# Patient Record
Sex: FEMALE | Race: WHITE | NOT HISPANIC OR LATINO | Employment: FULL TIME | URBAN - METROPOLITAN AREA
[De-identification: names, ages, dates, MRNs, and addresses within clinical notes are randomized per-mention and may not be internally consistent; named-entity substitution may affect disease eponyms.]

---

## 2021-12-16 ENCOUNTER — PATIENT OUTREACH (OUTPATIENT)
Dept: ONCOLOGY | Facility: CLINIC | Age: 41
End: 2021-12-16
Payer: COMMERCIAL

## 2021-12-16 ENCOUNTER — TRANSCRIBE ORDERS (OUTPATIENT)
Dept: OTHER | Age: 41
End: 2021-12-16

## 2021-12-16 ENCOUNTER — DOCUMENTATION ONLY (OUTPATIENT)
Dept: ONCOLOGY | Facility: CLINIC | Age: 41
End: 2021-12-16

## 2021-12-16 DIAGNOSIS — C85.90 NHL (NON-HODGKIN'S LYMPHOMA) (H): Primary | ICD-10-CM

## 2021-12-16 NOTE — PROGRESS NOTES
Action December 16, 2021 10:06 AM ABT   Action Taken Called and spoke to patient in regards to CE records. Patient gave consent to pull records. CE updated and Image request sent to

## 2021-12-17 NOTE — PROGRESS NOTES
New Patient Hematology / Oncology Nurse Navigator Note     Referral Date: 12/16/21    Referral notes from call center:        Self referral for transfer of care for new dx of Hodgkins lymphoma. Medical records are at [address] [address]. Ph. [phone]. Referral from telephone call with the patient           Clinical History (per Nurse review of records provided):      12/13/21 Lymph node, left axilla, needle core biopsy at [facility] -- BOOKMARKED    12/4/21 [facility] office visit note -- BOOKMARKED    12/9/21 MM MAMMOGRAM DIAG BILAT W 3D BARB, MM US AXILLARY ONLY LT  LOCATION: [facility] --- BOOKMARKED    Clinical Assessment / Barriers to Care (Per Nurse):  Pt lives in [facility]      Records Location: Care Everywhere     Referral updates and Plan:   December 17, 2021 OUTGOING CALL to pt, no answer, left detailed VM: Introduced my role as nurse navigator with [facility] Hematology/Oncology dept and that we have recd the referral and she can call the number below to schedule consult, provided my contact information if questions, scheduling team to call pt today as well if she does not return my call to schedule..  Batsheva Walton, RN, BSN, OCN  Hematology/Oncology Nurse Navigator   [facility]  [phone]

## 2021-12-22 ENCOUNTER — ONCOLOGY VISIT (OUTPATIENT)
Dept: ONCOLOGY | Facility: CLINIC | Age: 41
End: 2021-12-22
Attending: INTERNAL MEDICINE
Payer: COMMERCIAL

## 2021-12-22 VITALS
HEART RATE: 109 BPM | HEIGHT: 67 IN | SYSTOLIC BLOOD PRESSURE: 114 MMHG | WEIGHT: 177 LBS | RESPIRATION RATE: 16 BRPM | OXYGEN SATURATION: 97 % | DIASTOLIC BLOOD PRESSURE: 76 MMHG | BODY MASS INDEX: 27.78 KG/M2

## 2021-12-22 DIAGNOSIS — C81.70 CLASSICAL HODGKIN LYMPHOMA (H): Primary | ICD-10-CM

## 2021-12-22 LAB
ALBUMIN SERPL-MCNC: 3.6 G/DL (ref 3.5–5)
ALP SERPL-CCNC: 86 U/L (ref 45–120)
ALT SERPL W P-5'-P-CCNC: <9 U/L (ref 0–45)
ANION GAP SERPL CALCULATED.3IONS-SCNC: 8 MMOL/L (ref 5–18)
AST SERPL W P-5'-P-CCNC: 10 U/L (ref 0–40)
BASOPHILS # BLD AUTO: 0 10E3/UL (ref 0–0.2)
BASOPHILS NFR BLD AUTO: 0 %
BILIRUB SERPL-MCNC: 0.2 MG/DL (ref 0–1)
BUN SERPL-MCNC: 16 MG/DL (ref 8–22)
CALCIUM SERPL-MCNC: 9.6 MG/DL (ref 8.5–10.5)
CHLORIDE BLD-SCNC: 106 MMOL/L (ref 98–107)
CO2 SERPL-SCNC: 26 MMOL/L (ref 22–31)
CREAT SERPL-MCNC: 0.85 MG/DL (ref 0.6–1.1)
EOSINOPHIL # BLD AUTO: 0.2 10E3/UL (ref 0–0.7)
EOSINOPHIL NFR BLD AUTO: 2 %
ERYTHROCYTE [DISTWIDTH] IN BLOOD BY AUTOMATED COUNT: 12.4 % (ref 10–15)
ERYTHROCYTE [SEDIMENTATION RATE] IN BLOOD BY WESTERGREN METHOD: 23 MM/HR (ref 0–20)
GFR SERPL CREATININE-BSD FRML MDRD: 87 ML/MIN/1.73M2
GLUCOSE BLD-MCNC: 100 MG/DL (ref 70–125)
HCT VFR BLD AUTO: 39.4 % (ref 35–47)
HGB BLD-MCNC: 12.7 G/DL (ref 11.7–15.7)
IMM GRANULOCYTES # BLD: 0 10E3/UL
IMM GRANULOCYTES NFR BLD: 0 %
LDH SERPL L TO P-CCNC: 190 U/L (ref 125–220)
LYMPHOCYTES # BLD AUTO: 1.6 10E3/UL (ref 0.8–5.3)
LYMPHOCYTES NFR BLD AUTO: 15 %
MCH RBC QN AUTO: 28.3 PG (ref 26.5–33)
MCHC RBC AUTO-ENTMCNC: 32.2 G/DL (ref 31.5–36.5)
MCV RBC AUTO: 88 FL (ref 78–100)
MONOCYTES # BLD AUTO: 0.8 10E3/UL (ref 0–1.3)
MONOCYTES NFR BLD AUTO: 7 %
NEUTROPHILS # BLD AUTO: 7.7 10E3/UL (ref 1.6–8.3)
NEUTROPHILS NFR BLD AUTO: 76 %
NRBC # BLD AUTO: 0 10E3/UL
NRBC BLD AUTO-RTO: 0 /100
PLATELET # BLD AUTO: 424 10E3/UL (ref 150–450)
POTASSIUM BLD-SCNC: 4.3 MMOL/L (ref 3.5–5)
PROT SERPL-MCNC: 7.6 G/DL (ref 6–8)
RBC # BLD AUTO: 4.49 10E6/UL (ref 3.8–5.2)
SODIUM SERPL-SCNC: 140 MMOL/L (ref 136–145)
WBC # BLD AUTO: 10.2 10E3/UL (ref 4–11)

## 2021-12-22 PROCEDURE — 99205 OFFICE O/P NEW HI 60 MIN: CPT | Performed by: INTERNAL MEDICINE

## 2021-12-22 PROCEDURE — 80053 COMPREHEN METABOLIC PANEL: CPT | Performed by: INTERNAL MEDICINE

## 2021-12-22 PROCEDURE — 85025 COMPLETE CBC W/AUTO DIFF WBC: CPT | Performed by: INTERNAL MEDICINE

## 2021-12-22 PROCEDURE — 85652 RBC SED RATE AUTOMATED: CPT | Performed by: INTERNAL MEDICINE

## 2021-12-22 PROCEDURE — 83615 LACTATE (LD) (LDH) ENZYME: CPT | Performed by: INTERNAL MEDICINE

## 2021-12-22 PROCEDURE — 36415 COLL VENOUS BLD VENIPUNCTURE: CPT | Performed by: INTERNAL MEDICINE

## 2021-12-22 PROCEDURE — G0463 HOSPITAL OUTPT CLINIC VISIT: HCPCS

## 2021-12-22 RX ORDER — ESCITALOPRAM OXALATE 10 MG/1
1 TABLET ORAL DAILY
COMMUNITY
Start: 2021-08-03 | End: 2022-03-16

## 2021-12-22 RX ORDER — LORATADINE 10 MG/1
10 TABLET ORAL
COMMUNITY

## 2021-12-22 RX ORDER — SPIRONOLACTONE 50 MG/1
1 TABLET, FILM COATED ORAL 2 TIMES DAILY
COMMUNITY
Start: 2020-10-14 | End: 2022-03-16

## 2021-12-22 RX ORDER — DROSPIRENONE AND ETHINYL ESTRADIOL 0.02-3(28)
1 KIT ORAL DAILY
COMMUNITY
Start: 2021-08-03 | End: 2022-03-03

## 2021-12-22 RX ORDER — IRON,CARB/VIT C/VIT B12/FOLIC 100-250-1
1 TABLET ORAL DAILY
COMMUNITY

## 2021-12-22 ASSESSMENT — PAIN SCALES - GENERAL: PAINLEVEL: MILD PAIN (3)

## 2021-12-22 ASSESSMENT — MIFFLIN-ST. JEOR: SCORE: 1495.5

## 2021-12-22 NOTE — PROGRESS NOTES
"Oncology Rooming Note    December 22, 2021 1:58 PM   Amna Oneil is a 41 year old female who presents for:    Chief Complaint   Patient presents with     Oncology Clinic Visit     mew consult, non-hodgkins lymphoma      Initial Vitals: /76 (BP Location: Right arm, Patient Position: Sitting, Cuff Size: Adult Regular)   Pulse 109   Resp 16   Ht 1.702 m (5' 7\")   Wt 80.3 kg (177 lb)   SpO2 97%   BMI 27.72 kg/m   Estimated body mass index is 27.72 kg/m  as calculated from the following:    Height as of this encounter: 1.702 m (5' 7\").    Weight as of this encounter: 80.3 kg (177 lb). Body surface area is 1.95 meters squared.  Mild Pain (3) Comment: Data Unavailable   No LMP recorded.  Allergies reviewed: Yes  Medications reviewed: Yes    Medications: Medication refills not needed today.  Pharmacy name entered into [employer]: [facility] - [place], [place] - [address]    Clinical concerns:  New consult     Brenda Colin            "

## 2021-12-22 NOTE — PROGRESS NOTES
[facility] Hematology and Oncology Consult Note    Patient: Amna Oneil  MRN: [id number]  Date of Service: Dec 22, 2021       Reason for Visit    Chief Complaint   Patient presents with     Oncology Clinic Visit     mew consult, hodgkins lymphoma          Assessment/Plan    #.  Classical Hodgkin lymphoma of left axillary lymph node by core needle biopsy.   Reviewed the clinical course.  I reviewed the general information about lymphoma, Hodgkin lymphoma, resources for her to review, potential treatment options depending on the stage of the disease.   Recommended the pathology consultation at [place] as well.  I explained to her that the pathology diagnosis based on core needle biopsy could be adequate, however in some situation, we will need excisional biopsy for definitive diagnosis.  She voiced understanding.  We will expedite the pathology consult.   Will obtain PET/CT scan for staging and additional labs as below.   Follow-up with me after completion of PET scan and pathology evaluation.       ECOG Performance 0 - Independent    Encounter Diagnoses:    Problem List Items Addressed This Visit     None      Visit Diagnoses     Classical Hodgkin lymphoma (H)    -  Primary    Relevant Orders    CBC with Platelets & Differential (Completed)    Comprehensive metabolic panel (Completed)    Lactate Dehydrogenase (Completed)    ESR: Erythrocyte sedimentation rate (Completed)    Surgical Pathology Exam            ______________________________________________________________________________    Staging History  Cancer Staging  No matching staging information was found for the patient.      History    Ms. Amna Oneil is a very pleasant 41 year old presented today by her spouse, Christian.    She self palpated swollen lymph nodes in the left axilla in end of November and seek attention in early December 2021.  Feels it was felt to be related to Covid booster vaccination which she was on 11/16/2021.  However  she noted the axillary lymphadenopathy on the left side and the vaccine was given on the right shoulder.  She has wheezed with some shortness of breath about a few weeks duration prior to presentation.  She described shortness of breath as she is easily winded with activity. No chest pain. No drenching sweats, no fever.  She underwent diagnostic exam, ultrasound and left axillary lymph node biopsy.  Core needle biopsy of the left axillary lymph showed classical Hodgkin lymphoma.    She had left neck adenopathy evaluated 3 years ago was benign.  She had colonoscopy in November 2021 due to blood in stool for 6 months and found hemorrhoids.  She is post appendectomy about 10 years ago.  She does not have chronic major medical problems.    She does not smoke.  She occasionally drinks wine about 1 a month.  She uses edibles marijuana infrequently.  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  They have a 6-year-old child and he has 2 stepsons.  Her family is complete now and no plan for childbearing..    Family history is significant for multiple myeloma in maternal aunt, amyloidosis and maternal grandmother, breast cancer in maternal aunt, bladder cancer in paternal grandfather.      Review of systems.  Apart from describing in history, the remainder of comprehensive ROS was negative.    Past History    No past medical history on file.  No past surgical history on file.  No family history on file.  Social History     Socioeconomic History     Marital status:      Spouse name: None     Number of children: None     Years of education: None     Highest education level: None   Occupational History     None   Tobacco Use     Smoking status: Never Smoker     Smokeless tobacco: Never Used   Substance and Sexual Activity     Alcohol use: Not Currently     Drug use: Yes     Types: Marijuana     Comment: edibles     Sexual activity: None   Other Topics Concern     None   Social History Narrative     None     Social  "Determinants of Health     Financial Resource Strain: Not on file   Food Insecurity: Not on file   Transportation Needs: Not on file   Physical Activity: Not on file   Stress: Not on file   Social Connections: Not on file   Intimate Partner Violence: Not on file   Housing Stability: Not on file         Allergies    Allergies   Allergen Reactions     Avocado GI Disturbance         Physical Exam    /76 (BP Location: Right arm, Patient Position: Sitting, Cuff Size: Adult Regular)   Pulse 109   Resp 16   Ht 1.702 m (5' 7\")   Wt 80.3 kg (177 lb)   SpO2 97%   BMI 27.72 kg/m        General: alert, awake, not in acute distress  HEENT: Head: Normal, normocephalic, atraumatic.  Eye: Normal external eye, conjunctiva, lids cornea, PATRICIA.  Nose: Normal external nose, mucus membranes and septum.  Pharynx: Normal buccal mucosa. Normal pharynx.  Neck / Thyroid: Supple, no masses, nodes, nodules or enlargement.  Lymphatics: small rubbery palpable adenopathy in bilateral axilla.  About a pea-sized firm lymph node in the anterior cervical chain.    Chest: Normal chest wall and respirations. Clear to auscultation.  Heart: S1 S2 RRR, no murmur.   Abdomen: abdomen is soft without significant tenderness, masses, organomegaly or guarding  Extremities: normal strength, tone, and muscle mass  Skin: normal. no rash or abnormalities  CNS: non focal.      Lab Results    Recent Results (from the past 168 hour(s))   Comprehensive metabolic panel   Result Value Ref Range    Sodium 140 136 - 145 mmol/L    Potassium 4.3 3.5 - 5.0 mmol/L    Chloride 106 98 - 107 mmol/L    Carbon Dioxide (CO2) 26 22 - 31 mmol/L    Anion Gap 8 5 - 18 mmol/L    Urea Nitrogen 16 8 - 22 mg/dL    Creatinine 0.85 0.60 - 1.10 mg/dL    Calcium 9.6 8.5 - 10.5 mg/dL    Glucose 100 70 - 125 mg/dL    Alkaline Phosphatase 86 45 - 120 U/L    AST 10 0 - 40 U/L    ALT <9 0 - 45 U/L    Protein Total 7.6 6.0 - 8.0 g/dL    Albumin 3.6 3.5 - 5.0 g/dL    Bilirubin Total 0.2 0.0 " - 1.0 mg/dL    GFR Estimate 87 >60 mL/min/1.73m2   Lactate Dehydrogenase   Result Value Ref Range    Lactate Dehydrogenase 190 125 - 220 U/L   ESR: Erythrocyte sedimentation rate   Result Value Ref Range    Erythrocyte Sedimentation Rate 23 (H) 0 - 20 mm/hr   CBC with platelets and differential   Result Value Ref Range    WBC Count 10.2 4.0 - 11.0 10e3/uL    RBC Count 4.49 3.80 - 5.20 10e6/uL    Hemoglobin 12.7 11.7 - 15.7 g/dL    Hematocrit 39.4 35.0 - 47.0 %    MCV 88 78 - 100 fL    MCH 28.3 26.5 - 33.0 pg    MCHC 32.2 31.5 - 36.5 g/dL    RDW 12.4 10.0 - 15.0 %    Platelet Count 424 150 - 450 10e3/uL    % Neutrophils 76 %    % Lymphocytes 15 %    % Monocytes 7 %    % Eosinophils 2 %    % Basophils 0 %    % Immature Granulocytes 0 %    NRBCs per 100 WBC 0 <1 /100    Absolute Neutrophils 7.7 1.6 - 8.3 10e3/uL    Absolute Lymphocytes 1.6 0.8 - 5.3 10e3/uL    Absolute Monocytes 0.8 0.0 - 1.3 10e3/uL    Absolute Eosinophils 0.2 0.0 - 0.7 10e3/uL    Absolute Basophils 0.0 0.0 - 0.2 10e3/uL    Absolute Immature Granulocytes 0.0 <=0.4 10e3/uL    Absolute NRBCs 0.0 10e3/uL   Glucose by meter   Result Value Ref Range    GLUCOSE BY METER POCT 102 (H) 70 - 99 mg/dL       Imaging Results      Signed by: Juan Lizarraga MD

## 2021-12-22 NOTE — LETTER
"    12/22/2021         RE: Amna Oneil  [address]  [place] [address]        Dear Colleague,    Thank you for referring your patient, Amna Oneil, to the [facility] [place]. Please see a copy of my visit note below.    Oncology Rooming Note    December 22, 2021 1:58 PM   Amna Oneil is a 41 year old female who presents for:    Chief Complaint   Patient presents with     Oncology Clinic Visit     mew consult, non-hodgkins lymphoma      Initial Vitals: /76 (BP Location: Right arm, Patient Position: Sitting, Cuff Size: Adult Regular)   Pulse 109   Resp 16   Ht 1.702 m (5' 7\")   Wt 80.3 kg (177 lb)   SpO2 97%   BMI 27.72 kg/m   Estimated body mass index is 27.72 kg/m  as calculated from the following:    Height as of this encounter: 1.702 m (5' 7\").    Weight as of this encounter: 80.3 kg (177 lb). Body surface area is 1.95 meters squared.  Mild Pain (3) Comment: Data Unavailable   No LMP recorded.  Allergies reviewed: Yes  Medications reviewed: Yes    Medications: Medication refills not needed today.  Pharmacy name entered into [employer]: [facility] -- [place] - [place], [place] - [address]    Clinical concerns:  New consult     Brenda Colin              [facility] Hematology and Oncology Consult Note    Patient: Amna Oneil  MRN: [id number]  Date of Service: Dec 22, 2021       Reason for Visit    Chief Complaint   Patient presents with     Oncology Clinic Visit     mew consult, hodgkins lymphoma          Assessment/Plan    #.  Classical Hodgkin lymphoma of left axillary lymph node by core needle biopsy.   Reviewed the clinical course.  I reviewed the general information about lymphoma, Hodgkin lymphoma, resources for her to review, potential treatment options depending on the stage of the disease.   Recommended the pathology consultation at [place] as well.  I explained to her that the pathology diagnosis based on core needle " biopsy could be adequate, however in some situation, we will need excisional biopsy for definitive diagnosis.  She voiced understanding.  We will expedite the pathology consult.   Will obtain PET/CT scan for staging and additional labs as below.   Follow-up with me after completion of PET scan and pathology evaluation.       ECOG Performance 0 - Independent    Encounter Diagnoses:    Problem List Items Addressed This Visit     None      Visit Diagnoses     Classical Hodgkin lymphoma (H)    -  Primary    Relevant Orders    CBC with Platelets & Differential (Completed)    Comprehensive metabolic panel (Completed)    Lactate Dehydrogenase (Completed)    ESR: Erythrocyte sedimentation rate (Completed)    Surgical Pathology Exam            ______________________________________________________________________________    Staging History  Cancer Staging  No matching staging information was found for the patient.      History    Ms. Amna Oneil is a very pleasant 41 year old presented today by her spouse, Christian.    She self palpated swollen lymph nodes in the left axilla in end of November and seek attention in early December 2021.  Feels it was felt to be related to Covid booster vaccination which she was on 11/16/2021.  However she noted the axillary lymphadenopathy on the left side and the vaccine was given on the right shoulder.  She has wheezed with some shortness of breath about a few weeks duration prior to presentation.  She described shortness of breath as she is easily winded with activity. No chest pain. No drenching sweats, no fever.  She underwent diagnostic exam, ultrasound and left axillary lymph node biopsy.  Core needle biopsy of the left axillary lymph showed classical Hodgkin lymphoma.    She had left neck adenopathy evaluated 3 years ago was benign.  She had colonoscopy in November 2021 due to blood in stool for 6 months and found hemorrhoids.  She is post appendectomy about 10 years ago.  She does  "not have chronic major medical problems.    She does not smoke.  She occasionally drinks wine about 1 a month.  She uses edibles marijuana infrequently.  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  They have a 6-year-old child and he has 2 stepsons.  Her family is complete now and no plan for childbearing..    Family history is significant for multiple myeloma in maternal aunt, amyloidosis and maternal grandmother, breast cancer in maternal aunt, bladder cancer in paternal grandfather.      Review of systems.  Apart from describing in history, the remainder of comprehensive ROS was negative.    Past History    No past medical history on file.  No past surgical history on file.  No family history on file.  Social History     Socioeconomic History     Marital status:      Spouse name: None     Number of children: None     Years of education: None     Highest education level: None   Occupational History     None   Tobacco Use     Smoking status: Never Smoker     Smokeless tobacco: Never Used   Substance and Sexual Activity     Alcohol use: Not Currently     Drug use: Yes     Types: Marijuana     Comment: edibles     Sexual activity: None   Other Topics Concern     None   Social History Narrative     None     Social Determinants of Health     Financial Resource Strain: Not on file   Food Insecurity: Not on file   Transportation Needs: Not on file   Physical Activity: Not on file   Stress: Not on file   Social Connections: Not on file   Intimate Partner Violence: Not on file   Housing Stability: Not on file         Allergies    Allergies   Allergen Reactions     Avocado GI Disturbance         Physical Exam    /76 (BP Location: Right arm, Patient Position: Sitting, Cuff Size: Adult Regular)   Pulse 109   Resp 16   Ht 1.702 m (5' 7\")   Wt 80.3 kg (177 lb)   SpO2 97%   BMI 27.72 kg/m        General: alert, awake, not in acute distress  HEENT: Head: Normal, normocephalic, atraumatic.  Eye: Normal " external eye, conjunctiva, lids cornea, PATRICIA.  Nose: Normal external nose, mucus membranes and septum.  Pharynx: Normal buccal mucosa. Normal pharynx.  Neck / Thyroid: Supple, no masses, nodes, nodules or enlargement.  Lymphatics: small rubbery palpable adenopathy in bilateral axilla.  About a pea-sized firm lymph node in the anterior cervical chain.    Chest: Normal chest wall and respirations. Clear to auscultation.  Heart: S1 S2 RRR, no murmur.   Abdomen: abdomen is soft without significant tenderness, masses, organomegaly or guarding  Extremities: normal strength, tone, and muscle mass  Skin: normal. no rash or abnormalities  CNS: non focal.      Lab Results    Recent Results (from the past 168 hour(s))   Comprehensive metabolic panel   Result Value Ref Range    Sodium 140 136 - 145 mmol/L    Potassium 4.3 3.5 - 5.0 mmol/L    Chloride 106 98 - 107 mmol/L    Carbon Dioxide (CO2) 26 22 - 31 mmol/L    Anion Gap 8 5 - 18 mmol/L    Urea Nitrogen 16 8 - 22 mg/dL    Creatinine 0.85 0.60 - 1.10 mg/dL    Calcium 9.6 8.5 - 10.5 mg/dL    Glucose 100 70 - 125 mg/dL    Alkaline Phosphatase 86 45 - 120 U/L    AST 10 0 - 40 U/L    ALT <9 0 - 45 U/L    Protein Total 7.6 6.0 - 8.0 g/dL    Albumin 3.6 3.5 - 5.0 g/dL    Bilirubin Total 0.2 0.0 - 1.0 mg/dL    GFR Estimate 87 >60 mL/min/1.73m2   Lactate Dehydrogenase   Result Value Ref Range    Lactate Dehydrogenase 190 125 - 220 U/L   ESR: Erythrocyte sedimentation rate   Result Value Ref Range    Erythrocyte Sedimentation Rate 23 (H) 0 - 20 mm/hr   CBC with platelets and differential   Result Value Ref Range    WBC Count 10.2 4.0 - 11.0 10e3/uL    RBC Count 4.49 3.80 - 5.20 10e6/uL    Hemoglobin 12.7 11.7 - 15.7 g/dL    Hematocrit 39.4 35.0 - 47.0 %    MCV 88 78 - 100 fL    MCH 28.3 26.5 - 33.0 pg    MCHC 32.2 31.5 - 36.5 g/dL    RDW 12.4 10.0 - 15.0 %    Platelet Count 424 150 - 450 10e3/uL    % Neutrophils 76 %    % Lymphocytes 15 %    % Monocytes 7 %    % Eosinophils 2 %    %  Basophils 0 %    % Immature Granulocytes 0 %    NRBCs per 100 WBC 0 <1 /100    Absolute Neutrophils 7.7 1.6 - 8.3 10e3/uL    Absolute Lymphocytes 1.6 0.8 - 5.3 10e3/uL    Absolute Monocytes 0.8 0.0 - 1.3 10e3/uL    Absolute Eosinophils 0.2 0.0 - 0.7 10e3/uL    Absolute Basophils 0.0 0.0 - 0.2 10e3/uL    Absolute Immature Granulocytes 0.0 <=0.4 10e3/uL    Absolute NRBCs 0.0 10e3/uL   Glucose by meter   Result Value Ref Range    GLUCOSE BY METER POCT 102 (H) 70 - 99 mg/dL       Imaging Results      Signed by: Juan Lizarraga MD         Again, thank you for allowing me to participate in the care of your patient.        Sincerely,        Juan Lizarraga MD

## 2021-12-23 ENCOUNTER — HOSPITAL ENCOUNTER (OUTPATIENT)
Dept: PET IMAGING | Facility: HOSPITAL | Age: 41
Discharge: HOME | End: 2021-12-23
Attending: INTERNAL MEDICINE | Admitting: INTERNAL MEDICINE
Payer: COMMERCIAL

## 2021-12-23 DIAGNOSIS — C81.70 CLASSICAL HODGKIN LYMPHOMA (H): ICD-10-CM

## 2021-12-23 LAB — GLUCOSE BLDC GLUCOMTR-MCNC: 102 MG/DL (ref 70–99)

## 2021-12-23 PROCEDURE — 250N000011 HC RX IP 250 OP 636: Performed by: INTERNAL MEDICINE

## 2021-12-23 PROCEDURE — 71260 CT THORAX DX C+: CPT | Mod: 26 | Performed by: STUDENT IN AN ORGANIZED HEALTH CARE EDUCATION/TRAINING PROGRAM

## 2021-12-23 PROCEDURE — 71260 CT THORAX DX C+: CPT | Mod: 59,PI

## 2021-12-23 PROCEDURE — 82962 GLUCOSE BLOOD TEST: CPT

## 2021-12-23 PROCEDURE — A9552 F18 FDG: HCPCS | Performed by: INTERNAL MEDICINE

## 2021-12-23 PROCEDURE — 74177 CT ABD & PELVIS W/CONTRAST: CPT | Mod: 26 | Performed by: STUDENT IN AN ORGANIZED HEALTH CARE EDUCATION/TRAINING PROGRAM

## 2021-12-23 PROCEDURE — 78816 PET IMAGE W/CT FULL BODY: CPT | Mod: 26 | Performed by: STUDENT IN AN ORGANIZED HEALTH CARE EDUCATION/TRAINING PROGRAM

## 2021-12-23 PROCEDURE — 343N000001 HC RX 343: Performed by: INTERNAL MEDICINE

## 2021-12-23 RX ORDER — IOPAMIDOL 755 MG/ML
86 INJECTION, SOLUTION INTRAVASCULAR ONCE
Status: COMPLETED | OUTPATIENT
Start: 2021-12-23 | End: 2021-12-23

## 2021-12-23 RX ADMIN — FLUDEOXYGLUCOSE F-18 10.65 MCI.: 500 INJECTION, SOLUTION INTRAVENOUS at 10:09

## 2021-12-23 RX ADMIN — IOPAMIDOL 86 ML: 755 INJECTION, SOLUTION INTRAVENOUS at 11:11

## 2021-12-28 ENCOUNTER — TELEPHONE (OUTPATIENT)
Dept: ONCOLOGY | Facility: CLINIC | Age: 41
End: 2021-12-28

## 2021-12-28 ENCOUNTER — E-VISIT (OUTPATIENT)
Dept: URGENT CARE | Facility: URGENT CARE | Age: 41
End: 2021-12-28
Payer: COMMERCIAL

## 2021-12-28 DIAGNOSIS — Z20.822 CLOSE EXPOSURE TO 2019 NOVEL CORONAVIRUS: Primary | ICD-10-CM

## 2021-12-28 PROCEDURE — 99421 OL DIG E/M SVC 5-10 MIN: CPT | Performed by: PHYSICIAN ASSISTANT

## 2021-12-28 NOTE — PATIENT INSTRUCTIONS
Dear Amna,    Based on your exposure to COVID-19 (coronavirus), we would like to test you for this virus. I have placed an order for this test. The best time for testing is 5-7 days after the exposure.    How to schedule:  Go to your [facility] home page and scroll down to the section that says  You have an appointment that needs to be scheduled  and click the large green button that says  Schedule Now  and follow the steps to find the next available opening.     If you are unable to complete these [facility] scheduling steps, please call [phone] to schedule your testing.     Monoclonal antibody treatment after exposure:  Because you have been exposed to COVID-19, you may be able to receive a treatment with monoclonal antibodies. This treatment can lower your risk of severe illness and going to the hospital. It is given through an IV or under your skin (subcutaneous) and must be given at an infusion center.   To be eligible, you must be 12 years or older, at least 88 pounds and:    Are not fully vaccinated against COVID-19, OR    Are immunocompromised     If you would like to sign up to be considered to receive the monoclonal antibody medicine, please complete a participation form through the [facility] here:  MNRAP ([email or web address]/[email or web address]/[email or web address]). You may also call the [facility] COVID-19 Public Hotline at 1-903.265.6270 (open Mon-Fri: 9am-7pm and Sat: 10am-6pm).     Not all people who are eligible will receive the medicine since supply is limited. You will be contacted in the next 1 to 2 business days only if you are selected. If you do not receive a call, you have not been selected to receive the medicine. If you have any questions about this medication, please contact your primary care provider. For more information, see [email or web address]/[email or web address]/[email or web address]/[email or web address]    Return to work/school/ guidance:   Please let your workplace manager and  staffing office know when your quarantine ends. We cannot give you an exact date as it depends on the information below. You can calculate this on your own or work with your manager/staffing office to calculate this.    Quarantine Guidelines:  You are considered exposed if you have been within 6 feet of an infected person(s) for 15 minutes or more over a 24-hour period. Quarantine will start after the LAST time you had contact with the infected person while they were contagious (for example, if you saw someone on Monday and Wednesday, your quarantine would start after Wednesday).     If you have NO symptoms (asymptomatic):    Stay home for 14 days (quarantine) after your LAST contact with a person who has COVID-19 (this remains the CDC recommendation for greatest protection against spread of COVID-19), OR    10-day quarantine with no test, OR    Minimum 7-day quarantine with negative RT-PCR test collected on day 5 or later    Quarantine Guideline exceptions:    People who have been fully vaccinated do not need to quarantine unless they have symptoms. You are considered fully vaccinated 2 weeks after your 2nd dose in a 2-dose series or 2 weeks after a single-dose series. This includes vaccinated health care workers.  o Fully vaccinated people should still get tested 5-7 days after exposure, even if they don t have symptoms.   Note: If you have ongoing exposure to the COVID-positive person, this quarantine period may be more than 14 days. For example, if you continue to be exposed to your child who tested positive and cannot isolate from them, then the quarantine of 7-14 days can't start until your child is no longer contagious. This is typically 10 days from onset of the child's symptoms. So, the total duration may be 17-24 days in this case.   Please contact your doctor if you have questions or call the [facility] Public Hotline: 1-215.938.7789 (Mon-Fri: 9am-7pm and Sat: 10am-6pm).     How to Quarantine:     Monitor your  symptoms until 14 days after your last exposure. If you develop signs or symptoms, isolate and get tested (even if you have been tested already).    Stay home and away from others. Don't go to school or anywhere else. Generally, quarantine means staying home from work but there are some exceptions to this. Please contact your workplace. Cover your mouth and nose with a face covering if you must be around other people.     Wash your hands and face often. Use soap and water.    What are the symptoms of COVID-19?  The most common symptoms are cough, fever and trouble breathing. Less common symptoms include headache, body aches, fatigue (feeling very tired), chills, sore throat, stuffy or runny nose, diarrhea (loose poop), loss of taste or smell, belly pain, and nausea or vomiting (feeling sick to your stomach or throwing up).  If you develop symptoms, follow these guidelines:    If you're normally healthy: Please start another eVisit.    If you have a serious health problem (like cancer, heart failure, an organ transplant or kidney disease): Call your specialty clinic. Let them know that you might have COVID-19.    Where can I get more information?    [facility] - About COVID-19: [email or web address]     CDC - What to Do If You're Sick:     www.cdc.gov/coronavirus/2019-ncov/about/steps-when-sick.html    CDC - Ending Home Isolation:  https://www.cdc.gov/coronavirus/2019-ncov/your-health/quarantine-isolation.html    CDC - Caring for Someone:  www.cdc.gov/coronavirus/2019-ncov/if-you-are-sick/care-for-someone.html    [facility] clinical trials (COVID-19 research studies): clinicalaffairs.[facility]/umn-clinical-trials    Below are the COVID-19 hotlines at the [facility] of Health ([facility]). Interpreters are available.  o For health questions: Call 209-470-7385 or 1-243.264.4260 (7 am to 7 pm)  o For questions about schools and childcare: Call 222-068-2722 or 1-729.519.2225 (7 a.m. to 7  "p.m.)    December 28, 2021  RE:  Amna Oneil                                                                                                                   [address] [place] [address]      To whom it may concern:    I evaluated Amna Oneil on December 28, 2021. Amna Oneil should be excused from work/school.    They should let their workplace manager and staffing office know when their quarantine ends.    We can not give an exact date as it depends on the information below. They can calculate this on their own or work with their manager/staffing office to calculate this. (For example if they were exposed on 10/04, they would have to quarantine for 14 full days. That would be through 10/18. They could return on 10/19.)    Quarantine Guidelines:    Patients (\"contacts\") who have been in close prolonged contact of an infected person(s) (within six feet for at least 15 minutes within a 24 hour period) and remain asymptomatic should enter quarantine based on the following options:      14-day quarantine period (this remains the CDC recommendation for the greatest protection against spread of COVID-19) OR    Minimum 7-day quarantine with negative RT-PCR test collected on day 5 or later OR    10-day quarantine with no test   Quarantine Guideline exceptions are as follows:    People who have been fully vaccinated do not need to quarantine if the exposure was at least 2 weeks after the last vaccination. This includes vaccinated health care workers.    Not fully vaccinated and unvaccinated Individuals who work in health care, congregate care, or congregate living should be off work for 14 days from their last date of exposure. Community activities for this group can be resumed based on options above. Fully vaccinated individuals in this group do not need to quarantine from work after exposure.    Not fully vaccinated and unvaccinated people whose high-risk exposure was a household member should " always quarantine for 14 days from their last date of exposure. Fully vaccinated people in this category do not need to quarantine.    Not fully vaccinated or unvaccinated residents of congregate care and congregate living settings should always quarantine for 14 days from their last date of exposure. Fully vaccinated residents do not need to quarantine.    Note: If there is ongoing exposure to the covid positive person, this quarantine period may be longer than 14 days. (For example, if they are continually exposed to their child, who tested positive and cannot isolate from them, then the quarantine of 7-14 days can't start until their child is no longer contagious. This is typically 10 days from onset to the child's symptoms. So the total duration may be 17-24 days in this case.)    Amna Oneil should continue symptom monitoring until day 14 post-exposure. If they develop signs or symptoms of COVID-19, they should isolate and get tested (even if they have been tested already).    Sincerely,  Miguel Angel Galindo PA-C

## 2021-12-28 NOTE — TELEPHONE ENCOUNTER
Called [facility] and spoke with Sylvia ([phone], option 1) to follow up and see if the path slides were sent to [facility] yet for a second opinion read. She said that they were sent out yesterday to [facility] in [place] for review. They were requested by the hematology department (MD not listed) and patient has a consult on 1/7 at [place]. Dr. Lizarraga will be updated/ Indira Caro RN

## 2021-12-29 ENCOUNTER — TELEPHONE (OUTPATIENT)
Dept: ONCOLOGY | Facility: CLINIC | Age: 41
End: 2021-12-29
Payer: COMMERCIAL

## 2021-12-29 ENCOUNTER — LAB (OUTPATIENT)
Dept: LAB | Facility: CLINIC | Age: 41
End: 2021-12-29
Attending: PHYSICIAN ASSISTANT
Payer: COMMERCIAL

## 2021-12-29 DIAGNOSIS — Z20.822 CLOSE EXPOSURE TO 2019 NOVEL CORONAVIRUS: ICD-10-CM

## 2021-12-29 LAB — SARS-COV-2 RNA RESP QL NAA+PROBE: NEGATIVE

## 2021-12-29 PROCEDURE — U0003 INFECTIOUS AGENT DETECTION BY NUCLEIC ACID (DNA OR RNA); SEVERE ACUTE RESPIRATORY SYNDROME CORONAVIRUS 2 (SARS-COV-2) (CORONAVIRUS DISEASE [COVID-19]), AMPLIFIED PROBE TECHNIQUE, MAKING USE OF HIGH THROUGHPUT TECHNOLOGIES AS DESCRIBED BY CMS-2020-01-R: HCPCS

## 2021-12-29 PROCEDURE — U0005 INFEC AGEN DETEC AMPLI PROBE: HCPCS

## 2021-12-29 NOTE — TELEPHONE ENCOUNTER
Patient scheduled to see Dr. Lizarraga tomorrow, 12/30 to go over PET results and Pathology 2nd opinion.  Pathology 2nd opinion not completed as slides are at [facility]. Patient has consult on 1/7.     Dr. Lizarraga said patient may come as scheduled tomorrow to discuss PET results or reschedule until after follow-up with [facility]. Called and left message for patient to return my call/XENA Gonzales RN     Patient called back. I informed her that [facility] has her slides so unable to do 2nd opinion at [facility] until slides returned to [facility].  She said that she is planning to cancel her Jan 7 appt at [facility] and will do so after our phone call. She said she would like to keep her appointment with Dr. Lizarraga tomorrow to go over PET scan results. She changed to virtual visit as she recently was exposed to Covid.    Called [facility] ([phone], option 1) and talked with Monalisa to see how to get slides back from [facility]. Monalisa said slides were sent to [facility] on 12/21 and they should have received next day.  She said [facility] sends slides back automatically when they are done. She said once they receive the slides back, they can work on our request. She asked that I fax the requisition to them again to [phone].     Patient called back and said she canceled her appointment at [facility] and they are requesting [facility] call [phone] to request slides back.  Called this number and was told slides are internal and release would need to be requested in order to release slides.      I called Monalisa back at [facility] and notified her that release needs to be signed in order to release slides.  Monalisa said that they never have to call to get slides returned back, [facility] just automatically sends them back. She said that she will follow-up with [facility].  Faxed requisition at [phone].  Right fax confirmed faxed.    Dr. Lizarraga updated/XENA Gonzales, RN

## 2021-12-30 ENCOUNTER — VIRTUAL VISIT (OUTPATIENT)
Dept: ONCOLOGY | Facility: CLINIC | Age: 41
End: 2021-12-30
Attending: INTERNAL MEDICINE
Payer: COMMERCIAL

## 2021-12-30 VITALS — WEIGHT: 175 LBS | BODY MASS INDEX: 27.41 KG/M2

## 2021-12-30 DIAGNOSIS — C81.70 CLASSICAL HODGKIN LYMPHOMA (H): Primary | ICD-10-CM

## 2021-12-30 DIAGNOSIS — Z51.11 ENCOUNTER FOR ANTINEOPLASTIC CHEMOTHERAPY: ICD-10-CM

## 2021-12-30 DIAGNOSIS — F43.22 ADJUSTMENT DISORDER WITH ANXIOUS MOOD: ICD-10-CM

## 2021-12-30 PROCEDURE — 99215 OFFICE O/P EST HI 40 MIN: CPT | Mod: GT | Performed by: INTERNAL MEDICINE

## 2021-12-30 RX ORDER — OXYCODONE AND ACETAMINOPHEN 5; 325 MG/1; MG/1
1 TABLET ORAL ONCE
Status: CANCELLED | OUTPATIENT
Start: 2021-12-30 | End: 2021-12-30

## 2021-12-30 RX ORDER — LORAZEPAM 0.5 MG/1
0.5 TABLET ORAL EVERY 6 HOURS PRN
Qty: 30 TABLET | Refills: 0 | Status: SHIPPED | OUTPATIENT
Start: 2021-12-30 | End: 2022-11-16

## 2021-12-30 RX ORDER — LIDOCAINE HYDROCHLORIDE 10 MG/ML
10 INJECTION, SOLUTION EPIDURAL; INFILTRATION; INTRACAUDAL; PERINEURAL ONCE
Status: CANCELLED | OUTPATIENT
Start: 2021-12-30 | End: 2021-12-30

## 2021-12-30 RX ORDER — LORAZEPAM 1 MG/1
1 TABLET ORAL ONCE
Status: CANCELLED | OUTPATIENT
Start: 2021-12-30 | End: 2021-12-30

## 2021-12-30 ASSESSMENT — PAIN SCALES - GENERAL: PAINLEVEL: NO PAIN (0)

## 2021-12-30 NOTE — PROGRESS NOTES
Amna is a 41 year old who is being evaluated via a billable video visit.      How would you like to obtain your AVS? [employer]  If the video visit is dropped, the invitation should be resent by: Text to cell phone: [phone]  Will anyone else be joining your video visit? No    Video Start Time:                   Video-Visit Details    Type of service:  Video Visit    Video End Time:    Originating Location (pt. Location): Home    Distant Location (provider location):  [facility], located within [facility]     Platform used for Video Visit: Oximity

## 2021-12-30 NOTE — PROGRESS NOTES
[facility] Hematology and Oncology Progress Note    Patient: Amna Oneil  MRN: [id number]  Date of Service: Dec 30, 2021     This is a video visit.    Reason for Visit    Chief Complaint   Patient presents with     Oncology Clinic Visit       Assessment and Plan    Cancer Staging  No matching staging information was found for the patient.    ECOG Performance    0 - Independent     Pain  Pain Score: No Pain (0)    #.  Classical Hodgkin lymphoma   Reviewed the PET scan images independently and with the patient.  I showed the areas of concerns.  Discussed about likely consistent with Hodgkin lymphoma.  Awaiting pathology evaluation at [facility].  Currently slides are at [facility] and returning to [facility] before shipping out to [facility].  Will determine the need for additional biopsy.  I discussed the purpose of pathology confirmation is to exclude any evidence of non-Hodgkin lymphoma. Based on current finding, she has stage II (favorable/unfavorable risk) classical Hodgkin lymphoma.    She has variable marrow uptake possibly marrow reactivation.  She does not have cytopenia.  However, because of the FDG uptake in marrow, I recommended to complete bone marrow biopsy to confirm staging.   In preparation for chemotherapy, I recommended port placement, echocardiogram, pulmonary function test.   I discussed about likely treatment with ABVD, schedule, side effects and overall treatment plan.     #.  Situational anxiety   Ativan 0.5 mg every 6 hours as needed for short-term use.  Referral to psychotherapy was made today.    #.  Upper respiratory tract infection with Covid exposure   Advised her to push fluids, to do breathing exercises and get plenty of rest.    Encounter Diagnoses:    Problem List Items Addressed This Visit     None      Visit Diagnoses     Classical Hodgkin lymphoma (H)    -  Primary    Relevant Orders    Oncology Psychotherapist Referral    General  PFT Lab (Please always keep checked)    Pulmonary Function Test    IR Chest Port Placement > 5 Yrs of Age    Echocardiogram Complete    Bone marrow biopsy/aspiration    Adjustment disorder with anxious mood        Relevant Medications    LORazepam (ATIVAN) 0.5 MG tablet    Encounter for antineoplastic chemotherapy        Relevant Orders    General PFT Lab (Please always keep checked)    Pulmonary Function Test    IR Chest Port Placement > 5 Yrs of Age    Echocardiogram Complete             CC: Physician No Ref-Primary   ______________________________________________________________________________  Diagnosis  12/13/2021-classical Hodgkin lymphoma by left axillary lymph node core needle biopsy.  12/23/2021-PET/CT scan showed hypermetabolic lymph nodes involving basilar neck/superior mediastinum, mediastinum, right pericardial space, bilateral axillae.    Treatment to date  TBD    History of Present Illness    Ms. Amna Oneil presented today accompanied by her .  She had upper respiratory tract symptoms and had tested Covid x4 and negative.  Her  was Covid positive.  She is doing overall very good.  She admitted she has anxiety related to this diagnosis and staging work-up, treatment planning.  She had a history of anxiety over 15 years ago and she was on Ativan at that time.  No recent use of antidepressant or antianxiety.    Review of systems  Apart from describing in HPI, the remainder of comprehensive ROS was negative.    Past History    No past medical history on file.    No past surgical history on file.      Physical Exam    Wt 79.4 kg (175 lb)   BMI 27.41 kg/m      Limited video exam showed well-appearing female with no acute distress.    Lab Results    Recent Results (from the past 168 hour(s))   Asymptomatic COVID-19 Virus (Coronavirus) by PCR Nose    Specimen: Nose; Swab   Result Value Ref Range    SARS CoV2 PCR Negative Negative, Testing sent to reference lab. Results will be returned  via unsolicited result       Imaging    PET Oncology Whole Body    Result Date: 12/23/2021  Combined Report of:    PET and CT on  12/23/2021 11:39 AM : 1. PET of the neck, chest, abdomen, and pelvis. 2. PET CT Fusion for Attenuation Correction and Anatomical Localization:  3. Diagnostic CT scan of the chest, abdomen, and pelvis with intravenous contrast for interpretation. 4. 3D MIP and PET-CT fused images were processed on an independent workstation and archived to PACS and reviewed by a radiologist. Technique: 1. PET: The patient received 10.65 mCi of F-18-FDG; the serum glucose was 102 prior to administration, body weight was 80.3 kg. Images were evaluated in the axial, sagittal, and coronal planes as well as the rotational whole body MIP. Images were acquired from the Vertex to the Feet. UPTAKE WAS MEASURED AT 62 MINUTES. BACKGROUND:  Liver SUV max= 3.93,   Aorta Blood SUV Max: 2.97. 2. CT: Volumetric acquisition for clinical interpretation of the chest, abdomen, and pelvis acquired at 3 mm sections . The chest, abdomen, and pelvis were evaluated at 5 mm sections in bone, soft tissue, and lung windows.  The patient received 86 cc of Isovue 370 intravenously for the examination.  -- 3. 3D MIP and PET-CT fused images were processed on an independent workstation and archived to PACS and reviewed by a radiologist. INDICATION: Classical Hodgkin lymphoma (H) ADDITIONAL INFORMATION OBTAINED FROM EMR: Enlarged lymph nodes discovered on mammography. COMPARISON: None available. FINDINGS: HEAD/NECK: Hypermetabolic lymph nodes located within the basilar neck/superior mediastinum. For example: - Right level 7 lymph node which measures 1.2 x 0.8 cm (series 4, image 145 has an SUV max of 11.5. - Left level 7 lymph node which measures 1.3 x 1.0 cm (series 4, image 147 has an SUV max of 11.3. The paranasal sinuses are clear. The mastoid air cells are clear. The mucosal pharyngeal space, the , prevertebral and carotid  spaces are within normal limits. The thyroid gland is unremarkable. CHEST: Hypermetabolic lymph node conglomerate lesions located within throughout the mediastinum and the bilateral axillae. For example: - Hypermetabolic lymph node conglomerate mass in the superior anterior mediastinum (series 4, image 178) measures 5.3 x 2.3 cm with an SUV max of 8.9. -Hypermetabolic lymph node located at the aortopulmonic window (series 4, image 186) measures 2.1 x 1.6 cm and has an SUV max of 9.9. -Hypermetabolic lymph node located just to. 2 the diaphragm on the right and the right pericardial space (series 4, image 238) measures 1.4 x 1.0 cm and has an SUV max of 7.0. - Hypermetabolic right axillary lesion (series 4, image 175) measures 1.7 x 1.3 cm with an SUV max of 15.2. - Hypermetabolic left axillary lesion (series 4, image 174) measures 2.0 x 1.8 cm and has an SUV max of 12.3. - Scattered other hypermetabolic lymph nodes are located throughout the mediastinum. Focal area of peripheral groundglass in the right lower lobe which measures up to 6 mm (series 8, image 84) likely focal area of atelectasis. No concerning pulmonary nodules. There is no significant pericardial or pleural effusions. ABDOMEN AND PELVIS: There is no suspicious FDG uptake in the abdomen or pelvis. Hypermetabolic lesion near the dome of the liver corresponds with a right cardiophrenic lymph node. No hypermetabolic lymph nodes below the diaphragm. There are no definite intrahepatic or intrasplenic lesions. No splenomegaly. There are no suspicious adrenal mass lesions or opaque gallbladder calculi. The pancreas is unremarkable. There is symmetric nephrographic renal phase without hydronephrosis. There is no evidence for diverticulitis, bowel obstruction or free fluid.  Physiologic uptake in the cecum. LOWER EXTREMITIES: No hypermetabolic lesions. BONES: There is no abnormal FDG uptake in the skeleton. Mild relatively symmetric uptake throughout the axial  and proximal appendicular skeleton is favored to represent physiologic bone marrow uptake or bone marrow reactivation.     IMPRESSION: In this patient with a recent diagnosis of classical Hodgkin's lymphoma: 1. Hypermetabolic lymph node lesions throughout the base of the neck, mediastinum and bilateral axillae consistent with lymphoma. 2. No hypermetabolic lesions below the diaphragm. 3. No evidence for osseous or splenic involvement. I have personally reviewed the examination and initial interpretation and I agree with the findings. MARIELA ASH MD   SYSTEM ID:  [id number]    Video start time 2: 28 PM  Video end time 2: 58 PM  Platform used: [employer]    I spent 45 minutes on the date of service, of which greater than 50% of the time was spent on counseling and coordination of care, and discussion with radiation oncology, radiology.    Signed by: Juan Lizarraga MD

## 2022-01-04 ENCOUNTER — TELEPHONE (OUTPATIENT)
Dept: ONCOLOGY | Facility: CLINIC | Age: 42
End: 2022-01-04
Payer: COMMERCIAL

## 2022-01-04 DIAGNOSIS — Z11.59 ENCOUNTER FOR SCREENING FOR OTHER VIRAL DISEASES: ICD-10-CM

## 2022-01-04 NOTE — TELEPHONE ENCOUNTER
1/4/22:  Called [facility] ([phone], option 1) for update on slides.  Talked with Sylvia.  Sylvia said slides are still at [facility].  She said Monalisa is out today and will have her update me when she returns/XENA Gonzales RN     1/5/22:  Called [facility].  Talked with Sylvia.  Monalisa is out of the office again today. Per Sylvia, [facility] sent back slides yesterday. [facility] should receive today or tomorrow and once they receive they will send out to the [facility].  She said hopefully by the end of the week.    Dr. Lizarraga updated/XENA Gonzales RN    1/6/22:  Called [facility] again and talked to Danica. They have the slides are are sending out to the  today. Dr. Lizarraga updated/XENA Gonzales RN     1/10/22:  Called [facility] and talked to Sylvia to confirm slides sent to [facility].  She said Danica johnson on the 7th and they haven't received slides yet.  She said that [facility] is backed up in getting slides sent.  She said as of Fri they haven't been shipped back yet. She said as soon as they get, they will send out/XENA Gonzales RN     1/12/22:  Called [facility] and talked to Sylvia.  She said hasn't received specimen back yet.  She said that she called [facility] and they told her they were in distribution ready to be sent out. Sylvia and her team is aware we need slides urgently and once they receive they will send to [facility]. JAMES Gonzales    1/14/22:  Called [facility] and talked with Sylvia.  She said they called [facility] yesterday and said they still hadn't been sent yet. They were still in distribution center waiting to be sent out.  She said that the [facility] employee obtained the slides and mailed them herself yesterday.  She said they expect the slides today but haven't received their packages yet.  I told her I would follow up with them next week on status/XENA Gonzales RN

## 2022-01-11 ENCOUNTER — TRANSFERRED RECORDS (OUTPATIENT)
Dept: HEALTH INFORMATION MANAGEMENT | Facility: CLINIC | Age: 42
End: 2022-01-11

## 2022-01-11 ENCOUNTER — LAB (OUTPATIENT)
Dept: LAB | Facility: CLINIC | Age: 42
End: 2022-01-11
Attending: INTERNAL MEDICINE
Payer: COMMERCIAL

## 2022-01-11 DIAGNOSIS — Z11.59 ENCOUNTER FOR SCREENING FOR OTHER VIRAL DISEASES: ICD-10-CM

## 2022-01-11 PROCEDURE — U0005 INFEC AGEN DETEC AMPLI PROBE: HCPCS

## 2022-01-11 PROCEDURE — U0003 INFECTIOUS AGENT DETECTION BY NUCLEIC ACID (DNA OR RNA); SEVERE ACUTE RESPIRATORY SYNDROME CORONAVIRUS 2 (SARS-COV-2) (CORONAVIRUS DISEASE [COVID-19]), AMPLIFIED PROBE TECHNIQUE, MAKING USE OF HIGH THROUGHPUT TECHNOLOGIES AS DESCRIBED BY CMS-2020-01-R: HCPCS

## 2022-01-12 ENCOUNTER — APPOINTMENT (OUTPATIENT)
Dept: URGENT CARE | Facility: CLINIC | Age: 42
End: 2022-01-12
Payer: COMMERCIAL

## 2022-01-12 ENCOUNTER — TELEPHONE (OUTPATIENT)
Dept: ONCOLOGY | Facility: CLINIC | Age: 42
End: 2022-01-12
Payer: COMMERCIAL

## 2022-01-12 LAB — SARS-COV-2 RNA RESP QL NAA+PROBE: POSITIVE

## 2022-01-12 NOTE — TELEPHONE ENCOUNTER
1/12/22:  Message from DIDI Eugene,  called but unable to transfer call.  Also received another message on triage voicemail from patient's , Christian wanting to make sure received message from call earlier.  Call Christian at [phone] or Amna at [phone].    Patient had positive Covid test on 1/11/22 in preparation for port placement on 1/14/22.      Called patient reports her symptoms started on 12/27/21.  She said symptoms were nasal congestion. She said she has a little nasal drainage remaining but not sure if this is just due to cold winter.  She said she took rapid test on 1/2 and was positive. She took vault test on 1/6 and was positive. PCR done here on 12/29 and was negative.  She is wondering how appointments this week. Port placement has been canceled. I told patient I wanted to talk to my manager first before we cancel any appointments due to timing of previous + Covid tests. I told patient I would call her back with an update. She verbalized understanding.     Called lab and notified them of patient's situation.  Per Dr. Saad Edwards requesting bone marrow biopsy be rescheduled.    Discussed the above with ANA Cruz.  She will see if BM bx can be done at [facility].      Called [facility] to inform them of previous positive Covid tests. She said patient needs to be rescheduled 10 days past positive PCR on 1/11/21.    DIDI Eugene, Scheduling called ECHO and per Varsha, if patient is symptom free, she may come to her appointment tomorrow.     Called patient to let her know that she can attend her ECHO appointment tomorrow if she is symptom free. I told her we would call her tomorrow to get her port and bone marrow biopsy scheduled.  Patient verbalized understanding.    1/13/22:   Patient scheduled for port placement on 1/21, bone marrow biopsy with ANA Mercedes CNP on 1/18 at [facility], and appointment with Dr. Lizarraga on 1/26.  Patient updated on schedule changes by DIDI Eugene, as well as through sahil/XENA  Christian, RN

## 2022-01-13 ENCOUNTER — HOSPITAL ENCOUNTER (OUTPATIENT)
Dept: CARDIOLOGY | Facility: CLINIC | Age: 42
Discharge: HOME | End: 2022-01-13
Attending: INTERNAL MEDICINE | Admitting: INTERNAL MEDICINE
Payer: COMMERCIAL

## 2022-01-13 ENCOUNTER — MEDICAL CORRESPONDENCE (OUTPATIENT)
Dept: HEALTH INFORMATION MANAGEMENT | Facility: CLINIC | Age: 42
End: 2022-01-13

## 2022-01-13 DIAGNOSIS — C81.70 CLASSICAL HODGKIN LYMPHOMA (H): ICD-10-CM

## 2022-01-13 DIAGNOSIS — Z51.11 ENCOUNTER FOR ANTINEOPLASTIC CHEMOTHERAPY: ICD-10-CM

## 2022-01-13 PROCEDURE — 93306 TTE W/DOPPLER COMPLETE: CPT | Mod: 26 | Performed by: INTERNAL MEDICINE

## 2022-01-13 PROCEDURE — 93306 TTE W/DOPPLER COMPLETE: CPT

## 2022-01-17 NOTE — TELEPHONE ENCOUNTER
1/17/22- Call received from Obinna in Hematopathology at the [facility]. She has received the slides and be updating the system this afternoon or tomorrow. The results will be updated in Epic when ready.     She also wanted to confirm that the patient's next appt with Dr. Lizarraga was 1/26/22. RN confirmed this date.     Dr. Lizarraga will receive a copy of the results once ready. Alejandra Noland RN

## 2022-01-18 ENCOUNTER — DOCUMENTATION ONLY (OUTPATIENT)
Dept: ONCOLOGY | Facility: CLINIC | Age: 42
End: 2022-01-18
Payer: COMMERCIAL

## 2022-01-18 ENCOUNTER — PROCEDURE ONLY VISIT (OUTPATIENT)
Dept: ONCOLOGY | Facility: HOSPITAL | Age: 42
End: 2022-01-18
Attending: NURSE PRACTITIONER
Payer: COMMERCIAL

## 2022-01-18 ENCOUNTER — LAB (OUTPATIENT)
Dept: LAB | Facility: CLINIC | Age: 42
End: 2022-01-18
Payer: COMMERCIAL

## 2022-01-18 VITALS
RESPIRATION RATE: 16 BRPM | OXYGEN SATURATION: 99 % | DIASTOLIC BLOOD PRESSURE: 63 MMHG | HEIGHT: 67 IN | TEMPERATURE: 98.8 F | HEART RATE: 76 BPM | SYSTOLIC BLOOD PRESSURE: 108 MMHG | BODY MASS INDEX: 27.73 KG/M2 | WEIGHT: 176.7 LBS

## 2022-01-18 DIAGNOSIS — C81.70 CLASSICAL HODGKIN LYMPHOMA (H): ICD-10-CM

## 2022-01-18 DIAGNOSIS — C81.70 CLASSICAL HODGKIN LYMPHOMA (H): Primary | ICD-10-CM

## 2022-01-18 LAB
BASOPHILS # BLD AUTO: 0 10E3/UL (ref 0–0.2)
BASOPHILS NFR BLD AUTO: 0 %
EOSINOPHIL # BLD AUTO: 0.2 10E3/UL (ref 0–0.7)
EOSINOPHIL NFR BLD AUTO: 2 %
ERYTHROCYTE [DISTWIDTH] IN BLOOD BY AUTOMATED COUNT: 12.9 % (ref 10–15)
HCT VFR BLD AUTO: 40.1 % (ref 35–47)
HGB BLD-MCNC: 12.7 G/DL (ref 11.7–15.7)
IMM GRANULOCYTES # BLD: 0 10E3/UL
IMM GRANULOCYTES NFR BLD: 0 %
LYMPHOCYTES # BLD AUTO: 1.7 10E3/UL (ref 0.8–5.3)
LYMPHOCYTES NFR BLD AUTO: 17 %
MCH RBC QN AUTO: 27.8 PG (ref 26.5–33)
MCHC RBC AUTO-ENTMCNC: 31.7 G/DL (ref 31.5–36.5)
MCV RBC AUTO: 88 FL (ref 78–100)
MONOCYTES # BLD AUTO: 0.8 10E3/UL (ref 0–1.3)
MONOCYTES NFR BLD AUTO: 8 %
NEUTROPHILS # BLD AUTO: 7.3 10E3/UL (ref 1.6–8.3)
NEUTROPHILS NFR BLD AUTO: 73 %
NRBC # BLD AUTO: 0 10E3/UL
NRBC BLD AUTO-RTO: 0 /100
PLATELET # BLD AUTO: 398 10E3/UL (ref 150–450)
RBC # BLD AUTO: 4.57 10E6/UL (ref 3.8–5.2)
WBC # BLD AUTO: 10 10E3/UL (ref 4–11)

## 2022-01-18 PROCEDURE — 88189 FLOWCYTOMETRY/READ 16 & >: CPT | Mod: GC | Performed by: STUDENT IN AN ORGANIZED HEALTH CARE EDUCATION/TRAINING PROGRAM

## 2022-01-18 PROCEDURE — 88311 DECALCIFY TISSUE: CPT | Mod: TC | Performed by: INTERNAL MEDICINE

## 2022-01-18 PROCEDURE — 88321 CONSLTJ&REPRT SLD PREP ELSWR: CPT | Performed by: PATHOLOGY

## 2022-01-18 PROCEDURE — 85097 BONE MARROW INTERPRETATION: CPT | Performed by: PATHOLOGY

## 2022-01-18 PROCEDURE — 250N000013 HC RX MED GY IP 250 OP 250 PS 637: Performed by: INTERNAL MEDICINE

## 2022-01-18 PROCEDURE — 88313 SPECIAL STAINS GROUP 2: CPT | Mod: 26 | Performed by: PATHOLOGY

## 2022-01-18 PROCEDURE — 88291 CYTO/MOLECULAR REPORT: CPT | Performed by: MEDICAL GENETICS

## 2022-01-18 PROCEDURE — 88305 TISSUE EXAM BY PATHOLOGIST: CPT | Mod: 26 | Performed by: PATHOLOGY

## 2022-01-18 PROCEDURE — 38222 DX BONE MARROW BX & ASPIR: CPT | Performed by: NURSE PRACTITIONER

## 2022-01-18 PROCEDURE — 88185 FLOWCYTOMETRY/TC ADD-ON: CPT | Performed by: NURSE PRACTITIONER

## 2022-01-18 PROCEDURE — 85025 COMPLETE CBC W/AUTO DIFF WBC: CPT | Performed by: NURSE PRACTITIONER

## 2022-01-18 PROCEDURE — 88313 SPECIAL STAINS GROUP 2: CPT | Mod: TC,59 | Performed by: INTERNAL MEDICINE

## 2022-01-18 PROCEDURE — 88311 DECALCIFY TISSUE: CPT | Mod: 26 | Performed by: PATHOLOGY

## 2022-01-18 PROCEDURE — 88161 CYTOPATH SMEAR OTHER SOURCE: CPT | Mod: 26 | Performed by: PATHOLOGY

## 2022-01-18 PROCEDURE — 85060 BLOOD SMEAR INTERPRETATION: CPT | Performed by: PATHOLOGY

## 2022-01-18 PROCEDURE — 88237 TISSUE CULTURE BONE MARROW: CPT | Performed by: NURSE PRACTITIONER

## 2022-01-18 PROCEDURE — 36415 COLL VENOUS BLD VENIPUNCTURE: CPT | Performed by: NURSE PRACTITIONER

## 2022-01-18 RX ORDER — OXYCODONE AND ACETAMINOPHEN 5; 325 MG/1; MG/1
1 TABLET ORAL ONCE
Status: COMPLETED | OUTPATIENT
Start: 2022-01-18 | End: 2022-01-18

## 2022-01-18 RX ORDER — LORAZEPAM 1 MG/1
1 TABLET ORAL ONCE
Status: COMPLETED | OUTPATIENT
Start: 2022-01-18 | End: 2022-01-18

## 2022-01-18 RX ADMIN — LORAZEPAM 1 MG: 1 TABLET ORAL at 10:13

## 2022-01-18 RX ADMIN — OXYCODONE HYDROCHLORIDE AND ACETAMINOPHEN 1 TABLET: 5; 325 TABLET ORAL at 10:13

## 2022-01-18 ASSESSMENT — PAIN SCALES - GENERAL: PAINLEVEL: NO PAIN (0)

## 2022-01-18 ASSESSMENT — MIFFLIN-ST. JEOR: SCORE: 1494.26

## 2022-01-18 NOTE — PROGRESS NOTES
FMLA forms received via fax from:    [employer]  Fax : [phone]    Completed forms faxed to [phone] on 1/18/2022.  Copies made (2) one to scanning. One to file.     Sarah Corona LPN. [facility]  P. [phone]  F. [phone]

## 2022-01-19 ENCOUNTER — HOSPITAL ENCOUNTER (OUTPATIENT)
Dept: RESPIRATORY THERAPY | Facility: CLINIC | Age: 42
Discharge: HOME | End: 2022-01-19
Payer: COMMERCIAL

## 2022-01-19 DIAGNOSIS — C81.70 CLASSICAL HODGKIN LYMPHOMA (H): ICD-10-CM

## 2022-01-19 DIAGNOSIS — Z51.11 ENCOUNTER FOR ANTINEOPLASTIC CHEMOTHERAPY: ICD-10-CM

## 2022-01-19 PROCEDURE — 94375 RESPIRATORY FLOW VOLUME LOOP: CPT

## 2022-01-19 PROCEDURE — 94729 DIFFUSING CAPACITY: CPT

## 2022-01-19 PROCEDURE — 94726 PLETHYSMOGRAPHY LUNG VOLUMES: CPT

## 2022-01-20 LAB
DLCOCOR-%PRED-PRE: 93 %
DLCOCOR-PRE: 22.32 ML/MIN/MMHG
DLCOUNC-%PRED-PRE: 90 %
DLCOUNC-PRE: 21.82 ML/MIN/MMHG
DLCOUNC-PRED: 23.99 ML/MIN/MMHG
ERV-%PRED-PRE: 100 %
ERV-PRE: 1.04 L
ERV-PRED: 1.03 L
EXPTIME-PRE: 6.71 SEC
FEF2575-%PRED-PRE: 86 %
FEF2575-PRE: 2.85 L/SEC
FEF2575-PRED: 3.3 L/SEC
FEFMAX-%PRED-PRE: 94 %
FEFMAX-PRE: 7.02 L/SEC
FEFMAX-PRED: 7.41 L/SEC
FEV1-%PRED-PRE: 98 %
FEV1-PRE: 3.23 L
FEV1FEV6-PRE: 80 %
FEV1FEV6-PRED: 84 %
FEV1FVC-PRE: 80 %
FEV1FVC-PRED: 81 %
FEV1SVC-PRE: 83 %
FEV1SVC-PRED: 81 %
FIFMAX-PRE: 4.68 L/SEC
FRCPLETH-%PRED-PRE: 107 %
FRCPLETH-PRE: 3.07 L
FRCPLETH-PRED: 2.85 L
FVC-%PRED-PRE: 99 %
FVC-PRE: 4.04 L
FVC-PRED: 4.04 L
IC-%PRED-PRE: 91 %
IC-PRE: 2.74 L
IC-PRED: 3.01 L
PATH REPORT.COMMENTS IMP SPEC: NORMAL
PATH REPORT.FINAL DX SPEC: NORMAL
PATH REPORT.FINAL DX SPEC: NORMAL
PATH REPORT.MICROSCOPIC SPEC OTHER STN: NORMAL
PATH REPORT.RELEVANT HX SPEC: NORMAL
PATH REPORT.RELEVANT HX SPEC: NORMAL
RVPLETH-%PRED-PRE: 109 %
RVPLETH-PRE: 1.93 L
RVPLETH-PRED: 1.75 L
TLCPLETH-%PRED-PRE: 106 %
TLCPLETH-PRE: 5.81 L
TLCPLETH-PRED: 5.44 L
VA-%PRED-PRE: 95 %
VA-PRE: 5.32 L
VC-%PRED-PRE: 96 %
VC-PRE: 3.88 L
VC-PRED: 4.03 L

## 2022-01-21 ENCOUNTER — HOSPITAL ENCOUNTER (OUTPATIENT)
Dept: INTERVENTIONAL RADIOLOGY/VASCULAR | Facility: CLINIC | Age: 42
Discharge: HOME | End: 2022-01-21
Attending: INTERNAL MEDICINE | Admitting: RADIOLOGY
Payer: COMMERCIAL

## 2022-01-21 VITALS
SYSTOLIC BLOOD PRESSURE: 102 MMHG | RESPIRATION RATE: 17 BRPM | DIASTOLIC BLOOD PRESSURE: 65 MMHG | OXYGEN SATURATION: 96 % | HEART RATE: 65 BPM

## 2022-01-21 DIAGNOSIS — C81.70 CLASSICAL HODGKIN LYMPHOMA (H): ICD-10-CM

## 2022-01-21 DIAGNOSIS — Z51.11 ENCOUNTER FOR ANTINEOPLASTIC CHEMOTHERAPY: ICD-10-CM

## 2022-01-21 LAB
HCG UR QL: NEGATIVE
PATH REPORT.COMMENTS IMP SPEC: NORMAL
PATH REPORT.FINAL DX SPEC: NORMAL
PATH REPORT.GROSS SPEC: NORMAL
PATH REPORT.MICROSCOPIC SPEC OTHER STN: NORMAL
PATH REPORT.SITE OF ORIGIN SPEC: NORMAL

## 2022-01-21 PROCEDURE — 76937 US GUIDE VASCULAR ACCESS: CPT

## 2022-01-21 PROCEDURE — 250N000011 HC RX IP 250 OP 636: Performed by: RADIOLOGY

## 2022-01-21 PROCEDURE — 99152 MOD SED SAME PHYS/QHP 5/>YRS: CPT

## 2022-01-21 PROCEDURE — C1769 GUIDE WIRE: HCPCS

## 2022-01-21 PROCEDURE — 250N000009 HC RX 250: Performed by: NURSE PRACTITIONER

## 2022-01-21 PROCEDURE — 250N000009 HC RX 250: Performed by: RADIOLOGY

## 2022-01-21 PROCEDURE — 272N000500 HC NEEDLE CR2

## 2022-01-21 PROCEDURE — 81025 URINE PREGNANCY TEST: CPT | Performed by: NURSE PRACTITIONER

## 2022-01-21 PROCEDURE — 258N000003 HC RX IP 258 OP 636: Performed by: NURSE PRACTITIONER

## 2022-01-21 PROCEDURE — 272N000602 HC WOUND GLUE CR1

## 2022-01-21 PROCEDURE — 250N000011 HC RX IP 250 OP 636: Performed by: NURSE PRACTITIONER

## 2022-01-21 PROCEDURE — C1788 PORT, INDWELLING, IMP: HCPCS

## 2022-01-21 PROCEDURE — 36561 INSERT TUNNELED CV CATH: CPT

## 2022-01-21 RX ORDER — HEPARIN SODIUM,PORCINE 10 UNIT/ML
5-10 VIAL (ML) INTRAVENOUS EVERY 24 HOURS
Status: DISCONTINUED | OUTPATIENT
Start: 2022-01-21 | End: 2022-01-22 | Stop reason: HOSPADM

## 2022-01-21 RX ORDER — SODIUM CHLORIDE 9 MG/ML
INJECTION, SOLUTION INTRAVENOUS CONTINUOUS
Status: DISCONTINUED | OUTPATIENT
Start: 2022-01-21 | End: 2022-01-22 | Stop reason: HOSPADM

## 2022-01-21 RX ORDER — HEPARIN SODIUM 200 [USP'U]/100ML
1 INJECTION, SOLUTION INTRAVENOUS CONTINUOUS PRN
Status: DISCONTINUED | OUTPATIENT
Start: 2022-01-21 | End: 2022-01-22 | Stop reason: HOSPADM

## 2022-01-21 RX ORDER — LIDOCAINE HYDROCHLORIDE AND EPINEPHRINE 10; 10 MG/ML; UG/ML
1-10 INJECTION, SOLUTION INFILTRATION; PERINEURAL ONCE
Status: COMPLETED | OUTPATIENT
Start: 2022-01-21 | End: 2022-01-21

## 2022-01-21 RX ORDER — ACETAMINOPHEN 325 MG/1
650 TABLET ORAL
Status: DISCONTINUED | OUTPATIENT
Start: 2022-01-21 | End: 2022-01-22 | Stop reason: HOSPADM

## 2022-01-21 RX ORDER — NALOXONE HYDROCHLORIDE 0.4 MG/ML
0.2 INJECTION, SOLUTION INTRAMUSCULAR; INTRAVENOUS; SUBCUTANEOUS
Status: DISCONTINUED | OUTPATIENT
Start: 2022-01-21 | End: 2022-01-22 | Stop reason: HOSPADM

## 2022-01-21 RX ORDER — NALOXONE HYDROCHLORIDE 0.4 MG/ML
0.4 INJECTION, SOLUTION INTRAMUSCULAR; INTRAVENOUS; SUBCUTANEOUS
Status: DISCONTINUED | OUTPATIENT
Start: 2022-01-21 | End: 2022-01-22 | Stop reason: HOSPADM

## 2022-01-21 RX ORDER — CEFAZOLIN SODIUM 2 G/100ML
2 INJECTION, SOLUTION INTRAVENOUS
Status: COMPLETED | OUTPATIENT
Start: 2022-01-21 | End: 2022-01-21

## 2022-01-21 RX ORDER — HEPARIN SODIUM (PORCINE) LOCK FLUSH IV SOLN 100 UNIT/ML 100 UNIT/ML
5-10 SOLUTION INTRAVENOUS ONCE
Status: COMPLETED | OUTPATIENT
Start: 2022-01-21 | End: 2022-01-21

## 2022-01-21 RX ORDER — LIDOCAINE 40 MG/G
CREAM TOPICAL
Status: DISCONTINUED | OUTPATIENT
Start: 2022-01-21 | End: 2022-01-22 | Stop reason: HOSPADM

## 2022-01-21 RX ORDER — FLUMAZENIL 0.1 MG/ML
0.2 INJECTION, SOLUTION INTRAVENOUS
Status: DISCONTINUED | OUTPATIENT
Start: 2022-01-21 | End: 2022-01-22 | Stop reason: HOSPADM

## 2022-01-21 RX ORDER — HEPARIN SODIUM,PORCINE 10 UNIT/ML
5-10 VIAL (ML) INTRAVENOUS
Status: DISCONTINUED | OUTPATIENT
Start: 2022-01-21 | End: 2022-01-22 | Stop reason: HOSPADM

## 2022-01-21 RX ORDER — HEPARIN SODIUM (PORCINE) LOCK FLUSH IV SOLN 100 UNIT/ML 100 UNIT/ML
5-10 SOLUTION INTRAVENOUS
Status: DISCONTINUED | OUTPATIENT
Start: 2022-01-21 | End: 2022-01-22 | Stop reason: HOSPADM

## 2022-01-21 RX ORDER — FENTANYL CITRATE 50 UG/ML
25-50 INJECTION, SOLUTION INTRAMUSCULAR; INTRAVENOUS EVERY 5 MIN PRN
Status: DISCONTINUED | OUTPATIENT
Start: 2022-01-21 | End: 2022-01-22 | Stop reason: HOSPADM

## 2022-01-21 RX ADMIN — CEFAZOLIN SODIUM 2 G: 2 INJECTION, SOLUTION INTRAVENOUS at 07:39

## 2022-01-21 RX ADMIN — MIDAZOLAM HYDROCHLORIDE 1 MG: 1 INJECTION, SOLUTION INTRAMUSCULAR; INTRAVENOUS at 08:32

## 2022-01-21 RX ADMIN — LIDOCAINE HYDROCHLORIDE AND EPINEPHRINE 10 ML: 10; 10 INJECTION, SOLUTION INFILTRATION; PERINEURAL at 08:25

## 2022-01-21 RX ADMIN — SODIUM CHLORIDE: 9 INJECTION, SOLUTION INTRAVENOUS at 07:39

## 2022-01-21 RX ADMIN — FENTANYL CITRATE 50 MCG: 50 INJECTION INTRAMUSCULAR; INTRAVENOUS at 08:21

## 2022-01-21 RX ADMIN — HEPARIN SODIUM 1 BAG: 200 INJECTION, SOLUTION INTRAVENOUS at 08:27

## 2022-01-21 RX ADMIN — MIDAZOLAM HYDROCHLORIDE 1 MG: 1 INJECTION, SOLUTION INTRAMUSCULAR; INTRAVENOUS at 08:21

## 2022-01-21 RX ADMIN — LIDOCAINE HYDROCHLORIDE 10 ML: 10 INJECTION, SOLUTION EPIDURAL; INFILTRATION; INTRACAUDAL; PERINEURAL at 08:23

## 2022-01-21 RX ADMIN — FENTANYL CITRATE 50 MCG: 50 INJECTION INTRAMUSCULAR; INTRAVENOUS at 08:32

## 2022-01-21 RX ADMIN — HEPARIN SODIUM (PORCINE) LOCK FLUSH IV SOLN 100 UNIT/ML 5 ML: 100 SOLUTION at 08:35

## 2022-01-21 NOTE — PROGRESS NOTES
Pt discharged home with , Christian, at 0940  Pt tolerated oral intake.   Pt has no complaints of pain.   Discharge instructions discussed and verbalized understanding. Given port patient guide. Questions were answered.   PIV discontinued and removed from LDAs.     Lucinda Delgado RN  01/21/22

## 2022-01-21 NOTE — TELEPHONE ENCOUNTER
Called Crispin at Hematopathology at [facility] at [phone] to check status of Pathology Consult.  Left message to return my call/XENA Gonzales RN     Called Hematopathology lab [phone] opt#3. Talked with Sylvia. She will leave message for Doctors and will notify them need results before her appt on 1/26/22 with Dr. Lizarraga/XENA Gonzales RN    Pathologist, [place] MD called me back and said he just signed consult and results will be in Epic. Dr. Lizarraga updated/XENA Gonzales RN

## 2022-01-21 NOTE — PRE-PROCEDURE
GENERAL PRE-PROCEDURE:   Procedure:  Port placement  Date/Time:  1/21/2022 8:07 AM    Verbal consent obtained?: Yes    Written consent obtained?: Yes    Risks and benefits: Risks, benefits and alternatives were discussed    Consent given by:  Patient  Patient states understanding of procedure being performed: Yes    Patient's understanding of procedure matches consent: Yes    Procedure consent matches procedure scheduled: Yes    Expected level of sedation:  Moderate  Appropriately NPO:  Yes  ASA Class:  2  Mallampati  :  Grade 1- soft palate, uvula, tonsillar pillars, and posterior pharyngeal wall visible  Lungs:  Lungs clear with good breath sounds bilaterally  Heart:  Normal heart sounds and rate  History & Physical reviewed:  History and physical reviewed and no updates needed  Statement of review:  I have reviewed the lab findings, diagnostic data, medications, and the plan for sedation

## 2022-01-21 NOTE — PROCEDURES
[facility]    Procedure: IR Procedure Note    Date/Time: 1/21/2022 8:45 AM  Performed by: Deangelo Mckeon MD  Authorized by: Deangelo Mckeon MD       UNIVERSAL PROTOCOL   Site Marked: NA  Prior Images Obtained and Reviewed:  Yes  Required items: Required blood products, implants, devices and special equipment available    Patient identity confirmed:  Verbally with patient, arm band, provided demographic data and hospital-assigned identification number  Patient was reevaluated immediately before administering moderate or deep sedation or anesthesia  Confirmation Checklist:  Patient's identity using two indicators, relevant allergies, procedure was appropriate and matched the consent or emergent situation and correct equipment/implants were available  Time out: Immediately prior to the procedure a time out was called    Universal Protocol: the Joint Commission Universal Protocol was followed    Preparation: Patient was prepped and draped in usual sterile fashion       ANESTHESIA    Anesthesia: Local infiltration  Local Anesthetic:  Lidocaine 1% without epinephrine      SEDATION  Patient Sedated: Yes    Vital signs: Vital signs monitored during sedation    See dictated procedure note for full details.  Findings: SL Bard Port via right internal jugular vein, tip at cavoatrial junction.    Specimens: none    Complications: None    Condition: Stable    Plan: OK to use port      PROCEDURE  Describe Procedure: SL Bard Port via right internal jugular vein, tip at cavoatrial junction.  Patient Tolerance:  Patient tolerated the procedure well with no immediate complications  Length of time physician/provider present for 1:1 monitoring during sedation: 15

## 2022-01-21 NOTE — DISCHARGE INSTRUCTIONS
Port Placement Procedure Discharge Instructions:  You had a port placed. A port is a small medical device that is placed under the skin and is connected to a vein with a catheter (thin, flexible tube). Ports can be used to administer IV medications, fluids or blood products (including chemotherapy) or for blood lab draws. Please follow the below instructions:  Care Instructions:  - If you received sedation for your procedure, do not drive or operate heavy machinery for the rest of the day.  - You may shower beginning post procedure day #1.  Do not scrub site until well healed; pat dry.  - Avoid submerging the port site under water (tub baths, Jacuzzis, hot tubs and pools) for 10 days or until glue falls off.  - You may take over the counter pain medication for discomfort. Follow the package directions.  - Avoid heavy lifting (greater than 10 pounds) and strenuous activities for 3 days.   - If you experience significant bleeding at site, apply pressure with hands above the clavicle bone, sit upright and seek immediate medical assistance.    Call [facility] ([phone]) if you experience the following:   - Uncontrolled bleeding from port site  - Fever (greater than 101 F (38.3C))  - Purulent (yellow/green/foul smelling) drainage from port insertion site.  - Increasing pain at port site  - Increasing redness at port site      Sedation Discharge Instructions    1. You are required to have someone accompany you home.    2. Rest today. Do not drive or operate machinery today. Over activity may produce nausea and dizziness.    3. You may follow your normal diet. Drink plenty of fluids, juice, water, etc. Do not drink any alcoholic beverages for 24 hours.    4. If you have problems or questions, please call you doctor.        * Recovery After Conscious Sedation (Adult)  We gave you medicine by vein to make you sleepy or relaxed during your procedure. This may have included both a pain medicine and sleeping  medicine. Most of the effects have worn off. But you may still feel sleepy for the next 6 to 8 hours.  Home care  Follow these guidelines when you get home:    You may feel sleepy and clumsy and have poor balance for the next few hours.    A responsible adult should stay with you for the next 8 hours. This person should make sure your condition doesn t get worse.    Don't drink any alcohol for the next 24 hours.    Don't drive, operate dangerous machinery, make important business or personal decisions or sign legal documents during the next 24 hours.    You may vomit (throw up) if you eat too soon after the procedure. If this happens, drink small amounts of water, juice or clear broth. Wait to try solid food until you no longer have nausea (upset stomach).  Note: Your care team may tell you not to take any medicine by mouth for pain or sleep in the next 4 hours. These medicines may react with the medicines you had in the hospital. This could cause a much stronger response than usual.  Follow-up care  Follow up with your care team if you are not alert and back to your usual level of activity within 12 hours.  When to seek medical advice  Call your care team right away if any of these occur:    You still feel sleepy or clumsy after 12 hours, or your sleepiness gets worse    Weakness or dizziness gets worse    Repeated vomiting    If you can't be woken up and someone is staying with you, they should call 911.  For informational purposes only. Not to replace the advice of your health care provider.  Copyright   2018 [employer]. All rights reserved.

## 2022-01-21 NOTE — PROGRESS NOTES
Patient Name: Amna Oneil  Medical Record Number: [id number]  Today's Date: 1/21/2022    Procedure: Image Guided Chest Port Placement  Proceduralist: Dr. Deangelo Mckeon  Pathology present: n/a    Procedure Start: 0820  Procedure end: 0840  Sedation medications administered: Fentanyl 100 mcg, Versed 2 mg    Report given to: JAMES Jane IR  : n/a    Other Notes: Pt arrived to IR room #1 from IR Pre / Post 3. Consent reviewed. Pt denies any questions or concerns regarding procedure. Pt positioned supine and monitored per protocol. 8 fr Bard Power Port placed by Dr. Mckeon. Pt tolerated procedure without any noted complications. Pt transferred back to IR Pre / Post 3.

## 2022-01-21 NOTE — H&P
Interventional Radiology - History and Physical/Chart Review Note  1/21/2022    Procedure Requested: port placement  Requesting Provider: Juan Lizarraga MD    HPI: Amna Oneil is a 41 year old female with newly diagnosed classic hodgkin's lymphoma and plans for chemotherapy. Here today to have a port inserted. Recent COVID-19 (1/11/22).     NPO Status: midnight  Anticoagulation/Antiplatelets/Bleeding tendencies: none  Antibiotics: ancef 2g IV for IR procedure    Review of Systems: Per IR MD    PMH:  Hodgkin's lymphoma   COVID-19  Situational anxiety    PSH:  No past surgical history on file.    ALLERGIES:  Allergies   Allergen Reactions     Avocado GI Disturbance       MEDICATIONS:  Current Outpatient Medications   Medication     cholecalciferol 50 MCG (2000 UT) tablet     drospirenone-ethinyl estradiol (YOGESH) 3-0.02 MG tablet     escitalopram (LEXAPRO) 10 MG tablet     Iron-vit C-vit B12-folic acid (IRON 100 PLUS) 100-250-0.025-1 MG TABS     loratadine (CLARITIN) 10 MG tablet     LORazepam (ATIVAN) 0.5 MG tablet     Probiotic Product (PROBIOTIC-10 PO)     spironolactone (ALDACTONE) 50 MG tablet     Current Facility-Administered Medications   Medication     ceFAZolin (ANCEF) intermittent infusion 2 g in 100 mL dextrose PRE-MIX     fentaNYL (PF) (SUBLIMAZE) injection 25-50 mcg     flumazenil (ROMAZICON) injection 0.2 mg     lidocaine (LMX4) cream     lidocaine 1 % 0.1-1 mL     lidocaine 1 % 1-30 mL     midazolam (VERSED) injection 0.5-2 mg     naloxone (NARCAN) injection 0.2 mg    Or     naloxone (NARCAN) injection 0.4 mg    Or     naloxone (NARCAN) injection 0.2 mg    Or     naloxone (NARCAN) injection 0.4 mg     sodium chloride (PF) 0.9% PF flush 3 mL     sodium chloride (PF) 0.9% PF flush 3 mL     sodium chloride 0.9% 1000 mL TABLE SOLN     sodium chloride 0.9% infusion         LABS:     Hemoglobin   Date Value Ref Range Status   01/18/2022 12.7 11.7 - 15.7 g/dL Final   ]  Platelet Count   Date Value Ref  Range Status   01/18/2022 398 150 - 450 10e3/uL Final     Creatinine   Date Value Ref Range Status   12/22/2021 0.85 0.60 - 1.10 mg/dL Final     Potassium   Date Value Ref Range Status   12/22/2021 4.3 3.5 - 5.0 mmol/L Final         EXAM:  There were no vitals taken for this visit.  Per MICHAEL TONEY    See pre-sedation note.    ASSESSMENT:  Lymphoma need for Chemotherapy; here for a port    PLAN:    Port placement with sedation.     Procedure, risks/benefits, details, alternatives, and sedation reviewed with patient per MICHAEL TONEY. All questions answered. OK to proceed with above radiology procedure.       Chart review and note written by:  LORI Riojas CNP  Interventional Radiology  [phone]    Physical exam and consent to be obtained by: MICHAEL TONEY

## 2022-01-21 NOTE — IP AVS SNAPSHOT
[facility] Interventional Radiology  [address] [address]  Phone: [phone]  Fax: [phone]                                  After Visit Summary   1/21/2022    Amna Oneil   MRN: [id number]           After Visit Summary Signature Page    I have received my discharge instructions, and my questions have been answered. I have discussed any challenges I see with this plan with the nurse or doctor.    ..........................................................................................................................................  Patient/Patient Representative Signature      ..........................................................................................................................................  Patient Representative Print Name and Relationship to Patient    ..................................................               ................................................  Date                                   Time    ..........................................................................................................................................  Reviewed by Signature/Title    ...................................................              ..............................................  Date                                               Time          22EPIC Rev 08/18

## 2022-01-24 ENCOUNTER — VIRTUAL VISIT (OUTPATIENT)
Dept: ONCOLOGY | Facility: CLINIC | Age: 42
End: 2022-01-24
Attending: INTERNAL MEDICINE
Payer: COMMERCIAL

## 2022-01-24 DIAGNOSIS — C81.70 CLASSICAL HODGKIN LYMPHOMA (H): ICD-10-CM

## 2022-01-24 DIAGNOSIS — F43.23 ADJUSTMENT DISORDER WITH MIXED ANXIETY AND DEPRESSED MOOD: Primary | ICD-10-CM

## 2022-01-24 PROCEDURE — 90834 PSYTX W PT 45 MINUTES: CPT | Mod: TEL,95 | Performed by: SOCIAL WORKER

## 2022-01-24 NOTE — PROGRESS NOTES
Psychology Psychotherapy  Note-virtual visit    Name:  Amna Oneil  :  1980  MRN:  [id number]      Date of Service: 2022  Duration: 45 minutes (1:00-1:45 PM)    The patient has been notified of following:      This telephone visit will be conducted via a call between you and your provider. We have found that certain health care needs can be provided without a face to face meeting.  This service lets us provide the care you need with a short phone conversation.      Telephone visits are billed at different rates depending on your insurance coverage. During this emergency period, for some insurers they may be billed the same as an in-person visit.  Please reach out to your insurance provider with any questions.     If during the course of the call the if provider feels a telephone visit is not appropriate, you will not be charged for this service.     Patient has given verbal consent to a Telephone visit? Yes      Target Symptoms:    The patient was seen in light of concerns regarding symptoms of anxiety and depression as evidenced by patient and staff report.    Participation:  The patient was able to participate and benefit from treatment as evidenced by her verbal expression of ideas and initiation of topics discussed.    Mental Status:    Mood/Affect:  normal affect  Suicidal Ideation:  absent  Homicidal Ideation:  absent  Thought process:  normal  Thought content:  Clear  Fund of Knowledge:  Sufficient  Attention/Concentration:  Normal  Language ability:  intact  Speech: normal  Memory:  recent and remote memory intact  Insight and Judgement:  good  Orientation:  self, place and time  Appearance: N/A-telephone visit  Eye Contact: N/A-telephone visit  Estimated IQ:  Above Average      Intervention:    Amna was referred to me by Dr. Lizarraga.  She was diagnosed with classic Hodgkin's lymphoma on 12/15/2021.  She started to having symptoms in 2021 where she became tired and short of breath.   She also noticed her lymph nodes were swollen after her COVID-19 booster shot.  She has undergone additional testing and planning for her next step of treatment.  She follows up with Dr. Lizarraga on 2022 in order to finalize the plan of care.  She anticipates that she will start chemotherapy in the very near future and most likely get a PET scan after several cycles of treatment.  Amna processed her thoughts and feelings about her cancer diagnosis and her treatment.  We also talked about strategies to help her cope.    Amna has been treated for anxiety and depression for 15+ years.  She states that her first episode of anxiety and depression was when she was in graduate school in her mid 20s.  She was started on Lexapro at this time.  She went off this medication a few years later and then started on Paxil.  She felt that the Lexapro worked better for her and has been taking it since that time.  She feels that her anxiety and depression is episodic in nature.  She takes Lexapro with, 10 mg.  Prior to her cancer diagnosis, her anxiety was at baseline.  She is experiencing some situational anxiety and depression related to her cancer diagnosis.  She denies suicidal ideation.  She meets diagnostic criteria for adjustment disorder with mixed anxiety and depressed mood.    Amna indicated that 3 years ago she thought about being sized lump in her lymph node in her neck.  She had it checked out, but this created her to have issues with health anxiety.  She met with our therapist at that time to learn strategies to help her cope with her symptoms of anxiety related to her worries about her health.  She felt that the main worry at that time was her fear of not being alone for Ellie if she  from cancer.    Amna has been  to her , Christian for 8 years.  He has 2 adult sons from his previous marriage.  They include Rhett, age 23 who lives in [place] and Raj, age 19 who lives in [place]  and visits with him on a weekly basis.  Together they have a 7-year-old daughter, Ellie.  She is in first grade.  She knows about Amna's cancer and what cancer is.  She is worried about her mom and the future of her illness.  We talked about strategies and communicating with her daughter about her cancer.  I talked with her about the [employer] foundation and will get Ryan pack ready for her at an upcoming visit.  She already has information about [employer]'s club.  I also shared with her about pathways and the programs that they offer.  I also explained that I would be available to meet with any of her family members, including her , daughters, or stepsons as needed.  Amna indicates that they also have 2 large dogs and 3 cats.    Amna indicates that she has a good support system of family and friends.  Her friends have started a meal trained.  She also is looking for support from her friends to help walk her dogs.     Amna went to graduate school in [place] and went on to get her PhD in sociology from the [employer].  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  She works with a team of 7 people where they look at data sources in transrequiring quality of life.  She states that her work has been extremely supportive and she is on an intermittent leave of absence.     Amna talked about the hardest thing in dealing with her cancer is worrying about her daughter, Ellie and how she can be most supportive to her at this time.  We discussed some of the strategies that she has incorporated to better manage her anxiety and depression throughout the year.  She states that she has been knitting for 15 to 20 years.  She finds that this is extremely relaxing.  Prior to being sick, she enjoyed taking her dogs on long walks up to 3 to 5 miles a day.  This is been difficult as now she  is short of breath.  She did yoga last summer, which she found to be helpful.  She also feels that writing in her hearing bridge page helps her process about her cancer.    Both Amna and her , Christian grew up in the [place].  Amna's parents and her brother all live locally and are very supportive.  Her 's family also all live locally and are very involved and supportive.    Amna was raised Spiritism, but currently describes herself as being agnostic.  We discussed her thoughts and feelings about her belief system.    Amna is interested in ongoing support throughout her cancer journey.  She will contact our information coordinators when she is in clinic and ready to schedule further appointments.      Psychoterapeutic Techniques:  Cognitive-behavioral therapy, motivational interviewing and supportive psychotherapy strategies were utilized.    Necessity:    The session was necessary for the care of the patient to address symptoms of anxiety and depression related to the patient's medical condition.    Progress /Plan:    Amna openly processed about her cancer diagnosis and upcoming treatment.  She also shared about her history of anxiety and depression, which tends to be episodic.  We discussed strategies to help her better manage her anxiety and depression throughout her cancer journey.  I also gave her various resources including information about the [employer] foundation, [employer]'s club and pathways.  I will will continue to be available to Amna as needed for ongoing support and assistance.  She is considering the possibility of bringing her daughter in for a visit in the future.    Diagnosis:  1.  Adjustment disorder with mixed anxiety and depressed mood  2.  History of anxiety  3.  Classical Hodgkin's lymphoma      This note was created with the help of Dragon dictation system.  Grammatical and typing errors are not intentional.    Provider: Nicolette Ribeiro MA, LP, [facility]    Date:   1/24/2022  Time:  4:08 PM

## 2022-01-26 ENCOUNTER — ONCOLOGY VISIT (OUTPATIENT)
Dept: ONCOLOGY | Facility: CLINIC | Age: 42
End: 2022-01-26
Attending: INTERNAL MEDICINE
Payer: COMMERCIAL

## 2022-01-26 VITALS
HEART RATE: 86 BPM | WEIGHT: 176.4 LBS | BODY MASS INDEX: 27.62 KG/M2 | RESPIRATION RATE: 16 BRPM | TEMPERATURE: 98.8 F | OXYGEN SATURATION: 97 % | SYSTOLIC BLOOD PRESSURE: 126 MMHG | DIASTOLIC BLOOD PRESSURE: 86 MMHG

## 2022-01-26 DIAGNOSIS — C81.18 NODULAR SCLEROSIS HODGKIN LYMPHOMA OF LYMPH NODES OF MULTIPLE REGIONS (H): Primary | ICD-10-CM

## 2022-01-26 DIAGNOSIS — Z51.11 ENCOUNTER FOR ANTINEOPLASTIC CHEMOTHERAPY: ICD-10-CM

## 2022-01-26 PROCEDURE — G0463 HOSPITAL OUTPT CLINIC VISIT: HCPCS

## 2022-01-26 PROCEDURE — 99215 OFFICE O/P EST HI 40 MIN: CPT | Performed by: INTERNAL MEDICINE

## 2022-01-26 RX ORDER — DEXAMETHASONE 4 MG/1
8 TABLET ORAL DAILY
Qty: 12 TABLET | Refills: 5 | Status: SHIPPED | OUTPATIENT
Start: 2022-01-26 | End: 2022-02-01

## 2022-01-26 RX ORDER — PROCHLORPERAZINE MALEATE 10 MG
10 TABLET ORAL EVERY 6 HOURS PRN
Qty: 30 TABLET | Refills: 5 | Status: SHIPPED | OUTPATIENT
Start: 2022-01-26 | End: 2022-09-04

## 2022-01-26 RX ORDER — LIDOCAINE/PRILOCAINE 2.5 %-2.5%
CREAM (GRAM) TOPICAL ONCE
Qty: 30 G | Refills: 1 | Status: SHIPPED | OUTPATIENT
Start: 2022-01-26 | End: 2023-08-16

## 2022-01-26 ASSESSMENT — PAIN SCALES - GENERAL: PAINLEVEL: NO PAIN (0)

## 2022-01-26 NOTE — PATIENT INSTRUCTIONS
Patient Education     Doxorubicin HCl Injectable Solution 2 mg/mL  Uses  For treating cancer.  Instructions  This is an IV medicine. It is given through a sterile tube directly into the vein by a healthcare provider.  This medicine is given gradually through the IV line.  Always inspect the medicine before using.  Check the medicine before each use. If the liquid medicine has any particles in it, appears discolored, or if the vial appears damaged, do not use it.  Keep this medicine in the refrigerator. Do not freeze.  Protect medicine from light.  Drink plenty of water while on this medicine.  This medicine may cause you to become more sensitive to the sun. Use sunscreen or wear protective clothing when you are exposed to the sun.  Avoid getting medicine on the skin or in the eyes. If this happens, wash the skin with soap and water. For eyes, open eyelid and flush with plenty of water and tell your doctor right away.  It is important that you keep taking each dose of this medicine on time even if you are feeling well.  If you miss a dose, contact your doctor for instructions.  Your doctor may prescribe other medications to reduce side effects. Follow instructions carefully.  Talk to your doctor before taking other medicines, including aspirins and ibuprofen containing products. Speak to your doctor about which medicines are safe to use while you are on this medicine.  Use effective birth control to avoid pregnancy.  It is very important that you follow your doctor's instructions for all blood tests.  It is very important that you keep all appointments for medical exams and tests while on this medicine.  Cautions  Do not change brands, dosage forms or strengths without consulting your doctor or pharmacist. Doing so can result in serious side effects.  Tell your doctor and pharmacist if you ever had an allergic reaction to a medicine. Symptoms of an allergic reaction can include trouble breathing, skin rash, itching,  swelling, or severe dizziness.  May cause mouth sores. Brush teeth gently. Avoid products containing alcohol. Rinse mouth with a mixture of water and baking soda or salt.  Some patients with weak hearts may have worsening of symptoms. If you notice difficulty breathing, weight gain, or swelling of your legs or ankles, let your doctor know right away.  This medicine is associated with a rare but very serious medical condition. Please speak with your doctor about symptoms you should look out for while on this medicine. Notify your doctor immediately if you develop those symptoms.  Some patients on this medicine have developed severe, life-threatening infections. Please speak with your doctor about the risks and benefits of using this medicine.  Some patients taking this medicine have experienced serious side effects. Please speak with your doctor to understand the risks and benefits associated with this medicine.  This medicine is associated with an increased risk of serious heart problems, heart attack, and stroke. Please speak with your doctor about the risks and benefits of using this medicine. Contact your doctor immediately if you experience chest pain or difficulty breathing.  This medicine is associated with a rare, but serious problem of the liver. Speak to your doctor about the early signs of liver problems and the benefits and risks of using this medicine.  There is an increased risk of bleeding while on this medicine, please tell your doctor or nurse if you notice any excessive bleeding or bruising.  Your ability to stay alert or to react quickly may be impaired by this medicine. Do not drive or operate machinery until you know how this medicine will affect you.  Speak with your doctor before taking any medicine with aspirin.  Please check with your doctor before drinking alcohol while on this medicine.  Watch for excessive bruising or bleeding. Contact your doctor if you notice any.  This medicine may  reduce your body's ability to fight infections. Try to avoid contact with people with colds, flu or other infections.  Contact your doctor if you develop any signs of a new infection such as fever, cough, sore throat, or chills.  Wash your hands often and avoid close contact with people with infections such as colds and flu.  Speak with your health care provider before receiving any vaccinations.  Tell the doctor or pharmacist if you are pregnant, planning to be pregnant, or breastfeeding.  Do not breastfeed while on this medicine. You may safely start breastfeeding 10 days after stopping treatment.  Do not use this medicine if you are pregnant. If you become pregnant while on this medicine, contact your doctor immediately.  This medicine can hurt a new baby in the womb. If you become pregnant while on this medicine, tell your doctor immediately. Your doctor may switch you to a different medicine.  Women of child-bearing age should have a negative pregnancy test before starting this medicine.  Women must use reliable forms of birth control while taking this medicine and for 6 months after stopping to prevent pregnancy.  Men with a pregnant partner must wear a condom during sexual activity while taking this medicine and for 10 days after stopping to prevent harm to the developing baby.  Men with a female partner who is of childbearing age must use a condom and a second form of birth control during sexual activity while taking this medicine and for 3 months after stopping to prevent pregnancy.  Do not take [place] wort while on this medicine.  Ask your pharmacist if this medicine can interact with any of your other medicines. Be sure to tell them about all the medicines you take.  Always carry an ID card or wear a medical alert bracelet indicating your medical condition.  Please tell all your doctors and dentists that you are on this medicine before they provide care.  Do not start or stop any other medicines  without first speaking to your doctor or pharmacist.  Call your doctor right away if you notice any unusual bleeding or bruising.  If you have had a heart attack or a stroke within the past 6 months, talk to your doctor before using this medicine.  This medicine is associated with increased risk of death in certain patients. Please speak with your doctor about the benefits and risks of using this medicine.  Side Effects  The following is a list of some common side effects from this medicine. Please speak with your doctor about what you should do if you experience these or other side effects.    decreased appetite    changes in the number of cells in the blood    increased risk of cancer    diarrhea    changes in the color of the urine or stool    hair loss    mouth sores or irritation    nausea    redness of eyes    stomach upset or abdominal pain    increased risk of sunburn    red coloring of urine, tears or sweat    vomiting    weight loss  Call your doctor or get medical help right away if you notice any of these more serious side effects:    decreased awareness or responsiveness    back pain    wheezing or difficulty breathing    chest pain    cough that does not go away    severe, watery or bloody diarrhea    swelling of the face, mouth, tongue or throat    fainting    feeling of heat or flushing    fever or chills    flu-like symptoms    pain, redness, swelling or blistering on hands, feet or elbows    fast or irregular heart beats    unusual or long-lasting hoarseness    reaction at the area of the injection (pain, redness, swelling)    pain in the joints    menstruation changes (missed or fewer periods)    pain near injection site    eye pain or swelling    skin irritation such as redness, itching, rash, or burning    seizures    shortness of breath    red, peeling or blistering skin    blood in stool    dark, tarry stool    thirst    unusual or unexplained tiredness or weakness    difficulty or discomfort  urinating    urinating less often    severe or persistent vomiting  A few people may have an allergic reactions to this medicine. Symptoms can include difficulty breathing, skin rash, itching, swelling, or severe dizziness. If you notice any of these symptoms, seek medical help quickly.  Extra  Please speak with your doctor, nurse, or pharmacist if you have any questions about this medicine.  [email or web address]  IMPORTANT NOTE: This document tells you briefly how to take your medicine, but it does not tell you all there is to know about it.Your doctor or pharmacist may give you other documents about your medicine. Please talk to them if you have any questions.Always follow their advice. There is a more complete description of this medicine available in English.Scan this code on your smartphone or tablet or use the web address below. You can also ask your pharmacist for a printout. If you have any questions, please ask your pharmacist.     2021 [employer].      Patient Education     Bleomycin Injection 30 Units  Uses  For treating cancer.  Instructions  This is an IV medicine. It is given through a sterile tube directly into the vein by a healthcare provider.  This medicine should be given by a trained health care provider.  It is important that you keep taking each dose of this medicine on time even if you are feeling well.  If you miss a dose, contact your doctor for instructions.  Please tell your doctor and pharmacist about all the medicines you take. Include both prescription and over-the-counter medicines. Also tell them about any vitamins, herbal medicines, or anything else you take for your health.  If your symptoms do not improve or they worsen while on this medicine, contact your doctor.  Your doctor may prescribe other medications to reduce side effects. Follow instructions carefully.  Do not suddenly stop taking this medicine. Check with your doctor before  stopping.  Use effective birth control to avoid pregnancy.  It is very important that you follow your doctor's instructions for all blood tests.  It is very important that you keep all appointments for medical exams and tests while on this medicine.  Do not use more than 1 dose each week.  Cautions  Tell your doctor and pharmacist if you ever had an allergic reaction to a medicine. Symptoms of an allergic reaction can include trouble breathing, skin rash, itching, swelling, or severe dizziness.  May cause mouth sores. Brush teeth gently. Avoid products containing alcohol. Rinse mouth with a mixture of water and baking soda or salt.  This medicine has been associated with a rare but serious skin rash. If you notice any red, peeling or blistered skin, contact your doctor immediately.  This medicine is associated with a rare but very serious medical condition. Please speak with your doctor about symptoms you should look out for while on this medicine. Notify your doctor immediately if you develop those symptoms.  Some patients taking this medicine have experienced serious side effects. Please speak with your doctor to understand the risks and benefits associated with this medicine.  Call the doctor if there are any signs of confusion or unusual changes in behavior.  This medicine may reduce your body's ability to fight infections. Try to avoid contact with people with colds, flu or other infections.  Contact your doctor if you develop any signs of a new infection such as fever, cough, sore throat, or chills.  Wash your hands often and avoid close contact with people with infections such as colds and flu.  Speak with your health care provider before receiving any vaccinations.  Tell the doctor or pharmacist if you are pregnant, planning to be pregnant, or breastfeeding.  Do not breastfeed while on this medicine.  Do not use this medicine if you are pregnant. If you become pregnant while on this medicine, contact your  doctor immediately.  This medicine can cause birth defects. Speak with your doctor about birth control methods that should be used while on this medicine.  This medicine can hurt a new baby in the womb. If you become pregnant while on this medicine, tell your doctor immediately. Your doctor may switch you to a different medicine.  Ask your pharmacist if this medicine can interact with any of your other medicines. Be sure to tell them about all the medicines you take.  Please tell all your doctors and dentists that you are on this medicine before they provide care.  Do not start or stop any other medicines without first speaking to your doctor or pharmacist.  Side Effects  The following is a list of some common side effects from this medicine. Please speak with your doctor about what you should do if you experience these or other side effects.    decreased appetite    lack of energy and tiredness    hair loss    reaction at the area of the injection (pain, redness, swelling)    nausea    darkening of skin or nails    vomiting    weight loss  Call your doctor or get medical help right away if you notice any of these more serious side effects:    bleeding that is severe or takes longer to stop    wheezing or difficulty breathing    unusual bruising or discoloration on skin    chest pain    coughing    coughing up blood or vomit that looks like coffee grounds    fever or chills    cold hands or feet    signs of kidney damage (such as bloody or bubbly urine, or changes in urine color or amount)    mood changes    mouth sores or irritation    feeling of numbness or tingling in your hands and feet    pale or blue skin, lips or fingernails    rapid heartbeat    skin irritation such as redness, itching, rash, or burning    shortness of breath    red, peeling or blistering skin    unusual hardening or thickening of skin    severe stomach or bowel pain    symptoms of stroke (such as one-sided weakness, slurred speech,  confusion)    yeast infection of mouth    dark urine    blurring or changes of vision  A few people may have an allergic reactions to this medicine. Symptoms can include difficulty breathing, skin rash, itching, swelling, or severe dizziness. If you notice any of these symptoms, seek medical help quickly.  Extra  Please speak with your doctor, nurse, or pharmacist if you have any questions about this medicine.  [email or web address]  IMPORTANT NOTE: This document tells you briefly how to take your medicine, but it does not tell you all there is to know about it.Your doctor or pharmacist may give you other documents about your medicine. Please talk to them if you have any questions.Always follow their advice. There is a more complete description of this medicine available in English.Scan this code on your smartphone or tablet or use the web address below. You can also ask your pharmacist for a printout. If you have any questions, please ask your pharmacist.     2021 [employer].    Patient Education     Vinblastine Sulfate Solution for injection  What is this medicine?  VINBLASTINE ([place] CARLA teen) is a chemotherapy drug. It slows the growth of cancer cells. This medicine is used to treat many types of cancer like breast cancer, testicular cancer, Hodgkin's disease, non-Hodgkin's lymphoma, and sarcoma.  This medicine may be used for other purposes; ask your health care provider or pharmacist if you have questions.  What should I tell my health care provider before I take this medicine?  They need to know if you have any of these conditions:    blood disorders    dental disease    gout    infection (especially a virus infection such as chickenpox, cold sores, or herpes)    liver disease    lung disease    nervous system disease    recent or ongoing radiation therapy    an unusual or allergic reaction to vinblastine, other chemotherapy agents, other medicines, foods, dyes, or  preservatives    pregnant or trying to get pregnant    breast-feeding  How should I use this medicine?  This drug is given as an infusion into a vein. It is administered in a hospital or clinic by a specially trained health care professional. If you have pain, swelling, burning or any unusual feeling around the site of your injection, tell your health care professional right away.  Talk to your pediatrician regarding the use of this medicine in children. While this drug may be prescribed for selected conditions, precautions do apply.  Overdosage: If you think you have taken too much of this medicine contact a poison control center or emergency room at once.  NOTE: This medicine is only for you. Do not share this medicine with others.  What if I miss a dose?  It is important not to miss your dose. Call your doctor or health care professional if you are unable to keep an appointment.  What may interact with this medicine?  Do not take this medicine with any of the following medications:    erythromycin    itraconazole    mibefradil    voriconazole  This medicine may also interact with the following medications:    cyclosporine    fluconazole    ketoconazole    medicines for seizures like phenytoin    medicines to increase blood counts like filgrastim, pegfilgrastim, sargramostim    vaccines    verapamil  Talk to your doctor or health care professional before taking any of these medicines:    acetaminophen    aspirin    ibuprofen    ketoprofen    naproxen  This list may not describe all possible interactions. Give your health care provider a list of all the medicines, herbs, non-prescription drugs, or dietary supplements you use. Also tell them if you smoke, drink alcohol, or use illegal drugs. Some items may interact with your medicine.  What should I watch for while using this medicine?  Your condition will be monitored carefully while you are receiving this medicine. You will need important blood work done while you  are taking this medicine.  This drug may make you feel generally unwell. This is not uncommon, as chemotherapy can affect healthy cells as well as cancer cells. Report any side effects. Continue your course of treatment even though you feel ill unless your doctor tells you to stop.  In some cases, you may be given additional medicines to help with side effects. Follow all directions for their use.  Call your doctor or health care professional for advice if you get a fever, chills or sore throat, or other symptoms of a cold or flu. Do not treat yourself. This drug decreases your body's ability to fight infections. Try to avoid being around people who are sick.  This medicine may increase your risk to bruise or bleed. Call your doctor or health care professional if you notice any unusual bleeding.  Be careful brushing and flossing your teeth or using a toothpick because you may get an infection or bleed more easily. If you have any dental work done, tell your dentist you are receiving this medicine.  Avoid taking products that contain aspirin, acetaminophen, ibuprofen, naproxen, or ketoprofen unless instructed by your doctor. These medicines may hide a fever.  Do not become pregnant while taking this medicine. Women should inform their doctor if they wish to become pregnant or think they might be pregnant. There is a potential for serious side effects to an unborn child. Talk to your health care professional or pharmacist for more information. Do not breast-feed an infant while taking this medicine.  Men may have a lower sperm count while taking this medicine. Talk to your doctor if you plan to father a child.  What side effects may I notice from receiving this medicine?  Side effects that you should report to your doctor or health care professional as soon as possible:    allergic reactions like skin rash, itching or hives, swelling of the face, lips, or tongue    low blood counts - This drug may decrease the number  of white blood cells, red blood cells and platelets. You may be at increased risk for infections and bleeding.    signs of infection - fever or chills, cough, sore throat, pain or difficulty passing urine    signs of decreased platelets or bleeding - bruising, pinpoint red spots on the skin, black, tarry stools, nosebleeds    signs of decreased red blood cells - unusually weak or tired, fainting spells, lightheadedness    breathing problems    changes in hearing    change in the amount of urine    chest pain    high blood pressure    mouth sores    nausea and vomiting    pain, swelling, redness or irritation at the injection site    pain, tingling, numbness in the hands or feet    problems with balance, dizziness    seizures  Side effects that usually do not require medical attention (report to your doctor or health care professional if they continue or are bothersome):    constipation    hair loss    jaw pain    loss of appetite    sensitivity to light    stomach pain    tumor pain  This list may not describe all possible side effects. Call your doctor for medical advice about side effects. You may report side effects to FDA at 5-495-FDA-9118.  Where should I keep my medicine?  This drug is given in a hospital or clinic and will not be stored at home.  NOTE:This sheet is a summary. It may not cover all possible information. If you have questions about this medicine, talk to your doctor, pharmacist, or health care provider. Copyright  2016 [employer]      Patient Education     Dacarbazine Injection 100 mg  Uses  For treating cancer.  Instructions  This is an IV medicine. It is given through a sterile tube directly into the vein by a healthcare provider.  This medicine is given gradually through the IV line.  This medicine should be given by a trained health care provider.  This medicine may cause you to become more sensitive to the sun. Use sunscreen or wear protective clothing when you are exposed to the  sun.  It is important that you keep taking each dose of this medicine on time even if you are feeling well.  If you miss a dose, contact your doctor for instructions.  Please tell your doctor and pharmacist about all the medicines you take. Include both prescription and over-the-counter medicines. Also tell them about any vitamins, herbal medicines, or anything else you take for your health.  Your doctor may prescribe other medications to reduce side effects. Follow instructions carefully.  It is very important that you continue using this medicine for the full number of days that it is prescribed. Please do not stop the medicine even if you start to feel better after the first few days.  It is very important that you keep all appointments for medical exams and tests while on this medicine.  Cautions  Tell your doctor and pharmacist if you ever had an allergic reaction to a medicine. Symptoms of an allergic reaction can include trouble breathing, skin rash, itching, swelling, or severe dizziness.  May cause mouth sores. Brush teeth gently. Avoid products containing alcohol. Rinse mouth with a mixture of water and baking soda or salt.  Some patients taking this medicine have experienced serious side effects. Please speak with your doctor to understand the risks and benefits associated with this medicine.  This medicine is associated with a rare, but serious problem of the liver. Speak to your doctor about the early signs of liver problems and the benefits and risks of using this medicine.  Your ability to stay alert or to react quickly may be impaired by this medicine. Do not drive or operate machinery until you know how this medicine will affect you.  Watch for excessive bruising or bleeding. Contact your doctor if you notice any.  This medicine may reduce your body's ability to fight infections. Try to avoid contact with people with colds, flu or other infections.  Contact your doctor if you develop any signs of a  new infection such as fever, cough, sore throat, or chills.  Wash your hands often and avoid close contact with people with infections such as colds and flu.  Speak with your health care provider before receiving any vaccinations.  Tell the doctor or pharmacist if you are pregnant, planning to be pregnant, or breastfeeding.  Do not breastfeed while on this medicine.  Ask your pharmacist if this medicine can interact with any of your other medicines. Be sure to tell them about all the medicines you take.  Please tell all your doctors and dentists that you are on this medicine before they provide care.  Do not start or stop any other medicines without first speaking to your doctor or pharmacist.  Side Effects  The following is a list of some common side effects from this medicine. Please speak with your doctor about what you should do if you experience these or other side effects.    decreased appetite    blurry vision    diarrhea    feeling of heat or flushing    hair loss    increased risk for an infection    nausea    vomiting  Call your doctor or get medical help right away if you notice any of these more serious side effects:    bleeding that is severe or takes longer to stop    unusual bruising or discoloration on skin    confusion    fever or chills    flu-like symptoms    reaction at the area of the injection (pain, redness, swelling)    symptoms of liver damage (such as yellowing of skin or eyes, dark urine, unusual tiredness or weakness; severe stomach or back pain)    mouth sores or irritation    seizures    shortness of breath    sore throat    unusual or unexplained tiredness or weakness  A few people may have an allergic reactions to this medicine. Symptoms can include difficulty breathing, skin rash, itching, swelling, or severe dizziness. If you notice any of these symptoms, seek medical help quickly.  Extra  Please speak with your doctor, nurse, or pharmacist if you have any questions about this  medicine.  [email or web address]  IMPORTANT NOTE: This document tells you briefly how to take your medicine, but it does not tell you all there is to know about it.Your doctor or pharmacist may give you other documents about your medicine. Please talk to them if you have any questions.Always follow their advice. There is a more complete description of this medicine available in English.Scan this code on your smartphone or tablet or use the web address below. You can also ask your pharmacist for a printout. If you have any questions, please ask your pharmacist.     2021 [employer].

## 2022-01-26 NOTE — LETTER
1/26/2022         RE: Amna Oneil  [address]  [place] [address]        Dear Colleague,    Thank you for referring your patient, Amna Oneil, to the [facility] [place]. Please see a copy of my visit note below.    [facility] Hematology and Oncology Progress Note    Patient: Amna Oneil  MRN: [id number]  Date of Service: Jan 26, 2022     Reason for Visit    Chief Complaint   Patient presents with     Oncology Clinic Visit       Assessment and Plan    Cancer Staging  No matching staging information was found for the patient.    ECOG Performance    0 - Independent     Pain  Pain Score: No Pain (0)    #.  Classical Hodgkin lymphoma, stage II (favorable risk) IPSS-0 risk factor, FFP 88%, overall survival 98%.   Reviewed the bone marrow biopsy result in detail.  There is no evidence of Hodgkin lymphoma involvement in the marrow.  I also reviewed the second opinion pathology evaluation at [facility] and it confirmed classical Hodgkin lymphoma.  Based on the current finding, we do not need additional biopsy.   Echocardiogram showed adequate cardiac function.  Pulmonary function test was normal   She has a port placed.     Discussed that she has stage II favorable Hodgkin lymphoma.  Offered treatment with ABVD therapy.  I gave her a printout of NCCN guidelines regarding the treatment plan.  Reviewed the side effects and schedule of chemotherapy drugs in detail.  Also discussed about take-home medications and EMLA cream and his use.  Prescription sent.   She confirmed that she completed family and no desire for fertility preservation.   She will return to clinic next week to start cycle #1 ABVD.  We do not recommend G-CSF with ABVD in general.   We will plan to obtain restaging PET scan after 2 cycles of ABVD.  If she responds well, overall plan is to complete 3-4 cycles of ABVD followed by involved site radiation.     #.  Situational anxiety   Ativan 0.5 mg  every 6 hours as needed for short-term use.  Referral to psychotherapy was made.    #.  Nausea   She would like to use medical cannabis.  We will certify for nausea/anxiety related to cancer diagnosis and chemotherapy use.    #.  Covid infection in January 2022   She recovered well.    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    Relevant Medications    prochlorperazine (COMPAZINE) 10 MG tablet    dexamethasone (DECADRON) 4 MG tablet    lidocaine-prilocaine (EMLA) 2.5-2.5 % external cream    Other Relevant Orders    Infusion Appointment Request       Other    Encounter for antineoplastic chemotherapy    Relevant Medications    prochlorperazine (COMPAZINE) 10 MG tablet    dexamethasone (DECADRON) 4 MG tablet    Other Relevant Orders    Infusion Appointment Request             CC: Physician No Ref-Primary   ______________________________________________________________________________  Diagnosis  12/13/2021-classical Hodgkin lymphoma by left axillary lymph node core needle biopsy.  12/23/2021-PET/CT scan showed hypermetabolic lymph nodes involving basilar neck/superior mediastinum, mediastinum, right pericardial space, bilateral axillae.   -second opinion pathology evaluation at [facility] and it confirmed classical Hodgkin lymphoma.  Echocardiogram showed adequate cardiac function.  Pulmonary function test was normal    Treatment to date  2/3/2022-anticipate to start ABVD.     History of Present Illness    Ms. Amna Oneil presented today accompanied by her .  She is doing well.  She did not notice any changes in size of the lymph nodes.  No fever.  She and her  are recovering well from Covid.   She is here to discuss about the results and next step in management.    Review of systems  Apart from describing in HPI, the remainder of comprehensive ROS was  negative.    Past History    History reviewed. No pertinent past medical history.    Past Surgical History:   Procedure Laterality Date     IR CHEST PORT PLACEMENT > 5 YRS OF AGE  1/21/2022         Physical Exam    /86   Pulse 86   Temp 98.8  F (37.1  C) (Oral)   Resp 16   Wt 80 kg (176 lb 6.4 oz)   SpO2 97%   BMI 27.62 kg/m      General: alert, awake, not in acute distress  HEENT: Head: Normal, normocephalic, atraumatic.  Eye: Normal external eye, conjunctiva, lids cornea, PATRICIA.  Nose: Normal external nose, mucus membranes and septum.  Pharynx: Normal buccal mucosa. Normal pharynx.  Neck / Thyroid: Supple, no masses, nodes, nodules or enlargement.  Extremities: normal strength, tone, and muscle mass  Skin: normal. no rash or abnormalities  CNS: non focal.    Lab Results    Recent Results (from the past 168 hour(s))   General PFT Lab (Please always keep checked)   Result Value Ref Range    FVC-Pred 4.04 L    FVC-Pre 4.04 L    FVC-%Pred-Pre 99 %    FEV1-Pre 3.23 L    FEV1-%Pred-Pre 98 %    FEV1FVC-Pred 81 %    FEV1FVC-Pre 80 %    FEFMax-Pred 7.41 L/sec    FEFMax-Pre 7.02 L/sec    FEFMax-%Pred-Pre 94 %    FEF2575-Pred 3.30 L/sec    FEF2575-Pre 2.85 L/sec    RJF6475-%Pred-Pre 86 %    ExpTime-Pre 6.71 sec    FIFMax-Pre 4.68 L/sec    VC-Pred 4.03 L    VC-Pre 3.88 L    VC-%Pred-Pre 96 %    IC-Pred 3.01 L    IC-Pre 2.74 L    IC-%Pred-Pre 91 %    ERV-Pred 1.03 L    ERV-Pre 1.04 L    ERV-%Pred-Pre 100 %    FEV1FEV6-Pred 84 %    FEV1FEV6-Pre 80 %    FRCPleth-Pred 2.85 L    FRCPleth-Pre 3.07 L    FRCPleth-%Pred-Pre 107 %    RVPleth-Pred 1.75 L    RVPleth-Pre 1.93 L    RVPleth-%Pred-Pre 109 %    TLCPleth-Pred 5.44 L    TLCPleth-Pre 5.81 L    TLCPleth-%Pred-Pre 106 %    DLCOunc-Pred 23.99 ml/min/mmHg    DLCOunc-Pre 21.82 ml/min/mmHg    DLCOunc-%Pred-Pre 90 %    DLCOcor-Pre 22.32 ml/min/mmHg    DLCOcor-%Pred-Pre 93 %    VA-Pre 5.32 L    VA-%Pred-Pre 95 %    FEV1SVC-Pred 81 %    FEV1SVC-Pre 83 %   HCG qualitative urine    Result Value Ref Range    hCG Urine Qualitative Negative Negative       Imaging    Echocardiogram Complete    Result Date: 2022  [id number]^BENITEZ^CELESTINO^[place] [address]  Name: FREDY MARTÍNEZ MRN: [id number] : 1980 Study Date: 2022 01:07 PM Age: 41 yrs Gender: Female Patient Location: [facility] Reason For Study: Classical Hodgkin lymphoma (H), Encounter for antineoplastic john Ordering Physician: CELESTINO MARQUIS Referring Physician: CELESTINO MARQUIS Performed By: DELANEY  BSA: 1.9 m2 Height: 67 in Weight: 175 lb BP: 131/83 mmHg ______________________________________________________________________________ ______________________________________________________________________________ Interpretation Summary  1. Normal left ventricular size and systolic performance with a visually estimated ejection fraction of 60-65%. 2. No significant valvular heart disease is identified on this study. 3. Normal right ventricular size and systolic performance. ______________________________________________________________________________ Left ventricle: Normal left ventricular size and systolic performance with a visually estimated ejection fraction of 60-65%. There is normal regional wall motion. Left ventricular wall thickness is normal.  Assessment of LV Diastolic Function: The cumulative findings suggest normal diastolic filling [The septal e' velocity is > 7 cm/s & lateral e' velocity is > 10 cm/s. The average E/e' is < 14. TR velocity is < 2.8 m/s. Left atrial volume index is less than 34 mL/mÂ ]..  Right ventricle: Normal right ventricular size and systolic performance.  Left atrium: The left atrium is of normal size.  Right atrium: The right atrium is of normal size.  IVC: The IVC is of normal caliber.  Aortic valve: The aortic valve is comprised of three cusps. No significant aortic stenosis or aortic insufficiency is detected on this study.   Mitral valve: The mitral valve appears morphologically normal. There is trace mitral insufficiency..  Tricuspid valve: The tricuspid valve is grossly morphologically normal. There is trace tricuspid insufficiency.  Pulmonic valve: The pulmonic valve is grossly morphologically normal. There is trace pulmonic insufficiency.  Thoracic aorta: The aortic root and proximal ascending aorta are of normal dimension.  Pericardium: There is no significant pericardial effusion. ______________________________________________________________________________ ______________________________________________________________________________ MMode/2D Measurements & Calculations IVSd: 0.72 cm LVIDd: 4.6 cm LVIDs: 2.8 cm LVPWd: 0.92 cm FS: 38.6 % LV mass(C)d: 120.5 grams LV mass(C)dI: 63.1 grams/m2 Ao root diam: 3.0 cm LA dimension: 2.9 cm asc Aorta Diam: 2.8 cm LA/Ao: 0.97 LVOT diam: 2.0 cm LVOT area: 3.0 cm2 LA Volume Indexed (AL/bp): 19.5 ml/m2  RWT: 0.40  Time Measurements MM HR: 77.0 BPM  Doppler Measurements & Calculations MV E max javier: 81.6 cm/sec MV A max javier: 59.3 cm/sec MV E/A: 1.4 MV dec time: 0.22 sec Ao V2 max: 119.5 cm/sec Ao max P.0 mmHg Ao V2 mean: 81.8 cm/sec Ao mean PG: 3.1 mmHg Ao V2 VTI: 24.5 cm LUKE(I,D): 2.6 cm2 LUKE(V,D): 2.3 cm2 LV V1 max PG: 3.4 mmHg LV V1 max: 92.2 cm/sec LV V1 VTI: 20.9 cm SV(LVOT): 63.5 ml SI(LVOT): 33.2 ml/m2 PA acc time: 0.11 sec PI end-d javier: 105.9 cm/sec TR max javier: 194.1 cm/sec TR max PG: 15.1 mmHg AV Javier Ratio (DI): 0.77 LUKE Index (cm2/m2): 1.4  E/E' av.3 Lateral E/e': 4.5 Medial E/e': 6.1  ______________________________________________________________________________ Report approved by: Rhett Mohr 2022 02:13 PM       IR Chest Port Placement > 5 Yrs of Age    Result Date: 2022  [place] [facility] LOCATION: [facility] DATE: 2022 PROCEDURE: 1.  CENTRAL VENOUS PORT PLACEMENT 2.  FLUOROSCOPIC GUIDANCE FOR CATHETER PLACEMENT 3.   ULTRASOUND GUIDANCE FOR VASCULAR ACCESS 4.  CONSCIOUS SEDATION INTERVENTIONAL RADIOLOGIST: Deangelo Mckeon MD. INDICATION: Lymphoma, port for chemotherapy. SEDATION: Versed 2 mg. Fentanyl 100 mcg. The procedure was performed with administration intravenous conscious sedation with appropriate preoperative, intraoperative, and postoperative evaluation. During the time-out, immediately prior to administration of medications, the patient was reassessed for adequacy to receive conscious sedation. 15 minutes of supervised face to face conscious sedation time was provided by a radiology nurse under my direct supervision. ANTIBIOTICS: Ancef 2 gram IV. Air Kerma: 2 mGy FLUOROSCOPIC TIME: 0.2 minutes CONTRAST: None. COMPLICATIONS: No immediate complications. STERILE BARRIER TECHNIQUE: Maximum sterile barrier technique was used. Cutaneous antisepsis was performed at the operative site with application of 2% chlorhexidine and large sterile drape. Prior to the procedure, the surgeon and assistant performed hand  hygiene and wore hat, mask, sterile gown, and sterile gloves during the entire procedure. PROCEDURE: Ultrasound demonstrated a patent and compressible right internal  jugular vein, and an ultrasound image was obtained and placed in the patient's permanent medical record. The right neck and chest were prepped and draped in usual sterile fashion. Using real-time ultrasound guidance, the right internal jugular vein was accessed. A subcutaneous pocket was created and irrigated with sterile normal saline. The catheter was tunneled in an antegrade fashion. Over the guidewire, a peel-away sheath was advanced with fluoroscopic monitoring. Through the peel-away sheath, the catheter was advanced until the tip was at the cavoatrial junction. The catheter was cut to length and attached firmly to the port. The incisions were closed with layered absorbable  suture and surgical glue. FINDINGS: Ultrasound shows an anechoic and  "compressible jugular vein. At the completion of the study, the port tip lies at the cavoatrial junction. TYPE OF PORT:  8 [employer] Bard PowerPort     IMPRESSION: Successful power-injectable venous port placement.     Video start time 2: 28 PM  Video end time 2: 58 PM  Platform used: Laxmi    PEDRO spent 45 minutes on the date of service, of which greater than 50% of the time was spent on counseling and coordination of care, and discussion with radiation oncology, radiology.    Signed by: Juan Lizarraga MD    Oncology Rooming Note    January 26, 2022 8:57 AM   Amna Oneil is a 41 year old female who presents for:    Chief Complaint   Patient presents with     Oncology Clinic Visit     Initial Vitals: /86   Pulse 86   Temp 98.8  F (37.1  C) (Oral)   Resp 16   Wt 80 kg (176 lb 6.4 oz)   SpO2 97%   BMI 27.62 kg/m   Estimated body mass index is 27.62 kg/m  as calculated from the following:    Height as of 1/18/22: 1.702 m (5' 7.01\").    Weight as of this encounter: 80 kg (176 lb 6.4 oz). Body surface area is 1.94 meters squared.  No Pain (0) Comment: Data Unavailable   No LMP recorded.  Allergies reviewed: Yes  Medications reviewed: Yes    Medications: Medication refills not needed today.  Pharmacy name entered into [employer]: [facility] -- [place], [place] - [address]    Clinical concerns:  Discuss results.       Leonardo Corona LPN                  Again, thank you for allowing me to participate in the care of your patient.        Sincerely,        Juan Lizarraga MD    "

## 2022-01-26 NOTE — PROGRESS NOTES
[facility] Hematology and Oncology Progress Note    Patient: Amna Oneil  MRN: [id number]  Date of Service: Jan 26, 2022     Reason for Visit    Chief Complaint   Patient presents with     Oncology Clinic Visit       Assessment and Plan    Cancer Staging  No matching staging information was found for the patient.    ECOG Performance    0 - Independent     Pain  Pain Score: No Pain (0)    #.  Classical Hodgkin lymphoma, stage II (favorable risk) IPSS-0 risk factor, FFP 88%, overall survival 98%.   Reviewed the bone marrow biopsy result in detail.  There is no evidence of Hodgkin lymphoma involvement in the marrow.  I also reviewed the second opinion pathology evaluation at [facility] and it confirmed classical Hodgkin lymphoma.  Based on the current finding, we do not need additional biopsy.   Echocardiogram showed adequate cardiac function.  Pulmonary function test was normal   She has a port placed.     Discussed that she has stage II favorable Hodgkin lymphoma.  Offered treatment with ABVD therapy.  I gave her a printout of NCCN guidelines regarding the treatment plan.  Reviewed the side effects and schedule of chemotherapy drugs in detail.  Also discussed about take-home medications and EMLA cream and his use.  Prescription sent.   She confirmed that she completed family and no desire for fertility preservation.   She will return to clinic next week to start cycle #1 ABVD.  We do not recommend G-CSF with ABVD in general.   We will plan to obtain restaging PET scan after 2 cycles of ABVD.  If she responds well, overall plan is to complete 3-4 cycles of ABVD followed by involved site radiation.     #.  Situational anxiety   Ativan 0.5 mg every 6 hours as needed for short-term use.  Referral to psychotherapy was made.    #.  Nausea   She would like to use medical cannabis.  We will certify for nausea/anxiety related to cancer diagnosis and chemotherapy use.    #.  Covid infection in January  2022   She recovered well.    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    Relevant Medications    prochlorperazine (COMPAZINE) 10 MG tablet    dexamethasone (DECADRON) 4 MG tablet    lidocaine-prilocaine (EMLA) 2.5-2.5 % external cream    Other Relevant Orders    Infusion Appointment Request       Other    Encounter for antineoplastic chemotherapy    Relevant Medications    prochlorperazine (COMPAZINE) 10 MG tablet    dexamethasone (DECADRON) 4 MG tablet    Other Relevant Orders    Infusion Appointment Request             CC: Physician No Ref-Primary   ______________________________________________________________________________  Diagnosis  12/13/2021-classical Hodgkin lymphoma by left axillary lymph node core needle biopsy.  12/23/2021-PET/CT scan showed hypermetabolic lymph nodes involving basilar neck/superior mediastinum, mediastinum, right pericardial space, bilateral axillae.   -second opinion pathology evaluation at [facility] and it confirmed classical Hodgkin lymphoma.  Echocardiogram showed adequate cardiac function.  Pulmonary function test was normal    Treatment to date  2/3/2022-anticipate to start ABVD.     History of Present Illness    Ms. Amna Oneil presented today accompanied by her .  She is doing well.  She did not notice any changes in size of the lymph nodes.  No fever.  She and her  are recovering well from Covid.   She is here to discuss about the results and next step in management.    Review of systems  Apart from describing in HPI, the remainder of comprehensive ROS was negative.    Past History    History reviewed. No pertinent past medical history.    Past Surgical History:   Procedure Laterality Date     IR CHEST PORT PLACEMENT > 5 YRS OF AGE  1/21/2022         Physical Exam    /86   Pulse 86   Temp 98.8  F  (37.1  C) (Oral)   Resp 16   Wt 80 kg (176 lb 6.4 oz)   SpO2 97%   BMI 27.62 kg/m      General: alert, awake, not in acute distress  HEENT: Head: Normal, normocephalic, atraumatic.  Eye: Normal external eye, conjunctiva, lids cornea, PATRICIA.  Nose: Normal external nose, mucus membranes and septum.  Pharynx: Normal buccal mucosa. Normal pharynx.  Neck / Thyroid: Supple, no masses, nodes, nodules or enlargement.  Extremities: normal strength, tone, and muscle mass  Skin: normal. no rash or abnormalities  CNS: non focal.    Lab Results    Recent Results (from the past 168 hour(s))   General PFT Lab (Please always keep checked)   Result Value Ref Range    FVC-Pred 4.04 L    FVC-Pre 4.04 L    FVC-%Pred-Pre 99 %    FEV1-Pre 3.23 L    FEV1-%Pred-Pre 98 %    FEV1FVC-Pred 81 %    FEV1FVC-Pre 80 %    FEFMax-Pred 7.41 L/sec    FEFMax-Pre 7.02 L/sec    FEFMax-%Pred-Pre 94 %    FEF2575-Pred 3.30 L/sec    FEF2575-Pre 2.85 L/sec    ZDB0508-%Pred-Pre 86 %    ExpTime-Pre 6.71 sec    FIFMax-Pre 4.68 L/sec    VC-Pred 4.03 L    VC-Pre 3.88 L    VC-%Pred-Pre 96 %    IC-Pred 3.01 L    IC-Pre 2.74 L    IC-%Pred-Pre 91 %    ERV-Pred 1.03 L    ERV-Pre 1.04 L    ERV-%Pred-Pre 100 %    FEV1FEV6-Pred 84 %    FEV1FEV6-Pre 80 %    FRCPleth-Pred 2.85 L    FRCPleth-Pre 3.07 L    FRCPleth-%Pred-Pre 107 %    RVPleth-Pred 1.75 L    RVPleth-Pre 1.93 L    RVPleth-%Pred-Pre 109 %    TLCPleth-Pred 5.44 L    TLCPleth-Pre 5.81 L    TLCPleth-%Pred-Pre 106 %    DLCOunc-Pred 23.99 ml/min/mmHg    DLCOunc-Pre 21.82 ml/min/mmHg    DLCOunc-%Pred-Pre 90 %    DLCOcor-Pre 22.32 ml/min/mmHg    DLCOcor-%Pred-Pre 93 %    VA-Pre 5.32 L    VA-%Pred-Pre 95 %    FEV1SVC-Pred 81 %    FEV1SVC-Pre 83 %   HCG qualitative urine   Result Value Ref Range    hCG Urine Qualitative Negative Negative       Imaging    Echocardiogram Complete    Result Date: 1/13/2022  [id number]^THAW^CELESTINO^[address] [address]  Name:  FREDY MARTÍNEZ MRN: [id number] : 1980 Study Date: 2022 01:07 PM Age: 41 yrs Gender: Female Patient Location: [facility] Reason For Study: Classical Hodgkin lymphoma (H), Encounter for antineoplastic john Ordering Physician: CELESTINO MARQUIS Referring Physician: CELESTINO MARQUIS Performed By: DELANEY  BSA: 1.9 m2 Height: 67 in Weight: 175 lb BP: 131/83 mmHg ______________________________________________________________________________ ______________________________________________________________________________ Interpretation Summary  1. Normal left ventricular size and systolic performance with a visually estimated ejection fraction of 60-65%. 2. No significant valvular heart disease is identified on this study. 3. Normal right ventricular size and systolic performance. ______________________________________________________________________________ Left ventricle: Normal left ventricular size and systolic performance with a visually estimated ejection fraction of 60-65%. There is normal regional wall motion. Left ventricular wall thickness is normal.  Assessment of LV Diastolic Function: The cumulative findings suggest normal diastolic filling [The septal e' velocity is > 7 cm/s & lateral e' velocity is > 10 cm/s. The average E/e' is < 14. TR velocity is < 2.8 m/s. Left atrial volume index is less than 34 mL/mÂ ]..  Right ventricle: Normal right ventricular size and systolic performance.  Left atrium: The left atrium is of normal size.  Right atrium: The right atrium is of normal size.  IVC: The IVC is of normal caliber.  Aortic valve: The aortic valve is comprised of three cusps. No significant aortic stenosis or aortic insufficiency is detected on this study.  Mitral valve: The mitral valve appears morphologically normal. There is trace mitral insufficiency..  Tricuspid valve: The tricuspid valve is grossly morphologically normal. There is trace tricuspid insufficiency.  Pulmonic valve: The pulmonic valve  is grossly morphologically normal. There is trace pulmonic insufficiency.  Thoracic aorta: The aortic root and proximal ascending aorta are of normal dimension.  Pericardium: There is no significant pericardial effusion. ______________________________________________________________________________ ______________________________________________________________________________ MMode/2D Measurements & Calculations IVSd: 0.72 cm LVIDd: 4.6 cm LVIDs: 2.8 cm LVPWd: 0.92 cm FS: 38.6 % LV mass(C)d: 120.5 grams LV mass(C)dI: 63.1 grams/m2 Ao root diam: 3.0 cm LA dimension: 2.9 cm asc Aorta Diam: 2.8 cm LA/Ao: 0.97 LVOT diam: 2.0 cm LVOT area: 3.0 cm2 LA Volume Indexed (AL/bp): 19.5 ml/m2  RWT: 0.40  Time Measurements MM HR: 77.0 BPM  Doppler Measurements & Calculations MV E max javier: 81.6 cm/sec MV A max javier: 59.3 cm/sec MV E/A: 1.4 MV dec time: 0.22 sec Ao V2 max: 119.5 cm/sec Ao max P.0 mmHg Ao V2 mean: 81.8 cm/sec Ao mean PG: 3.1 mmHg Ao V2 VTI: 24.5 cm LUKE(I,D): 2.6 cm2 LUKE(V,D): 2.3 cm2 LV V1 max PG: 3.4 mmHg LV V1 max: 92.2 cm/sec LV V1 VTI: 20.9 cm SV(LVOT): 63.5 ml SI(LVOT): 33.2 ml/m2 PA acc time: 0.11 sec PI end-d javier: 105.9 cm/sec TR max javier: 194.1 cm/sec TR max PG: 15.1 mmHg AV Javier Ratio (DI): 0.77 LUKE Index (cm2/m2): 1.4  E/E' av.3 Lateral E/e': 4.5 Medial E/e': 6.1  ______________________________________________________________________________ Report approved by: Rhett Mohr 2022 02:13 PM       IR Chest Port Placement > 5 Yrs of Age    Result Date: 2022  [facility] LOCATION: [facility] DATE: 2022 PROCEDURE: 1.  CENTRAL VENOUS PORT PLACEMENT 2.  FLUOROSCOPIC GUIDANCE FOR CATHETER PLACEMENT 3.  ULTRASOUND GUIDANCE FOR VASCULAR ACCESS 4.  CONSCIOUS SEDATION INTERVENTIONAL RADIOLOGIST: Deangelo Mckeon MD. INDICATION: Lymphoma, port for chemotherapy. SEDATION: Versed 2 mg. Fentanyl 100 mcg. The procedure was performed with administration  intravenous conscious sedation with appropriate preoperative, intraoperative, and postoperative evaluation. During the time-out, immediately prior to administration of medications, the patient was reassessed for adequacy to receive conscious sedation. 15 minutes of supervised face to face conscious sedation time was provided by a radiology nurse under my direct supervision. ANTIBIOTICS: Ancef 2 gram IV. Air Kerma: 2 mGy FLUOROSCOPIC TIME: 0.2 minutes CONTRAST: None. COMPLICATIONS: No immediate complications. STERILE BARRIER TECHNIQUE: Maximum sterile barrier technique was used. Cutaneous antisepsis was performed at the operative site with application of 2% chlorhexidine and large sterile drape. Prior to the procedure, the surgeon and assistant performed hand  hygiene and wore hat, mask, sterile gown, and sterile gloves during the entire procedure. PROCEDURE: Ultrasound demonstrated a patent and compressible right internal  jugular vein, and an ultrasound image was obtained and placed in the patient's permanent medical record. The right neck and chest were prepped and draped in usual sterile fashion. Using real-time ultrasound guidance, the right internal jugular vein was accessed. A subcutaneous pocket was created and irrigated with sterile normal saline. The catheter was tunneled in an antegrade fashion. Over the guidewire, a peel-away sheath was advanced with fluoroscopic monitoring. Through the peel-away sheath, the catheter was advanced until the tip was at the cavoatrial junction. The catheter was cut to length and attached firmly to the port. The incisions were closed with layered absorbable  suture and surgical glue. FINDINGS: Ultrasound shows an anechoic and compressible jugular vein. At the completion of the study, the port tip lies at the cavoatrial junction. TYPE OF PORT:  8 [employer] Bard PowerPort     IMPRESSION: Successful power-injectable venous port placement.     Video start time 2: 28 PM  Video end  time 2: 58 PM  Platform used: Laxmi VASQUEZ spent 45 minutes on the date of service, of which greater than 50% of the time was spent on counseling and coordination of care, and discussion with radiation oncology, radiology.    Signed by: Juan Lizarraga MD

## 2022-01-26 NOTE — PROGRESS NOTES
"Oncology Rooming Note    January 26, 2022 8:57 AM   Amna Oneil is a 41 year old female who presents for:    Chief Complaint   Patient presents with     Oncology Clinic Visit     Initial Vitals: /86   Pulse 86   Temp 98.8  F (37.1  C) (Oral)   Resp 16   Wt 80 kg (176 lb 6.4 oz)   SpO2 97%   BMI 27.62 kg/m   Estimated body mass index is 27.62 kg/m  as calculated from the following:    Height as of 1/18/22: 1.702 m (5' 7.01\").    Weight as of this encounter: 80 kg (176 lb 6.4 oz). Body surface area is 1.94 meters squared.  No Pain (0) Comment: Data Unavailable   No LMP recorded.  Allergies reviewed: Yes  Medications reviewed: Yes    Medications: Medication refills not needed today.  Pharmacy name entered into [facility]: [facility] -- [place], [place] - [address]    Clinical concerns:  Discuss results.       Leonardo Corona LPN              "

## 2022-02-01 ENCOUNTER — TELEPHONE (OUTPATIENT)
Dept: ONCOLOGY | Facility: CLINIC | Age: 42
End: 2022-02-01
Payer: COMMERCIAL

## 2022-02-01 NOTE — TELEPHONE ENCOUNTER
Patient called stating she was returning a call from last week but didn't leave name.  I told her I didn't see any outstanding messages waiting for a call back from her.    While patient was on phone, we discussed her upcoming 1st chemo appointment on 2/3 and what to expect. Explained to her she would get her chemo education chair side.  Patent verbalized understanding/XENA Gonzales RN

## 2022-02-02 NOTE — PROGRESS NOTES
[facility] Hematology and Oncology Progress Note    Patient: Amna Oneil  MRN: [id number]  Date of Service: Feb 3, 2022         Reason for Visit:    D1C1 ABVD chemotherapy    Assessment and Plan:    1. Stage II classical Hodgkin lymphoma.    41 year old     Amna presents alone anticipating starting planned chemotherapy.  She looks and is feeling well.  She has not noted any changes in size of the axillary lymph nodes.  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  She denies B-symptoms, cough, fever, chills, unusual headaches, visual or mentation disturbance, bowel or bladder issues, neuropathies, rash.      CBC - WNL    CMP - basically WNL.    I reviewed the potential side effects and schedule of planned ABVD chemotherapy.  I reviewed the uses of her take-home dexamethasone and compazine antiemetic medications and EMLA cream.  She has picked up the Rxs.  I encouraged her to be proactive in treating any nausea, vomiting or bowel changes.  She confirmed that she completed family and does not desire fertility preservation.  She feels ready to begin C1.    Proceed with D1C1 of a planned 3-4 cycles ABVD chemotherapy.    No G-CSF.    Instructed to contact us/be seen with a fever > 100.4F as antibiotics or even hospitalization may be indicated.    Patient requested and was certified for Medical cannabis for potential nausea/anxiety related to cancer diagnosis and chemotherapy.    Situational anxiety - no concerning issues.    Managed with ativan 0.5 mg every 6 hours as needed for short-term use.    Follows with Nicolette Ribeiro, our cancer care psychotherapist.    I addition to receiving the seasonal flu vaccine and the SARScovid.19 vaccines and booster, Amna had the Covid infection in January, 2022, and recovered well.    02/17/2022 - D15C1 ABVD chemotherapy follow up with labs per Treatment Plan.    Restaging PET scan after 2 cycles of  ABVD.  If she responds well, overall plan is to complete 3-4 cycles of ABVD followed by involved site EBRT.                Hematologic History:    1. Classical Hodgkin lymphoma   Stage II (favorable risk)    IPSS-0 risk factor (FFP 88%, overall survival 98%).    12/13/2021 - diagnosed by left axillary lymph node core needle biopsy.    12/23/2021 NM PET/CT - hypermetabolic lymph nodes involving basilar neck/superior mediastinum, mediastinum, right pericardial space, bilateral axillae.    second opinion pathology evaluation at [facility] confirmed classical Hodgkin lymphoma.      01/13/2022 Echocardiogram - normal left ventricular size and systolic performance.  LVEF @ 60-65%.  No significant valvular heart disease identified. Normal right ventricular size and systolic performance.    01/18/2022 bone marrow biopsy - no evidence of Hodgkin lymphoma involvement.      01/19/2022 pulmonary function testing - normal    01/21/2022 - IR port-a-cath placed.      02/03/2022 - D1C1 ABVD chemotherapy.     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    Talib Gaxiola CNP     CC: Physician No Ref-Primary   ___________________________________________________________________________    Review of Systems:    No fever or night sweats.  No loss of weight.  No lumps or bumps anywhere.  No unusual headaches or eyesight issues.  No dizziness.  No bleeding from the nose.  No sores in the mouth. No problems with swallowing.  No chest pain. No shortness of breath. No cough.  No abdominal pain. No nausea or vomiting.  No diarrhea or  constipation.  No blood in stool or black colored stools.  No problems passing urine.  No numbness or tingling in hands or feet.  No skin rashes.    A 14 point review of systems is otherwise negative.    Past History:    No past medical history on file.    Past Surgical History:   Procedure Laterality Date     IR CHEST PORT PLACEMENT > 5 YRS OF AGE  1/21/2022     Physical Exam:    /81 (Patient Position: Sitting)   Pulse 96   Temp 98.5  F (36.9  C) (Oral)   Resp 16   Wt 79.8 kg (176 lb)   SpO2 99%   BMI 27.56 kg/m      GENERAL:   Very pleasant.  Alert and oriented. Seated comfortably. In no distress.    HEENT:   Atraumatic and normocephalic.  PATRICIA.  EOMI.  No pallor.  No icterus.  No mucosal lesions.    LYMPH NODES:  Soft (B) axillary adenopathy.  No palpable cervical or inguinal lymphadenopathy.    CHEST:   Lungs clear to auscultation bilaterally.    CVS:    S1 and S2 heard. Regular rate and rhythm.  No murmur, gallop or rub heard.  No peripheral edema.    ABDOMEN:   Soft. Not tender. Not distended.  No palpable hepatomegaly or splenomegaly, masses or ascites.    EXTREMITIES:  Warm.    SKIN:    No rash bruising or purpura noted.  Full head of hair.    Lab Results:    Reviewed with patient.    Recent Results (from the past 168 hour(s))   Comprehensive metabolic panel   Result Value Ref Range    Sodium 138 136 - 145 mmol/L    Potassium 4.2 3.5 - 5.0 mmol/L    Chloride 107 98 - 107 mmol/L    Carbon Dioxide (CO2) 20 (L) 22 - 31 mmol/L    Anion Gap 11 5 - 18 mmol/L    Urea Nitrogen 18 8 - 22 mg/dL    Creatinine 0.86 0.60 - 1.10 mg/dL    Calcium 9.2 8.5 - 10.5 mg/dL    Glucose 96 70 - 125 mg/dL    Alkaline Phosphatase 87 45 - 120 U/L    AST 10 0 - 40 U/L    ALT <9 0 - 45 U/L    Protein Total 7.4 6.0 - 8.0 g/dL    Albumin 3.5 3.5 - 5.0 g/dL    Bilirubin Total 0.5 0.0 - 1.0 mg/dL    GFR Estimate 86 >60 mL/min/1.73m2   CBC with platelets and differential   Result Value Ref Range    WBC Count 10.5 4.0 - 11.0  10e3/uL    RBC Count 4.47 3.80 - 5.20 10e6/uL    Hemoglobin 12.5 11.7 - 15.7 g/dL    Hematocrit 38.7 35.0 - 47.0 %    MCV 87 78 - 100 fL    MCH 28.0 26.5 - 33.0 pg    MCHC 32.3 31.5 - 36.5 g/dL    RDW 13.2 10.0 - 15.0 %    Platelet Count 363 150 - 450 10e3/uL    % Neutrophils 72 %    % Lymphocytes 16 %    % Monocytes 7 %    % Eosinophils 4 %    % Basophils 1 %    % Immature Granulocytes 0 %    NRBCs per 100 WBC 0 <1 /100    Absolute Neutrophils 7.6 1.6 - 8.3 10e3/uL    Absolute Lymphocytes 1.7 0.8 - 5.3 10e3/uL    Absolute Monocytes 0.7 0.0 - 1.3 10e3/uL    Absolute Eosinophils 0.4 0.0 - 0.7 10e3/uL    Absolute Basophils 0.1 0.0 - 0.2 10e3/uL    Absolute Immature Granulocytes 0.0 <=0.4 10e3/uL    Absolute NRBCs 0.0 10e3/uL       Imaging:    No new imaging.

## 2022-02-03 ENCOUNTER — INFUSION THERAPY VISIT (OUTPATIENT)
Dept: INFUSION THERAPY | Facility: CLINIC | Age: 42
End: 2022-02-03
Attending: NURSE PRACTITIONER
Payer: COMMERCIAL

## 2022-02-03 ENCOUNTER — ONCOLOGY VISIT (OUTPATIENT)
Dept: ONCOLOGY | Facility: CLINIC | Age: 42
End: 2022-02-03
Attending: NURSE PRACTITIONER
Payer: COMMERCIAL

## 2022-02-03 ENCOUNTER — LAB (OUTPATIENT)
Dept: INFUSION THERAPY | Facility: CLINIC | Age: 42
End: 2022-02-03
Attending: INTERNAL MEDICINE
Payer: COMMERCIAL

## 2022-02-03 VITALS
WEIGHT: 176 LBS | HEART RATE: 96 BPM | RESPIRATION RATE: 16 BRPM | TEMPERATURE: 98.5 F | DIASTOLIC BLOOD PRESSURE: 81 MMHG | OXYGEN SATURATION: 99 % | SYSTOLIC BLOOD PRESSURE: 114 MMHG | BODY MASS INDEX: 27.56 KG/M2

## 2022-02-03 DIAGNOSIS — C81.18 NODULAR SCLEROSIS HODGKIN LYMPHOMA OF LYMPH NODES OF MULTIPLE REGIONS (H): ICD-10-CM

## 2022-02-03 DIAGNOSIS — Z51.11 ENCOUNTER FOR ANTINEOPLASTIC CHEMOTHERAPY: ICD-10-CM

## 2022-02-03 DIAGNOSIS — C81.18 NODULAR SCLEROSIS HODGKIN LYMPHOMA OF LYMPH NODES OF MULTIPLE REGIONS (H): Primary | ICD-10-CM

## 2022-02-03 DIAGNOSIS — Z51.11 ENCOUNTER FOR ANTINEOPLASTIC CHEMOTHERAPY: Primary | ICD-10-CM

## 2022-02-03 LAB
ALBUMIN SERPL-MCNC: 3.5 G/DL (ref 3.5–5)
ALP SERPL-CCNC: 87 U/L (ref 45–120)
ALT SERPL W P-5'-P-CCNC: <9 U/L (ref 0–45)
ANION GAP SERPL CALCULATED.3IONS-SCNC: 11 MMOL/L (ref 5–18)
AST SERPL W P-5'-P-CCNC: 10 U/L (ref 0–40)
BASOPHILS # BLD AUTO: 0.1 10E3/UL (ref 0–0.2)
BASOPHILS NFR BLD AUTO: 1 %
BILIRUB SERPL-MCNC: 0.5 MG/DL (ref 0–1)
BUN SERPL-MCNC: 18 MG/DL (ref 8–22)
CALCIUM SERPL-MCNC: 9.2 MG/DL (ref 8.5–10.5)
CHLORIDE BLD-SCNC: 107 MMOL/L (ref 98–107)
CO2 SERPL-SCNC: 20 MMOL/L (ref 22–31)
CREAT SERPL-MCNC: 0.86 MG/DL (ref 0.6–1.1)
EOSINOPHIL # BLD AUTO: 0.4 10E3/UL (ref 0–0.7)
EOSINOPHIL NFR BLD AUTO: 4 %
ERYTHROCYTE [DISTWIDTH] IN BLOOD BY AUTOMATED COUNT: 13.2 % (ref 10–15)
GFR SERPL CREATININE-BSD FRML MDRD: 86 ML/MIN/1.73M2
GLUCOSE BLD-MCNC: 96 MG/DL (ref 70–125)
HCT VFR BLD AUTO: 38.7 % (ref 35–47)
HGB BLD-MCNC: 12.5 G/DL (ref 11.7–15.7)
IMM GRANULOCYTES # BLD: 0 10E3/UL
IMM GRANULOCYTES NFR BLD: 0 %
LYMPHOCYTES # BLD AUTO: 1.7 10E3/UL (ref 0.8–5.3)
LYMPHOCYTES NFR BLD AUTO: 16 %
MCH RBC QN AUTO: 28 PG (ref 26.5–33)
MCHC RBC AUTO-ENTMCNC: 32.3 G/DL (ref 31.5–36.5)
MCV RBC AUTO: 87 FL (ref 78–100)
MONOCYTES # BLD AUTO: 0.7 10E3/UL (ref 0–1.3)
MONOCYTES NFR BLD AUTO: 7 %
NEUTROPHILS # BLD AUTO: 7.6 10E3/UL (ref 1.6–8.3)
NEUTROPHILS NFR BLD AUTO: 72 %
NRBC # BLD AUTO: 0 10E3/UL
NRBC BLD AUTO-RTO: 0 /100
PLATELET # BLD AUTO: 363 10E3/UL (ref 150–450)
POTASSIUM BLD-SCNC: 4.2 MMOL/L (ref 3.5–5)
PROT SERPL-MCNC: 7.4 G/DL (ref 6–8)
RBC # BLD AUTO: 4.47 10E6/UL (ref 3.8–5.2)
SODIUM SERPL-SCNC: 138 MMOL/L (ref 136–145)
WBC # BLD AUTO: 10.5 10E3/UL (ref 4–11)

## 2022-02-03 PROCEDURE — G0463 HOSPITAL OUTPT CLINIC VISIT: HCPCS | Mod: 25

## 2022-02-03 PROCEDURE — 96367 TX/PROPH/DG ADDL SEQ IV INF: CPT

## 2022-02-03 PROCEDURE — 82040 ASSAY OF SERUM ALBUMIN: CPT | Performed by: INTERNAL MEDICINE

## 2022-02-03 PROCEDURE — 96411 CHEMO IV PUSH ADDL DRUG: CPT

## 2022-02-03 PROCEDURE — 85025 COMPLETE CBC W/AUTO DIFF WBC: CPT | Performed by: INTERNAL MEDICINE

## 2022-02-03 PROCEDURE — 96413 CHEMO IV INFUSION 1 HR: CPT

## 2022-02-03 PROCEDURE — 258N000003 HC RX IP 258 OP 636: Performed by: NURSE PRACTITIONER

## 2022-02-03 PROCEDURE — 80053 COMPREHEN METABOLIC PANEL: CPT | Performed by: INTERNAL MEDICINE

## 2022-02-03 PROCEDURE — 99214 OFFICE O/P EST MOD 30 MIN: CPT | Performed by: NURSE PRACTITIONER

## 2022-02-03 PROCEDURE — 96375 TX/PRO/DX INJ NEW DRUG ADDON: CPT

## 2022-02-03 PROCEDURE — 250N000011 HC RX IP 250 OP 636: Performed by: NURSE PRACTITIONER

## 2022-02-03 RX ORDER — LORAZEPAM 2 MG/ML
0.5 INJECTION INTRAMUSCULAR EVERY 4 HOURS PRN
Status: CANCELLED | OUTPATIENT
Start: 2022-02-03

## 2022-02-03 RX ORDER — MEPERIDINE HYDROCHLORIDE 50 MG/ML
25 INJECTION INTRAMUSCULAR; INTRAVENOUS; SUBCUTANEOUS EVERY 30 MIN PRN
Status: CANCELLED | OUTPATIENT
Start: 2022-02-03

## 2022-02-03 RX ORDER — EPINEPHRINE 1 MG/ML
0.3 INJECTION, SOLUTION, CONCENTRATE INTRAVENOUS EVERY 5 MIN PRN
Status: CANCELLED | OUTPATIENT
Start: 2022-02-03

## 2022-02-03 RX ORDER — PALONOSETRON 0.05 MG/ML
0.25 INJECTION, SOLUTION INTRAVENOUS ONCE
Status: CANCELLED
Start: 2022-02-03

## 2022-02-03 RX ORDER — PALONOSETRON 0.05 MG/ML
0.25 INJECTION, SOLUTION INTRAVENOUS ONCE
Status: COMPLETED | OUTPATIENT
Start: 2022-02-03 | End: 2022-02-03

## 2022-02-03 RX ORDER — DOXORUBICIN HYDROCHLORIDE 2 MG/ML
25 INJECTION, SOLUTION INTRAVENOUS ONCE
Status: COMPLETED | OUTPATIENT
Start: 2022-02-03 | End: 2022-02-03

## 2022-02-03 RX ORDER — HEPARIN SODIUM (PORCINE) LOCK FLUSH IV SOLN 100 UNIT/ML 100 UNIT/ML
5 SOLUTION INTRAVENOUS
Status: DISCONTINUED | OUTPATIENT
Start: 2022-02-03 | End: 2022-02-03 | Stop reason: HOSPADM

## 2022-02-03 RX ORDER — METHYLPREDNISOLONE SODIUM SUCCINATE 125 MG/2ML
125 INJECTION, POWDER, LYOPHILIZED, FOR SOLUTION INTRAMUSCULAR; INTRAVENOUS
Status: DISCONTINUED | OUTPATIENT
Start: 2022-02-03 | End: 2022-02-03 | Stop reason: HOSPADM

## 2022-02-03 RX ORDER — HEPARIN SODIUM,PORCINE 10 UNIT/ML
5 VIAL (ML) INTRAVENOUS
Status: CANCELLED | OUTPATIENT
Start: 2022-02-03

## 2022-02-03 RX ORDER — NALOXONE HYDROCHLORIDE 0.4 MG/ML
0.2 INJECTION, SOLUTION INTRAMUSCULAR; INTRAVENOUS; SUBCUTANEOUS
Status: DISCONTINUED | OUTPATIENT
Start: 2022-02-03 | End: 2022-02-03 | Stop reason: HOSPADM

## 2022-02-03 RX ORDER — METHYLPREDNISOLONE SODIUM SUCCINATE 125 MG/2ML
125 INJECTION, POWDER, LYOPHILIZED, FOR SOLUTION INTRAMUSCULAR; INTRAVENOUS
Status: CANCELLED
Start: 2022-02-03

## 2022-02-03 RX ORDER — DIPHENHYDRAMINE HYDROCHLORIDE 50 MG/ML
50 INJECTION INTRAMUSCULAR; INTRAVENOUS
Status: CANCELLED
Start: 2022-02-03

## 2022-02-03 RX ORDER — ALBUTEROL SULFATE 0.83 MG/ML
2.5 SOLUTION RESPIRATORY (INHALATION)
Status: DISCONTINUED | OUTPATIENT
Start: 2022-02-03 | End: 2022-02-03 | Stop reason: HOSPADM

## 2022-02-03 RX ORDER — HEPARIN SODIUM (PORCINE) LOCK FLUSH IV SOLN 100 UNIT/ML 100 UNIT/ML
5 SOLUTION INTRAVENOUS
Status: CANCELLED | OUTPATIENT
Start: 2022-02-03

## 2022-02-03 RX ORDER — NALOXONE HYDROCHLORIDE 0.4 MG/ML
0.2 INJECTION, SOLUTION INTRAMUSCULAR; INTRAVENOUS; SUBCUTANEOUS
Status: CANCELLED | OUTPATIENT
Start: 2022-02-03

## 2022-02-03 RX ORDER — ALBUTEROL SULFATE 0.83 MG/ML
2.5 SOLUTION RESPIRATORY (INHALATION)
Status: CANCELLED | OUTPATIENT
Start: 2022-02-03

## 2022-02-03 RX ORDER — ALBUTEROL SULFATE 90 UG/1
1-2 AEROSOL, METERED RESPIRATORY (INHALATION)
Status: DISCONTINUED | OUTPATIENT
Start: 2022-02-03 | End: 2022-02-03 | Stop reason: HOSPADM

## 2022-02-03 RX ORDER — DIPHENHYDRAMINE HYDROCHLORIDE 50 MG/ML
50 INJECTION INTRAMUSCULAR; INTRAVENOUS
Status: DISCONTINUED | OUTPATIENT
Start: 2022-02-03 | End: 2022-02-03 | Stop reason: HOSPADM

## 2022-02-03 RX ORDER — DOXORUBICIN HYDROCHLORIDE 2 MG/ML
25 INJECTION, SOLUTION INTRAVENOUS ONCE
Status: CANCELLED | OUTPATIENT
Start: 2022-02-03

## 2022-02-03 RX ORDER — MEPERIDINE HYDROCHLORIDE 50 MG/ML
25 INJECTION INTRAMUSCULAR; INTRAVENOUS; SUBCUTANEOUS EVERY 30 MIN PRN
Status: DISCONTINUED | OUTPATIENT
Start: 2022-02-03 | End: 2022-02-03 | Stop reason: HOSPADM

## 2022-02-03 RX ORDER — ALBUTEROL SULFATE 90 UG/1
1-2 AEROSOL, METERED RESPIRATORY (INHALATION)
Status: CANCELLED
Start: 2022-02-03

## 2022-02-03 RX ORDER — EPINEPHRINE 1 MG/ML
0.3 INJECTION, SOLUTION, CONCENTRATE INTRAVENOUS EVERY 5 MIN PRN
Status: DISCONTINUED | OUTPATIENT
Start: 2022-02-03 | End: 2022-02-03 | Stop reason: HOSPADM

## 2022-02-03 RX ADMIN — HEPARIN SODIUM (PORCINE) LOCK FLUSH IV SOLN 100 UNIT/ML 5 ML: 100 SOLUTION at 12:23

## 2022-02-03 RX ADMIN — VINBLASTINE SULFATE 11.6 MG: 1 INJECTION INTRAVENOUS at 11:38

## 2022-02-03 RX ADMIN — SODIUM CHLORIDE 250 ML: 9 INJECTION, SOLUTION INTRAVENOUS at 09:39

## 2022-02-03 RX ADMIN — FOSAPREPITANT: 150 INJECTION, POWDER, LYOPHILIZED, FOR SOLUTION INTRAVENOUS at 09:45

## 2022-02-03 RX ADMIN — PALONOSETRON 0.25 MG: 0.05 INJECTION, SOLUTION INTRAVENOUS at 09:40

## 2022-02-03 RX ADMIN — SODIUM CHLORIDE 19 UNITS: 9 INJECTION, SOLUTION INTRAVENOUS at 11:16

## 2022-02-03 RX ADMIN — DACARBAZINE 730 MG: 200 INJECTION, POWDER, FOR SOLUTION INTRAVENOUS at 11:47

## 2022-02-03 RX ADMIN — DOXORUBICIN HYDROCHLORIDE 50 MG: 2 INJECTION, SOLUTION INTRAVENOUS at 10:38

## 2022-02-03 ASSESSMENT — PAIN SCALES - GENERAL: PAINLEVEL: NO PAIN (0)

## 2022-02-03 NOTE — PROGRESS NOTES
The patient attended chemotherapy class today with spouse, Christian.  The patient was educated on:  -Chemotherapy: what it is and how it works  -Described a typical day at the treatment center and what the patient can expect  -Discussed port access and functions of the port   -Chemotherapy side effects and appropriate management of these side effects. SE discussed included and not limited to: Fatigue, nausea and vomiting, constipation, diarrhea, mucositis, alopecia, peripheral neuropathy, pain, anemia, thrombocytopenia, and neutropenia.    -Reasons to call the physician, including, fever>100.5, shaking chills, unusual bleeding or bruising, shortness of breath, vomiting>12hours, nausea >24 hours, unable to eat/drink >24 hours, painful urination, blood in urine or stool, soreness at the IV site, and painful mouth sores.  The triage phone number was given to the patient and the patient was encouraged to call with any questions.  All questions were addressed to the best of my abilities and the patient was encouraged to call with any questions.        Infusion Nursing Note:  Amna Oneil presents today for first ABVD infusion.    Patient seen by provider today: Yes: Talib CONWAY NP   present during visit today: Not Applicable.    Note: N/A.      Intravenous Access:  Implanted Port.    Treatment Conditions:  Results reviewed, labs MET treatment parameters, ok to proceed with treatment.      Post Infusion Assessment:  Patient tolerated infusion without incident.  Blood return noted pre and post infusion.  Blood return noted during Adriamycin administration every 2-5 cc.  Site patent and intact, free from redness, edema or discomfort.  No evidence of extravasations.  Access discontinued per protocol.       Discharge Plan:   Discharge instructions reviewed with: Patient.  Patient and/or family verbalized understanding of discharge instructions and all questions answered.  Patient discharged in stable condition  accompanied by: .  Departure Mode: Ambulatory.      Alyse BERNAL RN

## 2022-02-03 NOTE — PATIENT INSTRUCTIONS
Recent Results (from the past 24 hour(s))   Comprehensive metabolic panel    Collection Time: 02/03/22  8:19 AM   Result Value Ref Range    Sodium 138 136 - 145 mmol/L    Potassium 4.2 3.5 - 5.0 mmol/L    Chloride 107 98 - 107 mmol/L    Carbon Dioxide (CO2) 20 (L) 22 - 31 mmol/L    Anion Gap 11 5 - 18 mmol/L    Urea Nitrogen 18 8 - 22 mg/dL    Creatinine 0.86 0.60 - 1.10 mg/dL    Calcium 9.2 8.5 - 10.5 mg/dL    Glucose 96 70 - 125 mg/dL    Alkaline Phosphatase 87 45 - 120 U/L    AST 10 0 - 40 U/L    ALT <9 0 - 45 U/L    Protein Total 7.4 6.0 - 8.0 g/dL    Albumin 3.5 3.5 - 5.0 g/dL    Bilirubin Total 0.5 0.0 - 1.0 mg/dL    GFR Estimate 86 >60 mL/min/1.73m2   CBC with platelets and differential    Collection Time: 02/03/22  8:19 AM   Result Value Ref Range    WBC Count 10.5 4.0 - 11.0 10e3/uL    RBC Count 4.47 3.80 - 5.20 10e6/uL    Hemoglobin 12.5 11.7 - 15.7 g/dL    Hematocrit 38.7 35.0 - 47.0 %    MCV 87 78 - 100 fL    MCH 28.0 26.5 - 33.0 pg    MCHC 32.3 31.5 - 36.5 g/dL    RDW 13.2 10.0 - 15.0 %    Platelet Count 363 150 - 450 10e3/uL    % Neutrophils 72 %    % Lymphocytes 16 %    % Monocytes 7 %    % Eosinophils 4 %    % Basophils 1 %    % Immature Granulocytes 0 %    NRBCs per 100 WBC 0 <1 /100    Absolute Neutrophils 7.6 1.6 - 8.3 10e3/uL    Absolute Lymphocytes 1.7 0.8 - 5.3 10e3/uL    Absolute Monocytes 0.7 0.0 - 1.3 10e3/uL    Absolute Eosinophils 0.4 0.0 - 0.7 10e3/uL    Absolute Basophils 0.1 0.0 - 0.2 10e3/uL    Absolute Immature Granulocytes 0.0 <=0.4 10e3/uL    Absolute NRBCs 0.0 10e3/uL

## 2022-02-03 NOTE — PROGRESS NOTES
"Oncology Rooming Note    February 3, 2022 8:33 AM   Amna Oneil is a 41 year old female who presents for:    Chief Complaint   Patient presents with     Oncology Clinic Visit     Initial Vitals: /81 (Patient Position: Sitting)   Pulse 96   Temp 98.5  F (36.9  C) (Oral)   Resp 16   Wt 79.8 kg (176 lb)   SpO2 99%   BMI 27.56 kg/m   Estimated body mass index is 27.56 kg/m  as calculated from the following:    Height as of 1/18/22: 1.702 m (5' 7.01\").    Weight as of this encounter: 79.8 kg (176 lb). Body surface area is 1.94 meters squared.  No Pain (0) Comment: Data Unavailable   No LMP recorded.  Allergies reviewed: Yes  Medications reviewed: Yes    Medications: Medication refills not needed today.  Pharmacy name entered into [employer]: [facility] -- [place], [place] - [address]    Clinical concerns: No concerns today.       Leonardo Corona LPN              "

## 2022-02-03 NOTE — LETTER
2/3/2022         RE: Amna Oneil  [address]  [place] [address]        Dear Colleague,    Thank you for referring your patient, Amna Oneil, to the [facility] [place]. Please see a copy of my visit note below.    [facility] Hematology and Oncology Progress Note    Patient: Amna Oneil  MRN: [id number]  Date of Service: Feb 3, 2022         Reason for Visit:    D1C1 ABVD chemotherapy    Assessment and Plan:    1. Stage II classical Hodgkin lymphoma.    41 year old     Amna presents alone anticipating starting planned chemotherapy.  She looks and is feeling well.  She has not noted any changes in size of the axillary lymph nodes.  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  She denies B-symptoms, cough, fever, chills, unusual headaches, visual or mentation disturbance, bowel or bladder issues, neuropathies, rash.      CBC - WNL    CMP - basically WNL.    I reviewed the potential side effects and schedule of planned ABVD chemotherapy.  I reviewed the uses of her take-home dexamethasone and compazine antiemetic medications and EMLA cream.  She has picked up the Rxs.  I encouraged her to be proactive in treating any nausea, vomiting or bowel changes.  She confirmed that she completed family and does not desire fertility preservation.  She feels ready to begin C1.    Proceed with D1C1 of a planned 3-4 cycles ABVD chemotherapy.    No G-CSF.    Instructed to contact us/be seen with a fever > 100.4F as antibiotics or even hospitalization may be indicated.    Patient requested and was certified for Medical cannabis for potential nausea/anxiety related to cancer diagnosis and chemotherapy.    Situational anxiety - no concerning issues.    Managed with ativan 0.5 mg every 6 hours as needed for short-term use.    Follows with Nicolette Ribeiro, our cancer care psychotherapist.    I addition to receiving  the seasonal flu vaccine and the SARScovid.19 vaccines and booster, Amna had the Covid infection in January, 2022, and recovered well.    02/17/2022 - D15C1 ABVD chemotherapy follow up with labs per Treatment Plan.    Restaging PET scan after 2 cycles of ABVD.  If she responds well, overall plan is to complete 3-4 cycles of ABVD followed by involved site EBRT.                Hematologic History:    1. Classical Hodgkin lymphoma   Stage II (favorable risk)    IPSS-0 risk factor (FFP 88%, overall survival 98%).    12/13/2021 - diagnosed by left axillary lymph node core needle biopsy.    12/23/2021 NM PET/CT - hypermetabolic lymph nodes involving basilar neck/superior mediastinum, mediastinum, right pericardial space, bilateral axillae.    second opinion pathology evaluation at [facility] confirmed classical Hodgkin lymphoma.      01/13/2022 Echocardiogram - normal left ventricular size and systolic performance.  LVEF @ 60-65%.  No significant valvular heart disease identified. Normal right ventricular size and systolic performance.    01/18/2022 bone marrow biopsy - no evidence of Hodgkin lymphoma involvement.      01/19/2022 pulmonary function testing - normal    01/21/2022 - IR port-a-cath placed.      02/03/2022 - D1C1 ABVD chemotherapy.     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    Talib Gaxiola CNP     CC: Physician No Ref-Primary   ___________________________________________________________________________    Review of Systems:    No fever or night sweats.  No loss of weight.  No lumps or bumps  anywhere.  No unusual headaches or eyesight issues.  No dizziness.  No bleeding from the nose.  No sores in the mouth. No problems with swallowing.  No chest pain. No shortness of breath. No cough.  No abdominal pain. No nausea or vomiting.  No diarrhea or constipation.  No blood in stool or black colored stools.  No problems passing urine.  No numbness or tingling in hands or feet.  No skin rashes.    A 14 point review of systems is otherwise negative.    Past History:    No past medical history on file.    Past Surgical History:   Procedure Laterality Date     IR CHEST PORT PLACEMENT > 5 YRS OF AGE  1/21/2022     Physical Exam:    /81 (Patient Position: Sitting)   Pulse 96   Temp 98.5  F (36.9  C) (Oral)   Resp 16   Wt 79.8 kg (176 lb)   SpO2 99%   BMI 27.56 kg/m      GENERAL:   Very pleasant.  Alert and oriented. Seated comfortably. In no distress.    HEENT:   Atraumatic and normocephalic.  PATRICIA.  EOMI.  No pallor.  No icterus.  No mucosal lesions.    LYMPH NODES:  Soft (B) axillary adenopathy.  No palpable cervical or inguinal lymphadenopathy.    CHEST:   Lungs clear to auscultation bilaterally.    CVS:    S1 and S2 heard. Regular rate and rhythm.  No murmur, gallop or rub heard.  No peripheral edema.    ABDOMEN:   Soft. Not tender. Not distended.  No palpable hepatomegaly or splenomegaly, masses or ascites.    EXTREMITIES:  Warm.    SKIN:    No rash bruising or purpura noted.  Full head of hair.    Lab Results:    Reviewed with patient.    Recent Results (from the past 168 hour(s))   Comprehensive metabolic panel   Result Value Ref Range    Sodium 138 136 - 145 mmol/L    Potassium 4.2 3.5 - 5.0 mmol/L    Chloride 107 98 - 107 mmol/L    Carbon Dioxide (CO2) 20 (L) 22 - 31 mmol/L    Anion Gap 11 5 - 18 mmol/L    Urea Nitrogen 18 8 - 22 mg/dL    Creatinine 0.86 0.60 - 1.10 mg/dL    Calcium 9.2 8.5 - 10.5 mg/dL    Glucose 96 70 - 125 mg/dL    Alkaline Phosphatase 87 45 - 120 U/L    AST 10 0 - 40 U/L     "ALT <9 0 - 45 U/L    Protein Total 7.4 6.0 - 8.0 g/dL    Albumin 3.5 3.5 - 5.0 g/dL    Bilirubin Total 0.5 0.0 - 1.0 mg/dL    GFR Estimate 86 >60 mL/min/1.73m2   CBC with platelets and differential   Result Value Ref Range    WBC Count 10.5 4.0 - 11.0 10e3/uL    RBC Count 4.47 3.80 - 5.20 10e6/uL    Hemoglobin 12.5 11.7 - 15.7 g/dL    Hematocrit 38.7 35.0 - 47.0 %    MCV 87 78 - 100 fL    MCH 28.0 26.5 - 33.0 pg    MCHC 32.3 31.5 - 36.5 g/dL    RDW 13.2 10.0 - 15.0 %    Platelet Count 363 150 - 450 10e3/uL    % Neutrophils 72 %    % Lymphocytes 16 %    % Monocytes 7 %    % Eosinophils 4 %    % Basophils 1 %    % Immature Granulocytes 0 %    NRBCs per 100 WBC 0 <1 /100    Absolute Neutrophils 7.6 1.6 - 8.3 10e3/uL    Absolute Lymphocytes 1.7 0.8 - 5.3 10e3/uL    Absolute Monocytes 0.7 0.0 - 1.3 10e3/uL    Absolute Eosinophils 0.4 0.0 - 0.7 10e3/uL    Absolute Basophils 0.1 0.0 - 0.2 10e3/uL    Absolute Immature Granulocytes 0.0 <=0.4 10e3/uL    Absolute NRBCs 0.0 10e3/uL       Imaging:    No new imaging.    Oncology Rooming Note    February 3, 2022 8:33 AM   Amna DENIZ Oneil is a 41 year old female who presents for:    Chief Complaint   Patient presents with     Oncology Clinic Visit     Initial Vitals: /81 (Patient Position: Sitting)   Pulse 96   Temp 98.5  F (36.9  C) (Oral)   Resp 16   Wt 79.8 kg (176 lb)   SpO2 99%   BMI 27.56 kg/m   Estimated body mass index is 27.56 kg/m  as calculated from the following:    Height as of 1/18/22: 1.702 m (5' 7.01\").    Weight as of this encounter: 79.8 kg (176 lb). Body surface area is 1.94 meters squared.  No Pain (0) Comment: Data Unavailable   No LMP recorded.  Allergies reviewed: Yes  Medications reviewed: Yes    Medications: Medication refills not needed today.  Pharmacy name entered into [employer]: [facility] -- [place] - [place], [place] - [address]    Clinical concerns: No concerns today.       Lenoardo Corona LPN                  Again, thank " you for allowing me to participate in the care of your patient.        Sincerely,        Talib Gaxiola, CNP

## 2022-02-06 ENCOUNTER — HEALTH MAINTENANCE LETTER (OUTPATIENT)
Age: 42
End: 2022-02-06

## 2022-02-07 ENCOUNTER — TELEPHONE (OUTPATIENT)
Dept: ONCOLOGY | Facility: CLINIC | Age: 42
End: 2022-02-07
Payer: COMMERCIAL

## 2022-02-07 NOTE — TELEPHONE ENCOUNTER
Per Talib, patient can start OTC Pepcid 20 mg bid (or can send RX). Can decrease to Pepcid 20 mg every night once sxs resolved. Can ramp up again to twice daily throughout chemotherapy cycles.   Left message for patient to return call.   Adenike Colunga RN

## 2022-02-07 NOTE — TELEPHONE ENCOUNTER
Please review and advise.   Patient called in, reports significant reflux. Believes it is from taking dexamethasone with AVBD tx.   Reflux started evening of 2/04, has progressed. Feels in esophagus, sometimes to throat. Difficult to eat as that worsens reflux.  Avoiding normal trigger foods.   Wondering if she should try OTC or do you want to send in RX? [facility]  Adenike Colunga RN

## 2022-02-07 NOTE — TELEPHONE ENCOUNTER
The patient was contacted and is aware of plan and verbalizes agreement.  She will get OTC Pepcid.   Adenike Colunga RN

## 2022-02-09 ENCOUNTER — TELEPHONE (OUTPATIENT)
Dept: ONCOLOGY | Facility: CLINIC | Age: 42
End: 2022-02-09
Payer: COMMERCIAL

## 2022-02-09 LAB
CULTURE HARVEST COMPLETE DATE: NORMAL
INTERPRETATION: NORMAL
ISCN: NORMAL
METHODS: NORMAL

## 2022-02-09 NOTE — TELEPHONE ENCOUNTER
Called patient to check in to see if Pepcid helping. Instructed patient to call at her  convenience.

## 2022-02-11 ENCOUNTER — TELEPHONE (OUTPATIENT)
Dept: ONCOLOGY | Facility: HOSPITAL | Age: 42
End: 2022-02-11
Payer: COMMERCIAL

## 2022-02-11 NOTE — TELEPHONE ENCOUNTER
"[facility] to report that Pepcid is working great for her heartburn. Pt c/o new onset of left sided rib pain. Pt rates it a \"3\" and says it radiates to her back. She says it got as high as \"5-6\" last night. She notices it more with deep breaths. Pt denies cough, SOB, fever, or chills.  Instructed pt to monitor pain and call if worsens. Also instructed pt to report to ED for any sudden sharp chest pain, shortness of breath, fever, or chills. Dr. Lizarraga aware and agreeable to this plan. Pt verbalized understanding.   "

## 2022-02-17 ENCOUNTER — ONCOLOGY VISIT (OUTPATIENT)
Dept: ONCOLOGY | Facility: CLINIC | Age: 42
End: 2022-02-17
Payer: COMMERCIAL

## 2022-02-17 ENCOUNTER — LAB (OUTPATIENT)
Dept: INFUSION THERAPY | Facility: CLINIC | Age: 42
End: 2022-02-17
Payer: COMMERCIAL

## 2022-02-17 ENCOUNTER — INFUSION THERAPY VISIT (OUTPATIENT)
Dept: INFUSION THERAPY | Facility: CLINIC | Age: 42
End: 2022-02-17
Attending: INTERNAL MEDICINE
Payer: COMMERCIAL

## 2022-02-17 VITALS
DIASTOLIC BLOOD PRESSURE: 81 MMHG | OXYGEN SATURATION: 98 % | RESPIRATION RATE: 22 BRPM | BODY MASS INDEX: 27.4 KG/M2 | HEART RATE: 95 BPM | TEMPERATURE: 98.3 F | WEIGHT: 175 LBS | SYSTOLIC BLOOD PRESSURE: 105 MMHG

## 2022-02-17 DIAGNOSIS — K59.01 SLOW TRANSIT CONSTIPATION: ICD-10-CM

## 2022-02-17 DIAGNOSIS — T45.1X5A CHEMOTHERAPY-INDUCED NEUTROPENIA (H): ICD-10-CM

## 2022-02-17 DIAGNOSIS — C81.18 NODULAR SCLEROSIS HODGKIN LYMPHOMA OF LYMPH NODES OF MULTIPLE REGIONS (H): ICD-10-CM

## 2022-02-17 DIAGNOSIS — Z51.11 ENCOUNTER FOR ANTINEOPLASTIC CHEMOTHERAPY: ICD-10-CM

## 2022-02-17 DIAGNOSIS — D70.1 CHEMOTHERAPY-INDUCED NEUTROPENIA (H): ICD-10-CM

## 2022-02-17 DIAGNOSIS — C81.18 NODULAR SCLEROSIS HODGKIN LYMPHOMA OF LYMPH NODES OF MULTIPLE REGIONS (H): Primary | ICD-10-CM

## 2022-02-17 DIAGNOSIS — K21.9 GASTROESOPHAGEAL REFLUX DISEASE WITHOUT ESOPHAGITIS: ICD-10-CM

## 2022-02-17 DIAGNOSIS — Z51.11 ENCOUNTER FOR ANTINEOPLASTIC CHEMOTHERAPY: Primary | ICD-10-CM

## 2022-02-17 LAB
BASOPHILS # BLD AUTO: 0 10E3/UL (ref 0–0.2)
BASOPHILS NFR BLD AUTO: 1 %
EOSINOPHIL # BLD AUTO: 0.1 10E3/UL (ref 0–0.7)
EOSINOPHIL NFR BLD AUTO: 3 %
ERYTHROCYTE [DISTWIDTH] IN BLOOD BY AUTOMATED COUNT: 13.6 % (ref 10–15)
HCT VFR BLD AUTO: 37.8 % (ref 35–47)
HGB BLD-MCNC: 12.1 G/DL (ref 11.7–15.7)
IMM GRANULOCYTES # BLD: 0 10E3/UL
IMM GRANULOCYTES NFR BLD: 0 %
LYMPHOCYTES # BLD AUTO: 1.8 10E3/UL (ref 0.8–5.3)
LYMPHOCYTES NFR BLD AUTO: 46 %
MCH RBC QN AUTO: 27.9 PG (ref 26.5–33)
MCHC RBC AUTO-ENTMCNC: 32 G/DL (ref 31.5–36.5)
MCV RBC AUTO: 87 FL (ref 78–100)
MONOCYTES # BLD AUTO: 0.6 10E3/UL (ref 0–1.3)
MONOCYTES NFR BLD AUTO: 16 %
NEUTROPHILS # BLD AUTO: 1.3 10E3/UL (ref 1.6–8.3)
NEUTROPHILS NFR BLD AUTO: 34 %
NRBC # BLD AUTO: 0 10E3/UL
NRBC BLD AUTO-RTO: 0 /100
PLATELET # BLD AUTO: 374 10E3/UL (ref 150–450)
RBC # BLD AUTO: 4.33 10E6/UL (ref 3.8–5.2)
WBC # BLD AUTO: 3.9 10E3/UL (ref 4–11)

## 2022-02-17 PROCEDURE — 96367 TX/PROPH/DG ADDL SEQ IV INF: CPT

## 2022-02-17 PROCEDURE — 258N000003 HC RX IP 258 OP 636: Performed by: NURSE PRACTITIONER

## 2022-02-17 PROCEDURE — 85025 COMPLETE CBC W/AUTO DIFF WBC: CPT | Performed by: NURSE PRACTITIONER

## 2022-02-17 PROCEDURE — 96413 CHEMO IV INFUSION 1 HR: CPT

## 2022-02-17 PROCEDURE — G0463 HOSPITAL OUTPT CLINIC VISIT: HCPCS | Mod: 25

## 2022-02-17 PROCEDURE — 99214 OFFICE O/P EST MOD 30 MIN: CPT | Performed by: NURSE PRACTITIONER

## 2022-02-17 PROCEDURE — 96375 TX/PRO/DX INJ NEW DRUG ADDON: CPT

## 2022-02-17 PROCEDURE — 250N000011 HC RX IP 250 OP 636: Performed by: NURSE PRACTITIONER

## 2022-02-17 PROCEDURE — 96417 CHEMO IV INFUS EACH ADDL SEQ: CPT

## 2022-02-17 PROCEDURE — 96411 CHEMO IV PUSH ADDL DRUG: CPT

## 2022-02-17 RX ORDER — MEPERIDINE HYDROCHLORIDE 50 MG/ML
25 INJECTION INTRAMUSCULAR; INTRAVENOUS; SUBCUTANEOUS EVERY 30 MIN PRN
Status: DISCONTINUED | OUTPATIENT
Start: 2022-02-17 | End: 2022-02-17 | Stop reason: HOSPADM

## 2022-02-17 RX ORDER — NALOXONE HYDROCHLORIDE 0.4 MG/ML
0.2 INJECTION, SOLUTION INTRAMUSCULAR; INTRAVENOUS; SUBCUTANEOUS
Status: CANCELLED | OUTPATIENT
Start: 2022-02-17

## 2022-02-17 RX ORDER — EPINEPHRINE 1 MG/ML
0.3 INJECTION, SOLUTION, CONCENTRATE INTRAVENOUS EVERY 5 MIN PRN
Status: DISCONTINUED | OUTPATIENT
Start: 2022-02-17 | End: 2022-02-17 | Stop reason: HOSPADM

## 2022-02-17 RX ORDER — FAMOTIDINE 20 MG/1
20 TABLET, FILM COATED ORAL
COMMUNITY
End: 2022-11-16

## 2022-02-17 RX ORDER — HEPARIN SODIUM (PORCINE) LOCK FLUSH IV SOLN 100 UNIT/ML 100 UNIT/ML
5 SOLUTION INTRAVENOUS
Status: DISCONTINUED | OUTPATIENT
Start: 2022-02-17 | End: 2022-02-17 | Stop reason: HOSPADM

## 2022-02-17 RX ORDER — DOXORUBICIN HYDROCHLORIDE 2 MG/ML
25 INJECTION, SOLUTION INTRAVENOUS ONCE
Status: CANCELLED | OUTPATIENT
Start: 2022-02-17

## 2022-02-17 RX ORDER — PALONOSETRON 0.05 MG/ML
0.25 INJECTION, SOLUTION INTRAVENOUS ONCE
Status: COMPLETED | OUTPATIENT
Start: 2022-02-17 | End: 2022-02-17

## 2022-02-17 RX ORDER — MEPERIDINE HYDROCHLORIDE 50 MG/ML
25 INJECTION INTRAMUSCULAR; INTRAVENOUS; SUBCUTANEOUS EVERY 30 MIN PRN
Status: CANCELLED | OUTPATIENT
Start: 2022-02-17

## 2022-02-17 RX ORDER — HEPARIN SODIUM (PORCINE) LOCK FLUSH IV SOLN 100 UNIT/ML 100 UNIT/ML
5 SOLUTION INTRAVENOUS
Status: CANCELLED | OUTPATIENT
Start: 2022-02-17

## 2022-02-17 RX ORDER — PALONOSETRON 0.05 MG/ML
0.25 INJECTION, SOLUTION INTRAVENOUS ONCE
Status: CANCELLED
Start: 2022-02-17

## 2022-02-17 RX ORDER — DIPHENHYDRAMINE HYDROCHLORIDE 50 MG/ML
50 INJECTION INTRAMUSCULAR; INTRAVENOUS
Status: DISCONTINUED | OUTPATIENT
Start: 2022-02-17 | End: 2022-02-17 | Stop reason: HOSPADM

## 2022-02-17 RX ORDER — DEXAMETHASONE 4 MG/1
4 TABLET ORAL
COMMUNITY
Start: 2022-02-09 | End: 2022-11-16

## 2022-02-17 RX ORDER — NALOXONE HYDROCHLORIDE 0.4 MG/ML
0.2 INJECTION, SOLUTION INTRAMUSCULAR; INTRAVENOUS; SUBCUTANEOUS
Status: DISCONTINUED | OUTPATIENT
Start: 2022-02-17 | End: 2022-02-17 | Stop reason: HOSPADM

## 2022-02-17 RX ORDER — DOXORUBICIN HYDROCHLORIDE 2 MG/ML
25 INJECTION, SOLUTION INTRAVENOUS ONCE
Status: COMPLETED | OUTPATIENT
Start: 2022-02-17 | End: 2022-02-17

## 2022-02-17 RX ORDER — ALBUTEROL SULFATE 90 UG/1
1-2 AEROSOL, METERED RESPIRATORY (INHALATION)
Status: DISCONTINUED | OUTPATIENT
Start: 2022-02-17 | End: 2022-02-17 | Stop reason: HOSPADM

## 2022-02-17 RX ORDER — LORAZEPAM 2 MG/ML
0.5 INJECTION INTRAMUSCULAR EVERY 4 HOURS PRN
Status: CANCELLED | OUTPATIENT
Start: 2022-02-17

## 2022-02-17 RX ORDER — EPINEPHRINE 1 MG/ML
0.3 INJECTION, SOLUTION, CONCENTRATE INTRAVENOUS EVERY 5 MIN PRN
Status: CANCELLED | OUTPATIENT
Start: 2022-02-17

## 2022-02-17 RX ORDER — METHYLPREDNISOLONE SODIUM SUCCINATE 125 MG/2ML
125 INJECTION, POWDER, LYOPHILIZED, FOR SOLUTION INTRAMUSCULAR; INTRAVENOUS
Status: DISCONTINUED | OUTPATIENT
Start: 2022-02-17 | End: 2022-02-17 | Stop reason: HOSPADM

## 2022-02-17 RX ORDER — METHYLPREDNISOLONE SODIUM SUCCINATE 125 MG/2ML
125 INJECTION, POWDER, LYOPHILIZED, FOR SOLUTION INTRAMUSCULAR; INTRAVENOUS
Status: CANCELLED
Start: 2022-02-17

## 2022-02-17 RX ORDER — ALBUTEROL SULFATE 90 UG/1
1-2 AEROSOL, METERED RESPIRATORY (INHALATION)
Status: CANCELLED
Start: 2022-02-17

## 2022-02-17 RX ORDER — ALBUTEROL SULFATE 0.83 MG/ML
2.5 SOLUTION RESPIRATORY (INHALATION)
Status: DISCONTINUED | OUTPATIENT
Start: 2022-02-17 | End: 2022-02-17 | Stop reason: HOSPADM

## 2022-02-17 RX ORDER — HEPARIN SODIUM,PORCINE 10 UNIT/ML
5 VIAL (ML) INTRAVENOUS
Status: CANCELLED | OUTPATIENT
Start: 2022-02-17

## 2022-02-17 RX ORDER — DIPHENHYDRAMINE HYDROCHLORIDE 50 MG/ML
50 INJECTION INTRAMUSCULAR; INTRAVENOUS
Status: CANCELLED
Start: 2022-02-17

## 2022-02-17 RX ORDER — ALBUTEROL SULFATE 0.83 MG/ML
2.5 SOLUTION RESPIRATORY (INHALATION)
Status: CANCELLED | OUTPATIENT
Start: 2022-02-17

## 2022-02-17 RX ADMIN — DOXORUBICIN HYDROCHLORIDE 50 MG: 2 INJECTION, SOLUTION INTRAVENOUS at 13:06

## 2022-02-17 RX ADMIN — DACARBAZINE 730 MG: 200 INJECTION, POWDER, FOR SOLUTION INTRAVENOUS at 13:46

## 2022-02-17 RX ADMIN — VINBLASTINE SULFATE 11.6 MG: 1 INJECTION INTRAVENOUS at 13:40

## 2022-02-17 RX ADMIN — PALONOSETRON 0.25 MG: 0.05 INJECTION, SOLUTION INTRAVENOUS at 11:55

## 2022-02-17 RX ADMIN — FOSAPREPITANT: 150 INJECTION, POWDER, LYOPHILIZED, FOR SOLUTION INTRAVENOUS at 12:11

## 2022-02-17 RX ADMIN — SODIUM CHLORIDE 250 ML: 9 INJECTION, SOLUTION INTRAVENOUS at 11:50

## 2022-02-17 RX ADMIN — HEPARIN SODIUM (PORCINE) LOCK FLUSH IV SOLN 100 UNIT/ML 5 ML: 100 SOLUTION at 14:23

## 2022-02-17 RX ADMIN — BLEOMYCIN SULFATE 19 UNITS: 30 POWDER, FOR SOLUTION INTRAMUSCULAR; INTRAPLEURAL; INTRAVENOUS; SUBCUTANEOUS at 13:17

## 2022-02-17 ASSESSMENT — PAIN SCALES - GENERAL: PAINLEVEL: NO PAIN (0)

## 2022-02-17 NOTE — LETTER
"    2/17/2022         RE: Amna Oneil  [address] [place] [address]        Dear Colleague,    Thank you for referring your patient, Amna Oneil, to the [facility] [place]. Please see a copy of my visit note below.    Oncology Rooming Note    February 17, 2022 10:38 AM   Amna Oneil is a 41 year old female who presents for:    Chief Complaint   Patient presents with     Oncology Clinic Visit     Initial Vitals: /81   Pulse 95   Temp 98.3  F (36.8  C)   Resp 16   Wt 79.4 kg (175 lb)   SpO2 98%   BMI 27.40 kg/m   Estimated body mass index is 27.4 kg/m  as calculated from the following:    Height as of 1/18/22: 1.702 m (5' 7.01\").    Weight as of this encounter: 79.4 kg (175 lb). Body surface area is 1.94 meters squared.  No Pain (0) Comment: Data Unavailable   No LMP recorded.  Allergies reviewed: Yes  Medications reviewed: Yes    Medications: Medication refills not needed today.  Pharmacy name entered into [employer]: [facility] -- [place] - [place], [place] - [address]    Clinical concerns: Short of breath      Grace Pedraza RN              [facility] Hematology and Oncology Progress Note    Patient: Amna Oneil  MRN: [id number]  Date of Service: Feb 17, 2022         Reason for Visit:    D15C1 ABVD chemotherapy    Assessment and Plan:    1. Stage II classical Hodgkin lymphoma.    41 year old     Amna presents accompanied by her spouse anticipating ongoing ABVD chemotherapy.  She looks and is generally feeling well.  She noted significant heartburn s/p C1 chemotherapy - now managed on OTC Pepcid 1-2 times daily.  No nausea.  She tried the medical cannabis but didn't like the dizziness she got from it.  She also noted some constipation - managed with increasing oral hydration and eating black beans.  She has not noted any changes in size of the axillary lymph nodes.  she feels a bit more short of breath with certain activities.  No " cough.  Her appetite and weight remain very stable with start of treatment.  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  She denies B-symptoms, cough, fever, chills, unusual headaches, visual or mentation disturbance, bowel or bladder issues, neuropathies, rash.     CBC - mild neutropenia.  Otherwise WNL    I again reviewed the potential side effects and schedule of planned ABVD chemotherapy.  I reviewed the uses of her take-home dexamethasone and compazine antiemetic medications and EMLA cream.  I encouraged her to be proactive in treating any nausea, vomiting or bowel changes.      Proceed with D15C1 of a planned 3-4 cycles ABVD chemotherapy.    No G-CSF.    Instructed to contact us/be seen with a fever > 100.4F as antibiotics or even hospitalization may be indicated.    Shortness of breath with certain activities:    Pulse ox @ 98% at rest.  Not anemic.  Lungs clear.  Decreasing palpable LNs.  Possibly some debilitation.    Also discussed possible relationship to Bleomycin - if worsens she is to alert us.    Heart burn:    Well managed with OTC Pepcid - continue.    Nausea:    Denied.    Reviewed use of dexamethasone 8 mg daily with breakfast for 3-days after each chemotherapy dose.    Has Compazine and medical cannabis if needed.      Situational anxiety - no concerning issues.    No recent ativan use.    Met with Nicolette Ribeiro, our cancer care psychotherapist - following with her own therapist.    I addition to receiving the seasonal flu vaccine and the SARScovid.19 vaccines and booster, Amna had the Covid infection in January, 2022, and recovered well.    03/03/2022 - D1C2 ABVD chemotherapy follow up with labs per Treatment Plan.    Restaging PET scan after 2 cycles of ABVD.  If she responds well, overall plan is to complete 3-4 cycles of ABVD followed by involved site EBRT.                Hematologic History:    1. Classical Hodgkin lymphoma   Stage II (favorable risk)    IPSS-0 risk factor (FFP  88%, overall survival 98%).    12/13/2021 - diagnosed by left axillary lymph node core needle biopsy.    12/23/2021 NM PET/CT - hypermetabolic lymph nodes involving basilar neck/superior mediastinum, mediastinum, right pericardial space, bilateral axillae.    second opinion pathology evaluation at [facility] confirmed classical Hodgkin lymphoma.      01/13/2022 Echocardiogram - normal left ventricular size and systolic performance.  LVEF @ 60-65%.  No significant valvular heart disease identified. Normal right ventricular size and systolic performance.    01/18/2022 bone marrow biopsy - no evidence of Hodgkin lymphoma involvement.      01/19/2022 pulmonary function testing - normal    01/21/2022 - IR port-a-cath placed.      02/03/2022 - D1C1 ABVD chemotherapy.     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    Talib Gaxiola CNP     CC: Physician No Ref-Primary   ___________________________________________________________________________    Review of Systems:    No fever or night sweats.  No loss of weight.  No lumps or bumps anywhere.  No unusual headaches or eyesight issues.  No dizziness.  No bleeding from the nose.  No sores in the mouth. No problems with swallowing.  No chest pain. No shortness of breath. No cough.  No abdominal pain. No nausea or vomiting.  No diarrhea or constipation.  No  blood in stool or black colored stools.  No problems passing urine.  No numbness or tingling in hands or feet.  No skin rashes.    A 14 point review of systems is otherwise negative.    Past History:    No past medical history on file.    Past Surgical History:   Procedure Laterality Date     IR CHEST PORT PLACEMENT > 5 YRS OF AGE  1/21/2022     Physical Exam:    /81   Pulse 95   Temp 98.3  F (36.8  C)   Resp 22   Wt 79.4 kg (175 lb)   SpO2 98%   BMI 27.40 kg/m      GENERAL:   Very pleasant.  Alert and oriented. Seated comfortably. In no distress.    HEENT:   Atraumatic and normocephalic.  PATRICIA.  EOMI.  No pallor.  No icterus.  No mucosal lesions.    LYMPH NODES:  Soft (~2 cm) (L) axillary LN - previously (B) axillary adenopathy.  No palpable cervical or inguinal lymphadenopathy.    CHEST:   Lungs clear to auscultation bilaterally.    Port-a-cath in place - no s/s infection.  Working well.    CVS:    S1 and S2 heard. Regular rate and rhythm.  No murmur, gallop or rub heard.  No peripheral edema.    ABDOMEN:   Soft.  Not tender or distended.  No palpable hepatomegaly or splenomegaly, masses or ascites.    EXTREMITIES:  Warm.    SKIN:    No rash bruising or purpura noted.  Full head of hair.    Lab Results:    Reviewed with patient.    Recent Results (from the past 24 hour(s))   CBC with platelets and differential    Collection Time: 02/17/22 10:19 AM   Result Value Ref Range    WBC Count 3.9 (L) 4.0 - 11.0 10e3/uL    RBC Count 4.33 3.80 - 5.20 10e6/uL    Hemoglobin 12.1 11.7 - 15.7 g/dL    Hematocrit 37.8 35.0 - 47.0 %    MCV 87 78 - 100 fL    MCH 27.9 26.5 - 33.0 pg    MCHC 32.0 31.5 - 36.5 g/dL    RDW 13.6 10.0 - 15.0 %    Platelet Count 374 150 - 450 10e3/uL    % Neutrophils 34 %    % Lymphocytes 46 %    % Monocytes 16 %    % Eosinophils 3 %    % Basophils 1 %    % Immature Granulocytes 0 %    NRBCs per 100 WBC 0 <1 /100    Absolute Neutrophils 1.3 (L) 1.6 - 8.3 10e3/uL    Absolute Lymphocytes 1.8 0.8  - 5.3 10e3/uL    Absolute Monocytes 0.6 0.0 - 1.3 10e3/uL    Absolute Eosinophils 0.1 0.0 - 0.7 10e3/uL    Absolute Basophils 0.0 0.0 - 0.2 10e3/uL    Absolute Immature Granulocytes 0.0 <=0.4 10e3/uL    Absolute NRBCs 0.0 10e3/uL     Imaging:    No new imaging.      Again, thank you for allowing me to participate in the care of your patient.        Sincerely,        Talib Gaxiola, CNP

## 2022-02-17 NOTE — PROGRESS NOTES
"Oncology Rooming Note    February 17, 2022 10:38 AM   Amnaisidra Oneil is a 41 year old female who presents for:    Chief Complaint   Patient presents with     Oncology Clinic Visit     Initial Vitals: /81   Pulse 95   Temp 98.3  F (36.8  C)   Resp 16   Wt 79.4 kg (175 lb)   SpO2 98%   BMI 27.40 kg/m   Estimated body mass index is 27.4 kg/m  as calculated from the following:    Height as of 1/18/22: 1.702 m (5' 7.01\").    Weight as of this encounter: 79.4 kg (175 lb). Body surface area is 1.94 meters squared.  No Pain (0) Comment: Data Unavailable   No LMP recorded.  Allergies reviewed: Yes  Medications reviewed: Yes    Medications: Medication refills not needed today.  Pharmacy name entered into [facility]: [facility] -- [place] - [place], [place] - [address]    Clinical concerns: Short of breath      Grace Pedraza RN            "

## 2022-02-17 NOTE — PATIENT INSTRUCTIONS
Recent Results (from the past 24 hour(s))   CBC with platelets and differential    Collection Time: 02/17/22 10:19 AM   Result Value Ref Range    WBC Count 3.9 (L) 4.0 - 11.0 10e3/uL    RBC Count 4.33 3.80 - 5.20 10e6/uL    Hemoglobin 12.1 11.7 - 15.7 g/dL    Hematocrit 37.8 35.0 - 47.0 %    MCV 87 78 - 100 fL    MCH 27.9 26.5 - 33.0 pg    MCHC 32.0 31.5 - 36.5 g/dL    RDW 13.6 10.0 - 15.0 %    Platelet Count 374 150 - 450 10e3/uL    % Neutrophils 34 %    % Lymphocytes 46 %    % Monocytes 16 %    % Eosinophils 3 %    % Basophils 1 %    % Immature Granulocytes 0 %    NRBCs per 100 WBC 0 <1 /100    Absolute Neutrophils 1.3 (L) 1.6 - 8.3 10e3/uL    Absolute Lymphocytes 1.8 0.8 - 5.3 10e3/uL    Absolute Monocytes 0.6 0.0 - 1.3 10e3/uL    Absolute Eosinophils 0.1 0.0 - 0.7 10e3/uL    Absolute Basophils 0.0 0.0 - 0.2 10e3/uL    Absolute Immature Granulocytes 0.0 <=0.4 10e3/uL    Absolute NRBCs 0.0 10e3/uL

## 2022-02-17 NOTE — PROGRESS NOTES
Infusion Nursing Note:  Amna Oneil presents today for Chemotherapy.    Patient seen by provider today: Yes   present during visit today: Not Applicable.    Note:       Intravenous Access:  Implanted Port.    Treatment Conditions:  Results reviewed, labs MET treatment parameters, ok to proceed with treatment.  Biological Infusion Checklist:  ~~~ NOTE: If the patient answers yes to any of the questions below, hold the infusion and contact ordering provider or on-call provider.    1. Have you recently had an elevated temperature, fever, chills, productive cough, coughing for 3 weeks or longer or hemoptysis, abnormal vital signs, night sweats,  chest pain or have you noticed a decrease in your appetite, unexplained weight loss or fatigue? No  2. Do you have any open wounds or new incisions? No  3. Do you have any recent or upcoming hospitalizations, surgeries or dental procedures? No  4. Do you currently have or recently have had any signs of illness or infection or are you on any antibiotics? No  5. Have you had any new, sudden or worsening abdominal pain? No  6. Have you or anyone in your household received a live vaccination in the past 4 weeks? Please note:  No live vaccines while on biologic/chemotherapy until 6 months after the last treatment.  Patient can receive the flu vaccine (shot only) and the pneumovax.  It is optimal for the patient to get these vaccines mid cycle, but they can be given at any time as long as it is not on the day of the infusion. No  7. Have you recently been diagnosed with any new nervous system diseases (ie. Multiple sclerosis, Guillain [place], seizures, neurological changes) or cancer diagnosis? No  8. Are you on any form of radiation or chemotherapy? yes  9. Are you pregnant or breast feeding or do you have plans of pregnancy in the future? No  10. Have you been having any signs of worsening depression or suicidal ideations?  (benlysta only) No  11. Have there been any  other new onset medical symptoms? No        Post Infusion Assessment:  Patient tolerated infusion without incident.  Blood return noted pre and post infusion.  Blood return noted during administration every 5cc ml with Adriamycin.  Site patent and intact, free from redness, edema or discomfort.  No evidence of extravasations.  Access discontinued per protocol.       Discharge Plan:   Patient and/or family verbalized understanding of discharge instructions and all questions answered.      Kellen Rose RN

## 2022-02-17 NOTE — PROGRESS NOTES
[facility] Hematology and Oncology Progress Note    Patient: Amna Oneil  MRN: [id number]  Date of Service: Feb 17, 2022         Reason for Visit:    D15C1 ABVD chemotherapy    Assessment and Plan:    1. Stage II classical Hodgkin lymphoma.    41 year old     Amna presents accompanied by her spouse anticipating ongoing ABVD chemotherapy.  She looks and is generally feeling well.  She noted significant heartburn s/p C1 chemotherapy - now managed on OTC Pepcid 1-2 times daily.  No nausea.  She tried the medical cannabis but didn't like the dizziness she got from it.  She also noted some constipation - managed with increasing oral hydration and eating black beans.  She has not noted any changes in size of the axillary lymph nodes.  she feels a bit more short of breath with certain activities.  No cough.  Her appetite and weight remain very stable with start of treatment.  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  She denies B-symptoms, cough, fever, chills, unusual headaches, visual or mentation disturbance, bowel or bladder issues, neuropathies, rash.     CBC - mild neutropenia.  Otherwise WNL    I again reviewed the potential side effects and schedule of planned ABVD chemotherapy.  I reviewed the uses of her take-home dexamethasone and compazine antiemetic medications and EMLA cream.  I encouraged her to be proactive in treating any nausea, vomiting or bowel changes.      Proceed with D15C1 of a planned 3-4 cycles ABVD chemotherapy.    No G-CSF.    Instructed to contact us/be seen with a fever > 100.4F as antibiotics or even hospitalization may be indicated.    Shortness of breath with certain activities:    Pulse ox @ 98% at rest.  Not anemic.  Lungs clear.  Decreasing palpable LNs.  Possibly some debilitation.    Also discussed possible relationship to Bleomycin - if worsens she is to alert us.    Heart burn:    Well managed with OTC Pepcid - continue.    Nausea:    Denied.    Reviewed  use of dexamethasone 8 mg daily with breakfast for 3-days after each chemotherapy dose.    Has Compazine and medical cannabis if needed.      Situational anxiety - no concerning issues.    No recent ativan use.    Met with Nicolette Ribeiro, our cancer care psychotherapist - following with her own therapist.    I addition to receiving the seasonal flu vaccine and the SARScovid.19 vaccines and booster, Amna had the Covid infection in January, 2022, and recovered well.    03/03/2022 - D1C2 ABVD chemotherapy follow up with labs per Treatment Plan.    Restaging PET scan after 2 cycles of ABVD.  If she responds well, overall plan is to complete 3-4 cycles of ABVD followed by involved site EBRT.                Hematologic History:    1. Classical Hodgkin lymphoma   Stage II (favorable risk)    IPSS-0 risk factor (FFP 88%, overall survival 98%).    12/13/2021 - diagnosed by left axillary lymph node core needle biopsy.    12/23/2021 NM PET/CT - hypermetabolic lymph nodes involving basilar neck/superior mediastinum, mediastinum, right pericardial space, bilateral axillae.    second opinion pathology evaluation at [facility] confirmed classical Hodgkin lymphoma.      01/13/2022 Echocardiogram - normal left ventricular size and systolic performance.  LVEF @ 60-65%.  No significant valvular heart disease identified. Normal right ventricular size and systolic performance.    01/18/2022 bone marrow biopsy - no evidence of Hodgkin lymphoma involvement.      01/19/2022 pulmonary function testing - normal    01/21/2022 - IR port-a-cath placed.      02/03/2022 - D1C1 ABVD chemotherapy.     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    Talib Gaxiola CNP     CC: Physician No Ref-Primary   ___________________________________________________________________________    Review of Systems:    No fever or night sweats.  No loss of weight.  No lumps or bumps anywhere.  No unusual headaches or eyesight issues.  No dizziness.  No bleeding from the nose.  No sores in the mouth. No problems with swallowing.  No chest pain. No shortness of breath. No cough.  No abdominal pain. No nausea or vomiting.  No diarrhea or constipation.  No blood in stool or black colored stools.  No problems passing urine.  No numbness or tingling in hands or feet.  No skin rashes.    A 14 point review of systems is otherwise negative.    Past History:    No past medical history on file.    Past Surgical History:   Procedure Laterality Date     IR CHEST PORT PLACEMENT > 5 YRS OF AGE  1/21/2022     Physical Exam:    /81   Pulse 95   Temp 98.3  F (36.8  C)   Resp 22   Wt 79.4 kg (175 lb)   SpO2 98%   BMI 27.40 kg/m      GENERAL:   Very pleasant.  Alert and oriented. Seated comfortably. In no distress.    HEENT:   Atraumatic and normocephalic.  PATRICIA.  EOMI.  No pallor.  No icterus.  No mucosal lesions.    LYMPH NODES:  Soft (~2 cm) (L) axillary LN - previously (B) axillary adenopathy.  No palpable cervical or inguinal lymphadenopathy.    CHEST:   Lungs clear to auscultation bilaterally.    Port-a-cath in place - no s/s infection.  Working well.    CVS:    S1 and S2 heard. Regular rate and rhythm.  No murmur, gallop or rub heard.  No peripheral edema.    ABDOMEN:   Soft.  Not tender or distended.  No palpable hepatomegaly or splenomegaly, masses or  ascites.    EXTREMITIES:  Warm.    SKIN:    No rash bruising or purpura noted.  Full head of hair.    Lab Results:    Reviewed with patient.    Recent Results (from the past 24 hour(s))   CBC with platelets and differential    Collection Time: 02/17/22 10:19 AM   Result Value Ref Range    WBC Count 3.9 (L) 4.0 - 11.0 10e3/uL    RBC Count 4.33 3.80 - 5.20 10e6/uL    Hemoglobin 12.1 11.7 - 15.7 g/dL    Hematocrit 37.8 35.0 - 47.0 %    MCV 87 78 - 100 fL    MCH 27.9 26.5 - 33.0 pg    MCHC 32.0 31.5 - 36.5 g/dL    RDW 13.6 10.0 - 15.0 %    Platelet Count 374 150 - 450 10e3/uL    % Neutrophils 34 %    % Lymphocytes 46 %    % Monocytes 16 %    % Eosinophils 3 %    % Basophils 1 %    % Immature Granulocytes 0 %    NRBCs per 100 WBC 0 <1 /100    Absolute Neutrophils 1.3 (L) 1.6 - 8.3 10e3/uL    Absolute Lymphocytes 1.8 0.8 - 5.3 10e3/uL    Absolute Monocytes 0.6 0.0 - 1.3 10e3/uL    Absolute Eosinophils 0.1 0.0 - 0.7 10e3/uL    Absolute Basophils 0.0 0.0 - 0.2 10e3/uL    Absolute Immature Granulocytes 0.0 <=0.4 10e3/uL    Absolute NRBCs 0.0 10e3/uL     Imaging:    No new imaging.

## 2022-02-23 ENCOUNTER — TELEPHONE (OUTPATIENT)
Dept: ONCOLOGY | Facility: CLINIC | Age: 42
End: 2022-02-23
Payer: COMMERCIAL

## 2022-02-23 ENCOUNTER — ONCOLOGY VISIT (OUTPATIENT)
Dept: ONCOLOGY | Facility: CLINIC | Age: 42
End: 2022-02-23
Attending: INTERNAL MEDICINE
Payer: COMMERCIAL

## 2022-02-23 ENCOUNTER — HOSPITAL ENCOUNTER (OUTPATIENT)
Dept: CT IMAGING | Facility: CLINIC | Age: 42
End: 2022-02-23
Attending: INTERNAL MEDICINE
Payer: COMMERCIAL

## 2022-02-23 ENCOUNTER — TELEPHONE (OUTPATIENT)
Dept: ONCOLOGY | Facility: CLINIC | Age: 42
End: 2022-02-23

## 2022-02-23 VITALS
WEIGHT: 174.9 LBS | OXYGEN SATURATION: 96 % | BODY MASS INDEX: 27.39 KG/M2 | TEMPERATURE: 98 F | RESPIRATION RATE: 20 BRPM

## 2022-02-23 DIAGNOSIS — R06.09 DYSPNEA ON EXERTION: ICD-10-CM

## 2022-02-23 DIAGNOSIS — I26.99 ACUTE PULMONARY EMBOLISM WITHOUT ACUTE COR PULMONALE, UNSPECIFIED PULMONARY EMBOLISM TYPE (H): Primary | ICD-10-CM

## 2022-02-23 DIAGNOSIS — C81.18 NODULAR SCLEROSIS HODGKIN LYMPHOMA OF LYMPH NODES OF MULTIPLE REGIONS (H): ICD-10-CM

## 2022-02-23 DIAGNOSIS — R07.9 ACUTE CHEST PAIN: ICD-10-CM

## 2022-02-23 LAB
CREAT BLD-MCNC: 0.6 MG/DL (ref 0.6–1.1)
GFR SERPL CREATININE-BSD FRML MDRD: >60 ML/MIN/1.73M2
RADIOLOGIST FLAGS: ABNORMAL

## 2022-02-23 PROCEDURE — 99215 OFFICE O/P EST HI 40 MIN: CPT | Performed by: INTERNAL MEDICINE

## 2022-02-23 PROCEDURE — G0463 HOSPITAL OUTPT CLINIC VISIT: HCPCS

## 2022-02-23 PROCEDURE — 71275 CT ANGIOGRAPHY CHEST: CPT

## 2022-02-23 PROCEDURE — 250N000011 HC RX IP 250 OP 636: Performed by: INTERNAL MEDICINE

## 2022-02-23 PROCEDURE — 250N000011 HC RX IP 250 OP 636

## 2022-02-23 PROCEDURE — 82565 ASSAY OF CREATININE: CPT | Performed by: INTERNAL MEDICINE

## 2022-02-23 RX ORDER — HEPARIN SODIUM (PORCINE) LOCK FLUSH IV SOLN 100 UNIT/ML 100 UNIT/ML
5 SOLUTION INTRAVENOUS
Status: DISCONTINUED | OUTPATIENT
Start: 2022-02-23 | End: 2022-02-23 | Stop reason: HOSPADM

## 2022-02-23 RX ORDER — HEPARIN SODIUM,PORCINE 10 UNIT/ML
5 VIAL (ML) INTRAVENOUS
Status: DISCONTINUED | OUTPATIENT
Start: 2022-02-23 | End: 2022-02-23 | Stop reason: HOSPADM

## 2022-02-23 RX ORDER — HEPARIN SODIUM (PORCINE) LOCK FLUSH IV SOLN 100 UNIT/ML 100 UNIT/ML
5 SOLUTION INTRAVENOUS
Status: CANCELLED | OUTPATIENT
Start: 2022-02-23

## 2022-02-23 RX ORDER — HEPARIN SODIUM,PORCINE 10 UNIT/ML
5 VIAL (ML) INTRAVENOUS
Status: CANCELLED | OUTPATIENT
Start: 2022-02-23

## 2022-02-23 RX ORDER — APIXABAN 5 MG (74)
KIT ORAL
Qty: 30 EACH | Refills: 0 | Status: SHIPPED | OUTPATIENT
Start: 2022-02-23 | End: 2022-02-23

## 2022-02-23 RX ORDER — RIVAROXABAN 15 MG-20MG
KIT ORAL
Qty: 51 EACH | Refills: 0 | Status: SHIPPED | OUTPATIENT
Start: 2022-02-23 | End: 2022-03-16

## 2022-02-23 RX ORDER — IOPAMIDOL 755 MG/ML
100 INJECTION, SOLUTION INTRAVASCULAR ONCE
Status: COMPLETED | OUTPATIENT
Start: 2022-02-23 | End: 2022-02-23

## 2022-02-23 RX ORDER — HEPARIN SODIUM (PORCINE) LOCK FLUSH IV SOLN 100 UNIT/ML 100 UNIT/ML
SOLUTION INTRAVENOUS
Status: COMPLETED
Start: 2022-02-23 | End: 2022-02-23

## 2022-02-23 RX ADMIN — IOPAMIDOL 100 ML: 755 INJECTION, SOLUTION INTRAVENOUS at 14:20

## 2022-02-23 RX ADMIN — HEPARIN 5 ML: 100 SYRINGE at 15:03

## 2022-02-23 ASSESSMENT — PAIN SCALES - GENERAL: PAINLEVEL: MODERATE PAIN (4)

## 2022-02-23 NOTE — LETTER
"    2/23/2022         RE: Amna Oneil  [address] [place] [address]        Dear Colleague,    Thank you for referring your patient, Amna Oneil, to the [facility] [place]. Please see a copy of my visit note below.    Oncology Rooming Note    February 23, 2022 1:15 PM   Amna Oneil is a 41 year old female who presents for:    Chief Complaint   Patient presents with     Oncology Clinic Visit     Initial Vitals: Temp 98  F (36.7  C)   Resp 20   Wt 79.3 kg (174 lb 14.4 oz)   SpO2 96%   BMI 27.39 kg/m   Estimated body mass index is 27.39 kg/m  as calculated from the following:    Height as of 1/18/22: 1.702 m (5' 7.01\").    Weight as of this encounter: 79.3 kg (174 lb 14.4 oz). Body surface area is 1.94 meters squared.  Moderate Pain (4) Comment: Data Unavailable   No LMP recorded.  Allergies reviewed: Yes  Medications reviewed: Yes    Medications: Medication refills not needed today.  Pharmacy name entered into [facility]: [facility] - [place], [place] - [address]    Clinical concerns:  Follow up      Sarah yuan LPN         [facility] Hematology and Oncology Progress Note    Patient: Amna Oneil  MRN: [id number]  Date of Service: Feb 23, 2022     Reason for Visit    Chief Complaint   Patient presents with     Oncology Clinic Visit       Assessment and Plan    Cancer Staging  No matching staging information was found for the patient.    ECOG Performance    0 - Independent     Pain  Pain Score: Moderate Pain (4)  Pain Loc: Chest    #.  Progressive dyspnea with mid chest pain, pleuritic for a few days.  #. Classical Hodgkin lymphoma, stage II (favorable risk) IPSS-0 risk factor, FFP 88%, overall survival 98%.  Currently on active chemotherapy      She is dyspneic with any level of exertion.  She also has pleuritic chest pain. Oxygenation is fairly good at 96% room air.  She is hemodynamically stable.  Her lung sounds fairly unremarkable " therefore no signs of reactive airway disease.  Discussed about possible etiologies including pulmonary emboli being the first priority due to pleuritic pain in nature, having active cancer and undergoing chemotherapy.  Other possible differential include post viral pneumonitis and less likely drug-induced pneumonitis.   Requested to obtain CT angiogram of the chest stat.    2:40 PM: I discussed with Dr. Cazares from radiology.  CT angiogram showed moderate bilateral pulmonary emboli without any evidence of saddle embolus or central emboli.  No evidence of right heart strain.  No signs of pneumonitis.   I discussed the result with the patient in the clinic.  I advised her to start oral anticoagulant with DOAC.  No indication for hospitalization.  Based on her insurance preferred drug, Xarelto was initiated.  She is advised to call us with any concern.  If she has worsening pulmonary symptoms or shortness of breath, she is advised to go into emergency room.     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)    Relevant Medications    sodium chloride (PF) 0.9% PF flush 3-20 mL    heparin lock flush 10 UNIT/ML injection 5 mL    heparin 100 UNIT/ML injection 5 mL    Other Relevant Orders    IV access      Other Visit Diagnoses     Acute pulmonary embolism without acute cor pulmonale, unspecified pulmonary embolism type (H)    -  Primary    Relevant Medications    Rivaroxaban ANTICOAGULANT (XARELTO STARTER PACK ANTICOAGULANT) 15 & 20 MG TBPK    Dyspnea on exertion        Relevant Orders    CT Chest Pulmonary Embolism w Contrast (Completed)    Acute chest pain        Relevant Orders    CT Chest Pulmonary Embolism w Contrast (Completed)             CC: Physician No Ref-Primary   ______________________________________________________________________________  Diagnosis  12/13/2021-classical Hodgkin lymphoma  by left axillary lymph node core needle biopsy.  12/23/2021-PET/CT scan showed hypermetabolic lymph nodes involving basilar neck/superior mediastinum, mediastinum, right pericardial space, bilateral axillae.   -second opinion pathology evaluation at [facility] and it confirmed classical Hodgkin lymphoma.  Echocardiogram showed adequate cardiac function.  Pulmonary function test was normal    Treatment to date  2/3/2022-  ABVD.     History of Present Illness    Ms. Amna Oneil presented today accompanied by her  and requested to be seen urgently due to progressive shortness of breath and pain in the middle of the chest for about 3 days.  Reported she has some shortness of breath prior to diagnosis of lymphoma, however initially was attributed to anxiety.  She reported noticeably exertional dyspnea and pain in the middle of the chest with deep breathing.  No cough.  She had Covid about a month ago and she recovered very well.  No wheezing.  No leg swelling.  Urinating well.    Review of systems  Apart from describing in HPI, the remainder of comprehensive ROS was negative.    Past History    History reviewed. No pertinent past medical history.    Past Surgical History:   Procedure Laterality Date     IR CHEST PORT PLACEMENT > 5 YRS OF AGE  1/21/2022         Physical Exam    Temp 98  F (36.7  C)   Resp 20   Wt 79.3 kg (174 lb 14.4 oz)   SpO2 96%   BMI 27.39 kg/m      General: alert, awake, not in acute distress however she look dyspneic with short distance walking.  HEENT: Head: Normal, normocephalic, atraumatic.  Eye: Normal external eye, conjunctiva, lids cornea, PATRICIA.  Nose: Normal external nose, mucus membranes and septum.  Pharynx: Normal buccal mucosa. Normal pharynx.  Neck / Thyroid: Supple, no masses, nodes, nodules or enlargement.  Lymphatics: No abnormally enlarged lymph nodes.  Chest: Normal chest wall and respirations. Clear to auscultation.  Heart: S1 S2 RRR, no murmur.    Abdomen: abdomen is soft without significant tenderness, masses, organomegaly or guarding  Extremities: normal strength, tone, and muscle mass  Skin: normal. no rash or abnormalities  CNS: non focal.    Lab Results    Recent Results (from the past 168 hour(s))   CBC with platelets and differential   Result Value Ref Range    WBC Count 3.9 (L) 4.0 - 11.0 10e3/uL    RBC Count 4.33 3.80 - 5.20 10e6/uL    Hemoglobin 12.1 11.7 - 15.7 g/dL    Hematocrit 37.8 35.0 - 47.0 %    MCV 87 78 - 100 fL    MCH 27.9 26.5 - 33.0 pg    MCHC 32.0 31.5 - 36.5 g/dL    RDW 13.6 10.0 - 15.0 %    Platelet Count 374 150 - 450 10e3/uL    % Neutrophils 34 %    % Lymphocytes 46 %    % Monocytes 16 %    % Eosinophils 3 %    % Basophils 1 %    % Immature Granulocytes 0 %    NRBCs per 100 WBC 0 <1 /100    Absolute Neutrophils 1.3 (L) 1.6 - 8.3 10e3/uL    Absolute Lymphocytes 1.8 0.8 - 5.3 10e3/uL    Absolute Monocytes 0.6 0.0 - 1.3 10e3/uL    Absolute Eosinophils 0.1 0.0 - 0.7 10e3/uL    Absolute Basophils 0.0 0.0 - 0.2 10e3/uL    Absolute Immature Granulocytes 0.0 <=0.4 10e3/uL    Absolute NRBCs 0.0 10e3/uL   Creatinine POCT   Result Value Ref Range    Creatinine POCT 0.6 0.6 - 1.1 mg/dL    GFR, ESTIMATED POCT >60 >60 mL/min/1.73m2   CT Chest Pulmonary Embolism w Contrast   Result Value Ref Range    Radiologist flags New diagnosis of pulmonary embolism (AA)        Imaging    CT Chest Pulmonary Embolism w Contrast    Result Date: 2/23/2022  EXAM: CT CHEST PULMONARY EMBOLISM W CONTRAST LOCATION: [facility] DATE/TIME: 2/23/2022 2:17 PM INDICATION: PE suspected, high probability, shortness of breath. COMPARISON: PET/CT from 12/23/2021. TECHNIQUE: CT chest pulmonary angiogram during arterial phase injection of IV contrast. Multiplanar reformats and MIP reconstructions were performed. Dose reduction techniques were used. CONTRAST: Isovue 370 100mL FINDINGS: ANGIOGRAM CHEST: Moderate bilateral pulmonary emboli, right greater  than left. There are pulmonary artery filling defects in the distal right and left main pulmonary arteries extending into the lobar, segmental, and a few subsegmental branches. There is no evidence of right heart strain. The aorta is normal in caliber. LUNGS AND PLEURA: Stable 4 mm left lower lobe nodule (series 7 image 216). Lungs are otherwise clear. No effusions. MEDIASTINUM/AXILLAE: Heart size is normal. Significantly improved adenopathy when compared to prior exam. The largest lymph node is in the left AP window measuring 9 x 21 mm compared to 14 x 24 mm previously (series 5/32). There is also improved and nearly resolved axillary and supraclavicular adenopathy with the largest residual lymph node measuring 13 x 11 mm compared to 18 x 16 mm previously (series 5/25). CORONARY ARTERY CALCIFICATION: None. UPPER ABDOMEN: No significant finding. MUSCULOSKELETAL: No suspicious osseous lesions.     IMPRESSION: 1.  Moderate bilateral pulmonary emboli. No evidence of right heart strain. 2.  Significantly improved mediastinal, axillary, and supraclavicular adenopathy. 3.  Stable 4 mm left lower lobe nodule. Continued attention on follow-up. [Critical Result: New diagnosis of pulmonary embolism] Finding was identified on 2/23/2022 2:33 PM. Dr. Juan Lizarraga was contacted by me on 2/23/2022 2:39 PM and verbalized understanding of the critical result.     I spent 40 minutes on the date of service, of which greater than 50% of the time was spent on counseling and coordination of care.    Signed by: Juan Lizarraga MD    Let patient know that Apixaban Starter Pack (ELIQUIS) sent to her pharmacy, [facility]. Instructed patient on dosing instructions.  Patient verbalized understanding.   Patient will call pharmacy to make sure they can deliver medication today as Dr. Lizarraga wants her to start taking today.  She will calls us back if they are unable to get to her tonight.  Patient gets short of breath with minimal exertion.  Patient  reports feels comfortable going home.  I Instructed patient to monitor her symptoms closely and if SOB worsens, to go to ER.  Patient said she lives closer to [facility] and asked if she should go to [facility] or ER.  I recommended closest ER, if breathing worsens. She verbalized understanding.  Patient discharged to home by W/C.  Accompanied by her father/XENA Gonzales RN       Again, thank you for allowing me to participate in the care of your patient.        Sincerely,        Juan Lizarraga MD

## 2022-02-23 NOTE — PROGRESS NOTES
[facility] Hematology and Oncology Progress Note    Patient: Amna Oneil  MRN: [id number]  Date of Service: Feb 23, 2022     Reason for Visit    Chief Complaint   Patient presents with     Oncology Clinic Visit       Assessment and Plan    Cancer Staging  No matching staging information was found for the patient.    ECOG Performance    0 - Independent     Pain  Pain Score: Moderate Pain (4)  Pain Loc: Chest    #.  Progressive dyspnea with mid chest pain, pleuritic for a few days.  #. Classical Hodgkin lymphoma, stage II (favorable risk) IPSS-0 risk factor, FFP 88%, overall survival 98%.  Currently on active chemotherapy      She is dyspneic with any level of exertion.  She also has pleuritic chest pain. Oxygenation is fairly good at 96% room air.  She is hemodynamically stable.  Her lung sounds fairly unremarkable therefore no signs of reactive airway disease.  Discussed about possible etiologies including pulmonary emboli being the first priority due to pleuritic pain in nature, having active cancer and undergoing chemotherapy.  Other possible differential include post viral pneumonitis and less likely drug-induced pneumonitis.   Requested to obtain CT angiogram of the chest stat.    2:40 PM: I discussed with Dr. Cazares from radiology.  CT angiogram showed moderate bilateral pulmonary emboli without any evidence of saddle embolus or central emboli.  No evidence of right heart strain.  No signs of pneumonitis.   I discussed the result with the patient in the clinic.  I advised her to start oral anticoagulant with DOAC.  No indication for hospitalization.  Based on her insurance preferred drug, Xarelto was initiated.  She is advised to call us with any concern.  If she has worsening pulmonary symptoms or shortness of breath, she is advised to go into emergency room.     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)    Relevant Medications    sodium chloride (PF) 0.9% PF flush 3-20 mL    heparin lock flush 10 UNIT/ML injection 5 mL    heparin 100 UNIT/ML injection 5 mL    Other Relevant Orders    IV access      Other Visit Diagnoses     Acute pulmonary embolism without acute cor pulmonale, unspecified pulmonary embolism type (H)    -  Primary    Relevant Medications    Rivaroxaban ANTICOAGULANT (XARELTO STARTER PACK ANTICOAGULANT) 15 & 20 MG TBPK    Dyspnea on exertion        Relevant Orders    CT Chest Pulmonary Embolism w Contrast (Completed)    Acute chest pain        Relevant Orders    CT Chest Pulmonary Embolism w Contrast (Completed)             CC: Physician No Ref-Primary   ______________________________________________________________________________  Diagnosis  12/13/2021-classical Hodgkin lymphoma by left axillary lymph node core needle biopsy.  12/23/2021-PET/CT scan showed hypermetabolic lymph nodes involving basilar neck/superior mediastinum, mediastinum, right pericardial space, bilateral axillae.   -second opinion pathology evaluation at [facility] and it confirmed classical Hodgkin lymphoma.  Echocardiogram showed adequate cardiac function.  Pulmonary function test was normal    Treatment to date  2/3/2022-  ABVD.     History of Present Illness    Ms. Amna Oneil presented today accompanied by her  and requested to be seen urgently due to progressive shortness of breath and pain in the middle of the chest for about 3 days.  Reported she has some shortness of breath prior to diagnosis of lymphoma, however initially was attributed to anxiety.  She reported noticeably exertional dyspnea and pain in the middle of the chest with deep breathing.  No cough.  She had Covid about a month ago and she recovered very well.  No wheezing.  No leg swelling.  Urinating well.    Review of systems  Apart from describing in HPI, the remainder of comprehensive ROS was  negative.    Past History    History reviewed. No pertinent past medical history.    Past Surgical History:   Procedure Laterality Date     IR CHEST PORT PLACEMENT > 5 YRS OF AGE  1/21/2022         Physical Exam    Temp 98  F (36.7  C)   Resp 20   Wt 79.3 kg (174 lb 14.4 oz)   SpO2 96%   BMI 27.39 kg/m      General: alert, awake, not in acute distress however she look dyspneic with short distance walking.  HEENT: Head: Normal, normocephalic, atraumatic.  Eye: Normal external eye, conjunctiva, lids cornea, PTARICIA.  Nose: Normal external nose, mucus membranes and septum.  Pharynx: Normal buccal mucosa. Normal pharynx.  Neck / Thyroid: Supple, no masses, nodes, nodules or enlargement.  Lymphatics: No abnormally enlarged lymph nodes.  Chest: Normal chest wall and respirations. Clear to auscultation.  Heart: S1 S2 RRR, no murmur.   Abdomen: abdomen is soft without significant tenderness, masses, organomegaly or guarding  Extremities: normal strength, tone, and muscle mass  Skin: normal. no rash or abnormalities  CNS: non focal.    Lab Results    Recent Results (from the past 168 hour(s))   CBC with platelets and differential   Result Value Ref Range    WBC Count 3.9 (L) 4.0 - 11.0 10e3/uL    RBC Count 4.33 3.80 - 5.20 10e6/uL    Hemoglobin 12.1 11.7 - 15.7 g/dL    Hematocrit 37.8 35.0 - 47.0 %    MCV 87 78 - 100 fL    MCH 27.9 26.5 - 33.0 pg    MCHC 32.0 31.5 - 36.5 g/dL    RDW 13.6 10.0 - 15.0 %    Platelet Count 374 150 - 450 10e3/uL    % Neutrophils 34 %    % Lymphocytes 46 %    % Monocytes 16 %    % Eosinophils 3 %    % Basophils 1 %    % Immature Granulocytes 0 %    NRBCs per 100 WBC 0 <1 /100    Absolute Neutrophils 1.3 (L) 1.6 - 8.3 10e3/uL    Absolute Lymphocytes 1.8 0.8 - 5.3 10e3/uL    Absolute Monocytes 0.6 0.0 - 1.3 10e3/uL    Absolute Eosinophils 0.1 0.0 - 0.7 10e3/uL    Absolute Basophils 0.0 0.0 - 0.2 10e3/uL    Absolute Immature Granulocytes 0.0 <=0.4 10e3/uL    Absolute NRBCs 0.0 10e3/uL   Creatinine  POCT   Result Value Ref Range    Creatinine POCT 0.6 0.6 - 1.1 mg/dL    GFR, ESTIMATED POCT >60 >60 mL/min/1.73m2   CT Chest Pulmonary Embolism w Contrast   Result Value Ref Range    Radiologist flags New diagnosis of pulmonary embolism (AA)        Imaging    CT Chest Pulmonary Embolism w Contrast    Result Date: 2/23/2022  EXAM: CT CHEST PULMONARY EMBOLISM W CONTRAST LOCATION: [facility] DATE/TIME: 2/23/2022 2:17 PM INDICATION: PE suspected, high probability, shortness of breath. COMPARISON: PET/CT from 12/23/2021. TECHNIQUE: CT chest pulmonary angiogram during arterial phase injection of IV contrast. Multiplanar reformats and MIP reconstructions were performed. Dose reduction techniques were used. CONTRAST: Isovue 370 100mL FINDINGS: ANGIOGRAM CHEST: Moderate bilateral pulmonary emboli, right greater than left. There are pulmonary artery filling defects in the distal right and left main pulmonary arteries extending into the lobar, segmental, and a few subsegmental branches. There is no evidence of right heart strain. The aorta is normal in caliber. LUNGS AND PLEURA: Stable 4 mm left lower lobe nodule (series 7 image 216). Lungs are otherwise clear. No effusions. MEDIASTINUM/AXILLAE: Heart size is normal. Significantly improved adenopathy when compared to prior exam. The largest lymph node is in the left AP window measuring 9 x 21 mm compared to 14 x 24 mm previously (series 5/32). There is also improved and nearly resolved axillary and supraclavicular adenopathy with the largest residual lymph node measuring 13 x 11 mm compared to 18 x 16 mm previously (series 5/25). CORONARY ARTERY CALCIFICATION: None. UPPER ABDOMEN: No significant finding. MUSCULOSKELETAL: No suspicious osseous lesions.     IMPRESSION: 1.  Moderate bilateral pulmonary emboli. No evidence of right heart strain. 2.  Significantly improved mediastinal, axillary, and supraclavicular adenopathy. 3.  Stable 4 mm left lower  lobe nodule. Continued attention on follow-up. [Critical Result: New diagnosis of pulmonary embolism] Finding was identified on 2/23/2022 2:33 PM. Dr. Juan Lizarraga was contacted by me on 2/23/2022 2:39 PM and verbalized understanding of the critical result.     I spent 40 minutes on the date of service, of which greater than 50% of the time was spent on counseling and coordination of care.    Signed by: Juan Lizarraga MD

## 2022-02-23 NOTE — TELEPHONE ENCOUNTER
PA for Eliquis received and faxed to [facility].  Milla reports that insurance prefers Xarelto.  Dr. Lizarraga notified and will send Rx to [facility].  [facility] called and notified of medication change.  Talked to Jany. She said Rx will be processed within the hour and she confirmed they can get delivered to patient today.      Called patient to let her know Rx changed from Eliquis to Xarelto due to insurance. Dosing instructions given. Patient verbalized understanding.  I told patient that [facility] confirmed they could get delivered to her today.  I told patient if for some reason they can't get medication delivered to her today, she needs to on-call DrBarbara so new Rx can be sent tonight to a different pharmacy as Dr. Lizarraga wants her to start taking tonight. Patient verbalized understanding/XENA Gonzales RN

## 2022-02-23 NOTE — TELEPHONE ENCOUNTER
Patient called in to clinic today and reports worsening shortness of breath and chest tightness.  She reports increased SOB and increased HR when goes up stairs.  Occassional cough but says this happens when she tries to catch her breath.  She denies fever. I told patient I would discuss her symptoms with Dr. Lizarraga and call her back. Patient verbalized understanding.    Dr. Lizarraga would like to see patient in clinic today to further assess her symptoms. Called patient. Patient agreed and will be here before 1p.  Dr. Lizarraga updated. Scheduling notified/XENA Gonzales RN

## 2022-02-23 NOTE — PROGRESS NOTES
Let patient know that Apixaban Starter Pack (ELIQUIS) sent to her pharmacy, [facility]. Instructed patient on dosing instructions.  Patient verbalized understanding.   Patient will call pharmacy to make sure they can deliver medication today as Dr. Lizarraga wants her to start taking today.  She will calls us back if they are unable to get to her tonight.  Patient gets short of breath with minimal exertion.  Patient reports feels comfortable going home.  I Instructed patient to monitor her symptoms closely and if SOB worsens, to go to ER.  Patient said she lives closer to [facility] and asked if she should go to [facility] or ER.  I recommended closest ER, if breathing worsens. She verbalized understanding.  Patient discharged to home by W/C.  Accompanied by her father/XENA Gonzales RN

## 2022-02-23 NOTE — PROGRESS NOTES
"Oncology Rooming Note    February 23, 2022 1:15 PM   Amna Oneil is a 41 year old female who presents for:    Chief Complaint   Patient presents with     Oncology Clinic Visit     Initial Vitals: Temp 98  F (36.7  C)   Resp 20   Wt 79.3 kg (174 lb 14.4 oz)   SpO2 96%   BMI 27.39 kg/m   Estimated body mass index is 27.39 kg/m  as calculated from the following:    Height as of 1/18/22: 1.702 m (5' 7.01\").    Weight as of this encounter: 79.3 kg (174 lb 14.4 oz). Body surface area is 1.94 meters squared.  Moderate Pain (4) Comment: Data Unavailable   No LMP recorded.  Allergies reviewed: Yes  Medications reviewed: Yes    Medications: Medication refills not needed today.  Pharmacy name entered into [facility]: [facility] -- [place] - [place], [place] - [address]    Clinical concerns:  Follow up      Sarah yuan LPN       "

## 2022-02-24 ENCOUNTER — TELEPHONE (OUTPATIENT)
Dept: ONCOLOGY | Facility: HOSPITAL | Age: 42
End: 2022-02-24
Payer: COMMERCIAL

## 2022-02-24 NOTE — TELEPHONE ENCOUNTER
Called pt to f/u after her visit yesterday and being diagnosed with a PE. Pt states she was able to get the Xarelto as ordered and has started the medication. Pt still feels quite SOB with activity but no worse and says Dr. Lizarraga told her it may take up to 48 hours for relief from her symptoms.Pt was instructed to call for any worsening symptoms or with any questions. Pt has f/u scheduled for 3/3. Pt verbalized understanding and agreeable to plan.

## 2022-02-26 ENCOUNTER — TELEPHONE (OUTPATIENT)
Dept: ONCOLOGY | Facility: CLINIC | Age: 42
End: 2022-02-26
Payer: COMMERCIAL

## 2022-03-03 ENCOUNTER — LAB (OUTPATIENT)
Dept: INFUSION THERAPY | Facility: CLINIC | Age: 42
End: 2022-03-03
Attending: INTERNAL MEDICINE
Payer: COMMERCIAL

## 2022-03-03 ENCOUNTER — HOSPITAL ENCOUNTER (OUTPATIENT)
Dept: ULTRASOUND IMAGING | Facility: CLINIC | Age: 42
End: 2022-03-03
Attending: INTERNAL MEDICINE
Payer: COMMERCIAL

## 2022-03-03 ENCOUNTER — TELEPHONE (OUTPATIENT)
Dept: ONCOLOGY | Facility: CLINIC | Age: 42
End: 2022-03-03

## 2022-03-03 ENCOUNTER — ONCOLOGY VISIT (OUTPATIENT)
Dept: ONCOLOGY | Facility: CLINIC | Age: 42
End: 2022-03-03
Attending: INTERNAL MEDICINE
Payer: COMMERCIAL

## 2022-03-03 VITALS
SYSTOLIC BLOOD PRESSURE: 113 MMHG | BODY MASS INDEX: 27.79 KG/M2 | HEART RATE: 107 BPM | DIASTOLIC BLOOD PRESSURE: 72 MMHG | OXYGEN SATURATION: 96 % | WEIGHT: 177.5 LBS | RESPIRATION RATE: 16 BRPM | TEMPERATURE: 98.4 F

## 2022-03-03 DIAGNOSIS — M79.605 PAIN IN BOTH LOWER EXTREMITIES: ICD-10-CM

## 2022-03-03 DIAGNOSIS — Z51.11 ENCOUNTER FOR ANTINEOPLASTIC CHEMOTHERAPY: Primary | ICD-10-CM

## 2022-03-03 DIAGNOSIS — C81.70 CLASSICAL HODGKIN LYMPHOMA (H): Primary | ICD-10-CM

## 2022-03-03 DIAGNOSIS — M79.604 PAIN IN BOTH LOWER EXTREMITIES: ICD-10-CM

## 2022-03-03 DIAGNOSIS — M79.605 PAIN OF LEFT LOWER EXTREMITY: ICD-10-CM

## 2022-03-03 DIAGNOSIS — C81.18 NODULAR SCLEROSIS HODGKIN LYMPHOMA OF LYMPH NODES OF MULTIPLE REGIONS (H): ICD-10-CM

## 2022-03-03 DIAGNOSIS — Z51.11 ENCOUNTER FOR ANTINEOPLASTIC CHEMOTHERAPY: ICD-10-CM

## 2022-03-03 DIAGNOSIS — I26.99 ACUTE PULMONARY EMBOLISM WITHOUT ACUTE COR PULMONALE, UNSPECIFIED PULMONARY EMBOLISM TYPE (H): ICD-10-CM

## 2022-03-03 DIAGNOSIS — C81.18 NODULAR SCLEROSIS HODGKIN LYMPHOMA OF LYMPH NODES OF MULTIPLE REGIONS (H): Primary | ICD-10-CM

## 2022-03-03 LAB
ALBUMIN SERPL-MCNC: 3.6 G/DL (ref 3.5–5)
ALP SERPL-CCNC: 65 U/L (ref 45–120)
ALT SERPL W P-5'-P-CCNC: <9 U/L (ref 0–45)
ANION GAP SERPL CALCULATED.3IONS-SCNC: 12 MMOL/L (ref 5–18)
AST SERPL W P-5'-P-CCNC: 10 U/L (ref 0–40)
BASOPHILS # BLD AUTO: 0.1 10E3/UL (ref 0–0.2)
BASOPHILS NFR BLD AUTO: 1 %
BILIRUB SERPL-MCNC: 0.3 MG/DL (ref 0–1)
BUN SERPL-MCNC: 16 MG/DL (ref 8–22)
CALCIUM SERPL-MCNC: 9.3 MG/DL (ref 8.5–10.5)
CHLORIDE BLD-SCNC: 107 MMOL/L (ref 98–107)
CO2 SERPL-SCNC: 20 MMOL/L (ref 22–31)
CREAT SERPL-MCNC: 0.88 MG/DL (ref 0.6–1.1)
EOSINOPHIL # BLD AUTO: 0.2 10E3/UL (ref 0–0.7)
EOSINOPHIL NFR BLD AUTO: 5 %
ERYTHROCYTE [DISTWIDTH] IN BLOOD BY AUTOMATED COUNT: 14.4 % (ref 10–15)
GFR SERPL CREATININE-BSD FRML MDRD: 84 ML/MIN/1.73M2
GLUCOSE BLD-MCNC: 124 MG/DL (ref 70–125)
HCT VFR BLD AUTO: 38.2 % (ref 35–47)
HGB BLD-MCNC: 12.3 G/DL (ref 11.7–15.7)
HOLD SPECIMEN: NORMAL
IMM GRANULOCYTES # BLD: 0 10E3/UL
IMM GRANULOCYTES NFR BLD: 0 %
LYMPHOCYTES # BLD AUTO: 2 10E3/UL (ref 0.8–5.3)
LYMPHOCYTES NFR BLD AUTO: 41 %
MCH RBC QN AUTO: 29.1 PG (ref 26.5–33)
MCHC RBC AUTO-ENTMCNC: 32.2 G/DL (ref 31.5–36.5)
MCV RBC AUTO: 90 FL (ref 78–100)
MONOCYTES # BLD AUTO: 0.8 10E3/UL (ref 0–1.3)
MONOCYTES NFR BLD AUTO: 16 %
NEUTROPHILS # BLD AUTO: 1.8 10E3/UL (ref 1.6–8.3)
NEUTROPHILS NFR BLD AUTO: 37 %
NRBC # BLD AUTO: 0 10E3/UL
NRBC BLD AUTO-RTO: 0 /100
PLATELET # BLD AUTO: 424 10E3/UL (ref 150–450)
POTASSIUM BLD-SCNC: 3.8 MMOL/L (ref 3.5–5)
PROT SERPL-MCNC: 6.8 G/DL (ref 6–8)
RBC # BLD AUTO: 4.23 10E6/UL (ref 3.8–5.2)
SODIUM SERPL-SCNC: 139 MMOL/L (ref 136–145)
WBC # BLD AUTO: 4.9 10E3/UL (ref 4–11)

## 2022-03-03 PROCEDURE — 96411 CHEMO IV PUSH ADDL DRUG: CPT

## 2022-03-03 PROCEDURE — 85025 COMPLETE CBC W/AUTO DIFF WBC: CPT | Performed by: NURSE PRACTITIONER

## 2022-03-03 PROCEDURE — 96375 TX/PRO/DX INJ NEW DRUG ADDON: CPT

## 2022-03-03 PROCEDURE — 96413 CHEMO IV INFUSION 1 HR: CPT

## 2022-03-03 PROCEDURE — 99215 OFFICE O/P EST HI 40 MIN: CPT | Performed by: INTERNAL MEDICINE

## 2022-03-03 PROCEDURE — G0463 HOSPITAL OUTPT CLINIC VISIT: HCPCS | Mod: 25

## 2022-03-03 PROCEDURE — 80053 COMPREHEN METABOLIC PANEL: CPT | Performed by: NURSE PRACTITIONER

## 2022-03-03 PROCEDURE — G0463 HOSPITAL OUTPT CLINIC VISIT: HCPCS

## 2022-03-03 PROCEDURE — 250N000011 HC RX IP 250 OP 636: Performed by: INTERNAL MEDICINE

## 2022-03-03 PROCEDURE — 93970 EXTREMITY STUDY: CPT

## 2022-03-03 PROCEDURE — 258N000003 HC RX IP 258 OP 636: Performed by: INTERNAL MEDICINE

## 2022-03-03 PROCEDURE — 96367 TX/PROPH/DG ADDL SEQ IV INF: CPT

## 2022-03-03 RX ORDER — DIPHENHYDRAMINE HYDROCHLORIDE 50 MG/ML
50 INJECTION INTRAMUSCULAR; INTRAVENOUS
Status: CANCELLED
Start: 2022-03-03

## 2022-03-03 RX ORDER — ALBUTEROL SULFATE 90 UG/1
1-2 AEROSOL, METERED RESPIRATORY (INHALATION)
Status: CANCELLED
Start: 2022-03-17

## 2022-03-03 RX ORDER — METHYLPREDNISOLONE SODIUM SUCCINATE 125 MG/2ML
125 INJECTION, POWDER, LYOPHILIZED, FOR SOLUTION INTRAMUSCULAR; INTRAVENOUS
Status: CANCELLED
Start: 2022-03-17

## 2022-03-03 RX ORDER — NALOXONE HYDROCHLORIDE 0.4 MG/ML
0.2 INJECTION, SOLUTION INTRAMUSCULAR; INTRAVENOUS; SUBCUTANEOUS
Status: DISCONTINUED | OUTPATIENT
Start: 2022-03-03 | End: 2022-03-03 | Stop reason: HOSPADM

## 2022-03-03 RX ORDER — HEPARIN SODIUM (PORCINE) LOCK FLUSH IV SOLN 100 UNIT/ML 100 UNIT/ML
5 SOLUTION INTRAVENOUS
Status: CANCELLED | OUTPATIENT
Start: 2022-03-17

## 2022-03-03 RX ORDER — DIPHENHYDRAMINE HYDROCHLORIDE 50 MG/ML
50 INJECTION INTRAMUSCULAR; INTRAVENOUS
Status: CANCELLED
Start: 2022-03-17

## 2022-03-03 RX ORDER — METHYLPREDNISOLONE SODIUM SUCCINATE 125 MG/2ML
125 INJECTION, POWDER, LYOPHILIZED, FOR SOLUTION INTRAMUSCULAR; INTRAVENOUS
Status: DISCONTINUED | OUTPATIENT
Start: 2022-03-03 | End: 2022-03-03 | Stop reason: HOSPADM

## 2022-03-03 RX ORDER — NALOXONE HYDROCHLORIDE 0.4 MG/ML
0.2 INJECTION, SOLUTION INTRAMUSCULAR; INTRAVENOUS; SUBCUTANEOUS
Status: CANCELLED | OUTPATIENT
Start: 2022-03-17

## 2022-03-03 RX ORDER — HEPARIN SODIUM (PORCINE) LOCK FLUSH IV SOLN 100 UNIT/ML 100 UNIT/ML
5 SOLUTION INTRAVENOUS
Status: CANCELLED | OUTPATIENT
Start: 2022-03-03

## 2022-03-03 RX ORDER — ALBUTEROL SULFATE 0.83 MG/ML
2.5 SOLUTION RESPIRATORY (INHALATION)
Status: CANCELLED | OUTPATIENT
Start: 2022-03-17

## 2022-03-03 RX ORDER — PALONOSETRON 0.05 MG/ML
0.25 INJECTION, SOLUTION INTRAVENOUS ONCE
Status: CANCELLED
Start: 2022-03-03

## 2022-03-03 RX ORDER — ALBUTEROL SULFATE 90 UG/1
1-2 AEROSOL, METERED RESPIRATORY (INHALATION)
Status: CANCELLED
Start: 2022-03-03

## 2022-03-03 RX ORDER — RIVAROXABAN 20 MG/1
20 TABLET, FILM COATED ORAL
Qty: 30 TABLET | Refills: 3 | Status: SHIPPED | OUTPATIENT
Start: 2022-03-03 | End: 2022-07-22

## 2022-03-03 RX ORDER — RIVAROXABAN 20 MG/1
TABLET, FILM COATED ORAL
COMMUNITY
Start: 2022-02-23 | End: 2022-03-03

## 2022-03-03 RX ORDER — PALONOSETRON 0.05 MG/ML
0.25 INJECTION, SOLUTION INTRAVENOUS ONCE
Status: COMPLETED | OUTPATIENT
Start: 2022-03-03 | End: 2022-03-03

## 2022-03-03 RX ORDER — MEPERIDINE HYDROCHLORIDE 50 MG/ML
25 INJECTION INTRAMUSCULAR; INTRAVENOUS; SUBCUTANEOUS EVERY 30 MIN PRN
Status: CANCELLED | OUTPATIENT
Start: 2022-03-17

## 2022-03-03 RX ORDER — LORAZEPAM 2 MG/ML
0.5 INJECTION INTRAMUSCULAR EVERY 4 HOURS PRN
Status: CANCELLED | OUTPATIENT
Start: 2022-03-03

## 2022-03-03 RX ORDER — NALOXONE HYDROCHLORIDE 0.4 MG/ML
0.2 INJECTION, SOLUTION INTRAMUSCULAR; INTRAVENOUS; SUBCUTANEOUS
Status: CANCELLED | OUTPATIENT
Start: 2022-03-03

## 2022-03-03 RX ORDER — MEPERIDINE HYDROCHLORIDE 50 MG/ML
25 INJECTION INTRAMUSCULAR; INTRAVENOUS; SUBCUTANEOUS EVERY 30 MIN PRN
Status: DISCONTINUED | OUTPATIENT
Start: 2022-03-03 | End: 2022-03-03 | Stop reason: HOSPADM

## 2022-03-03 RX ORDER — PALONOSETRON 0.05 MG/ML
0.25 INJECTION, SOLUTION INTRAVENOUS ONCE
Status: CANCELLED
Start: 2022-03-17

## 2022-03-03 RX ORDER — HEPARIN SODIUM,PORCINE 10 UNIT/ML
5 VIAL (ML) INTRAVENOUS
Status: CANCELLED | OUTPATIENT
Start: 2022-03-17

## 2022-03-03 RX ORDER — MEPERIDINE HYDROCHLORIDE 50 MG/ML
25 INJECTION INTRAMUSCULAR; INTRAVENOUS; SUBCUTANEOUS EVERY 30 MIN PRN
Status: CANCELLED | OUTPATIENT
Start: 2022-03-03

## 2022-03-03 RX ORDER — EPINEPHRINE 1 MG/ML
0.3 INJECTION, SOLUTION, CONCENTRATE INTRAVENOUS EVERY 5 MIN PRN
Status: CANCELLED | OUTPATIENT
Start: 2022-03-17

## 2022-03-03 RX ORDER — EPINEPHRINE 1 MG/ML
0.3 INJECTION, SOLUTION, CONCENTRATE INTRAVENOUS EVERY 5 MIN PRN
Status: CANCELLED | OUTPATIENT
Start: 2022-03-03

## 2022-03-03 RX ORDER — HEPARIN SODIUM,PORCINE 10 UNIT/ML
5 VIAL (ML) INTRAVENOUS
Status: CANCELLED | OUTPATIENT
Start: 2022-03-03

## 2022-03-03 RX ORDER — DOXORUBICIN HYDROCHLORIDE 2 MG/ML
25 INJECTION, SOLUTION INTRAVENOUS ONCE
Status: CANCELLED | OUTPATIENT
Start: 2022-03-03

## 2022-03-03 RX ORDER — ALBUTEROL SULFATE 0.83 MG/ML
2.5 SOLUTION RESPIRATORY (INHALATION)
Status: CANCELLED | OUTPATIENT
Start: 2022-03-03

## 2022-03-03 RX ORDER — DOXORUBICIN HYDROCHLORIDE 2 MG/ML
25 INJECTION, SOLUTION INTRAVENOUS ONCE
Status: COMPLETED | OUTPATIENT
Start: 2022-03-03 | End: 2022-03-03

## 2022-03-03 RX ORDER — EPINEPHRINE 1 MG/ML
0.3 INJECTION, SOLUTION, CONCENTRATE INTRAVENOUS EVERY 5 MIN PRN
Status: DISCONTINUED | OUTPATIENT
Start: 2022-03-03 | End: 2022-03-03 | Stop reason: HOSPADM

## 2022-03-03 RX ORDER — METHYLPREDNISOLONE SODIUM SUCCINATE 125 MG/2ML
125 INJECTION, POWDER, LYOPHILIZED, FOR SOLUTION INTRAMUSCULAR; INTRAVENOUS
Status: CANCELLED
Start: 2022-03-03

## 2022-03-03 RX ORDER — DIPHENHYDRAMINE HYDROCHLORIDE 50 MG/ML
50 INJECTION INTRAMUSCULAR; INTRAVENOUS
Status: DISCONTINUED | OUTPATIENT
Start: 2022-03-03 | End: 2022-03-03 | Stop reason: HOSPADM

## 2022-03-03 RX ORDER — DOXORUBICIN HYDROCHLORIDE 2 MG/ML
25 INJECTION, SOLUTION INTRAVENOUS ONCE
Status: CANCELLED | OUTPATIENT
Start: 2022-03-17

## 2022-03-03 RX ORDER — ALBUTEROL SULFATE 0.83 MG/ML
2.5 SOLUTION RESPIRATORY (INHALATION)
Status: DISCONTINUED | OUTPATIENT
Start: 2022-03-03 | End: 2022-03-03 | Stop reason: HOSPADM

## 2022-03-03 RX ORDER — LORAZEPAM 2 MG/ML
0.5 INJECTION INTRAMUSCULAR EVERY 4 HOURS PRN
Status: CANCELLED | OUTPATIENT
Start: 2022-03-17

## 2022-03-03 RX ORDER — HEPARIN SODIUM (PORCINE) LOCK FLUSH IV SOLN 100 UNIT/ML 100 UNIT/ML
5 SOLUTION INTRAVENOUS
Status: DISCONTINUED | OUTPATIENT
Start: 2022-03-03 | End: 2022-03-03 | Stop reason: HOSPADM

## 2022-03-03 RX ORDER — ALBUTEROL SULFATE 90 UG/1
1-2 AEROSOL, METERED RESPIRATORY (INHALATION)
Status: DISCONTINUED | OUTPATIENT
Start: 2022-03-03 | End: 2022-03-03 | Stop reason: HOSPADM

## 2022-03-03 RX ORDER — RIVAROXABAN 15 MG/1
TABLET, FILM COATED ORAL
COMMUNITY
Start: 2022-02-23 | End: 2022-03-16

## 2022-03-03 RX ADMIN — FOSAPREPITANT: 150 INJECTION, POWDER, LYOPHILIZED, FOR SOLUTION INTRAVENOUS at 09:27

## 2022-03-03 RX ADMIN — PALONOSETRON 0.25 MG: 0.05 INJECTION, SOLUTION INTRAVENOUS at 09:01

## 2022-03-03 RX ADMIN — BLEOMYCIN SULFATE 19 UNITS: 30 POWDER, FOR SOLUTION INTRAMUSCULAR; INTRAPLEURAL; INTRAVENOUS; SUBCUTANEOUS at 10:06

## 2022-03-03 RX ADMIN — SODIUM CHLORIDE 250 ML: 9 INJECTION, SOLUTION INTRAVENOUS at 09:01

## 2022-03-03 RX ADMIN — HEPARIN SODIUM (PORCINE) LOCK FLUSH IV SOLN 100 UNIT/ML 5 ML: 100 SOLUTION at 11:14

## 2022-03-03 RX ADMIN — FOSAPREPITANT: 150 INJECTION, POWDER, LYOPHILIZED, FOR SOLUTION INTRAVENOUS at 09:24

## 2022-03-03 RX ADMIN — VINBLASTINE SULFATE 11.6 MG: 1 INJECTION INTRAVENOUS at 10:28

## 2022-03-03 RX ADMIN — DOXORUBICIN HYDROCHLORIDE 50 MG: 2 INJECTION, SOLUTION INTRAVENOUS at 09:58

## 2022-03-03 RX ADMIN — DACARBAZINE 730 MG: 200 INJECTION, POWDER, FOR SOLUTION INTRAVENOUS at 10:37

## 2022-03-03 ASSESSMENT — PAIN SCALES - GENERAL: PAINLEVEL: NO PAIN (0)

## 2022-03-03 NOTE — PROGRESS NOTES
Infusion Nursing Note:  Amna DENIZ Oneil presents today for Chemotherapy.    Patient seen by provider today: Yes   present during visit today: Not Applicable.    Note:       Intravenous Access:  Implanted Port.    Treatment Conditions:  Results reviewed, labs MET treatment parameters, ok to proceed with treatment.      Post Infusion Assessment:  Patient tolerated infusion without incident.  Blood return noted pre and post infusion.  Site patent and intact, free from redness, edema or discomfort.  No evidence of extravasations.  Access discontinued per protocol.       Discharge Plan:   Patient and/or family verbalized understanding of discharge instructions and all questions answered.      Kellen Rose RN

## 2022-03-03 NOTE — PROGRESS NOTES
[facility] Hematology and Oncology Progress Note    Patient: Amna Oneil  MRN: [id number]  Date of Service: Mar 3, 2022     Reason for Visit    Chief Complaint   Patient presents with     Cancer       Assessment and Plan    Cancer Staging  No matching staging information was found for the patient.    ECOG Performance    0 - Independent     Pain  Pain Score: No Pain (0)    #.  Classical Hodgkin lymphoma, stage II (favorable risk) IPSS-0 risk factor, FFP 88%, overall survival 98%.   She tolerates current chemotherapy very well.  She is here for cycle #2 ABVD.  Reviewed labs.  Her CBC, CMP are all within normal range.  We will continue with cycle #2 ABVD at current dose.   To obtain PET scan prior to cycle #3 ABVD.  Will decide whether we would proceed with involved site radiation after 3 or 4 cycles depending on the PET scan and chemotherapy tolerance.     #.  Acute bilateral pulmonary emboli without evidence of saddle embolus or central emboli in 2/2022.   Likely provoked in the setting of recent COVID-19 infection, active chemotherapy for lymphoma in the setting of ongoing birth control pill use.   She will stop birth control pills.  She will use barrier method.   Continue Xarelto, for minimum of 6 months.    #.  Left calf pain   Will obtain bilateral lower extremity ultrasound given her recent bilateral pulmonary emboli.  Explained to her that if the result comes back even positive for DVT, it will not change the treatment.  She voiced understanding.   No signs of lymphedema or swelling.     #.  Situational anxiety   Ativan 0.5 mg every 6 hours as needed for short-term use, but she has not been using it.  She is following with a psychotherapist at [facility].      #.  Covid infection in January 2022   She recovered well.    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)    Relevant  Orders    Infusion Appointment Request    Infusion Appointment Request       Other    Encounter for antineoplastic chemotherapy    Relevant Orders    Infusion Appointment Request    Infusion Appointment Request      Other Visit Diagnoses     Classical Hodgkin lymphoma (H)    -  Primary    Relevant Orders    PET Oncology (Eyes to Thighs)    Pain of left lower extremity        Relevant Orders    US Lower Ext Venous Duplex Limited Bilat    Pain in both lower extremities        Relevant Orders    US Lower Ext Venous Duplex Limited Bilat    Acute pulmonary embolism without acute cor pulmonale, unspecified pulmonary embolism type (H)        Relevant Medications    XARELTO ANTICOAGULANT 20 MG TABS tablet             CC: Physician No Ref-Primary   ______________________________________________________________________________  Diagnosis  12/13/2021-classical Hodgkin lymphoma by left axillary lymph node core needle biopsy.  12/23/2021-PET/CT scan showed hypermetabolic lymph nodes involving basilar neck/superior mediastinum, mediastinum, right pericardial space, bilateral axillae.   -second opinion pathology evaluation at [facility] and it confirmed classical Hodgkin lymphoma.  Echocardiogram showed adequate cardiac function.  Pulmonary function test was normal    Treatment to date  2/3/2022- ABVD.     History of Present Illness    Ms. Amna Oneil presented today for scheduled chemotherapy.  Her father was waiting out in the lobby.      Her shortness of breath has improved.  She still have slight tightness in the middle of the chest with deep breathing.  She tolerated Xarelto well without bleeding or intolerance.  She tolerates current chemotherapy fairly good.    She reported left calf pain intermittently in the last few days.  No skin color changes or significant swelling.  Reported she is has not been taking Ativan and she took only 1 tablet.  She is seeing a therapist from [facility] [facility].    Review of  systems  Apart from describing in HPI, the remainder of comprehensive ROS was negative.    Past History    History reviewed. No pertinent past medical history.    Past Surgical History:   Procedure Laterality Date     IR CHEST PORT PLACEMENT > 5 YRS OF AGE  1/21/2022         Physical Exam    /72 (Patient Position: Sitting)   Pulse 107   Temp 98.4  F (36.9  C) (Oral)   Resp 16   Wt 80.5 kg (177 lb 8 oz)   SpO2 96%   BMI 27.79 kg/m      General: alert, awake, not in acute distress  HEENT: Head: Normal, normocephalic, atraumatic.  Eye: Normal external eye, conjunctiva, lids cornea, PATRICIA.  Nose: Normal external nose, mucus membranes and septum.  Pharynx: Normal buccal mucosa. Normal pharynx.  Neck / Thyroid: Supple, no masses, nodes, nodules or enlargement.  Lymphatics: No abnormally enlarged lymph nodes.  Extremities: normal strength, tone, and muscle mass  Skin: normal. no rash or abnormalities  CNS: non focal.    Lab Results    Recent Results (from the past 168 hour(s))   Comprehensive metabolic panel   Result Value Ref Range    Sodium 139 136 - 145 mmol/L    Potassium 3.8 3.5 - 5.0 mmol/L    Chloride 107 98 - 107 mmol/L    Carbon Dioxide (CO2) 20 (L) 22 - 31 mmol/L    Anion Gap 12 5 - 18 mmol/L    Urea Nitrogen 16 8 - 22 mg/dL    Creatinine 0.88 0.60 - 1.10 mg/dL    Calcium 9.3 8.5 - 10.5 mg/dL    Glucose 124 70 - 125 mg/dL    Alkaline Phosphatase 65 45 - 120 U/L    AST 10 0 - 40 U/L    ALT <9 0 - 45 U/L    Protein Total 6.8 6.0 - 8.0 g/dL    Albumin 3.6 3.5 - 5.0 g/dL    Bilirubin Total 0.3 0.0 - 1.0 mg/dL    GFR Estimate 84 >60 mL/min/1.73m2   CBC with platelets and differential   Result Value Ref Range    WBC Count 4.9 4.0 - 11.0 10e3/uL    RBC Count 4.23 3.80 - 5.20 10e6/uL    Hemoglobin 12.3 11.7 - 15.7 g/dL    Hematocrit 38.2 35.0 - 47.0 %    MCV 90 78 - 100 fL    MCH 29.1 26.5 - 33.0 pg    MCHC 32.2 31.5 - 36.5 g/dL    RDW 14.4 10.0 - 15.0 %    Platelet Count 424 150 - 450 10e3/uL    % Neutrophils  37 %    % Lymphocytes 41 %    % Monocytes 16 %    % Eosinophils 5 %    % Basophils 1 %    % Immature Granulocytes 0 %    NRBCs per 100 WBC 0 <1 /100    Absolute Neutrophils 1.8 1.6 - 8.3 10e3/uL    Absolute Lymphocytes 2.0 0.8 - 5.3 10e3/uL    Absolute Monocytes 0.8 0.0 - 1.3 10e3/uL    Absolute Eosinophils 0.2 0.0 - 0.7 10e3/uL    Absolute Basophils 0.1 0.0 - 0.2 10e3/uL    Absolute Immature Granulocytes 0.0 <=0.4 10e3/uL    Absolute NRBCs 0.0 10e3/uL   Extra Red Top Tube   Result Value Ref Range    Hold Specimen JIC        Imaging    CT Chest Pulmonary Embolism w Contrast    Result Date: 2/23/2022  EXAM: CT CHEST PULMONARY EMBOLISM W CONTRAST LOCATION: [facility] DATE/TIME: 2/23/2022 2:17 PM INDICATION: PE suspected, high probability, shortness of breath. COMPARISON: PET/CT from 12/23/2021. TECHNIQUE: CT chest pulmonary angiogram during arterial phase injection of IV contrast. Multiplanar reformats and MIP reconstructions were performed. Dose reduction techniques were used. CONTRAST: Isovue 370 100mL FINDINGS: ANGIOGRAM CHEST: Moderate bilateral pulmonary emboli, right greater than left. There are pulmonary artery filling defects in the distal right and left main pulmonary arteries extending into the lobar, segmental, and a few subsegmental branches. There is no evidence of right heart strain. The aorta is normal in caliber. LUNGS AND PLEURA: Stable 4 mm left lower lobe nodule (series 7 image 216). Lungs are otherwise clear. No effusions. MEDIASTINUM/AXILLAE: Heart size is normal. Significantly improved adenopathy when compared to prior exam. The largest lymph node is in the left AP window measuring 9 x 21 mm compared to 14 x 24 mm previously (series 5/32). There is also improved and nearly resolved axillary and supraclavicular adenopathy with the largest residual lymph node measuring 13 x 11 mm compared to 18 x 16 mm previously (series 5/25). CORONARY ARTERY CALCIFICATION: None. UPPER  ABDOMEN: No significant finding. MUSCULOSKELETAL: No suspicious osseous lesions.     IMPRESSION: 1.  Moderate bilateral pulmonary emboli. No evidence of right heart strain. 2.  Significantly improved mediastinal, axillary, and supraclavicular adenopathy. 3.  Stable 4 mm left lower lobe nodule. Continued attention on follow-up. [Critical Result: New diagnosis of pulmonary embolism] Finding was identified on 2/23/2022 2:33 PM. Dr. Juan Lizarraga was contacted by me on 2/23/2022 2:39 PM and verbalized understanding of the critical result.     Signed by: Juan Lizarraga MD

## 2022-03-03 NOTE — PROGRESS NOTES
"Oncology Rooming Note    March 3, 2022 8:07 AM   Amna Oneil is a 41 year old female who presents for:    Chief Complaint   Patient presents with     Cancer     Initial Vitals: /72 (Patient Position: Sitting)   Pulse 107   Temp 98.4  F (36.9  C) (Oral)   Resp 16   Wt 80.5 kg (177 lb 8 oz)   SpO2 96%   BMI 27.79 kg/m   Estimated body mass index is 27.79 kg/m  as calculated from the following:    Height as of 1/18/22: 1.702 m (5' 7.01\").    Weight as of this encounter: 80.5 kg (177 lb 8 oz). Body surface area is 1.95 meters squared.  No Pain (0) Comment: Data Unavailable   No LMP recorded. (Menstrual status: Birth Control).  Allergies reviewed: Yes  Medications reviewed: Yes    Medications: Medication refills not needed today.  Pharmacy name entered into [employer]: [facility] -- [place], [place] - [address]    Clinical concerns: Re; bld clot-  PET  SCAN- plan going forward.      Leonardo Corona LPN              "

## 2022-03-03 NOTE — LETTER
"    3/3/2022         RE: Amna Oneil  [address] [place] [address]        Dear Colleague,    Thank you for referring your patient, Amna Oneil, to the [facility] [place]. Please see a copy of my visit note below.    Oncology Rooming Note    March 3, 2022 8:07 AM   Amna Oneil is a 41 year old female who presents for:    Chief Complaint   Patient presents with     Cancer     Initial Vitals: /72 (Patient Position: Sitting)   Pulse 107   Temp 98.4  F (36.9  C) (Oral)   Resp 16   Wt 80.5 kg (177 lb 8 oz)   SpO2 96%   BMI 27.79 kg/m   Estimated body mass index is 27.79 kg/m  as calculated from the following:    Height as of 1/18/22: 1.702 m (5' 7.01\").    Weight as of this encounter: 80.5 kg (177 lb 8 oz). Body surface area is 1.95 meters squared.  No Pain (0) Comment: Data Unavailable   No LMP recorded. (Menstrual status: Birth Control).  Allergies reviewed: Yes  Medications reviewed: Yes    Medications: Medication refills not needed today.  Pharmacy name entered into [employer]: [facility] -- [place] - [place], [place] - [address]    Clinical concerns: Re; bld clot-  PET  SCAN- plan going forward.      Leonardo Corona LPN                [facility] Hematology and Oncology Progress Note    Patient: Amna Oneil  MRN: [id number]  Date of Service: Mar 3, 2022     Reason for Visit    Chief Complaint   Patient presents with     Cancer       Assessment and Plan    Cancer Staging  No matching staging information was found for the patient.    ECOG Performance    0 - Independent     Pain  Pain Score: No Pain (0)    #.  Classical Hodgkin lymphoma, stage II (favorable risk) IPSS-0 risk factor, FFP 88%, overall survival 98%.   She tolerates current chemotherapy very well.  She is here for cycle #2 ABVD.  Reviewed labs.  Her CBC, CMP are all within normal range.  We will continue with cycle #2 ABVD at under percent dose    To obtain PET scan prior to " cycle #3 ABVD.  Will decide whether we would proceed with involved site radiation after 3 or 4 cycles depending on the PET scan and chemotherapy tolerance.     #.  Acute bilateral pulmonary emboli without evidence of saddle embolus or central emboli in 2/2022.   Likely provoked in the setting of recent COVID-19 infection, active chemotherapy for lymphoma in the setting of ongoing blood control use   She will stop birth control.  She will use barrier method.   Continue Xarelto, for minimum of 6 months.    #.  Left calf pain   Will obtain bilateral lower extremity ultrasound given her recent bilateral pulmonary emboli.  Explained to her that if the result comes back even positive for DVT, it will not change the treatment.  She voiced understanding.   No signs of lymphedema or swelling.     #.  Situational anxiety   Ativan 0.5 mg every 6 hours as needed for short-term use, but she has not been using it.  She is following with a psychotherapist at [facility].      #.  Covid infection in January 2022   She recovered well.    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)    Relevant Orders    Infusion Appointment Request    Infusion Appointment Request       Other    Encounter for antineoplastic chemotherapy    Relevant Orders    Infusion Appointment Request    Infusion Appointment Request      Other Visit Diagnoses     Classical Hodgkin lymphoma (H)    -  Primary    Relevant Orders    PET Oncology (Eyes to Thighs)    Pain of left lower extremity        Relevant Orders    US Lower Ext Venous Duplex Limited Bilat    Pain in both lower extremities        Relevant Orders    US Lower Ext Venous Duplex Limited Bilat    Acute pulmonary embolism without acute cor pulmonale, unspecified pulmonary embolism type (H)        Relevant Medications    XARELTO ANTICOAGULANT 20 MG TABS tablet             CC: Physician  No Ref-Primary   ______________________________________________________________________________  Diagnosis  12/13/2021-classical Hodgkin lymphoma by left axillary lymph node core needle biopsy.  12/23/2021-PET/CT scan showed hypermetabolic lymph nodes involving basilar neck/superior mediastinum, mediastinum, right pericardial space, bilateral axillae.   -second opinion pathology evaluation at [facility] and it confirmed classical Hodgkin lymphoma.  Echocardiogram showed adequate cardiac function.  Pulmonary function test was normal    Treatment to date  2/3/2022- ABVD.     History of Present Illness    Ms. Amna Oneil presented today for scheduled chemotherapy.  Her father was waiting out in the lobby.      Her shortness of breath has improved.  She still have slight tightness in the middle of the chest with deep breathing.  She tolerated Xarelto well without bleeding or intolerance.  She tolerates current chemotherapy fairly good.    She reported left calf pain intermittently in the last few days.  No skin color changes or significant swelling.  Reported she is has not been taking Ativan and she took only 1 tablet.  She is seeing a therapist from [facility].    Review of systems  Apart from describing in HPI, the remainder of comprehensive ROS was negative.    Past History    History reviewed. No pertinent past medical history.    Past Surgical History:   Procedure Laterality Date     IR CHEST PORT PLACEMENT > 5 YRS OF AGE  1/21/2022         Physical Exam    /72 (Patient Position: Sitting)   Pulse 107   Temp 98.4  F (36.9  C) (Oral)   Resp 16   Wt 80.5 kg (177 lb 8 oz)   SpO2 96%   BMI 27.79 kg/m      General: alert, awake, not in acute distress  HEENT: Head: Normal, normocephalic, atraumatic.  Eye: Normal external eye, conjunctiva, lids cornea, PATRICIA.  Nose: Normal external nose, mucus membranes and septum.  Pharynx: Normal buccal mucosa. Normal pharynx.  Neck / Thyroid: Supple, no  masses, nodes, nodules or enlargement.  Lymphatics: No abnormally enlarged lymph nodes.  Extremities: normal strength, tone, and muscle mass  Skin: normal. no rash or abnormalities  CNS: non focal.    Lab Results    Recent Results (from the past 168 hour(s))   Comprehensive metabolic panel   Result Value Ref Range    Sodium 139 136 - 145 mmol/L    Potassium 3.8 3.5 - 5.0 mmol/L    Chloride 107 98 - 107 mmol/L    Carbon Dioxide (CO2) 20 (L) 22 - 31 mmol/L    Anion Gap 12 5 - 18 mmol/L    Urea Nitrogen 16 8 - 22 mg/dL    Creatinine 0.88 0.60 - 1.10 mg/dL    Calcium 9.3 8.5 - 10.5 mg/dL    Glucose 124 70 - 125 mg/dL    Alkaline Phosphatase 65 45 - 120 U/L    AST 10 0 - 40 U/L    ALT <9 0 - 45 U/L    Protein Total 6.8 6.0 - 8.0 g/dL    Albumin 3.6 3.5 - 5.0 g/dL    Bilirubin Total 0.3 0.0 - 1.0 mg/dL    GFR Estimate 84 >60 mL/min/1.73m2   CBC with platelets and differential   Result Value Ref Range    WBC Count 4.9 4.0 - 11.0 10e3/uL    RBC Count 4.23 3.80 - 5.20 10e6/uL    Hemoglobin 12.3 11.7 - 15.7 g/dL    Hematocrit 38.2 35.0 - 47.0 %    MCV 90 78 - 100 fL    MCH 29.1 26.5 - 33.0 pg    MCHC 32.2 31.5 - 36.5 g/dL    RDW 14.4 10.0 - 15.0 %    Platelet Count 424 150 - 450 10e3/uL    % Neutrophils 37 %    % Lymphocytes 41 %    % Monocytes 16 %    % Eosinophils 5 %    % Basophils 1 %    % Immature Granulocytes 0 %    NRBCs per 100 WBC 0 <1 /100    Absolute Neutrophils 1.8 1.6 - 8.3 10e3/uL    Absolute Lymphocytes 2.0 0.8 - 5.3 10e3/uL    Absolute Monocytes 0.8 0.0 - 1.3 10e3/uL    Absolute Eosinophils 0.2 0.0 - 0.7 10e3/uL    Absolute Basophils 0.1 0.0 - 0.2 10e3/uL    Absolute Immature Granulocytes 0.0 <=0.4 10e3/uL    Absolute NRBCs 0.0 10e3/uL   Extra Red Top Tube   Result Value Ref Range    Hold Specimen JIC        Imaging    CT Chest Pulmonary Embolism w Contrast    Result Date: 2/23/2022  EXAM: CT CHEST PULMONARY EMBOLISM W CONTRAST LOCATION: [facility] DATE/TIME: 2/23/2022 2:17 PM  INDICATION: PE suspected, high probability, shortness of breath. COMPARISON: PET/CT from 12/23/2021. TECHNIQUE: CT chest pulmonary angiogram during arterial phase injection of IV contrast. Multiplanar reformats and MIP reconstructions were performed. Dose reduction techniques were used. CONTRAST: Isovue 370 100mL FINDINGS: ANGIOGRAM CHEST: Moderate bilateral pulmonary emboli, right greater than left. There are pulmonary artery filling defects in the distal right and left main pulmonary arteries extending into the lobar, segmental, and a few subsegmental branches. There is no evidence of right heart strain. The aorta is normal in caliber. LUNGS AND PLEURA: Stable 4 mm left lower lobe nodule (series 7 image 216). Lungs are otherwise clear. No effusions. MEDIASTINUM/AXILLAE: Heart size is normal. Significantly improved adenopathy when compared to prior exam. The largest lymph node is in the left AP window measuring 9 x 21 mm compared to 14 x 24 mm previously (series 5/32). There is also improved and nearly resolved axillary and supraclavicular adenopathy with the largest residual lymph node measuring 13 x 11 mm compared to 18 x 16 mm previously (series 5/25). CORONARY ARTERY CALCIFICATION: None. UPPER ABDOMEN: No significant finding. MUSCULOSKELETAL: No suspicious osseous lesions.     IMPRESSION: 1.  Moderate bilateral pulmonary emboli. No evidence of right heart strain. 2.  Significantly improved mediastinal, axillary, and supraclavicular adenopathy. 3.  Stable 4 mm left lower lobe nodule. Continued attention on follow-up. [Critical Result: New diagnosis of pulmonary embolism] Finding was identified on 2/23/2022 2:33 PM. Dr. Juan Lizarraga was contacted by me on 2/23/2022 2:39 PM and verbalized understanding of the critical result.     Signed by: Juan Lizarraga MD      Again, thank you for allowing me to participate in the care of your patient.        Sincerely,        Juan Lizarraga MD

## 2022-03-03 NOTE — TELEPHONE ENCOUNTER
U/S today negative for DVT.  Called patient and left message. Instructed patient to call back with further questions or concerns/JBarbara Gonzales RN

## 2022-03-04 NOTE — TELEPHONE ENCOUNTER
Central Prior Authorization Team   Phone: [phone]    PA MEDICATION CHANGED    Medication: ELIQUIS-MEDICATION CHANGED  Insurance Company:    Pharmacy Filling the Rx: [facility] -- [place], [place] - [address]  Filling Pharmacy Phone: [phone]  Filling Pharmacy Fax:    Start Date: 3/4/2022    Doctor changed to preferred Xarelto.  Called pharmacy, they patient has picked up the new medication.

## 2022-03-16 ENCOUNTER — OFFICE VISIT (OUTPATIENT)
Dept: FAMILY MEDICINE | Facility: CLINIC | Age: 42
End: 2022-03-16
Payer: COMMERCIAL

## 2022-03-16 VITALS
SYSTOLIC BLOOD PRESSURE: 114 MMHG | DIASTOLIC BLOOD PRESSURE: 70 MMHG | TEMPERATURE: 98 F | OXYGEN SATURATION: 97 % | RESPIRATION RATE: 14 BRPM | HEART RATE: 120 BPM | BODY MASS INDEX: 28.09 KG/M2 | WEIGHT: 179 LBS | HEIGHT: 67 IN

## 2022-03-16 DIAGNOSIS — Z30.09 GENERAL COUNSELING FOR PRESCRIPTION OF ORAL CONTRACEPTIVES: ICD-10-CM

## 2022-03-16 DIAGNOSIS — F32.1 CURRENT MODERATE EPISODE OF MAJOR DEPRESSIVE DISORDER WITHOUT PRIOR EPISODE (H): ICD-10-CM

## 2022-03-16 DIAGNOSIS — L70.0 ACNE VULGARIS: ICD-10-CM

## 2022-03-16 DIAGNOSIS — Z00.00 ROUTINE GENERAL MEDICAL EXAMINATION AT A HEALTH CARE FACILITY: Primary | ICD-10-CM

## 2022-03-16 DIAGNOSIS — I26.99 BILATERAL PULMONARY EMBOLISM (H): ICD-10-CM

## 2022-03-16 DIAGNOSIS — C81.18 NODULAR SCLEROSIS HODGKIN LYMPHOMA OF LYMPH NODES OF MULTIPLE REGIONS (H): ICD-10-CM

## 2022-03-16 PROCEDURE — 90732 PPSV23 VACC 2 YRS+ SUBQ/IM: CPT | Performed by: FAMILY MEDICINE

## 2022-03-16 PROCEDURE — 99386 PREV VISIT NEW AGE 40-64: CPT | Mod: 25 | Performed by: FAMILY MEDICINE

## 2022-03-16 PROCEDURE — 90471 IMMUNIZATION ADMIN: CPT | Performed by: FAMILY MEDICINE

## 2022-03-16 PROCEDURE — 0054A COVID-19,PF,PFIZER (12+ YRS): CPT | Performed by: FAMILY MEDICINE

## 2022-03-16 PROCEDURE — 91305 COVID-19,PF,PFIZER (12+ YRS): CPT | Performed by: FAMILY MEDICINE

## 2022-03-16 RX ORDER — ESCITALOPRAM OXALATE 10 MG/1
10 TABLET ORAL DAILY
Qty: 90 TABLET | Refills: 4 | Status: SHIPPED | OUTPATIENT
Start: 2022-03-16 | End: 2022-12-02

## 2022-03-16 RX ORDER — SPIRONOLACTONE 50 MG/1
50 TABLET, FILM COATED ORAL DAILY
Start: 2022-03-16 | End: 2023-08-23

## 2022-03-16 ASSESSMENT — PATIENT HEALTH QUESTIONNAIRE - PHQ9
SUM OF ALL RESPONSES TO PHQ QUESTIONS 1-9: 11
SUM OF ALL RESPONSES TO PHQ QUESTIONS 1-9: 11
10. IF YOU CHECKED OFF ANY PROBLEMS, HOW DIFFICULT HAVE THESE PROBLEMS MADE IT FOR YOU TO DO YOUR WORK, TAKE CARE OF THINGS AT HOME, OR GET ALONG WITH OTHER PEOPLE: VERY DIFFICULT

## 2022-03-16 NOTE — PROGRESS NOTES
SUBJECTIVE:   CC: Amna Oneil is an 41 year old woman who presents for preventive health visit.     [facility] Health Maintenance Exam  [facility]  Phone : None    Assessment/Plan     1. Annual Wellness Visit as outlined below.   Health maintenance discussed as appropriate for age and risk factors including:  Nutrition, exercise, alcohol use, tobacco use, safe sexual practices, dental health.  Cancer screening assessed and ordered as indicated. Routine labs as outlined below based on age and risk factors reviewed today. Immunizations reviewed and updated.       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  Triggered by chemo and cancer.      General counseling for prescription of oral contraceptives  Advised to stop estrogen based contraception at this time due to recent PE in setting of chemo for cancer.  Will use condoms.  Reviewed progestrone and non hormonal birth control options- vasectomy, nexplanon, IUD, depo, minipill, etc.      Acne vulgaris  Doing well on spironolactone.  Labs wnl.  Recheck annually.  Refill as requested.    - spironolactone (ALDACTONE) 50 MG tablet    Current moderate episode of major depressive disorder without prior episode (H)  Stable- continue cognitive therapy and lexapro 10mg daily.  Refill as requested.   - escitalopram (LEXAPRO) 10 MG tablet  Dispense: 90 tablet; Refill: 4       Return in about 1 year (around 3/16/2023) for Health Maintenance Visit.    HPI:     Chief Complaint   Patient presents with     Physical       Amna Oneil is a 41 year old female and is here for a  comprehensive health maintenance exam.     Other concerns today:   Just dx with bilateral PE 2/23/22 related to chemo and underlying malignancy.  Still having chest pain and some SOB.  On xarelto- up to 20mg daily now.  On OCPs and told to stop immediately but she hasn't.  Has h/o hemorrhagic ovarian cysts and was bleeding every 2 weeks and put on OCPs at that time and doesn't want to do this again.  Wants a new contraception issue- condoms.  Not getting periods on Lilian during the 4 off pills.      Needs to establish primary care    Spironolactone for acne- working well.      Taking lexapro 10mg daily initially for anxiety and now helping with depression related to her cancer dx.  Working well.  Seeing therapist regularly.      Dark spot on base on left hand pointer finger.  Showed up last 1-2 weeks.  Not painful.  Itchy at times.      History summarized from1-2:oncology notes for last several months reviewed.    Old Records-1: Outside allergies, meds, problems and immunizations were reconciled as needed from [facility]  Lab tests reviewed-1: [facility] pap smear reviewed and chart updated.      Review of Systems   Complete ROS including General, HEENT, CV, Pulmonary, GI, , Genital, Neuro, Psych, MSK, Heme, Endocrine all within normal except as noted in the HPI.      Prevention of Osteoporosis:calcium in diet and vitamin d  Last Dexa:na    Cancer Screening:  Hemoccults/Colonoscopy: na  Mammogram:  Due soon- pt waiting until she is done with chemo  Pap Smear:  Due next year  Lung Cancer: na      Wt Readings from Last 3 Encounters:   03/16/22 81.2 kg (179 lb)   03/03/22 80.5 kg (177 lb 8 oz)   02/23/22 79.3 kg (174 lb 14.4 oz)         Complete physical exam including:  General, HEENT, Neck,  back, CV, Pulmonary, Abdominal, extremities, skin, neuro, psych, lymph exams all within normal.    Abnormalities include:  Grossly normal exam.  Gyn deferred as pt is asymptomatic and not due for screening.      Patient has been  advised of split billing requirements and indicates understanding: Yes  Healthy Habits:     Getting at least 3 servings of Calcium per day:  Yes    Bi-annual eye exam:  Yes    Dental care twice a year:  Yes    Sleep apnea or symptoms of sleep apnea:  None    Diet:  Vegetarian/vegan    Frequency of exercise:  2-3 days/week    Duration of exercise:  15-30 minutes    Taking medications regularly:  Yes    Medication side effects:  None    PHQ-2 Total Score: 4    Additional concerns today:  Yes      Today's PHQ-2 Score:   PHQ-2 ( 1999 [employer]) 3/16/2022   Q1: Little interest or pleasure in doing things 2   Q2: Feeling down, depressed or hopeless 2   PHQ-2 Score 4   Q1: Little interest or pleasure in doing things More than half the days   Q2: Feeling down, depressed or hopeless More than half the days   PHQ-2 Score 4       Abuse: Current or Past (Physical, Sexual or Emotional) - No  Do you feel safe in your environment? Yes    Have you ever done Advance Care Planning? (For example, a Health Directive, POLST, or a discussion with a medical provider or your loved ones about your wishes): No, advance care planning information given to patient to review.  Patient plans to discuss their wishes with loved ones or provider.      Social History     Tobacco Use     Smoking status: Never Smoker     Smokeless tobacco: Never Used   Substance Use Topics     Alcohol use: Not Currently       Alcohol Use 3/16/2022   Prescreen: >3 drinks/day or >7 drinks/week? No       Reviewed orders with patient.  Reviewed health maintenance and updated orders accordingly - Yes      Breast Cancer Screening:  Any new diagnosis of family breast, ovarian, or bowel cancer? No    FHS-7: No flowsheet data found.      Pertinent mammograms are reviewed under the imaging tab.    History of abnormal Pap smear: NO - age 30-65 PAP every 5 years with negative HPV co-testing recommended     Reviewed and updated as needed this visit by clinical staff   Tobacco   "Allergies  Meds  Problems  Med Hx  Surg Hx  Fam Hx          Reviewed and updated as needed this visit by Provider   Tobacco  Allergies  Meds  Problems  Med Hx  Surg Hx  Fam Hx         History reviewed. No pertinent past medical history.   Past Surgical History:   Procedure Laterality Date     IR CHEST PORT PLACEMENT > 5 YRS OF AGE  2022     OB History    Para Term  AB Living   2 1 1 0 1 1   SAB IAB Ectopic Multiple Live Births   1 0 0 0 1      # Outcome Date GA Lbr [place]/2nd Weight Sex Delivery Anes PTL Lv   2 SAB 10/04/17 9w0d       ND      Birth Comments: missed ab had suction d&c   1 Term 01/30/15 40w4d 04:45 / 00:26 3.216 kg (7 lb 1.4 oz) F Vag-Spont None N LIANE      Birth Comments: Mom is A+Hearing passedBili 4.7      Name: Ellie      Apgar1: 7  Apgar5: 9       Review of Systems       OBJECTIVE:   /70 (BP Location: Left arm, Patient Position: Sitting, Cuff Size: Adult Regular)   Pulse 120   Temp 98  F (36.7  C) (Temporal)   Resp 14   Ht 1.702 m (5' 7\")   Wt 81.2 kg (179 lb)   SpO2 97%   BMI 28.04 kg/m    Physical Exam          ASSESSMENT/PLAN:         COUNSELING:      Estimated body mass index is 28.04 kg/m  as calculated from the following:    Height as of this encounter: 1.702 m (5' 7\").    Weight as of this encounter: 81.2 kg (179 lb).    Weight management plan: Discussed healthy diet and exercise guidelines    She reports that she has never smoked. She has never used smokeless tobacco.      Counseling Resources:  ATP IV Guidelines  Pooled Cohorts Equation Calculator  Breast Cancer Risk Calculator  BRCA-Related Cancer Risk Assessment: FHS-7 Tool  FRAX Risk Assessment  ICSI Preventive Guidelines  Dietary Guidelines for Americans, 2010  USDA's MyPlate  ASA Prophylaxis  Lung CA Screening    Grace Hagan MD  [facility]  Answers for HPI/ROS submitted by the patient on 3/16/2022  If you checked off any problems, how difficult have these problems " made it for you to do your work, take care of things at home, or get along with other people?: Very difficult  PHQ9 TOTAL SCORE: 11

## 2022-03-16 NOTE — PATIENT INSTRUCTIONS
Consider - mirena IUD, nexplanon, depo, minipill     Preventive Health Recommendations  Female Ages 40 to 49    Yearly exam:     See your health care provider every year in order to  1. Review health changes.   2. Discuss preventive care.    3. Review your medicines if your doctor prescribed any.      Get a Pap test every three years (unless you have an abnormal result and your provider advises testing more often).      If you get Pap tests with HPV test, you only need to test every 5 years, unless you have an abnormal result. You do not need a Pap test if your uterus was removed (hysterectomy) and you have not had cancer.      You should be tested each year for STDs (sexually transmitted diseases), if you're at risk.     Ask your doctor if you should have a mammogram.      Have a colonoscopy (test for colon cancer) if someone in your family has had colon cancer or polyps before age 50.       Have a cholesterol test every 5 years.       Have a diabetes test (fasting glucose) after age 45. If you are at risk for diabetes, you should have this test every 3 years.    Shots: Get a flu shot each year. Get a tetanus shot every 10 years.     Nutrition:     Eat at least 5 servings of fruits and vegetables each day.    Eat whole-grain bread, whole-wheat pasta and brown rice instead of white grains and rice.    Get adequate Calcium and Vitamin D.      Lifestyle    Exercise at least 150 minutes a week (an average of 30 minutes a day, 5 days a week). This will help you control your weight and prevent disease.    Limit alcohol to one drink per day.    No smoking.     Wear sunscreen to prevent skin cancer.    See your dentist every six months for an exam and cleaning.

## 2022-03-17 ENCOUNTER — INFUSION THERAPY VISIT (OUTPATIENT)
Dept: INFUSION THERAPY | Facility: CLINIC | Age: 42
End: 2022-03-17
Attending: INTERNAL MEDICINE
Payer: COMMERCIAL

## 2022-03-17 VITALS
HEART RATE: 105 BPM | OXYGEN SATURATION: 97 % | TEMPERATURE: 98.3 F | SYSTOLIC BLOOD PRESSURE: 99 MMHG | RESPIRATION RATE: 16 BRPM | DIASTOLIC BLOOD PRESSURE: 74 MMHG

## 2022-03-17 DIAGNOSIS — C81.18 NODULAR SCLEROSIS HODGKIN LYMPHOMA OF LYMPH NODES OF MULTIPLE REGIONS (H): ICD-10-CM

## 2022-03-17 DIAGNOSIS — Z51.11 ENCOUNTER FOR ANTINEOPLASTIC CHEMOTHERAPY: Primary | ICD-10-CM

## 2022-03-17 LAB
BASOPHILS # BLD AUTO: 0.1 10E3/UL (ref 0–0.2)
BASOPHILS NFR BLD AUTO: 1 %
EOSINOPHIL # BLD AUTO: 0.3 10E3/UL (ref 0–0.7)
EOSINOPHIL NFR BLD AUTO: 5 %
ERYTHROCYTE [DISTWIDTH] IN BLOOD BY AUTOMATED COUNT: 14.8 % (ref 10–15)
HCT VFR BLD AUTO: 36.3 % (ref 35–47)
HGB BLD-MCNC: 11.7 G/DL (ref 11.7–15.7)
IMM GRANULOCYTES # BLD: 0 10E3/UL
IMM GRANULOCYTES NFR BLD: 0 %
LYMPHOCYTES # BLD AUTO: 1.1 10E3/UL (ref 0.8–5.3)
LYMPHOCYTES NFR BLD AUTO: 21 %
MCH RBC QN AUTO: 29.3 PG (ref 26.5–33)
MCHC RBC AUTO-ENTMCNC: 32.2 G/DL (ref 31.5–36.5)
MCV RBC AUTO: 91 FL (ref 78–100)
MONOCYTES # BLD AUTO: 0.8 10E3/UL (ref 0–1.3)
MONOCYTES NFR BLD AUTO: 16 %
NEUTROPHILS # BLD AUTO: 3.1 10E3/UL (ref 1.6–8.3)
NEUTROPHILS NFR BLD AUTO: 57 %
NRBC # BLD AUTO: 0 10E3/UL
NRBC BLD AUTO-RTO: 0 /100
PLATELET # BLD AUTO: 397 10E3/UL (ref 150–450)
RBC # BLD AUTO: 4 10E6/UL (ref 3.8–5.2)
WBC # BLD AUTO: 5.3 10E3/UL (ref 4–11)

## 2022-03-17 PROCEDURE — 96413 CHEMO IV INFUSION 1 HR: CPT

## 2022-03-17 PROCEDURE — 96375 TX/PRO/DX INJ NEW DRUG ADDON: CPT

## 2022-03-17 PROCEDURE — 250N000011 HC RX IP 250 OP 636: Performed by: INTERNAL MEDICINE

## 2022-03-17 PROCEDURE — 85025 COMPLETE CBC W/AUTO DIFF WBC: CPT | Performed by: INTERNAL MEDICINE

## 2022-03-17 PROCEDURE — 258N000003 HC RX IP 258 OP 636: Performed by: INTERNAL MEDICINE

## 2022-03-17 PROCEDURE — 96367 TX/PROPH/DG ADDL SEQ IV INF: CPT

## 2022-03-17 PROCEDURE — 96411 CHEMO IV PUSH ADDL DRUG: CPT

## 2022-03-17 RX ORDER — PALONOSETRON 0.05 MG/ML
0.25 INJECTION, SOLUTION INTRAVENOUS ONCE
Status: COMPLETED | OUTPATIENT
Start: 2022-03-17 | End: 2022-03-17

## 2022-03-17 RX ORDER — DIPHENHYDRAMINE HYDROCHLORIDE 50 MG/ML
50 INJECTION INTRAMUSCULAR; INTRAVENOUS
Status: DISCONTINUED | OUTPATIENT
Start: 2022-03-17 | End: 2022-03-17 | Stop reason: HOSPADM

## 2022-03-17 RX ORDER — NALOXONE HYDROCHLORIDE 0.4 MG/ML
0.2 INJECTION, SOLUTION INTRAMUSCULAR; INTRAVENOUS; SUBCUTANEOUS
Status: DISCONTINUED | OUTPATIENT
Start: 2022-03-17 | End: 2022-03-17 | Stop reason: HOSPADM

## 2022-03-17 RX ORDER — MEPERIDINE HYDROCHLORIDE 50 MG/ML
25 INJECTION INTRAMUSCULAR; INTRAVENOUS; SUBCUTANEOUS EVERY 30 MIN PRN
Status: DISCONTINUED | OUTPATIENT
Start: 2022-03-17 | End: 2022-03-17 | Stop reason: HOSPADM

## 2022-03-17 RX ORDER — DOXORUBICIN HYDROCHLORIDE 2 MG/ML
25 INJECTION, SOLUTION INTRAVENOUS ONCE
Status: COMPLETED | OUTPATIENT
Start: 2022-03-17 | End: 2022-03-17

## 2022-03-17 RX ORDER — HEPARIN SODIUM (PORCINE) LOCK FLUSH IV SOLN 100 UNIT/ML 100 UNIT/ML
5 SOLUTION INTRAVENOUS
Status: DISCONTINUED | OUTPATIENT
Start: 2022-03-17 | End: 2022-03-17 | Stop reason: HOSPADM

## 2022-03-17 RX ORDER — ALBUTEROL SULFATE 0.83 MG/ML
2.5 SOLUTION RESPIRATORY (INHALATION)
Status: DISCONTINUED | OUTPATIENT
Start: 2022-03-17 | End: 2022-03-17 | Stop reason: HOSPADM

## 2022-03-17 RX ORDER — EPINEPHRINE 1 MG/ML
0.3 INJECTION, SOLUTION, CONCENTRATE INTRAVENOUS EVERY 5 MIN PRN
Status: DISCONTINUED | OUTPATIENT
Start: 2022-03-17 | End: 2022-03-17 | Stop reason: HOSPADM

## 2022-03-17 RX ORDER — METHYLPREDNISOLONE SODIUM SUCCINATE 125 MG/2ML
125 INJECTION, POWDER, LYOPHILIZED, FOR SOLUTION INTRAMUSCULAR; INTRAVENOUS
Status: DISCONTINUED | OUTPATIENT
Start: 2022-03-17 | End: 2022-03-17 | Stop reason: HOSPADM

## 2022-03-17 RX ORDER — ALBUTEROL SULFATE 90 UG/1
1-2 AEROSOL, METERED RESPIRATORY (INHALATION)
Status: DISCONTINUED | OUTPATIENT
Start: 2022-03-17 | End: 2022-03-17 | Stop reason: HOSPADM

## 2022-03-17 RX ADMIN — HEPARIN SODIUM (PORCINE) LOCK FLUSH IV SOLN 100 UNIT/ML 5 ML: 100 SOLUTION at 12:12

## 2022-03-17 RX ADMIN — BLEOMYCIN SULFATE 19 UNITS: 30 POWDER, FOR SOLUTION INTRAMUSCULAR; INTRAPLEURAL; INTRAVENOUS; SUBCUTANEOUS at 10:50

## 2022-03-17 RX ADMIN — DEXAMETHASONE SODIUM PHOSPHATE: 10 INJECTION, SOLUTION INTRAMUSCULAR; INTRAVENOUS at 09:55

## 2022-03-17 RX ADMIN — DACARBAZINE 730 MG: 200 INJECTION, POWDER, FOR SOLUTION INTRAVENOUS at 11:36

## 2022-03-17 RX ADMIN — SODIUM CHLORIDE 250 ML: 9 INJECTION, SOLUTION INTRAVENOUS at 09:50

## 2022-03-17 RX ADMIN — DOXORUBICIN HYDROCHLORIDE 50 MG: 2 INJECTION, SOLUTION INTRAVENOUS at 10:41

## 2022-03-17 RX ADMIN — PALONOSETRON 0.25 MG: 0.05 INJECTION, SOLUTION INTRAVENOUS at 09:52

## 2022-03-17 RX ADMIN — VINBLASTINE SULFATE 11.6 MG: 1 INJECTION INTRAVENOUS at 11:24

## 2022-03-17 ASSESSMENT — PATIENT HEALTH QUESTIONNAIRE - PHQ9: SUM OF ALL RESPONSES TO PHQ QUESTIONS 1-9: 11

## 2022-03-17 NOTE — PROGRESS NOTES
Infusion Nursing Note:  Amna Oneil presents today for chemo infusion.    Patient seen by provider today: No   present during visit today: Not Applicable.    Note: Dr. Lizarraga notified that pt continues to c/o pain across sternum with deep breathing. Pt also continues to be SOB. Per Dr. Lizarraga, writer called pt to instruct her to go to ED if symptoms worsen.       Intravenous Access:  Implanted Port.    Treatment Conditions:  Results reviewed, labs MET treatment parameters, ok to proceed with treatment.      Post Infusion Assessment:  Patient tolerated infusion without incident.  Blood return noted pre and post infusion.  Blood return noted during Adriamycin administration every 2-5 cc.  Site patent and intact, free from redness, edema or discomfort.  No evidence of extravasations.  Access discontinued per protocol.       Discharge Plan:   Patient discharged in stable condition accompanied by: self.  Departure Mode: Ambulatory.      Alyse BERNAL RN

## 2022-03-17 NOTE — PROGRESS NOTES
Pt returned writer's call; pt instructed to go to ED if  PEsymptoms increase. Pt states understanding.

## 2022-03-28 ENCOUNTER — HOSPITAL ENCOUNTER (OUTPATIENT)
Dept: PET IMAGING | Facility: HOSPITAL | Age: 42
Discharge: HOME | End: 2022-03-28
Attending: INTERNAL MEDICINE | Admitting: INTERNAL MEDICINE
Payer: COMMERCIAL

## 2022-03-28 DIAGNOSIS — C81.70 CLASSICAL HODGKIN LYMPHOMA (H): ICD-10-CM

## 2022-03-28 LAB — GLUCOSE BLDC GLUCOMTR-MCNC: 104 MG/DL (ref 70–99)

## 2022-03-28 PROCEDURE — 71260 CT THORAX DX C+: CPT | Mod: 26 | Performed by: RADIOLOGY

## 2022-03-28 PROCEDURE — 74177 CT ABD & PELVIS W/CONTRAST: CPT | Mod: 26 | Performed by: RADIOLOGY

## 2022-03-28 PROCEDURE — 78816 PET IMAGE W/CT FULL BODY: CPT | Mod: 26 | Performed by: RADIOLOGY

## 2022-03-28 PROCEDURE — 250N000011 HC RX IP 250 OP 636: Performed by: INTERNAL MEDICINE

## 2022-03-28 PROCEDURE — A9552 F18 FDG: HCPCS | Performed by: INTERNAL MEDICINE

## 2022-03-28 PROCEDURE — 74177 CT ABD & PELVIS W/CONTRAST: CPT

## 2022-03-28 PROCEDURE — 343N000001 HC RX 343: Performed by: INTERNAL MEDICINE

## 2022-03-28 PROCEDURE — 82962 GLUCOSE BLOOD TEST: CPT

## 2022-03-28 PROCEDURE — 78816 PET IMAGE W/CT FULL BODY: CPT | Mod: PS

## 2022-03-28 RX ORDER — IOPAMIDOL 755 MG/ML
88 INJECTION, SOLUTION INTRAVASCULAR ONCE
Status: COMPLETED | OUTPATIENT
Start: 2022-03-28 | End: 2022-03-28

## 2022-03-28 RX ADMIN — IOPAMIDOL 88 ML: 755 INJECTION, SOLUTION INTRAVENOUS at 09:55

## 2022-03-28 RX ADMIN — FLUDEOXYGLUCOSE F-18 10.66 MCI.: 500 INJECTION, SOLUTION INTRAVENOUS at 08:53

## 2022-03-29 ENCOUNTER — HOSPITAL ENCOUNTER (INPATIENT)
Facility: CLINIC | Age: 42
LOS: 5 days | Discharge: HOME | DRG: 871 | End: 2022-04-03
Attending: EMERGENCY MEDICINE | Admitting: INTERNAL MEDICINE
Payer: COMMERCIAL

## 2022-03-29 ENCOUNTER — PATIENT OUTREACH (OUTPATIENT)
Dept: ONCOLOGY | Facility: CLINIC | Age: 42
End: 2022-03-29

## 2022-03-29 ENCOUNTER — TELEPHONE (OUTPATIENT)
Dept: ONCOLOGY | Facility: CLINIC | Age: 42
End: 2022-03-29

## 2022-03-29 ENCOUNTER — APPOINTMENT (OUTPATIENT)
Dept: CT IMAGING | Facility: CLINIC | Age: 42
DRG: 871 | End: 2022-03-29
Attending: EMERGENCY MEDICINE
Payer: COMMERCIAL

## 2022-03-29 DIAGNOSIS — C81.18 NODULAR SCLEROSIS HODGKIN LYMPHOMA OF LYMPH NODES OF MULTIPLE REGIONS (H): Primary | ICD-10-CM

## 2022-03-29 DIAGNOSIS — J18.9 ACUTE PNEUMONIA: ICD-10-CM

## 2022-03-29 LAB
ALBUMIN SERPL-MCNC: 3.1 G/DL (ref 3.5–5)
ALP SERPL-CCNC: 66 U/L (ref 45–120)
ALT SERPL W P-5'-P-CCNC: <9 U/L (ref 0–45)
ANION GAP SERPL CALCULATED.3IONS-SCNC: 11 MMOL/L (ref 5–18)
AST SERPL W P-5'-P-CCNC: 14 U/L (ref 0–40)
ATRIAL RATE - MUSE: 117 BPM
BASOPHILS # BLD MANUAL: 0 10E3/UL (ref 0–0.2)
BASOPHILS NFR BLD MANUAL: 0 %
BILIRUB SERPL-MCNC: 0.3 MG/DL (ref 0–1)
BNP SERPL-MCNC: 37 PG/ML (ref 0–64)
BUN SERPL-MCNC: 9 MG/DL (ref 8–22)
CALCIUM SERPL-MCNC: 8.6 MG/DL (ref 8.5–10.5)
CHLORIDE BLD-SCNC: 106 MMOL/L (ref 98–107)
CO2 SERPL-SCNC: 21 MMOL/L (ref 22–31)
CREAT SERPL-MCNC: 0.71 MG/DL (ref 0.6–1.1)
DIASTOLIC BLOOD PRESSURE - MUSE: NORMAL MMHG
EOSINOPHIL # BLD MANUAL: 0.4 10E3/UL (ref 0–0.7)
EOSINOPHIL NFR BLD MANUAL: 10 %
ERYTHROCYTE [DISTWIDTH] IN BLOOD BY AUTOMATED COUNT: 14.8 % (ref 10–15)
FLUAV RNA SPEC QL NAA+PROBE: NEGATIVE
FLUBV RNA RESP QL NAA+PROBE: NEGATIVE
GFR SERPL CREATININE-BSD FRML MDRD: >90 ML/MIN/1.73M2
GLUCOSE BLD-MCNC: 101 MG/DL (ref 70–125)
HCT VFR BLD AUTO: 33.4 % (ref 35–47)
HGB BLD-MCNC: 11 G/DL (ref 11.7–15.7)
INR PPP: 1.11 (ref 0.85–1.15)
INTERPRETATION ECG - MUSE: NORMAL
LACTATE SERPL-SCNC: 0.8 MMOL/L (ref 0.7–2)
LACTATE SERPL-SCNC: 1 MMOL/L (ref 0.7–2)
LYMPHOCYTES # BLD MANUAL: 1 10E3/UL (ref 0.8–5.3)
LYMPHOCYTES NFR BLD MANUAL: 28 %
MAGNESIUM SERPL-MCNC: 1.9 MG/DL (ref 1.8–2.6)
MCH RBC QN AUTO: 28.7 PG (ref 26.5–33)
MCHC RBC AUTO-ENTMCNC: 32.9 G/DL (ref 31.5–36.5)
MCV RBC AUTO: 87 FL (ref 78–100)
MONOCYTES # BLD MANUAL: 0.7 10E3/UL (ref 0–1.3)
MONOCYTES NFR BLD MANUAL: 19 %
NEUTROPHILS # BLD MANUAL: 1.5 10E3/UL (ref 1.6–8.3)
NEUTROPHILS NFR BLD MANUAL: 43 %
P AXIS - MUSE: 71 DEGREES
PLAT MORPH BLD: ABNORMAL
PLATELET # BLD AUTO: 393 10E3/UL (ref 150–450)
POTASSIUM BLD-SCNC: 3.9 MMOL/L (ref 3.5–5)
PR INTERVAL - MUSE: 148 MS
PROCALCITONIN SERPL-MCNC: 0.05 NG/ML (ref 0–0.49)
PROT SERPL-MCNC: 6.4 G/DL (ref 6–8)
QRS DURATION - MUSE: 74 MS
QT - MUSE: 332 MS
QTC - MUSE: 461 MS
R AXIS - MUSE: 82 DEGREES
RBC # BLD AUTO: 3.83 10E6/UL (ref 3.8–5.2)
RBC MORPH BLD: ABNORMAL
S PNEUM AG SPEC QL: NEGATIVE
SARS-COV-2 RNA RESP QL NAA+PROBE: NEGATIVE
SODIUM SERPL-SCNC: 138 MMOL/L (ref 136–145)
SYSTOLIC BLOOD PRESSURE - MUSE: NORMAL MMHG
T AXIS - MUSE: 52 DEGREES
TROPONIN I SERPL-MCNC: <0.01 NG/ML (ref 0–0.29)
TROPONIN T BLD-MCNC: <0.02 UG/L
VENTRICULAR RATE- MUSE: 116 BPM
WBC # BLD AUTO: 3.6 10E3/UL (ref 4–11)

## 2022-03-29 PROCEDURE — 120N000001 HC R&B MED SURG/OB

## 2022-03-29 PROCEDURE — 250N000011 HC RX IP 250 OP 636: Performed by: INTERNAL MEDICINE

## 2022-03-29 PROCEDURE — 36415 COLL VENOUS BLD VENIPUNCTURE: CPT | Performed by: EMERGENCY MEDICINE

## 2022-03-29 PROCEDURE — 71275 CT ANGIOGRAPHY CHEST: CPT

## 2022-03-29 PROCEDURE — 93005 ELECTROCARDIOGRAM TRACING: CPT | Performed by: EMERGENCY MEDICINE

## 2022-03-29 PROCEDURE — 258N000003 HC RX IP 258 OP 636: Performed by: EMERGENCY MEDICINE

## 2022-03-29 PROCEDURE — 96367 TX/PROPH/DG ADDL SEQ IV INF: CPT

## 2022-03-29 PROCEDURE — 36415 COLL VENOUS BLD VENIPUNCTURE: CPT | Performed by: INTERNAL MEDICINE

## 2022-03-29 PROCEDURE — 85610 PROTHROMBIN TIME: CPT | Performed by: EMERGENCY MEDICINE

## 2022-03-29 PROCEDURE — 85027 COMPLETE CBC AUTOMATED: CPT | Performed by: EMERGENCY MEDICINE

## 2022-03-29 PROCEDURE — 84484 ASSAY OF TROPONIN QUANT: CPT | Performed by: EMERGENCY MEDICINE

## 2022-03-29 PROCEDURE — 83735 ASSAY OF MAGNESIUM: CPT | Performed by: EMERGENCY MEDICINE

## 2022-03-29 PROCEDURE — 80053 COMPREHEN METABOLIC PANEL: CPT | Performed by: EMERGENCY MEDICINE

## 2022-03-29 PROCEDURE — 250N000011 HC RX IP 250 OP 636: Performed by: EMERGENCY MEDICINE

## 2022-03-29 PROCEDURE — 83605 ASSAY OF LACTIC ACID: CPT | Performed by: EMERGENCY MEDICINE

## 2022-03-29 PROCEDURE — 84145 PROCALCITONIN (PCT): CPT | Performed by: EMERGENCY MEDICINE

## 2022-03-29 PROCEDURE — 250N000013 HC RX MED GY IP 250 OP 250 PS 637: Performed by: INTERNAL MEDICINE

## 2022-03-29 PROCEDURE — 99223 1ST HOSP IP/OBS HIGH 75: CPT | Performed by: INTERNAL MEDICINE

## 2022-03-29 PROCEDURE — 83605 ASSAY OF LACTIC ACID: CPT | Performed by: INTERNAL MEDICINE

## 2022-03-29 PROCEDURE — 83880 ASSAY OF NATRIURETIC PEPTIDE: CPT | Performed by: EMERGENCY MEDICINE

## 2022-03-29 PROCEDURE — C9803 HOPD COVID-19 SPEC COLLECT: HCPCS

## 2022-03-29 PROCEDURE — 99285 EMERGENCY DEPT VISIT HI MDM: CPT | Mod: 25

## 2022-03-29 PROCEDURE — 87040 BLOOD CULTURE FOR BACTERIA: CPT | Performed by: EMERGENCY MEDICINE

## 2022-03-29 PROCEDURE — 96365 THER/PROPH/DIAG IV INF INIT: CPT | Mod: 59

## 2022-03-29 PROCEDURE — 87899 AGENT NOS ASSAY W/OPTIC: CPT | Performed by: INTERNAL MEDICINE

## 2022-03-29 PROCEDURE — 87636 SARSCOV2 & INF A&B AMP PRB: CPT | Performed by: EMERGENCY MEDICINE

## 2022-03-29 RX ORDER — PIPERACILLIN SODIUM, TAZOBACTAM SODIUM 3; .375 G/15ML; G/15ML
3.38 INJECTION, POWDER, LYOPHILIZED, FOR SOLUTION INTRAVENOUS EVERY 8 HOURS
Status: DISCONTINUED | OUTPATIENT
Start: 2022-03-29 | End: 2022-03-29

## 2022-03-29 RX ORDER — ESCITALOPRAM OXALATE 10 MG/1
10 TABLET ORAL AT BEDTIME
Status: DISCONTINUED | OUTPATIENT
Start: 2022-03-29 | End: 2022-04-03 | Stop reason: HOSPADM

## 2022-03-29 RX ORDER — CALCIUM CARBONATE 500(1250)
100 TABLET ORAL DAILY
Status: ON HOLD | COMMUNITY
End: 2022-04-03

## 2022-03-29 RX ORDER — AZITHROMYCIN 500 MG/5ML
500 INJECTION, POWDER, LYOPHILIZED, FOR SOLUTION INTRAVENOUS ONCE
Status: COMPLETED | OUTPATIENT
Start: 2022-03-29 | End: 2022-03-29

## 2022-03-29 RX ORDER — LORAZEPAM 0.5 MG/1
0.5 TABLET ORAL EVERY 6 HOURS PRN
Status: DISCONTINUED | OUTPATIENT
Start: 2022-03-29 | End: 2022-04-03 | Stop reason: HOSPADM

## 2022-03-29 RX ORDER — IOPAMIDOL 755 MG/ML
100 INJECTION, SOLUTION INTRAVASCULAR ONCE
Status: COMPLETED | OUTPATIENT
Start: 2022-03-29 | End: 2022-03-29

## 2022-03-29 RX ORDER — AZITHROMYCIN 500 MG/5ML
500 INJECTION, POWDER, LYOPHILIZED, FOR SOLUTION INTRAVENOUS EVERY 24 HOURS
Status: DISCONTINUED | OUTPATIENT
Start: 2022-03-29 | End: 2022-03-29

## 2022-03-29 RX ORDER — CEFTRIAXONE 2 G/1
2 INJECTION, POWDER, FOR SOLUTION INTRAMUSCULAR; INTRAVENOUS ONCE
Status: COMPLETED | OUTPATIENT
Start: 2022-03-29 | End: 2022-03-29

## 2022-03-29 RX ORDER — FAMOTIDINE 20 MG/1
20 TABLET, FILM COATED ORAL
Status: DISCONTINUED | OUTPATIENT
Start: 2022-03-30 | End: 2022-03-31

## 2022-03-29 RX ORDER — PIPERACILLIN SODIUM, TAZOBACTAM SODIUM 3; .375 G/15ML; G/15ML
3.38 INJECTION, POWDER, LYOPHILIZED, FOR SOLUTION INTRAVENOUS ONCE
Status: COMPLETED | OUTPATIENT
Start: 2022-03-29 | End: 2022-03-29

## 2022-03-29 RX ORDER — LORATADINE 10 MG/1
10 TABLET ORAL AT BEDTIME
Status: DISCONTINUED | OUTPATIENT
Start: 2022-03-29 | End: 2022-04-03 | Stop reason: HOSPADM

## 2022-03-29 RX ORDER — PIPERACILLIN SODIUM, TAZOBACTAM SODIUM 3; .375 G/15ML; G/15ML
3.38 INJECTION, POWDER, LYOPHILIZED, FOR SOLUTION INTRAVENOUS ONCE
Status: DISCONTINUED | OUTPATIENT
Start: 2022-03-29 | End: 2022-03-29

## 2022-03-29 RX ORDER — PIPERACILLIN SODIUM, TAZOBACTAM SODIUM 3; .375 G/15ML; G/15ML
3.38 INJECTION, POWDER, LYOPHILIZED, FOR SOLUTION INTRAVENOUS EVERY 8 HOURS
Status: DISCONTINUED | OUTPATIENT
Start: 2022-03-29 | End: 2022-04-03

## 2022-03-29 RX ADMIN — PIPERACILLIN AND TAZOBACTAM 3.38 G: 3; .375 INJECTION, POWDER, LYOPHILIZED, FOR SOLUTION INTRAVENOUS at 20:44

## 2022-03-29 RX ADMIN — ESCITALOPRAM OXALATE 10 MG: 10 TABLET ORAL at 20:08

## 2022-03-29 RX ADMIN — CEFTRIAXONE SODIUM 2 G: 2 INJECTION, POWDER, FOR SOLUTION INTRAMUSCULAR; INTRAVENOUS at 12:06

## 2022-03-29 RX ADMIN — PIPERACILLIN AND TAZOBACTAM 3.38 G: 3; .375 INJECTION, POWDER, LYOPHILIZED, FOR SOLUTION INTRAVENOUS at 12:48

## 2022-03-29 RX ADMIN — IOPAMIDOL 65 ML: 755 INJECTION, SOLUTION INTRAVENOUS at 10:14

## 2022-03-29 RX ADMIN — AZITHROMYCIN MONOHYDRATE 500 MG: 500 INJECTION, POWDER, LYOPHILIZED, FOR SOLUTION INTRAVENOUS at 13:29

## 2022-03-29 RX ADMIN — RIVAROXABAN 20 MG: 10 TABLET, FILM COATED ORAL at 18:15

## 2022-03-29 RX ADMIN — LORATADINE 10 MG: 10 TABLET ORAL at 20:08

## 2022-03-29 ASSESSMENT — ENCOUNTER SYMPTOMS
ABDOMINAL PAIN: 0
SHORTNESS OF BREATH: 1
FEVER: 1
COUGH: 1

## 2022-03-29 ASSESSMENT — ACTIVITIES OF DAILY LIVING (ADL)
TOILETING_ISSUES: NO
ADLS_ACUITY_SCORE: 5
ADLS_ACUITY_SCORE: 5
FALL_HISTORY_WITHIN_LAST_SIX_MONTHS: NO
WEAR_GLASSES_OR_BLIND: NO
CHANGE_IN_FUNCTIONAL_STATUS_SINCE_ONSET_OF_CURRENT_ILLNESS/INJURY: NO
ADLS_ACUITY_SCORE: 7
DIFFICULTY_EATING/SWALLOWING: NO
ADLS_ACUITY_SCORE: 5
ADLS_ACUITY_SCORE: 7
ADLS_ACUITY_SCORE: 5
WALKING_OR_CLIMBING_STAIRS_DIFFICULTY: NO
ADLS_ACUITY_SCORE: 7
ADLS_ACUITY_SCORE: 5
DRESSING/BATHING_DIFFICULTY: NO
ADLS_ACUITY_SCORE: 5
CONCENTRATING,_REMEMBERING_OR_MAKING_DECISIONS_DIFFICULTY: NO
DOING_ERRANDS_INDEPENDENTLY_DIFFICULTY: NO
ADLS_ACUITY_SCORE: 7
ADLS_ACUITY_SCORE: 5
ADLS_ACUITY_SCORE: 5

## 2022-03-29 NOTE — ED TRIAGE NOTES
"History of lymphoma and has known PE for the last 5 weeks and is on xarelto.  Last night started running a fever with \"sweats\" and this am began having increasing shortness of breath. Able to speak 3-4 word sentences. Chest pain described as tight and sharp  "

## 2022-03-29 NOTE — PROGRESS NOTES
Patient left message stating she was admitted to the hospital with pneumonia at . She said she thinks this will affect her chemo for Thurs. She asked for call back to [phone].  She also left message this morning alerting us that she was going to ED.    Called patient and left message acknowledging her messages she left and told her Dr. Lizarraga was notified.  Told her that patient not tagged yet for Oncology and she could ask her nurse to ask Hospitalist.  Instructed patient to call me back with questions.     Patient called back and confirmed she received my message.  She said when her nurse comes back in she will ask Hospitalist for Oncology consult so Dr. Lizarraga notified and can come see her in the hospital.  Told patient it likely would not be until tomorrow that she would see her. Told patient Dr. Lizarraga will discuss treatment schedule, follow-up with her when she sees her. Patient verbalized understanding/XENA Gonzales RN

## 2022-03-29 NOTE — TELEPHONE ENCOUNTER
Patient called and left message; she reports increasing shortness of breath, coughing, chest pain with deep breaths, fever last night. Hx of bilateral PE.  Said spouse was driving her to ER.   She asked for a call if we had different recommendations.  Shared with Ping TOLBERT RNCC-agrees ER is best for urgent evaluation.  Adenike Colunga RN

## 2022-03-29 NOTE — ED PROVIDER NOTES
EMERGENCY DEPARTMENT ENCOUNTER      NAME: Amna Oneil  AGE: 41 year old female  YOB: 1980  MRN: [id number]  EVALUATION DATE & TIME: 3/29/2022  8:27 AM    PCP: Jayna Luna    ED PROVIDER: Adenike Watson    Chief Complaint   Patient presents with     Shortness of Breath     Chest Pain     FINAL IMPRESSION:  1. Acute pneumonia        ED COURSE & MEDICAL DECISION MAKIN:35 AM I met the patient and performed my initial interview and exam.   11:27 AM I rechecked and updated the patient on results. Discussed plan for admission.   11:58 AM I spoke with Dr. Mendez, hospitalist, who accepts patient for admission.     Pertinent Labs & Imaging studies reviewed. (See chart for details)  41 year old female presents to the Emergency Department for evaluation of chest pain and shortness of breath.  Pain is centrally mediated to worse with deep inspiration she feels associated shortness of breath she had a temperature at home 100.8.  Past medical history of lymphoma on chemotherapy last dose approximately 2 weeks ago.  Also history of PE diagnosed in February on anticoagulation.  She was seen yesterday in follow-up to her oncology and had a PET scan and CT scan demonstrating resolution of her lymphoma for the most part by imaging.  No other abnormalities identified.  Patient had an acute change with these symptoms which resulted in her ED presentation.  On examination she was noted to be tachycardic.  She appeared mildly dyspneic but was not in respiratory distress.  Cardiopulmonary examination otherwise unremarkable.  We accessed her port.  Laboratory testing pending.  I did recommend a repeat CT scan in light of her history of pulmonary emboli for further evaluation to see the status of her blood clots in her lungs and assess for possible pneumonia given her temperature is 100.8.  Septic work-up pending at this time.    1:20 PM  Patient did not appear to be septic by laboratory testing.  Her heart  rate improved with interventions in the emergency department.  Interestingly the CT scan demonstrating findings consistent with a multifocal pneumonia.  This was not present yesterday when she had her oncology follow-up.  She was administered antibiotics in the emergency department cultures are pending given her chemotherapy.  With ambulation the patient becomes acutely symptomatic developing increased shortness of breath and difficulty breathing to the point where I do feel she requires admission to the hospital to ensure clinical improvement.  Plan of care for admission antibiotics Case discussed with hospitalist service.    At the conclusion of the encounter I discussed the results of all of the tests and the disposition. The questions were answered. The patient or family acknowledged understanding and was agreeable with the care plan.          MEDICATIONS GIVEN IN THE EMERGENCY:  Medications   azithromycin 500 mg (ZITHROMAX) in 0.9% NaCl 250 mL intermittent infusion 500 mg (has no administration in time range)   iopamidol (ISOVUE-370) solution 100 mL (65 mLs Intravenous Given 3/29/22 1014)   cefTRIAXone (ROCEPHIN) 2 g vial to attach to  ml bag for ADULTS or NS 50 ml bag for PEDS (0 g Intravenous Stopped 3/29/22 1244)   piperacillin-tazobactam (ZOSYN) 3.375 g vial to attach to  mL bag (3.375 g Intravenous New Bag 3/29/22 1248)       NEW PRESCRIPTIONS STARTED AT TODAY'S ER VISIT  New Prescriptions    No medications on file          =================================================================    HPI    Patient information was obtained from: patient     Use of : N/A       Amna Oneil is a 41 year old female with a pertinent history of bilateral PE, nodular sclerosis Hodgkin lymphoma, who presents to this ED by private car for evaluation of shortness of breath.     Per chart review, patient was seen at  [facility] on 2/23/2022 for evaluation of shortness of breath  with minimal exertion and mid pleuritic chest pain. CT PE study with moderate bilateral PE without evidence of saddle embolus or central emboli. Prescribed Xarelto. On 3/3, patient endorsed left calf pain, bilateral lower extremity ultrasound negative for DVT. Most recent CT chest/abdomen/pelvis on 3/28 was without concerning findings.    Patient presents with worsening shortness of breath, cough, and central pleuritic chest pain. Shortness of breath is exacerbated with movement, limiting her ability to move around, but feels comfortable at rest. Last night she had a fever of 100.8. She also endorses body aches and overall feels she may be coming down with an illness. Currently taking Xarelto for PE. When PE was initially diagnosed, patient had shortness of breath and pleuritic chest pain, no fever. Last chemotherapy infusion was on 3/17, next infusion on 3/31. Fully vaccinated for COVID-19, influenza, pneumonia. Denies abdominal pain, leg pain, leg swelling.    REVIEW OF SYSTEMS   Review of Systems   Constitutional: Positive for fever.        Positive for body aches.   Respiratory: Positive for cough and shortness of breath.         Positive for pleuritic chest pain.    Cardiovascular: Negative for leg swelling.   Gastrointestinal: Negative for abdominal pain.   Musculoskeletal:        Negative for leg pain.   All other systems reviewed and are negative.    PAST MEDICAL HISTORY:  No past medical history on file.    PAST SURGICAL HISTORY:  Past Surgical History:   Procedure Laterality Date     IR CHEST PORT PLACEMENT > 5 YRS OF AGE  1/21/2022       CURRENT MEDICATIONS:    calcium carbonate 500 mg, elemental, (OSCAL) 500 MG tablet  dexamethasone (DECADRON) 4 MG tablet  escitalopram (LEXAPRO) 10 MG tablet  famotidine (PEPCID) 20 MG tablet  Iron-vit C-vit B12-folic acid (IRON 100 PLUS) 100-250-0.025-1 MG TABS  loratadine (CLARITIN) 10 MG tablet  LORazepam (ATIVAN) 0.5 MG tablet  Probiotic Product (PROBIOTIC-10  "PO)  prochlorperazine (COMPAZINE) 10 MG tablet  spironolactone (ALDACTONE) 50 MG tablet  XARELTO ANTICOAGULANT 20 MG TABS tablet  lidocaine-prilocaine (EMLA) 2.5-2.5 % external cream        ALLERGIES:  Allergies   Allergen Reactions     Avocado GI Disturbance       FAMILY HISTORY:  No family history on file.    SOCIAL HISTORY:     Socioeconomic History     Marital status:      Spouse name: Christian     Number of children: 1     Years of education: Not on file     Highest education level: Not on file   Occupational History     Occupation: Research Manager at [employer]   Tobacco Use     Smoking status: Never Smoker     Smokeless tobacco: Never Used   Substance and Sexual Activity     Alcohol use: Not Currently     Drug use: Yes     Types: Marijuana     Comment: edibles     Sexual activity: Not on file   Other Topics Concern     Not on file   Social History Narrative    Lives with Christian and daughter      Social Determinants of Health     Financial Resource Strain: Not on file   Food Insecurity: Not on file   Transportation Needs: Not on file   Physical Activity: Not on file   Stress: Not on file   Social Connections: Not on file   Intimate Partner Violence: Not At Risk     Fear of Current or Ex-Partner: No     Emotionally Abused: No     Physically Abused: No     Sexually Abused: No   Housing Stability: Not on file       VITALS:  /64   Pulse 98   Temp 98.6  F (37  C) (Oral)   Resp 22   Ht 1.702 m (5' 7\")   Wt 79.4 kg (175 lb)   SpO2 98%   BMI 27.41 kg/m      PHYSICAL EXAM    PHYSICAL EXAM    Constitutional: Well developed, Well nourished, NAD  HENT: Normocephalic, Atraumatic, Bilateral external ears normal, Oropharynx normal, mucous membranes moist, Nose normal. Neck-  Normal range of motion, No tenderness, Supple, No stridor.   Eyes: PERRL, EOMI, Conjunctiva normal, No discharge.   Respiratory: Tachypnea, not in respiratory distress, breath sounds normal auscultation  Cardiovascular: Tachycardic, distal " perfusion appears to be intact no murmur  GI:  Soft, No tenderness, No masses, No flank tenderness. No rebound or guarding.  : No cva tenderness    Musculoskeletal: 2+ DP pulses. No edema. No cyanosis. Good range of motion in all major joints. No tenderness to palpation. No tenderness of the CTLS spine.   Integument: Warm, Dry, No erythema, No rash. No petechiae.   Neurologic: Alert & oriented x 3, Normal motor function, Normal sensory function, No focal deficits noted.   Psychiatric: Affect normal, Judgment normal, Mood normal. Cooperative.        LAB:  All pertinent labs reviewed and interpreted.  Results for orders placed or performed during the hospital encounter of 03/29/22   CT Chest Pulmonary Embolism w Contrast    Impression    IMPRESSION:  1.  No convincing pulmonary emboli.  2.  New patchy basilar predominant groundglass consolidative opacities, right greater than left. Findings are suggestive of multifocal infection.  3.  Persistent and slightly increased mediastinal adenopathy.  4.  Stable 4 mm left lower lobe pulmonary nodule. Continued attention on follow-up.   Result Value Ref Range    INR 1.11 0.85 - 1.15   Comprehensive metabolic panel   Result Value Ref Range    Sodium 138 136 - 145 mmol/L    Potassium 3.9 3.5 - 5.0 mmol/L    Chloride 106 98 - 107 mmol/L    Carbon Dioxide (CO2) 21 (L) 22 - 31 mmol/L    Anion Gap 11 5 - 18 mmol/L    Urea Nitrogen 9 8 - 22 mg/dL    Creatinine 0.71 0.60 - 1.10 mg/dL    Calcium 8.6 8.5 - 10.5 mg/dL    Glucose 101 70 - 125 mg/dL    Alkaline Phosphatase 66 45 - 120 U/L    AST 14 0 - 40 U/L    ALT <9 0 - 45 U/L    Protein Total 6.4 6.0 - 8.0 g/dL    Albumin 3.1 (L) 3.5 - 5.0 g/dL    Bilirubin Total 0.3 0.0 - 1.0 mg/dL    GFR Estimate >90 >60 mL/min/1.73m2   Lactic acid whole blood   Result Value Ref Range    Lactic Acid 0.8 0.7 - 2.0 mmol/L   Result Value Ref Range    Troponin I <0.01 0.00 - 0.29 ng/mL   Result Value Ref Range    Magnesium 1.9 1.8 - 2.6 mg/dL   B-Type  Natriuretic Peptide ([facility] Only)   Result Value Ref Range    BNP 37 0 - 64 pg/mL   Result Value Ref Range    Procalcitonin 0.05 0.00 - 0.49 ng/mL   Symptomatic; Unknown Influenza A/B & SARS-CoV2 (COVID-19) Virus PCR Multiplex Nasopharyngeal    Specimen: Nasopharyngeal; Swab   Result Value Ref Range    Influenza A PCR Negative Negative    Influenza B PCR Negative Negative    SARS CoV2 PCR Negative Negative   CBC with platelets and differential   Result Value Ref Range    WBC Count 3.6 (L) 4.0 - 11.0 10e3/uL    RBC Count 3.83 3.80 - 5.20 10e6/uL    Hemoglobin 11.0 (L) 11.7 - 15.7 g/dL    Hematocrit 33.4 (L) 35.0 - 47.0 %    MCV 87 78 - 100 fL    MCH 28.7 26.5 - 33.0 pg    MCHC 32.9 31.5 - 36.5 g/dL    RDW 14.8 10.0 - 15.0 %    Platelet Count 393 150 - 450 10e3/uL   iStat Troponin, POCT   Result Value Ref Range    TROPPC POCT <0.02 <=0.12 ug/L   Manual Differential   Result Value Ref Range    % Neutrophils 43 %    % Lymphocytes 28 %    % Monocytes 19 %    % Eosinophils 10 %    % Basophils 0 %    Absolute Neutrophils 1.5 (L) 1.6 - 8.3 10e3/uL    Absolute Lymphocytes 1.0 0.8 - 5.3 10e3/uL    Absolute Monocytes 0.7 0.0 - 1.3 10e3/uL    Absolute Eosinophils 0.4 0.0 - 0.7 10e3/uL    Absolute Basophils 0.0 0.0 - 0.2 10e3/uL    RBC Morphology Confirmed RBC Indices     Platelet Assessment  Automated Count Confirmed. Platelet morphology is normal.     Automated Count Confirmed. Platelet morphology is normal.   ECG 12-LEAD WITH MUSE (L[facility])   Result Value Ref Range    Systolic Blood Pressure  mmHg    Diastolic Blood Pressure  mmHg    Ventricular Rate 116 BPM    Atrial Rate 117 BPM    NC Interval 148 ms    QRS Duration 74 ms     ms    QTc 461 ms    P Axis 71 degrees    R AXIS 82 degrees    T Axis 52 degrees    Interpretation ECG       Sinus tachycardia  Otherwise normal ECG  No previous ECGs available  Confirmed by SEE ED PROVIDER NOTE FOR, ECG INTERPRETATION ([id number]),  MICHELLE TARIQ ([id number]) on 3/29/2022 8:32:17 AM          RADIOLOGY:  Reviewed all pertinent imaging. Please see official radiology report.  CT Chest Pulmonary Embolism w Contrast   Final Result   IMPRESSION:   1.  No convincing pulmonary emboli.   2.  New patchy basilar predominant groundglass consolidative opacities, right greater than left. Findings are suggestive of multifocal infection.   3.  Persistent and slightly increased mediastinal adenopathy.   4.  Stable 4 mm left lower lobe pulmonary nodule. Continued attention on follow-up.          EKG:    Performed at: 0831    Impression: Sinus tachycardia. Otherwise normal ECG.     Rate: 116  Rhythm: sinus   Axis: 82  ID Interval: 148  QRS Interval: 74  QTc Interval: 461  ST Changes: none  Comparison: No previous ECGs available.    I have independently reviewed and interpreted the EKG(s) documented above.          I, Adenike Watson, am serving as a scribe to document services personally performed by Vega Talavera MD based on my observation and the provider's statements to me. I, Vega Talavera MD, attest that Adenike Watson is acting in a scribe capacity, has observed my performance of the services and has documented them in accordance with my direction.    Vega Talavera MD  Emergency Medicine  [facility] EMERGENCY ROOM  [address] [id number] [phone]       Vega Talavera MD  03/29/22 2330

## 2022-03-29 NOTE — H&P
"[facility] MEDICINE ADMISSION HISTORY AND PHYSICAL     Assessment & Plan       Amna Oneil is a 41 year old old female with history of  presented with nodular sclerosis of Hodgkin lymphoma (on chemotherapy regimen currently), and bilateral PE presented with SOB and fever and diagnosed with PNA      Sepsis/CAP  Immunosuppressed patient   WBC 3.6  Tachycardic  On chemotherapy   CT chest with ground glass opacities   Azithromycin and Zosyn (to cover for pseudomonas given that she is immunocompromised)    H/o bilateral PE   On Xarelto, continue     Hodgkin's lymphoma, Stage II  Followed by Dr. Lizarraga, consult oncology             # Hypoalbuminemia: Albumin = 3.1 g/dL (Ref range: 3.5 - 5.0 g/dL) on admission, will monitor as appropriate   # Overweight: Estimated body mass index is 27.41 kg/m  as calculated from the following:    Height as of this encounter: 1.702 m (5' 7\").    Weight as of this encounter: 79.4 kg (175 lb).        Code Status: No Order  Disposition: Inpatient   Expected LOS: 2 days           Chief Complaint SOB     HISTORY     Amna Oneil is a 41 year old old female with history of nodular sclerosis of Hodgkin lymphoma (on ABVD chemotherapy regimen, cycle 2) and bilateral PE presented with SOB, fever and chills to ER. Reports that she was diagnosed with bilateral PE on 2/23 presented with SOB and pleuritic chest pain, she started on Xaretlo. Her SOB never went away and in fact it has gotten worse recently. Last night she developed fever and chills and finally she decided to come to ER. She has non-productive cough. Denies any nausea, vomiting, urinary symptoms, diarrhea, runny nose, sore throat.    In ER, she was afebrile, tachycardic with HR in 100-110. Saturating well in room, CT chest remarkable for patchy basilar ground glass opacities R>L. WBC 3.6. she was   Diagnosed with PNA, received a dose of ceftriaxone and azithromycin and admitted to the floor     Past " Medical History     Past Medical History:  No date: Hodgkin lymphoma (H)  No date: Pulmonary emboli (H)     Surgical History     Past Surgical History:   Procedure Laterality Date     IR CHEST PORT PLACEMENT > 5 YRS OF AGE  2022     Family History    Reviewed, and History reviewed. No pertinent family history.     Social History      Social History     Tobacco Use     Smoking status: Never Smoker     Smokeless tobacco: Never Used   Substance Use Topics     Alcohol use: Not Currently     Drug use: Yes     Types: Marijuana     Comment: edibles        Allergies     Allergies   Allergen Reactions     Avocado GI Disturbance     Prior to Admission Medications      Prior to Admission Medications   Prescriptions Last Dose Informant Patient Reported? Taking?   Iron-vit C-vit B12-folic acid (IRON 100 PLUS) 100-250-0.025-1 MG TABS 3/29/2022 at Unknown time  Yes Yes   Sig: Take 1 tablet by mouth daily   LORazepam (ATIVAN) 0.5 MG tablet Past Month at Unknown time  No Yes   Sig: Take 1 tablet (0.5 mg) by mouth every 6 hours as needed for anxiety   Probiotic Product (PROBIOTIC-10 PO) 3/28/2022 at pm  Yes Yes   Sig: Take 2 tablets by mouth daily   XARELTO ANTICOAGULANT 20 MG TABS tablet 3/28/2022 at pm  No Yes   Sig: Take 1 tablet (20 mg) by mouth daily (with dinner)   calcium carbonate 500 mg, elemental, (OSCAL) 500 MG tablet 3/29/2022 at Unknown time  Yes Yes   Sig: Take 100 tablets by mouth daily   dexamethasone (DECADRON) 4 MG tablet 3/17/2022  Yes Yes   Si mg daily (with breakfast) On Chemo Days 2,3,4   escitalopram (LEXAPRO) 10 MG tablet 3/28/2022 at pm  No Yes   Sig: Take 1 tablet (10 mg) by mouth daily   famotidine (PEPCID) 20 MG tablet 3/29/2022 at Unknown time  Yes Yes   Sig: Take 20 mg by mouth daily before breakfast Takes twice daily during week of chemo   lidocaine-prilocaine (EMLA) 2.5-2.5 % external cream   No No   Sig: Apply topically once for 1 dose Apply to the port site about 30-45 minutes prior to  access.   loratadine (CLARITIN) 10 MG tablet 3/28/2022 at pm  Yes Yes   Sig: Take 10 mg by mouth   prochlorperazine (COMPAZINE) 10 MG tablet Past Month at Unknown time  No Yes   Sig: Take 1 tablet (10 mg) by mouth every 6 hours as needed (Nausea/Vomiting)   spironolactone (ALDACTONE) 50 MG tablet 3/28/2022 at pm  No Yes   Sig: Take 1 tablet (50 mg) by mouth daily      Facility-Administered Medications: None      Review of Systems     A 12 point comprehensive review of systems was negative except as noted above in HPI.    PHYSICAL EXAMINATION       Vitals      Temp:  [98.6  F (37  C)] 98.6  F (37  C)  Pulse:  [] 95  Resp:  [22] 22  BP: ()/(57-71) 114/57  SpO2:  [96 %-98 %] 97 %    Examination     GENERAL:  Alert, appears comfortable, in no mild acute distress (tachypnic), appears stated age, a   HEAD:  Normocephalic, without obvious abnormality, atraumatic   EYES:  PERRL, conjunctiva/corneas clear, no scleral icterus, EOM's intact   NOSE: Nares normal, septum midline, mucosa normal, no drainage   THROAT: Lips, mucosa, and tongue normal; teeth and gums normal, mouth moist   NECK: Supple, symmetrical, trachea midline   BACK:   Symmetric, no curvature, ROM normal   LUNGS:   Clear to auscultation bilaterally, no rales, rhonchi, or wheezing, symmetric chest rise on inhalation, tachypnic    CHEST WALL:  No tenderness or deformity   HEART:  Regular rate and rhythm, S1 and S2 normal, no murmur, rub, or gallop    ABDOMEN:   Soft, non-tender, bowel sounds active all four quadrants, no masses, no organomegaly, no rebound or guarding   EXTREMITIES: Extremities normal, atraumatic, no cyanosis or edema    SKIN: Dry to touch, no exanthems in the visualized areas   NEURO: Alert, oriented x 4, moves all four extremities freely, non-focal   PSYCH: Cooperative, behavior is appropriate      Pertinent Lab     Most Recent 3 CBC's:Recent Labs   Lab Test 03/29/22  0938 03/17/22  0912 03/03/22  0755   WBC 3.6* 5.3 4.9   HGB 11.0*  11.7 12.3   MCV 87 91 90    397 424     Most Recent 3 BMP's:Recent Labs   Lab Test 03/29/22  0938 03/28/22  0846 03/03/22  0755 02/23/22  1417 02/03/22  0819     --  139  --  138   POTASSIUM 3.9  --  3.8  --  4.2   CHLORIDE 106  --  107  --  107   CO2 21*  --  20*  --  20*   BUN 9  --  16  --  18   CR 0.71  --  0.88 0.6 0.86   ANIONGAP 11  --  12  --  11   SAMY 8.6  --  9.3  --  9.2    104* 124  --  96     Most Recent 2 LFT's:Recent Labs   Lab Test 03/29/22  0938 03/03/22  0755   AST 14 10   ALT <9 <9   ALKPHOS 66 65   BILITOTAL 0.3 0.3         Pertinent Radiology     Radiology Results:   Recent Results (from the past 24 hour(s))   CT Chest Pulmonary Embolism w Contrast    Narrative    EXAM: CT CHEST PULMONARY EMBOLISM W CONTRAST  LOCATION: [facility]  DATE/TIME: 3/29/2022 10:10 AM    INDICATION: Chest pain, shortness of breath, history of pulmonary embolus.  COMPARISON: 02/23/2022.   TECHNIQUE: CT chest pulmonary angiogram during arterial phase injection of IV contrast. Multiplanar reformats and MIP reconstructions were performed. Dose reduction techniques were used.   CONTRAST: Isovue 370, 65 mL.     FINDINGS:  ANGIOGRAM CHEST: Suboptimal timing of contrast bolus limiting evaluation of the segmental and subsegmental branches. Within the limits of this exam there are no convincing pulmonary artery filling defects. The aorta is normal in caliber.    LUNGS AND PLEURA: There are new patchy bibasilar predominant groundglass and consolidative opacities, right greater than left. No pleural effusions. Stable 4 mm left lower lobe pulmonary nodule (series 7/180).    MEDIASTINUM/AXILLAE: Right IJ port terminates over the proximal right atrium. Heart size is normal. Persistent borderline to mildly enlarged mediastinal lymph nodes. A few of these have slightly increased when compared to prior exam. For example in the   left AP window measuring 2.1 x 1.2 cm compared to 2.1 x 0.9 cm  previously and in the anterior mediastinum measuring 1.8 x 1.1 cm compared to 1.6 x 1.0 cm previously (series 6/111 and 123 respectively).    CORONARY ARTERY CALCIFICATION: None.    UPPER ABDOMEN: No significant finding.    MUSCULOSKELETAL: No suspicious osseous lesions.      Impression    IMPRESSION:  1.  No convincing pulmonary emboli.  2.  New patchy basilar predominant groundglass consolidative opacities, right greater than left. Findings are suggestive of multifocal infection.  3.  Persistent and slightly increased mediastinal adenopathy.  4.  Stable 4 mm left lower lobe pulmonary nodule. Continued attention on follow-up.     EKG Results: personally reviewed. sinus tachyardia    Advance Care Planning        Vero Mendez MD  [facility]   Phone: #[phone]

## 2022-03-29 NOTE — PHARMACY-ADMISSION MEDICATION HISTORY
Pharmacy Note - Admission Medication History    Pertinent Provider Information: none     ______________________________________________________________________    Prior To Admission (PTA) med list completed and updated in EMR.       PTA Med List   Medication Sig Last Dose     calcium carbonate 500 mg, elemental, (OSCAL) 500 MG tablet Take 100 tablets by mouth daily 3/29/2022 at Unknown time     dexamethasone (DECADRON) 4 MG tablet 4 mg daily (with breakfast) On Chemo Days 2,3,4 3/17/2022     escitalopram (LEXAPRO) 10 MG tablet Take 1 tablet (10 mg) by mouth daily 3/28/2022 at pm     famotidine (PEPCID) 20 MG tablet Take 20 mg by mouth daily before breakfast Takes twice daily during week of chemo 3/29/2022 at Unknown time     Iron-vit C-vit B12-folic acid (IRON 100 PLUS) 100-250-0.025-1 MG TABS Take 1 tablet by mouth daily 3/29/2022 at Unknown time     loratadine (CLARITIN) 10 MG tablet Take 10 mg by mouth 3/28/2022 at pm     LORazepam (ATIVAN) 0.5 MG tablet Take 1 tablet (0.5 mg) by mouth every 6 hours as needed for anxiety Past Month at Unknown time     Probiotic Product (PROBIOTIC-10 PO) Take 2 tablets by mouth daily 3/28/2022 at pm     prochlorperazine (COMPAZINE) 10 MG tablet Take 1 tablet (10 mg) by mouth every 6 hours as needed (Nausea/Vomiting) Past Month at Unknown time     spironolactone (ALDACTONE) 50 MG tablet Take 1 tablet (50 mg) by mouth daily 3/28/2022 at pm     XARELTO ANTICOAGULANT 20 MG TABS tablet Take 1 tablet (20 mg) by mouth daily (with dinner) 3/28/2022 at pm       Information source(s): Patient  Method of interview communication: in-person    Summary of Changes to PTA Med List  No changes    Patient was asked about OTC/herbal products specifically.  PTA med list reflects this.        The information provided in this note is only as accurate as the sources available at the time of the update(s).    Thank you for the opportunity to participate in the care of this patient.    Jorge Sanders,  [facility]  3/29/2022 11:06 AM

## 2022-03-30 LAB
BASOPHILS # BLD MANUAL: 0 10E3/UL (ref 0–0.2)
BASOPHILS NFR BLD MANUAL: 0 %
EOSINOPHIL # BLD MANUAL: 0.4 10E3/UL (ref 0–0.7)
EOSINOPHIL NFR BLD MANUAL: 11 %
ERYTHROCYTE [DISTWIDTH] IN BLOOD BY AUTOMATED COUNT: 14.8 % (ref 10–15)
HCT VFR BLD AUTO: 32.5 % (ref 35–47)
HGB BLD-MCNC: 10.8 G/DL (ref 11.7–15.7)
HOLD SPECIMEN: NORMAL
LYMPHOCYTES # BLD MANUAL: 1.1 10E3/UL (ref 0.8–5.3)
LYMPHOCYTES NFR BLD MANUAL: 32 %
MCH RBC QN AUTO: 28.9 PG (ref 26.5–33)
MCHC RBC AUTO-ENTMCNC: 33.2 G/DL (ref 31.5–36.5)
MCV RBC AUTO: 87 FL (ref 78–100)
MONOCYTES # BLD MANUAL: 0.9 10E3/UL (ref 0–1.3)
MONOCYTES NFR BLD MANUAL: 25 %
NEUTROPHILS # BLD MANUAL: 1.1 10E3/UL (ref 1.6–8.3)
NEUTROPHILS NFR BLD MANUAL: 32 %
PLAT MORPH BLD: ABNORMAL
PLATELET # BLD AUTO: 423 10E3/UL (ref 150–450)
RBC # BLD AUTO: 3.74 10E6/UL (ref 3.8–5.2)
RBC MORPH BLD: ABNORMAL
WBC # BLD AUTO: 3.4 10E3/UL (ref 4–11)

## 2022-03-30 PROCEDURE — 99232 SBSQ HOSP IP/OBS MODERATE 35: CPT | Performed by: FAMILY MEDICINE

## 2022-03-30 PROCEDURE — 258N000003 HC RX IP 258 OP 636: Performed by: INTERNAL MEDICINE

## 2022-03-30 PROCEDURE — 99223 1ST HOSP IP/OBS HIGH 75: CPT | Performed by: INTERNAL MEDICINE

## 2022-03-30 PROCEDURE — 250N000011 HC RX IP 250 OP 636: Performed by: INTERNAL MEDICINE

## 2022-03-30 PROCEDURE — 250N000013 HC RX MED GY IP 250 OP 250 PS 637: Performed by: FAMILY MEDICINE

## 2022-03-30 PROCEDURE — 85027 COMPLETE CBC AUTOMATED: CPT | Performed by: INTERNAL MEDICINE

## 2022-03-30 PROCEDURE — 120N000001 HC R&B MED SURG/OB

## 2022-03-30 PROCEDURE — 250N000013 HC RX MED GY IP 250 OP 250 PS 637: Performed by: INTERNAL MEDICINE

## 2022-03-30 PROCEDURE — 36415 COLL VENOUS BLD VENIPUNCTURE: CPT | Performed by: INTERNAL MEDICINE

## 2022-03-30 RX ORDER — NAPROXEN SODIUM 220 MG
220 TABLET ORAL 2 TIMES DAILY WITH MEALS
Status: DISCONTINUED | OUTPATIENT
Start: 2022-03-30 | End: 2022-03-30

## 2022-03-30 RX ORDER — ACETAMINOPHEN 325 MG/1
325 TABLET ORAL EVERY 4 HOURS PRN
Status: DISCONTINUED | OUTPATIENT
Start: 2022-03-30 | End: 2022-03-31

## 2022-03-30 RX ORDER — BENZONATATE 100 MG/1
100 CAPSULE ORAL 3 TIMES DAILY PRN
Status: DISCONTINUED | OUTPATIENT
Start: 2022-03-30 | End: 2022-04-03 | Stop reason: HOSPADM

## 2022-03-30 RX ADMIN — ACETAMINOPHEN 325 MG: 325 TABLET ORAL at 12:27

## 2022-03-30 RX ADMIN — PIPERACILLIN AND TAZOBACTAM 3.38 G: 3; .375 INJECTION, POWDER, LYOPHILIZED, FOR SOLUTION INTRAVENOUS at 12:28

## 2022-03-30 RX ADMIN — LORATADINE 10 MG: 10 TABLET ORAL at 20:06

## 2022-03-30 RX ADMIN — PIPERACILLIN AND TAZOBACTAM 3.38 G: 3; .375 INJECTION, POWDER, LYOPHILIZED, FOR SOLUTION INTRAVENOUS at 04:12

## 2022-03-30 RX ADMIN — PIPERACILLIN AND TAZOBACTAM 3.38 G: 3; .375 INJECTION, POWDER, LYOPHILIZED, FOR SOLUTION INTRAVENOUS at 20:06

## 2022-03-30 RX ADMIN — ACETAMINOPHEN 325 MG: 325 TABLET ORAL at 23:02

## 2022-03-30 RX ADMIN — ESCITALOPRAM OXALATE 10 MG: 10 TABLET ORAL at 20:06

## 2022-03-30 RX ADMIN — LORAZEPAM 0.5 MG: 0.5 TABLET ORAL at 20:06

## 2022-03-30 RX ADMIN — FAMOTIDINE 20 MG: 20 TABLET ORAL at 08:01

## 2022-03-30 RX ADMIN — RIVAROXABAN 20 MG: 10 TABLET, FILM COATED ORAL at 16:28

## 2022-03-30 RX ADMIN — AZITHROMYCIN MONOHYDRATE 250 MG: 500 INJECTION, POWDER, LYOPHILIZED, FOR SOLUTION INTRAVENOUS at 10:57

## 2022-03-30 ASSESSMENT — ACTIVITIES OF DAILY LIVING (ADL)
ADLS_ACUITY_SCORE: 5

## 2022-03-30 NOTE — PROGRESS NOTES
"[facility]    Medicine Progress Note - Hospitalist Service    Date of Admission:  3/29/2022    Assessment & Plan          Amna Oneil is a 41 year old old female with history of  presented with nodular sclerosis of Hodgkin lymphoma (on chemotherapy regimen currently), and bilateral PE diagnosed on 2/23 presented with SOB and fever/chills and bilateral ground glass opacities, diagnosed with PNA        Sepsis/CAP  Immunosuppressed patient   Off of oxygen  WBC 3.6->3.4  Tachycardia resolving  On chemotherapy   CT chest with ground glass opacities   Pulmonary nodule noted should have outpatient follow up.  Azithromycin and Zosyn (to cover for pseudomonas given that she is immunocompromised)  Serology and cultures negative to date.  Await final culture results  - tylenol for pleuritic chest pain due to NSAID interactions  - tessalon prn until cough becomes less painful.  - recheck WBC and BMP tomorrow     H/o bilateral PE   On Xarelto, continue      Hodgkin's lymphoma, Stage II  Followed by Dr. Lizarraga, consult oncology appreciated     # Hypoalbuminemia: Albumin = 3.1 g/dL (Ref range: 3.5 - 5.0 g/dL) on admission, will monitor as appropriate   # Overweight: Estimated body mass index is 27.41 kg/m  as calculated from the following:    Height as of this encounter: 1.702 m (5' 7\").    Weight as of this encounter: 79.4 kg (175 lb).         Diet: Regular Diet Adult    DVT Prophylaxis: DOAC  [place] Catheter: Not present  Central Lines: PRESENT     Cardiac Monitoring: None  Code Status: Full Code      Disposition Plan   Expected Discharge: 03/31/2022     Anticipated discharge location:  Awaiting care coordination huddle  Delays:     *Early Discharge Anticipated            The patient's care was discussed with the Patient and Patient's Family.    Rene Ramirez MD  Hospitalist Service  [facility]  Securely message with the Vocera Web Console (learn more here)  Text page " "via [facility] Paging/Directory       ______________________________________________________________________    Interval History     Overall patient feels \"the same or worse\" due to increase in pleuritic chest pain, although her coughing and dyspnea are improved.    Patient has developed Chest pain with coughing, not exertional, and coughing persists.  She denies new fever.  No neurological sx.  No gi symptoms.  No rash.      Data reviewed today: I reviewed all medications, new labs and imaging results over the last 24 hours. I personally reviewed Imaging findings and labs with the patient and her .     Physical Exam   Vital Signs: Temp: 98.2  F (36.8  C) Temp src: Oral BP: 94/52 Pulse: 83   Resp: 16 SpO2: 94 % O2 Device: None (Room air)    Weight: 178 lbs 3.2 oz  Constitutional: awake, alert, cooperative, no apparent distress, and appears stated age  Eyes: Lids and lashes normal, pupils equal, round and reactive to light,  sclera clear, conjunctiva normal  Respiratory: no increased work of breathing  Cardiovascular: no edema, no cyanosis  Skin: no bruising or bleeding and no jaundice  Neurologic: Awake, alert, oriented to name, place and time.  Cranial nerves II-XII are grossly intact.  Motor is 5 out of 5 bilaterally.  Cerebellar finger to nose, heel to shin intact.  Sensory is intact.  Babinski down going, Romberg negative, and gait is normal.  Neuropsychiatric: Level of consciousness: alert / normal  Affect: normal  Orientation: oriented to self, place, time and situation    Data   Recent Labs   Lab 03/30/22  0431 03/29/22  1143 03/29/22  0938 03/28/22  0846   WBC 3.4*  --  3.6*  --    HGB 10.8*  --  11.0*  --    MCV 87  --  87  --      --  393  --    INR  --  1.11  --   --    NA  --   --  138  --    POTASSIUM  --   --  3.9  --    CHLORIDE  --   --  106  --    CO2  --   --  21*  --    BUN  --   --  9  --    CR  --   --  0.71  --    ANIONGAP  --   --  11  --    SAMY  --   --  8.6  --    GLC  --   --  101 " 104*   ALBUMIN  --   --  3.1*  --    PROTTOTAL  --   --  6.4  --    BILITOTAL  --   --  0.3  --    ALKPHOS  --   --  66  --    ALT  --   --  <9  --    AST  --   --  14  --      No results found for this or any previous visit (from the past 24 hour(s)).  Medications       azithromycin  250 mg Intravenous Q24H     escitalopram  10 mg Oral At Bedtime     famotidine  20 mg Oral QAM AC     loratadine  10 mg Oral At Bedtime     piperacillin-tazobactam  3.375 g Intravenous Q8H     rivaroxaban ANTICOAGULANT  20 mg Oral Daily with supper

## 2022-03-30 NOTE — PROVIDER NOTIFICATION
Provider paged regarding missing order for code status. RN spoke with patient and she would like to be full code. Order placed by MD.

## 2022-03-30 NOTE — CONSULTS
[facility] Hematology and Oncology inpatient consult Note    Patient: Amna Oneil  MRN: [id number]  Date of Service: Mar 29, 2022       Reason for Visit    We are asked by Dr. Mendez regarding Hodgkin lymphoma, on chemotherapy, admitted for pneumonia    Assessment/Plan    #.  Acute bilateral basilar groundglass consolidative opacities ?  Infectious pneumonia ?  Bleomycin induced pulmonary toxicity  #.  Classical Hodgkin lymphoma, last chemotherapy was on 3/17/2022 with cycle #2 ABVD.  Achieved radiographic CR.  #.  Recent bilateral pulmonary emboli in 2/2022.  On Xarelto.     She looks clinically stable.  Afebrile.  She has very mild transient hypoxia but not needing oxygen supplementation.  Discussed about possible infectious versus drug-induced pneumonitis.  I agree with continuing antibiotic at this point.  Discussed about expectant improvement of symptoms by tomorrow or the next day.  We can consider starting on steroid if there is inadequate response due to possible drug-induced pneumonitis.  If there is worsening of clinical symptoms, we will obtain BAL.    She is scheduled for chemotherapy tomorrow which we will reschedule it at a later date when she is clinically improved.    Will follow.    ECOG Performance 1 - Can't do physically strenuous work, but fully ambyulatory and can do light sedentary work    Encounter Diagnoses:    Problem List Items Addressed This Visit     Acute pneumonia    Relevant Medications    loratadine (CLARITIN) tablet 10 mg    azithromycin (ZITHROMAX) 250 mg in sodium chloride 0.9 % 250 mL intermittent infusion    piperacillin-tazobactam (ZOSYN) 3.375 g vial to attach to  mL bag    benzonatate (TESSALON) capsule 100 mg            ______________________________________________________________________________    Staging History  Cancer Staging  No matching staging information was found for the patient.      History    Ms. Amna Oneil is a very pleasant 41 year old  very pleasant female who I have been following for classical Hodgkin lymphoma, stage II.  She is currently undergoing curative intent treatment with ABVD, post cycle #2 on 3/17/2022.  She presented with progressive shortness of breath for a few days, or fever of 100.8 the night prior to admission.  She reported she has a dry cough.  She had pulmonary emboli about a month ago and currently on rivaroxaban.  Admission CT angiogram of the chest showed no convincing pulmonary emboli, however new patchy basilar prominent groundglass consolidative opacity right more than the left suggestive of multifocal infection.  She was subsequently admitted to the hospital and started on broad-spectrum antibiotic.  She reported that she is feeling about the same as yesterday without significant improvement yet.  However she is comfortable laying down but she noted more chest pain or more cough with activity, deep breathing exercises etc.  No fever.      Review of systems.  Apart from describing in history, the remainder of comprehensive ROS was negative.    Past History    Past Medical History:   Diagnosis Date     Hodgkin lymphoma (H)      Pulmonary emboli (H)      Past Surgical History:   Procedure Laterality Date     IR CHEST PORT PLACEMENT > 5 YRS OF AGE  1/21/2022     History reviewed. No pertinent family history.  Social History     Socioeconomic History     Marital status:      Spouse name: Chrsitian     Number of children: 1     Years of education: None     Highest education level: None   Occupational History     Occupation: Research Manager at [employer]   Tobacco Use     Smoking status: Never Smoker     Smokeless tobacco: Never Used   Substance and Sexual Activity     Alcohol use: Not Currently     Drug use: Yes     Types: Marijuana     Comment: edibles     Sexual activity: None   Other Topics Concern     Parent/sibling w/ CABG, MI or angioplasty before 65F 55M? Not Asked   Social History Narrative    Lives with Christian and  "daughter      Social Determinants of Health     Financial Resource Strain: Not on file   Food Insecurity: Not on file   Transportation Needs: Not on file   Physical Activity: Not on file   Stress: Not on file   Social Connections: Not on file   Intimate Partner Violence: Not At Risk     Fear of Current or Ex-Partner: No     Emotionally Abused: No     Physically Abused: No     Sexually Abused: No   Housing Stability: Not on file       Allergies    Allergies   Allergen Reactions     Avocado GI Disturbance       Physical Exam    BP 94/52 (BP Location: Right arm)   Pulse 83   Temp 98.2  F (36.8  C) (Oral)   Resp 16   Ht 1.702 m (5' 7\")   Wt 80.8 kg (178 lb 3.2 oz)   SpO2 94%   BMI 27.91 kg/m      General: alert, awake, not in acute distress  HEENT: Head: Normal, normocephalic, atraumatic.  Eye: Normal external eye, conjunctiva, lids cornea, PATRICIA.  Nose: Normal external nose, mucus membranes and septum.  Pharynx: Normal buccal mucosa. Normal pharynx.  Neck / Thyroid: Supple, no masses, nodes, nodules or enlargement.  Lymphatics: No abnormally enlarged lymph nodes.  Chest: Normal chest wall and respirations.  Crackles at the bases more on the left side.  Heart: S1 S2 RRR, no murmur.   Abdomen: abdomen is soft without significant tenderness, masses, organomegaly or guarding  Extremities: normal strength, tone, and muscle mass  Skin: normal. no rash or abnormalities  CNS: non focal.    Lab Results    Recent Results (from the past 168 hour(s))   Glucose by meter   Result Value Ref Range    GLUCOSE BY METER POCT 104 (H) 70 - 99 mg/dL   ECG 12-LEAD WITH MUSE (L[facility])   Result Value Ref Range    Systolic Blood Pressure  mmHg    Diastolic Blood Pressure  mmHg    Ventricular Rate 116 BPM    Atrial Rate 117 BPM    IN Interval 148 ms    QRS Duration 74 ms     ms    QTc 461 ms    P Axis 71 degrees    R AXIS 82 degrees    T Axis 52 degrees    Interpretation ECG       Sinus tachycardia  Otherwise normal ECG  No previous ECGs " available  Confirmed by SEE ED PROVIDER NOTE FOR, ECG INTERPRETATION ([id number]),  MICHELLE TARIQ ([id number]) on 3/29/2022 8:32:17 AM     Comprehensive metabolic panel   Result Value Ref Range    Sodium 138 136 - 145 mmol/L    Potassium 3.9 3.5 - 5.0 mmol/L    Chloride 106 98 - 107 mmol/L    Carbon Dioxide (CO2) 21 (L) 22 - 31 mmol/L    Anion Gap 11 5 - 18 mmol/L    Urea Nitrogen 9 8 - 22 mg/dL    Creatinine 0.71 0.60 - 1.10 mg/dL    Calcium 8.6 8.5 - 10.5 mg/dL    Glucose 101 70 - 125 mg/dL    Alkaline Phosphatase 66 45 - 120 U/L    AST 14 0 - 40 U/L    ALT <9 0 - 45 U/L    Protein Total 6.4 6.0 - 8.0 g/dL    Albumin 3.1 (L) 3.5 - 5.0 g/dL    Bilirubin Total 0.3 0.0 - 1.0 mg/dL    GFR Estimate >90 >60 mL/min/1.73m2   Lactic acid whole blood   Result Value Ref Range    Lactic Acid 0.8 0.7 - 2.0 mmol/L   Troponin I   Result Value Ref Range    Troponin I <0.01 0.00 - 0.29 ng/mL   Magnesium   Result Value Ref Range    Magnesium 1.9 1.8 - 2.6 mg/dL   B-Type Natriuretic Peptide ([facility] Only)   Result Value Ref Range    BNP 37 0 - 64 pg/mL   Procalcitonin   Result Value Ref Range    Procalcitonin 0.05 0.00 - 0.49 ng/mL   CBC with platelets and differential   Result Value Ref Range    WBC Count 3.6 (L) 4.0 - 11.0 10e3/uL    RBC Count 3.83 3.80 - 5.20 10e6/uL    Hemoglobin 11.0 (L) 11.7 - 15.7 g/dL    Hematocrit 33.4 (L) 35.0 - 47.0 %    MCV 87 78 - 100 fL    MCH 28.7 26.5 - 33.0 pg    MCHC 32.9 31.5 - 36.5 g/dL    RDW 14.8 10.0 - 15.0 %    Platelet Count 393 150 - 450 10e3/uL   iStat Troponin, POCT   Result Value Ref Range    TROPPC POCT <0.02 <=0.12 ug/L   Manual Differential   Result Value Ref Range    % Neutrophils 43 %    % Lymphocytes 28 %    % Monocytes 19 %    % Eosinophils 10 %    % Basophils 0 %    Absolute Neutrophils 1.5 (L) 1.6 - 8.3 10e3/uL    Absolute Lymphocytes 1.0 0.8 - 5.3 10e3/uL    Absolute Monocytes 0.7 0.0 - 1.3 10e3/uL    Absolute Eosinophils 0.4 0.0 - 0.7 10e3/uL    Absolute Basophils 0.0 0.0 - 0.2  10e3/uL    RBC Morphology Confirmed RBC Indices     Platelet Assessment  Automated Count Confirmed. Platelet morphology is normal.     Automated Count Confirmed. Platelet morphology is normal.   Symptomatic; Unknown Influenza A/B & SARS-CoV2 (COVID-19) Virus PCR Multiplex Nasopharyngeal    Specimen: Nasopharyngeal; Swab   Result Value Ref Range    Influenza A PCR Negative Negative    Influenza B PCR Negative Negative    SARS CoV2 PCR Negative Negative   INR   Result Value Ref Range    INR 1.11 0.85 - 1.15   Blood Culture Peripheral Blood    Specimen: Peripheral Blood   Result Value Ref Range    Culture No growth after 1 day    Lactic Acid STAT   Result Value Ref Range    Lactic Acid 1.0 0.7 - 2.0 mmol/L   Strep pneumo Agn Ur greater or equal to 13yrs or CSF any age    Specimen: Urine, Clean Catch   Result Value Ref Range    Streptococcus pneumoniae antigen Negative Negative   CBC with platelets and differential   Result Value Ref Range    WBC Count 3.4 (L) 4.0 - 11.0 10e3/uL    RBC Count 3.74 (L) 3.80 - 5.20 10e6/uL    Hemoglobin 10.8 (L) 11.7 - 15.7 g/dL    Hematocrit 32.5 (L) 35.0 - 47.0 %    MCV 87 78 - 100 fL    MCH 28.9 26.5 - 33.0 pg    MCHC 33.2 31.5 - 36.5 g/dL    RDW 14.8 10.0 - 15.0 %    Platelet Count 423 150 - 450 10e3/uL   Extra Red Top Tube   Result Value Ref Range    Hold Specimen JIC    Manual Differential   Result Value Ref Range    % Neutrophils 32 %    % Lymphocytes 32 %    % Monocytes 25 %    % Eosinophils 11 %    % Basophils 0 %    Absolute Neutrophils 1.1 (L) 1.6 - 8.3 10e3/uL    Absolute Lymphocytes 1.1 0.8 - 5.3 10e3/uL    Absolute Monocytes 0.9 0.0 - 1.3 10e3/uL    Absolute Eosinophils 0.4 0.0 - 0.7 10e3/uL    Absolute Basophils 0.0 0.0 - 0.2 10e3/uL    RBC Morphology Confirmed RBC Indices     Platelet Assessment  Automated Count Confirmed. Platelet morphology is normal.     Automated Count Confirmed. Platelet morphology is normal.       Imaging Results    CT Chest/Abdomen/Pelvis w  Contrast    Result Date: 3/28/2022  Combined Report of:    PET and CT on  3/28/2022 10:26 AM : 1. PET of the neck, chest, abdomen, and pelvis. 2. PET CT Fusion for Attenuation Correction and Anatomical Localization:  3. Diagnostic CT scan of the chest, abdomen, and pelvis with intravenous contrast for interpretation. 3. CT of the chest, abdomen and pelvis obtained for diagnostic interpretation. 4. 3D MIP and PET-CT fused images were processed on an independent workstation and archived to PACS and reviewed by a radiologist. Technique: 1. PET: The patient received 10.46 mCi of F-18-FDG; the serum glucose was 104 prior to administration, body weight was 179 lb. Images were evaluated in the axial, sagittal, and coronal planes as well as the rotational whole body MIP. Images were acquired from the Vertex to the Feet. UPTAKE WAS MEASURED AT 61 MINUTES. BACKGROUND:  Liver SUV max= 4.26,   Aorta Blood SUV Max: 2.93. 2. CT: Volumetric acquisition for clinical interpretation of the chest, abdomen, and pelvis acquired at 3 mm sections . The chest, abdomen, and pelvis were evaluated at 5 mm sections in bone, soft tissue, and lung windows.  The patient received 88 cc of Isovue 370 intravenously for the examination.  3. 3D MIP and PET-CT fused images were processed on an independent workstation and archived to PACS and reviewed by a radiologist. INDICATION: Classical Hodgkin lymphoma (H) ADDITIONAL INFORMATION OBTAINED FROM EMR: Classical Hodgkin lymphoma, stage II (favorable risk) IPSS-0 risk factor, FFP 88%, overall survival 98%. She tolerates current chemotherapy very well.  She is here for cycle #2 ABVD.  Reviewed labs.  Her CBC, CMP are all within normal range. We will continue with cycle #2 ABVD at under percent dose To obtain PET scan prior to cycle #3 ABVD.  Will decide whether we would proceed with involved site radiation after 3 or 4 cycles depending on the PET scan and chemotherapy tolerance. COMPARISON: PET/CT  12/23/2021. FINDINGS: HEAD/NECK: There is no  suspicious FDG uptake in the neck. The paranasal sinuses are clear. The mastoid air cells are clear. The mucosal pharyngeal space, the , prevertebral and carotid spaces are within normal limits. No masses, mass effect or pathologically enlarged lymph nodes are evident. The thyroid gland is unremarkable. CHEST: Completely resolved hypermetabolic lymphadenopathy throughout the base of the neck, mediastinum and bilateral axillary regions since PET/CT from 12/23/2021. Prominent mildly hypermetabolic right hilar and subcarinal nodes, for example subcarinal node with SUV max of 5.5 (Series 3 image 201), likely reactive in etiology. 1 cm non-FDG avid nodule in the right major fissure (Series 8 image 63). There is no significant pericardial or pleural effusions. Right chest wall Port-A-Cath with its tip in the right atrium. Bibasilar atelectasis. ABDOMEN AND PELVIS: There is no suspicious FDG uptake in the abdomen or pelvis. There are no suspicious hepatic lesions. There is no splenomegaly or evidence for splenic or pancreatic mass lesion. There are no suspicious adrenal mass lesions or opaque gallbladder calculi. There is symmetric nephrographic renal phase without hydronephrosis. There is no evidence for diverticulitis, bowel obstruction or free fluid. LOWER EXTREMITIES: No abnormal masses or hypermetabolic lesions. BONES: Mild diffuse FDG uptake by the bone marrow is likely due to reactive bone marrow activation.     IMPRESSION: In this patient with a recent diagnosis of classical Hodgkin's lymphoma status post-chemotherapy; 1. Evidence of complete response per Lugano criteria since PET/CT from 12/23/2021; 1a. Completely resolved hypermetabolic lymphadenopathy throughout the base of the neck, mediastinum and bilateral axillary regions. 1b. Prominent mildly hypermetabolic right hilar and subcarinal nodes, likely reactive in etiology. Attention on follow-up. 2. 1 cm  non-FDG avid nodule in the right major fissure, likely a lymph node. Recommend attention on follow-up. Lugano Criteria for Lymphoma response to therapy methodology Reported as: ex.  Lugano Criteria: Complete Response PET response Used for FDG avid Lymphomas (Hodgkins & Diffuse Large B-Cell) Complete                     <=Liver  (Deauville 1-3) Partial                          > Liver & decreased from baseline (Deauville 4-5) Stable/No response     > Liver & no change from baseline  (Deauville 4-5) Progressive                 > Liver & Increased or new FDG avid lesion (Deauville 4-5) CT response Used for variable uptake Lymphomas (Follicular, Marginal zone, CLL, Mantle Cell) Complete                     all nodes <1.5 cm  (longest diameter) Partial                          Shrank > 50%        (SPD*) Stable/No response      Shrank <50%         (SPD*) Progressive disease      New or Increased size of lesions *SPD = (sum of up to six lesions (longest x shortest diameter) Source: Ludin et al.  Imaging for Staging and Response Assessment in Lymphoma.  Radiology August 2015.  _____________________________________________ The Deauville 5-point scoring system is an internationally accepted and utilized five-point scoring system for the fluorodeoxyglucose (FDG) avidity of a Hodgkin lymphoma or Non-Hodgkin lymphoma tumor mass as seen on FDG positron emission tomography:[1] Score 1: No uptake above the background Score 2: Uptake ? mediastinum Score 3: Uptake > mediastinum but ? liver Score 4: Uptake moderately increased compared to the liver at any site Score 5: Uptake markedly increased compared to the liver at any site Score X: New areas of uptake unlikely to be related to lymphoma I have personally reviewed the examination and initial interpretation and I agree with the findings. AMANDA KING MD   SYSTEM ID:  [id number]    US Lower Extremity Venous Duplex Bilateral    Result Date: 3/3/2022  EXAM: US LOWER EXTREMITY VENOUS  DUPLEX BILATERAL LOCATION: [facility] DATE/TIME: 3/3/2022 11:49 AM INDICATION: Moderate pulmonary emboli. Bilateral lower extremity pain. Assess for DVT. COMPARISON: None. TECHNIQUE: Venous Duplex ultrasound of bilateral lower extremities with and without compression, augmentation and duplex. Color flow and spectral Doppler with waveform analysis performed. FINDINGS: Exam includes the common femoral, femoral, popliteal veins as well as segmentally visualized deep calf veins and greater saphenous vein. RIGHT: No deep vein thrombosis. No superficial thrombophlebitis. No popliteal cyst. LEFT: No deep vein thrombosis. No superficial thrombophlebitis. No popliteal cyst.     IMPRESSION: 1.  Negative bilateral lower extremity venous Doppler.    PET Oncology Whole Body    Result Date: 3/28/2022  Combined Report of:    PET and CT on  3/28/2022 10:26 AM : 1. PET of the neck, chest, abdomen, and pelvis. 2. PET CT Fusion for Attenuation Correction and Anatomical Localization:  3. Diagnostic CT scan of the chest, abdomen, and pelvis with intravenous contrast for interpretation. 3. CT of the chest, abdomen and pelvis obtained for diagnostic interpretation. 4. 3D MIP and PET-CT fused images were processed on an independent workstation and archived to PACS and reviewed by a radiologist. Technique: 1. PET: The patient received 10.46 mCi of F-18-FDG; the serum glucose was 104 prior to administration, body weight was 179 lb. Images were evaluated in the axial, sagittal, and coronal planes as well as the rotational whole body MIP. Images were acquired from the Vertex to the Feet. UPTAKE WAS MEASURED AT 61 MINUTES. BACKGROUND:  Liver SUV max= 4.26,   Aorta Blood SUV Max: 2.93. 2. CT: Volumetric acquisition for clinical interpretation of the chest, abdomen, and pelvis acquired at 3 mm sections . The chest, abdomen, and pelvis were evaluated at 5 mm sections in bone, soft tissue, and lung windows.  The patient  received 88 cc of Isovue 370 intravenously for the examination.  3. 3D MIP and PET-CT fused images were processed on an independent workstation and archived to PACS and reviewed by a radiologist. INDICATION: Classical Hodgkin lymphoma (H) ADDITIONAL INFORMATION OBTAINED FROM EMR: Classical Hodgkin lymphoma, stage II (favorable risk) IPSS-0 risk factor, FFP 88%, overall survival 98%. She tolerates current chemotherapy very well.  She is here for cycle #2 ABVD.  Reviewed labs.  Her CBC, CMP are all within normal range. We will continue with cycle #2 ABVD at under percent dose To obtain PET scan prior to cycle #3 ABVD.  Will decide whether we would proceed with involved site radiation after 3 or 4 cycles depending on the PET scan and chemotherapy tolerance. COMPARISON: PET/CT 12/23/2021. FINDINGS: HEAD/NECK: There is no  suspicious FDG uptake in the neck. The paranasal sinuses are clear. The mastoid air cells are clear. The mucosal pharyngeal space, the , prevertebral and carotid spaces are within normal limits. No masses, mass effect or pathologically enlarged lymph nodes are evident. The thyroid gland is unremarkable. CHEST: Completely resolved hypermetabolic lymphadenopathy throughout the base of the neck, mediastinum and bilateral axillary regions since PET/CT from 12/23/2021. Prominent mildly hypermetabolic right hilar and subcarinal nodes, for example subcarinal node with SUV max of 5.5 (Series 3 image 201), likely reactive in etiology. 1 cm non-FDG avid nodule in the right major fissure (Series 8 image 63). There is no significant pericardial or pleural effusions. Right chest wall Port-A-Cath with its tip in the right atrium. Bibasilar atelectasis. ABDOMEN AND PELVIS: There is no suspicious FDG uptake in the abdomen or pelvis. There are no suspicious hepatic lesions. There is no splenomegaly or evidence for splenic or pancreatic mass lesion. There are no suspicious adrenal mass lesions or opaque  gallbladder calculi. There is symmetric nephrographic renal phase without hydronephrosis. There is no evidence for diverticulitis, bowel obstruction or free fluid. LOWER EXTREMITIES: No abnormal masses or hypermetabolic lesions. BONES: Mild diffuse FDG uptake by the bone marrow is likely due to reactive bone marrow activation.     IMPRESSION: In this patient with a recent diagnosis of classical Hodgkin's lymphoma status post-chemotherapy; 1. Evidence of complete response per Lugano criteria since PET/CT from 12/23/2021; 1a. Completely resolved hypermetabolic lymphadenopathy throughout the base of the neck, mediastinum and bilateral axillary regions. 1b. Prominent mildly hypermetabolic right hilar and subcarinal nodes, likely reactive in etiology. Attention on follow-up. 2. 1 cm non-FDG avid nodule in the right major fissure, likely a lymph node. Recommend attention on follow-up. Lugano Criteria for Lymphoma response to therapy methodology Reported as: ex.  Lugano Criteria: Complete Response PET response Used for FDG avid Lymphomas (Hodgkins & Diffuse Large B-Cell) Complete                     <=Liver  (Deauville 1-3) Partial                          > Liver & decreased from baseline (Deauville 4-5) Stable/No response     > Liver & no change from baseline  (Deauville 4-5) Progressive                 > Liver & Increased or new FDG avid lesion (Deauville 4-5) CT response Used for variable uptake Lymphomas (Follicular, Marginal zone, CLL, Mantle Cell) Complete                     all nodes <1.5 cm  (longest diameter) Partial                          Shrank > 50%        (SPD*) Stable/No response      Shrank <50%         (SPD*) Progressive disease      New or Increased size of lesions *SPD = (sum of up to six lesions (longest x shortest diameter) Source: Ludin et al.  Imaging for Staging and Response Assessment in Lymphoma.  Radiology August 2015.  _____________________________________________ The Deauville 5-point  scoring system is an internationally accepted and utilized five-point scoring system for the fluorodeoxyglucose (FDG) avidity of a Hodgkin lymphoma or Non-Hodgkin lymphoma tumor mass as seen on FDG positron emission tomography:[1] Score 1: No uptake above the background Score 2: Uptake ? mediastinum Score 3: Uptake > mediastinum but ? liver Score 4: Uptake moderately increased compared to the liver at any site Score 5: Uptake markedly increased compared to the liver at any site Score X: New areas of uptake unlikely to be related to lymphoma I have personally reviewed the examination and initial interpretation and I agree with the findings. AMANDA KING MD   SYSTEM ID:  [id number]    CT Chest Pulmonary Embolism w Contrast    Result Date: 3/29/2022  EXAM: CT CHEST PULMONARY EMBOLISM W CONTRAST LOCATION: [facility] DATE/TIME: 3/29/2022 10:10 AM INDICATION: Chest pain, shortness of breath, history of pulmonary embolus. COMPARISON: 02/23/2022. TECHNIQUE: CT chest pulmonary angiogram during arterial phase injection of IV contrast. Multiplanar reformats and MIP reconstructions were performed. Dose reduction techniques were used. CONTRAST: Isovue 370, 65 mL. FINDINGS: ANGIOGRAM CHEST: Suboptimal timing of contrast bolus limiting evaluation of the segmental and subsegmental branches. Within the limits of this exam there are no convincing pulmonary artery filling defects. The aorta is normal in caliber. LUNGS AND PLEURA: There are new patchy bibasilar predominant groundglass and consolidative opacities, right greater than left. No pleural effusions. Stable 4 mm left lower lobe pulmonary nodule (series 7/180). MEDIASTINUM/AXILLAE: Right IJ port terminates over the proximal right atrium. Heart size is normal. Persistent borderline to mildly enlarged mediastinal lymph nodes. A few of these have slightly increased when compared to prior exam. For example in the left AP window measuring 2.1 x 1.2 cm  compared to 2.1 x 0.9 cm previously and in the anterior mediastinum measuring 1.8 x 1.1 cm compared to 1.6 x 1.0 cm previously (series 6/111 and 123 respectively). CORONARY ARTERY CALCIFICATION: None. UPPER ABDOMEN: No significant finding. MUSCULOSKELETAL: No suspicious osseous lesions.     IMPRESSION: 1.  No convincing pulmonary emboli. 2.  New patchy basilar predominant groundglass consolidative opacities, right greater than left. Findings are suggestive of multifocal infection. 3.  Persistent and slightly increased mediastinal adenopathy. 4.  Stable 4 mm left lower lobe pulmonary nodule. Continued attention on follow-up.    Signed by: Juan Lizarraga MD

## 2022-03-30 NOTE — PLAN OF CARE
Patient alert & oriented. VSS with intermittent low O2. Pt experiencing pain, shortness of breath & cough with movement. Pt with right sided port that can infuse but does not have blood return. Pt able to sleep comfortably overnight in between cares. UA pending.

## 2022-03-30 NOTE — PROGRESS NOTES
Care Management Follow Up    Length of Stay (days): 1    Expected Discharge Date: 03/31/2022     Concerns to be Addressed: Pulmonary Progression   Patient plan of care discussed at interdisciplinary rounds: Yes    Anticipated Discharge Disposition:  Home     Anticipated Discharge Services:  None anticipated  Anticipated Discharge DME: None anticipated     Patient/family educated on Medicare website which has current facility and service quality ratings:  Yes   Education Provided on the Discharge Plan: Yes    Patient/Family in Agreement with the Plan: Yes      Referrals Placed by CM/SW: None    Private pay costs discussed: No     Additional Information:  Chart reviewed.  Pt lives with her .  No CM needs anticipated.  Family to transport.     GONZALES De La Fuente

## 2022-03-30 NOTE — PLAN OF CARE
Problem: Plan of Care - These are the overarching goals to be used throughout the patient stay.    Goal: Plan of Care Review/Shift Note  Description: The Plan of Care Review/Shift note should be completed every shift.  The Outcome Evaluation is a brief statement about your assessment that the patient is improving, declining, or no change.  This information will be displayed automatically on your shift note.  Outcome: Ongoing, Progressing     Patient A&O x4. C/o pain in chest with exertion. IV Abx infusing. Pt on room air with sats in mid to upper 90s. Pt independent in room; calls appropriately.

## 2022-03-31 LAB
ANION GAP SERPL CALCULATED.3IONS-SCNC: 10 MMOL/L (ref 5–18)
BUN SERPL-MCNC: 8 MG/DL (ref 8–22)
CALCIUM SERPL-MCNC: 8.8 MG/DL (ref 8.5–10.5)
CHLORIDE BLD-SCNC: 105 MMOL/L (ref 98–107)
CO2 SERPL-SCNC: 25 MMOL/L (ref 22–31)
CREAT SERPL-MCNC: 0.83 MG/DL (ref 0.6–1.1)
ERYTHROCYTE [DISTWIDTH] IN BLOOD BY AUTOMATED COUNT: 14.8 % (ref 10–15)
GFR SERPL CREATININE-BSD FRML MDRD: 90 ML/MIN/1.73M2
GLUCOSE BLD-MCNC: 109 MG/DL (ref 70–125)
HCT VFR BLD AUTO: 34.4 % (ref 35–47)
HGB BLD-MCNC: 10.9 G/DL (ref 11.7–15.7)
MCH RBC QN AUTO: 28.1 PG (ref 26.5–33)
MCHC RBC AUTO-ENTMCNC: 31.7 G/DL (ref 31.5–36.5)
MCV RBC AUTO: 89 FL (ref 78–100)
PLATELET # BLD AUTO: 442 10E3/UL (ref 150–450)
POTASSIUM BLD-SCNC: 4.2 MMOL/L (ref 3.5–5)
RBC # BLD AUTO: 3.88 10E6/UL (ref 3.8–5.2)
SODIUM SERPL-SCNC: 140 MMOL/L (ref 136–145)
WBC # BLD AUTO: 4.5 10E3/UL (ref 4–11)

## 2022-03-31 PROCEDURE — 250N000013 HC RX MED GY IP 250 OP 250 PS 637: Performed by: INTERNAL MEDICINE

## 2022-03-31 PROCEDURE — 258N000003 HC RX IP 258 OP 636: Performed by: INTERNAL MEDICINE

## 2022-03-31 PROCEDURE — 82310 ASSAY OF CALCIUM: CPT | Performed by: FAMILY MEDICINE

## 2022-03-31 PROCEDURE — 250N000011 HC RX IP 250 OP 636: Performed by: FAMILY MEDICINE

## 2022-03-31 PROCEDURE — 250N000011 HC RX IP 250 OP 636: Performed by: INTERNAL MEDICINE

## 2022-03-31 PROCEDURE — 250N000013 HC RX MED GY IP 250 OP 250 PS 637: Performed by: FAMILY MEDICINE

## 2022-03-31 PROCEDURE — 99233 SBSQ HOSP IP/OBS HIGH 50: CPT | Performed by: INTERNAL MEDICINE

## 2022-03-31 PROCEDURE — 99233 SBSQ HOSP IP/OBS HIGH 50: CPT | Performed by: FAMILY MEDICINE

## 2022-03-31 PROCEDURE — 120N000001 HC R&B MED SURG/OB

## 2022-03-31 PROCEDURE — 85027 COMPLETE CBC AUTOMATED: CPT | Performed by: FAMILY MEDICINE

## 2022-03-31 PROCEDURE — 36415 COLL VENOUS BLD VENIPUNCTURE: CPT | Performed by: FAMILY MEDICINE

## 2022-03-31 RX ORDER — METHYLPREDNISOLONE SODIUM SUCCINATE 125 MG/2ML
60 INJECTION, POWDER, LYOPHILIZED, FOR SOLUTION INTRAMUSCULAR; INTRAVENOUS EVERY 12 HOURS
Status: DISCONTINUED | OUTPATIENT
Start: 2022-03-31 | End: 2022-04-03

## 2022-03-31 RX ORDER — SODIUM CHLORIDE 9 MG/ML
INJECTION, SOLUTION INTRAVENOUS CONTINUOUS
Status: DISCONTINUED | OUTPATIENT
Start: 2022-04-01 | End: 2022-04-01

## 2022-03-31 RX ORDER — ACETAMINOPHEN 325 MG/1
650 TABLET ORAL EVERY 4 HOURS PRN
Status: DISCONTINUED | OUTPATIENT
Start: 2022-03-31 | End: 2022-04-03 | Stop reason: HOSPADM

## 2022-03-31 RX ORDER — FAMOTIDINE 20 MG/1
20 TABLET, FILM COATED ORAL 2 TIMES DAILY
Status: DISCONTINUED | OUTPATIENT
Start: 2022-03-31 | End: 2022-04-03 | Stop reason: HOSPADM

## 2022-03-31 RX ORDER — LACTOBACILLUS RHAMNOSUS GG 10B CELL
1 CAPSULE ORAL 2 TIMES DAILY
Status: DISCONTINUED | OUTPATIENT
Start: 2022-03-31 | End: 2022-04-03 | Stop reason: HOSPADM

## 2022-03-31 RX ADMIN — LORATADINE 10 MG: 10 TABLET ORAL at 20:16

## 2022-03-31 RX ADMIN — FAMOTIDINE 20 MG: 20 TABLET ORAL at 20:16

## 2022-03-31 RX ADMIN — Medication 1 CAPSULE: at 20:16

## 2022-03-31 RX ADMIN — PIPERACILLIN AND TAZOBACTAM 3.38 G: 3; .375 INJECTION, POWDER, LYOPHILIZED, FOR SOLUTION INTRAVENOUS at 20:16

## 2022-03-31 RX ADMIN — FAMOTIDINE 20 MG: 20 TABLET ORAL at 06:31

## 2022-03-31 RX ADMIN — PIPERACILLIN AND TAZOBACTAM 3.38 G: 3; .375 INJECTION, POWDER, LYOPHILIZED, FOR SOLUTION INTRAVENOUS at 03:50

## 2022-03-31 RX ADMIN — METHYLPREDNISOLONE SODIUM SUCCINATE 62.5 MG: 125 INJECTION, POWDER, FOR SOLUTION INTRAMUSCULAR; INTRAVENOUS at 10:42

## 2022-03-31 RX ADMIN — PIPERACILLIN AND TAZOBACTAM 3.38 G: 3; .375 INJECTION, POWDER, LYOPHILIZED, FOR SOLUTION INTRAVENOUS at 13:13

## 2022-03-31 RX ADMIN — Medication 1 CAPSULE: at 10:42

## 2022-03-31 RX ADMIN — ESCITALOPRAM OXALATE 10 MG: 10 TABLET ORAL at 20:16

## 2022-03-31 RX ADMIN — AZITHROMYCIN MONOHYDRATE 250 MG: 500 INJECTION, POWDER, LYOPHILIZED, FOR SOLUTION INTRAVENOUS at 10:42

## 2022-03-31 ASSESSMENT — ACTIVITIES OF DAILY LIVING (ADL)
ADLS_ACUITY_SCORE: 5

## 2022-03-31 NOTE — PLAN OF CARE
Goal Outcome Evaluation:     Supplemental oxygen applied last night - has continued to need oxygen throughout the day. Has a fair amount of dyspnea with exertion and noted her sats dropped to mid 80's on 1-2L of oxygen via NC with ambulation. This afternoon, increased O2 to 4L with walking and sats = 90% upon return and Amna felt she was able to recover more quickly with a higher amount of O2 with walking.     Hospitalist and Oncology consulted pulmonary team so Amna is waiting to visit with them today. IV antibiotics infused as ordered. No other acute issues. Continue w/ plan of care.

## 2022-03-31 NOTE — PLAN OF CARE
Patient alert & oriented. Pt blood pressures remain soft. Pt remains tachy. Pt O2 at 89% so 1L of O2 was applied and pt O2 increased to the high 90's. Pt experiencing SOB and chest pain with exertion. No other complains of pain on shift. Using bedside commode. Pt slept comfortably overnight. Unable to get blood return from port for morning labs; changed to lab draw.

## 2022-03-31 NOTE — PROGRESS NOTES
"[facility]    Medicine Progress Note - Hospitalist Service    Date of Admission:  3/29/2022    Assessment & Plan          Amna Oneil is a 41 year old old female with history of  presented with nodular sclerosis of Hodgkin lymphoma (on chemotherapy regimen currently), and bilateral PE diagnosed on 2/23 presented with SOB and fever/chills and bilateral ground glass opacities, diagnosed with PNA        Sepsis/CAP  Immunosuppressed patient   Back on O2 with more severe pleuritic chest pain.  WBC normalizing   CT chest with bilateral ground glass opacities and pulm nodule.  Azithromycin and Zosyn (to cover for pseudomonas given that she is immunocompromised)  Serology and cultures negative to date.  Await final culture results  - tylenol for pleuritic chest pain due to NSAID interactions  - tessalon prn until cough becomes less painful.  - Incentive spirometer and increase activity as able  - Start 60 mg solumedrol IV BID for pleuritis and pneumonitis  - consult pulmonology for atypica pneumonia and mediastinal LAD in Hodgkin's Lymphoma patient on bleomycin.  Opportunistic PCP pneumonia vs chemo toxicity.  Possible bronch.     H/o bilateral PE   On Xarelto, continue this      Hodgkin's lymphoma, Stage II  Followed by Dr. Lizarrgaa, consult oncology appreciated  Will also monitor for signs of CHF - another common complicaiton of Adriamycin. Normal Echo on 1/13/2022     Hypoalbuminemia: Albumin = 3.1 g/dL (Ref range: 3.5 - 5.0 g/dL) on admission, will monitor as appropriate       Overweight: Estimated body mass index is 27.41 kg/m  as calculated from the following:    Height as of this encounter: 1.702 m (5' 7\").    Weight as of this encounter: 79.4 kg (175 lb).         Diet: Regular Diet Adult    DVT Prophylaxis: DOAC  [place] Catheter: Not present  Central Lines: PRESENT       Cardiac Monitoring: None  Code Status: Full Code      Disposition Plan   Expected Discharge: 03/31/2022     Anticipated " "discharge location:  Awaiting care coordination huddle  Delays:     *Early Discharge Anticipated            The patient's care was discussed with the Patient and Patient's Family.    Rene Ramirez MD  Hospitalist Service  [facility]  Securely message with the Vocera Web Console (learn more here)  Text page via [facility] Paging/Directory       ______________________________________________________________________    Interval History     Overall patient feels \"the same or worse\" due to increase in pleuritic chest pain, although her coughing and dyspnea are improved.    Patient has developed Chest pain with coughing, not exertional, and coughing persists.  She denies new fever.  No neurological sx.  No gi symptoms.  No rash.      Data reviewed today: I reviewed all medications, new labs and imaging results over the last 24 hours. I personally reviewed Imaging findings and labs with the patient and her .     Physical Exam   Vital Signs: Temp: 98.1  F (36.7  C) Temp src: Oral BP: 103/63 Pulse: 104   Resp: 18 SpO2: 94 % O2 Device: Nasal cannula Oxygen Delivery: 2 LPM  Weight: 179 lbs 8 oz  Constitutional: awake, alert, cooperative, no apparent distress, and appears stated age  Eyes: Lids and lashes normal, pupils equal, round and reactive to light,  sclera clear, conjunctiva normal  Respiratory: increased work of breathing, shallow respirations, bilateral ronchi, no wheeze.  Cardiovascular: no edema, no cyanosis  Skin: no bruising or bleeding and no jaundice  Neurologic: Awake, alert, oriented to name, place and time.  Cranial nerves II-XII are grossly intact.  Motor is 5 out of 5 bilaterally.  Cerebellar finger to nose, heel to shin intact.  Sensory is intact.  Babinski down going, Romberg negative, and gait is normal.  Neuropsychiatric: Level of consciousness: alert / normal  Affect: normal  Orientation: oriented to self, place, time and situation    Data   Recent Labs   Lab " 03/31/22  0638 03/30/22  0431 03/29/22  1143 03/29/22  0938 03/28/22  0846   WBC 4.5 3.4*  --  3.6*  --    HGB 10.9* 10.8*  --  11.0*  --    MCV 89 87  --  87  --     423  --  393  --    INR  --   --  1.11  --   --      --   --  138  --    POTASSIUM 4.2  --   --  3.9  --    CHLORIDE 105  --   --  106  --    CO2 25  --   --  21*  --    BUN 8  --   --  9  --    CR 0.83  --   --  0.71  --    ANIONGAP 10  --   --  11  --    SAMY 8.8  --   --  8.6  --      --   --  101 104*   ALBUMIN  --   --   --  3.1*  --    PROTTOTAL  --   --   --  6.4  --    BILITOTAL  --   --   --  0.3  --    ALKPHOS  --   --   --  66  --    ALT  --   --   --  <9  --    AST  --   --   --  14  --      No results found for this or any previous visit (from the past 24 hour(s)).  Medications       azithromycin  250 mg Intravenous Q24H     escitalopram  10 mg Oral At Bedtime     famotidine  20 mg Oral BID     loratadine  10 mg Oral At Bedtime     methylPREDNISolone  62.5 mg Intravenous Q12H     piperacillin-tazobactam  3.375 g Intravenous Q8H     rivaroxaban ANTICOAGULANT  20 mg Oral Daily with supper

## 2022-03-31 NOTE — PROGRESS NOTES
"[facility] Hematology and Oncology Inpatient Progress Note    Patient: Amna Oneil  MRN: [id number]  Date of Service: March 31, 2022         Reason for Visit    #.  Acute hypoxic respiratory failure    Assessment and Plan    Cancer Staging  No matching staging information was found for the patient.      #.  Acute hypoxic respiratory failure, on supplemental oxygen 2 L by nasal cannula  #.  Acute bilateral basilar groundglass consolidative opacities -concerning for opportunistic infection or bleomycin toxicity  #.  Classical Hodgkin lymphoma, last chemotherapy on 3/17/2022 of cycle #2 ABVD.  Radiographic CR.  #.  Bilateral pulmonary emboli in 2/2022.  On Xarelto.     Due to worsening hypoxia, I requested pulmonary consultation for evaluation for consideration of BAL for infection work-up including PCP pneumonia.   Continue supplemental oxygen and antibiotics at this point.   Will follow.    ______________________________________________________________________________    History of Present Illness    Amna reported that she has more shortness of breath today.  Her oxygen dropped to 82-85% with getting to the bathroom out of bed.  Her chest feels tight.  She has a dry cough.  She has loose stool felt to be from antibiotics.  She was started methylprednisone today.    Review of Systems    Apart from describing in HPI, the remainder of comprehensive ROS was negative.    Physical Exam    /63 (BP Location: Right arm)   Pulse 104   Temp 98.1  F (36.7  C) (Oral)   Resp 18   Ht 1.702 m (5' 7\")   Wt 81.4 kg (179 lb 8 oz)   SpO2 94%   BMI 28.11 kg/m        General: alert, awake, not in acute distress, laying in bed.  She looks tired  HEENT: Head: Normal, normocephalic, atraumatic.  Eye: Normal external eye, conjunctiva, lids cornea, PATRICIA.  Nose: Normal external nose, mucus membranes and septum.  Pharynx: Normal buccal mucosa. Normal pharynx.  Neck / Thyroid: Supple, no masses, nodes, nodules or " enlargement.  Lymphatics: No abnormally enlarged lymph nodes.  Chest: Normal chest wall and respirations. Clear to auscultation.  Heart: S1 S2 RRR, no murmur.   Abdomen: abdomen is soft without significant tenderness, masses, organomegaly or guarding  Extremities: normal strength, tone, and muscle mass  Skin: normal. no rash or abnormalities  CNS: non focal.     Lab Results    Recent Results (from the past 24 hour(s))   Basic metabolic panel    Collection Time: 03/31/22  6:38 AM   Result Value Ref Range    Sodium 140 136 - 145 mmol/L    Potassium 4.2 3.5 - 5.0 mmol/L    Chloride 105 98 - 107 mmol/L    Carbon Dioxide (CO2) 25 22 - 31 mmol/L    Anion Gap 10 5 - 18 mmol/L    Urea Nitrogen 8 8 - 22 mg/dL    Creatinine 0.83 0.60 - 1.10 mg/dL    Calcium 8.8 8.5 - 10.5 mg/dL    Glucose 109 70 - 125 mg/dL    GFR Estimate 90 >60 mL/min/1.73m2   CBC with platelets    Collection Time: 03/31/22  6:38 AM   Result Value Ref Range    WBC Count 4.5 4.0 - 11.0 10e3/uL    RBC Count 3.88 3.80 - 5.20 10e6/uL    Hemoglobin 10.9 (L) 11.7 - 15.7 g/dL    Hematocrit 34.4 (L) 35.0 - 47.0 %    MCV 89 78 - 100 fL    MCH 28.1 26.5 - 33.0 pg    MCHC 31.7 31.5 - 36.5 g/dL    RDW 14.8 10.0 - 15.0 %    Platelet Count 442 150 - 450 10e3/uL        Imaging    CT Chest Pulmonary Embolism w Contrast    Result Date: 3/29/2022  EXAM: CT CHEST PULMONARY EMBOLISM W CONTRAST LOCATION: [facility] DATE/TIME: 3/29/2022 10:10 AM INDICATION: Chest pain, shortness of breath, history of pulmonary embolus. COMPARISON: 02/23/2022. TECHNIQUE: CT chest pulmonary angiogram during arterial phase injection of IV contrast. Multiplanar reformats and MIP reconstructions were performed. Dose reduction techniques were used. CONTRAST: Isovue 370, 65 mL. FINDINGS: ANGIOGRAM CHEST: Suboptimal timing of contrast bolus limiting evaluation of the segmental and subsegmental branches. Within the limits of this exam there are no convincing pulmonary artery  filling defects. The aorta is normal in caliber. LUNGS AND PLEURA: There are new patchy bibasilar predominant groundglass and consolidative opacities, right greater than left. No pleural effusions. Stable 4 mm left lower lobe pulmonary nodule (series 7/180). MEDIASTINUM/AXILLAE: Right IJ port terminates over the proximal right atrium. Heart size is normal. Persistent borderline to mildly enlarged mediastinal lymph nodes. A few of these have slightly increased when compared to prior exam. For example in the left AP window measuring 2.1 x 1.2 cm compared to 2.1 x 0.9 cm previously and in the anterior mediastinum measuring 1.8 x 1.1 cm compared to 1.6 x 1.0 cm previously (series 6/111 and 123 respectively). CORONARY ARTERY CALCIFICATION: None. UPPER ABDOMEN: No significant finding. MUSCULOSKELETAL: No suspicious osseous lesions.     IMPRESSION: 1.  No convincing pulmonary emboli. 2.  New patchy basilar predominant groundglass consolidative opacities, right greater than left. Findings are suggestive of multifocal infection. 3.  Persistent and slightly increased mediastinal adenopathy. 4.  Stable 4 mm left lower lobe pulmonary nodule. Continued attention on follow-up.       Signed by: Juan Lizarraga MD

## 2022-03-31 NOTE — PLAN OF CARE
Problem: Plan of Care - These are the overarching goals to be used throughout the patient stay.    Goal: Plan of Care Review/Shift Note  Description: The Plan of Care Review/Shift note should be completed every shift.  The Outcome Evaluation is a brief statement about your assessment that the patient is improving, declining, or no change.  This information will be displayed automatically on your shift note.  Problem: Plan of Care - These are the overarching goals to be used throughout the patient stay.    Goal: Optimal Comfort and Wellbeing  Intervention: Monitor Pain and Promote Comfort  Recent Flowsheet Documentation  Taken 3/30/2022 1617 by Indira Rojas RN  Pain Management Interventions: rest   Problem: Gas Exchange Impaired  Goal: Optimal Gas Exchange  Outcome: Ongoing, Progressing    Patient is alert and oriented. C/o SOB with exertion, mostly when she walked to the bathroom and coughed. Taught on energy saving techniques, offered bedside commode. Was receptive to aromatherapy for calming, offered mandarin essential oil. Also offered PRN ativan.

## 2022-04-01 ENCOUNTER — APPOINTMENT (OUTPATIENT)
Dept: CARDIOLOGY | Facility: CLINIC | Age: 42
DRG: 871 | End: 2022-04-01
Attending: INTERNAL MEDICINE
Payer: COMMERCIAL

## 2022-04-01 LAB
ANION GAP SERPL CALCULATED.3IONS-SCNC: 10 MMOL/L (ref 5–18)
APPEARANCE FLD: ABNORMAL
BNP SERPL-MCNC: 36 PG/ML (ref 0–64)
BUN SERPL-MCNC: 11 MG/DL (ref 8–22)
CALCIUM SERPL-MCNC: 8.9 MG/DL (ref 8.5–10.5)
CELL COUNT BODY FLUID SOURCE: ABNORMAL
CHLORIDE BLD-SCNC: 109 MMOL/L (ref 98–107)
CO2 SERPL-SCNC: 22 MMOL/L (ref 22–31)
COLOR FLD: COLORLESS
CREAT SERPL-MCNC: 0.7 MG/DL (ref 0.6–1.1)
EOSINOPHIL NFR FLD MANUAL: 9 %
ERYTHROCYTE [DISTWIDTH] IN BLOOD BY AUTOMATED COUNT: 14.6 % (ref 10–15)
GFR SERPL CREATININE-BSD FRML MDRD: >90 ML/MIN/1.73M2
GLUCOSE BLD-MCNC: 144 MG/DL (ref 70–125)
GRAM STAIN RESULT: NORMAL
GRAM STAIN RESULT: NORMAL
HCT VFR BLD AUTO: 35.3 % (ref 35–47)
HGB BLD-MCNC: 11.3 G/DL (ref 11.7–15.7)
INR PPP: 1.01 (ref 0.85–1.15)
KOH PREPARATION: NORMAL
KOH PREPARATION: NORMAL
LYMPHOCYTES NFR FLD MANUAL: 75 %
MCH RBC QN AUTO: 28.7 PG (ref 26.5–33)
MCHC RBC AUTO-ENTMCNC: 32 G/DL (ref 31.5–36.5)
MCV RBC AUTO: 90 FL (ref 78–100)
MONOS+MACROS NFR FLD MANUAL: 10 %
NEUTS BAND NFR FLD MANUAL: 6 %
PLATELET # BLD AUTO: 489 10E3/UL (ref 150–450)
POTASSIUM BLD-SCNC: 4.5 MMOL/L (ref 3.5–5)
RBC # BLD AUTO: 3.94 10E6/UL (ref 3.8–5.2)
SODIUM SERPL-SCNC: 141 MMOL/L (ref 136–145)
WBC # BLD AUTO: 5.5 10E3/UL (ref 4–11)
WBC # FLD AUTO: 128 /UL

## 2022-04-01 PROCEDURE — 88112 CYTOPATH CELL ENHANCE TECH: CPT | Mod: 26 | Performed by: PATHOLOGY

## 2022-04-01 PROCEDURE — 258N000003 HC RX IP 258 OP 636: Performed by: INTERNAL MEDICINE

## 2022-04-01 PROCEDURE — 85610 PROTHROMBIN TIME: CPT | Performed by: FAMILY MEDICINE

## 2022-04-01 PROCEDURE — 83880 ASSAY OF NATRIURETIC PEPTIDE: CPT | Performed by: EMERGENCY MEDICINE

## 2022-04-01 PROCEDURE — 87385 HISTOPLASMA CAPSUL AG IA: CPT | Performed by: INTERNAL MEDICINE

## 2022-04-01 PROCEDURE — 258N000003 HC RX IP 258 OP 636: Performed by: HOSPITALIST

## 2022-04-01 PROCEDURE — 87205 SMEAR GRAM STAIN: CPT | Performed by: INTERNAL MEDICINE

## 2022-04-01 PROCEDURE — 88312 SPECIAL STAINS GROUP 1: CPT | Mod: 26 | Performed by: PATHOLOGY

## 2022-04-01 PROCEDURE — 87116 MYCOBACTERIA CULTURE: CPT | Performed by: INTERNAL MEDICINE

## 2022-04-01 PROCEDURE — 85027 COMPLETE CBC AUTOMATED: CPT | Performed by: FAMILY MEDICINE

## 2022-04-01 PROCEDURE — 87206 SMEAR FLUORESCENT/ACID STAI: CPT | Performed by: INTERNAL MEDICINE

## 2022-04-01 PROCEDURE — 999N000157 HC STATISTIC RCP TIME EA 10 MIN

## 2022-04-01 PROCEDURE — 36415 COLL VENOUS BLD VENIPUNCTURE: CPT | Performed by: FAMILY MEDICINE

## 2022-04-01 PROCEDURE — 87070 CULTURE OTHR SPECIMN AEROBIC: CPT | Performed by: INTERNAL MEDICINE

## 2022-04-01 PROCEDURE — 250N000011 HC RX IP 250 OP 636: Performed by: INTERNAL MEDICINE

## 2022-04-01 PROCEDURE — 99232 SBSQ HOSP IP/OBS MODERATE 35: CPT | Performed by: EMERGENCY MEDICINE

## 2022-04-01 PROCEDURE — 250N000009 HC RX 250: Performed by: INTERNAL MEDICINE

## 2022-04-01 PROCEDURE — 87798 DETECT AGENT NOS DNA AMP: CPT | Performed by: INTERNAL MEDICINE

## 2022-04-01 PROCEDURE — 99233 SBSQ HOSP IP/OBS HIGH 50: CPT | Mod: 25 | Performed by: INTERNAL MEDICINE

## 2022-04-01 PROCEDURE — 89051 BODY FLUID CELL COUNT: CPT | Performed by: INTERNAL MEDICINE

## 2022-04-01 PROCEDURE — 87581 M.PNEUMON DNA AMP PROBE: CPT | Performed by: INTERNAL MEDICINE

## 2022-04-01 PROCEDURE — 88112 CYTOPATH CELL ENHANCE TECH: CPT | Mod: TC | Performed by: INTERNAL MEDICINE

## 2022-04-01 PROCEDURE — 250N000011 HC RX IP 250 OP 636: Performed by: FAMILY MEDICINE

## 2022-04-01 PROCEDURE — 120N000001 HC R&B MED SURG/OB

## 2022-04-01 PROCEDURE — 250N000013 HC RX MED GY IP 250 OP 250 PS 637: Performed by: INTERNAL MEDICINE

## 2022-04-01 PROCEDURE — 99152 MOD SED SAME PHYS/QHP 5/>YRS: CPT | Performed by: INTERNAL MEDICINE

## 2022-04-01 PROCEDURE — 88305 TISSUE EXAM BY PATHOLOGIST: CPT | Mod: 26 | Performed by: PATHOLOGY

## 2022-04-01 PROCEDURE — 87210 SMEAR WET MOUNT SALINE/INK: CPT | Performed by: INTERNAL MEDICINE

## 2022-04-01 PROCEDURE — 258N000003 HC RX IP 258 OP 636: Performed by: NURSE PRACTITIONER

## 2022-04-01 PROCEDURE — 31624 DX BRONCHOSCOPE/LAVAGE: CPT | Performed by: INTERNAL MEDICINE

## 2022-04-01 PROCEDURE — 0B9F8ZX DRAINAGE OF RIGHT LOWER LUNG LOBE, VIA NATURAL OR ARTIFICIAL OPENING ENDOSCOPIC, DIAGNOSTIC: ICD-10-PCS | Performed by: EMERGENCY MEDICINE

## 2022-04-01 PROCEDURE — 31624 DX BRONCHOSCOPE/LAVAGE: CPT

## 2022-04-01 PROCEDURE — 87102 FUNGUS ISOLATION CULTURE: CPT | Performed by: INTERNAL MEDICINE

## 2022-04-01 PROCEDURE — 80048 BASIC METABOLIC PNL TOTAL CA: CPT | Performed by: FAMILY MEDICINE

## 2022-04-01 PROCEDURE — 250N000013 HC RX MED GY IP 250 OP 250 PS 637: Performed by: FAMILY MEDICINE

## 2022-04-01 PROCEDURE — 87633 RESP VIRUS 12-25 TARGETS: CPT | Performed by: INTERNAL MEDICINE

## 2022-04-01 PROCEDURE — 87305 ASPERGILLUS AG IA: CPT | Performed by: INTERNAL MEDICINE

## 2022-04-01 RX ORDER — FLUMAZENIL 0.1 MG/ML
0.2 INJECTION, SOLUTION INTRAVENOUS
Status: ACTIVE | OUTPATIENT
Start: 2022-04-01 | End: 2022-04-01

## 2022-04-01 RX ORDER — LIDOCAINE HYDROCHLORIDE 40 MG/ML
INJECTION, SOLUTION RETROBULBAR
Status: COMPLETED | OUTPATIENT
Start: 2022-04-01 | End: 2022-04-01

## 2022-04-01 RX ORDER — NALOXONE HYDROCHLORIDE 0.4 MG/ML
0.4 INJECTION, SOLUTION INTRAMUSCULAR; INTRAVENOUS; SUBCUTANEOUS
Status: DISCONTINUED | OUTPATIENT
Start: 2022-04-01 | End: 2022-04-03 | Stop reason: HOSPADM

## 2022-04-01 RX ORDER — SODIUM CHLORIDE 9 MG/ML
INJECTION, SOLUTION INTRAVENOUS CONTINUOUS
Status: DISCONTINUED | OUTPATIENT
Start: 2022-04-01 | End: 2022-04-01

## 2022-04-01 RX ORDER — NALOXONE HYDROCHLORIDE 0.4 MG/ML
0.2 INJECTION, SOLUTION INTRAMUSCULAR; INTRAVENOUS; SUBCUTANEOUS
Status: DISCONTINUED | OUTPATIENT
Start: 2022-04-01 | End: 2022-04-03 | Stop reason: HOSPADM

## 2022-04-01 RX ORDER — LIDOCAINE HYDROCHLORIDE 20 MG/ML
SOLUTION OROPHARYNGEAL
Status: COMPLETED | OUTPATIENT
Start: 2022-04-01 | End: 2022-04-01

## 2022-04-01 RX ORDER — ONDANSETRON 2 MG/ML
4 INJECTION INTRAMUSCULAR; INTRAVENOUS EVERY 6 HOURS PRN
Status: DISCONTINUED | OUTPATIENT
Start: 2022-04-01 | End: 2022-04-03 | Stop reason: HOSPADM

## 2022-04-01 RX ORDER — FENTANYL CITRATE 50 UG/ML
INJECTION, SOLUTION INTRAMUSCULAR; INTRAVENOUS
Status: COMPLETED | OUTPATIENT
Start: 2022-04-01 | End: 2022-04-01

## 2022-04-01 RX ORDER — ONDANSETRON 4 MG/1
4 TABLET, ORALLY DISINTEGRATING ORAL EVERY 6 HOURS PRN
Status: DISCONTINUED | OUTPATIENT
Start: 2022-04-01 | End: 2022-04-03 | Stop reason: HOSPADM

## 2022-04-01 RX ADMIN — MIDAZOLAM 1 MG: 1 INJECTION INTRAMUSCULAR; INTRAVENOUS at 10:07

## 2022-04-01 RX ADMIN — AZITHROMYCIN MONOHYDRATE 250 MG: 500 INJECTION, POWDER, LYOPHILIZED, FOR SOLUTION INTRAVENOUS at 11:52

## 2022-04-01 RX ADMIN — SODIUM CHLORIDE: 9 INJECTION, SOLUTION INTRAVENOUS at 00:02

## 2022-04-01 RX ADMIN — METHYLPREDNISOLONE SODIUM SUCCINATE 62.5 MG: 125 INJECTION, POWDER, FOR SOLUTION INTRAMUSCULAR; INTRAVENOUS at 11:52

## 2022-04-01 RX ADMIN — LIDOCAINE HYDROCHLORIDE 2 ML: 40 INJECTION, SOLUTION RETROBULBAR; TOPICAL at 10:09

## 2022-04-01 RX ADMIN — RIVAROXABAN 20 MG: 10 TABLET, FILM COATED ORAL at 17:47

## 2022-04-01 RX ADMIN — FAMOTIDINE 20 MG: 20 TABLET ORAL at 13:27

## 2022-04-01 RX ADMIN — LORATADINE 10 MG: 10 TABLET ORAL at 21:03

## 2022-04-01 RX ADMIN — SODIUM CHLORIDE: 9 INJECTION, SOLUTION INTRAVENOUS at 09:23

## 2022-04-01 RX ADMIN — PIPERACILLIN AND TAZOBACTAM 3.38 G: 3; .375 INJECTION, POWDER, LYOPHILIZED, FOR SOLUTION INTRAVENOUS at 04:16

## 2022-04-01 RX ADMIN — LIDOCAINE HYDROCHLORIDE 3 ML: 10 INJECTION, SOLUTION INFILTRATION; PERINEURAL at 10:11

## 2022-04-01 RX ADMIN — LIDOCAINE HYDROCHLORIDE 2 ML: 20 SOLUTION ORAL; TOPICAL at 10:00

## 2022-04-01 RX ADMIN — METHYLPREDNISOLONE SODIUM SUCCINATE 62.5 MG: 125 INJECTION, POWDER, FOR SOLUTION INTRAMUSCULAR; INTRAVENOUS at 00:02

## 2022-04-01 RX ADMIN — PIPERACILLIN AND TAZOBACTAM 3.38 G: 3; .375 INJECTION, POWDER, LYOPHILIZED, FOR SOLUTION INTRAVENOUS at 13:21

## 2022-04-01 RX ADMIN — FAMOTIDINE 20 MG: 20 TABLET ORAL at 21:03

## 2022-04-01 RX ADMIN — LIDOCAINE HYDROCHLORIDE 5 ML: 20 SOLUTION ORAL; TOPICAL at 10:01

## 2022-04-01 RX ADMIN — MIDAZOLAM 1 MG: 1 INJECTION INTRAMUSCULAR; INTRAVENOUS at 10:04

## 2022-04-01 RX ADMIN — PIPERACILLIN AND TAZOBACTAM 3.38 G: 3; .375 INJECTION, POWDER, LYOPHILIZED, FOR SOLUTION INTRAVENOUS at 21:03

## 2022-04-01 RX ADMIN — Medication 1 CAPSULE: at 13:27

## 2022-04-01 RX ADMIN — FENTANYL CITRATE 50 MCG: 50 INJECTION, SOLUTION INTRAMUSCULAR; INTRAVENOUS at 10:03

## 2022-04-01 RX ADMIN — LIDOCAINE HYDROCHLORIDE 5 ML: 40 INJECTION, SOLUTION RETROBULBAR; TOPICAL at 09:44

## 2022-04-01 RX ADMIN — Medication 1 CAPSULE: at 21:03

## 2022-04-01 RX ADMIN — LIDOCAINE HYDROCHLORIDE 2 ML: 40 INJECTION, SOLUTION RETROBULBAR; TOPICAL at 10:08

## 2022-04-01 RX ADMIN — ESCITALOPRAM OXALATE 10 MG: 10 TABLET ORAL at 21:03

## 2022-04-01 RX ADMIN — FENTANYL CITRATE 50 MCG: 50 INJECTION, SOLUTION INTRAMUSCULAR; INTRAVENOUS at 10:09

## 2022-04-01 ASSESSMENT — ACTIVITIES OF DAILY LIVING (ADL)
ADLS_ACUITY_SCORE: 5

## 2022-04-01 NOTE — PROGRESS NOTES
Bronch Note:    Pt was given a 4% lido neb before bronch. Pt was on a 10 lpm oxymask during the procedure. Spo2 levels were within normal limits during bronch. Did a BAL and obtain a sample from R[facility]. No issues during procedure.

## 2022-04-01 NOTE — PROGRESS NOTES
"[facility] Hematology and Oncology Inpatient Progress Note    Patient: Amna Oneil  MRN: [id number]  Date of Service: April 1, 2022         Reason for Visit    #. Acute hypoxic respiratory failure    Assessment and Plan    Cancer Staging  No matching staging information was found for the patient.      #.  Acute hypoxic respiratory failure, on supplemental oxygen 2-4 L   #.  Acute bilateral basilar groundglass consolidative opacity-concerning for bleomycin-induced lung toxicity or atypical infection  #.  Classical Hodgkin lymphoma, last chemotherapy on 3/17/2022.  In CR.  #.  Bilateral pulmonary emboli in 2/2022.  On Xarelto.     Her oxygen requirement increased overnight to 4 L from 2 L yesterday.  No fever. She appears euvolemic. She is going to have a bronch and BAL today for further evaluation of bilateral groundglass opacities.  Continue supportive care with oxygen supplementation, broad-spectrum antimicrobials and steroids.     Probably can switch to oral prednisone 1 mg/kg tomorrow along with PPI if her respiratory status stable and she continues to take p.o. well.     Appreciate pulmonary and hospital medicine assistance.     Will follow.  ______________________________________________________________________________    History of Present Illness    Amna reported that she had a rough night.  She is short of breath with minimal activity.  Her oxygen dropped to 80s with activities.  She has tightness in her chest.  She has dry cough.  Unable to produce any sputum.  She takes p.o. intake very well    Review of Systems    Apart from describing in HPI, the remainder of comprehensive ROS was negative.    Physical Exam    BP 99/58 (BP Location: Right arm)   Pulse 75   Temp 97.3  F (36.3  C) (Oral)   Resp 20   Ht 1.702 m (5' 7\")   Wt 79.4 kg (175 lb)   SpO2 94%   BMI 27.41 kg/m        General: alert, awake, not in acute distress.  She with supplemental oxygen by nasal cannula.  She is sitting in " bed.  HEENT: Head: Normal, normocephalic, atraumatic.  Eye: Normal external eye, conjunctiva, lids cornea, PATRICIA.  Nose: Normal external nose, mucus membranes and septum.  Pharynx: Normal buccal mucosa. Normal pharynx.  Neck / Thyroid: Supple, no masses, nodes, nodules or enlargement.  Extremities: normal strength, tone, and muscle mass  Skin: normal. no rash or abnormalities  CNS: non focal.     Lab Results    Recent Results (from the past 24 hour(s))   INR    Collection Time: 04/01/22  4:24 AM   Result Value Ref Range    INR 1.01 0.85 - 1.15   CBC with platelets    Collection Time: 04/01/22  4:24 AM   Result Value Ref Range    WBC Count 5.5 4.0 - 11.0 10e3/uL    RBC Count 3.94 3.80 - 5.20 10e6/uL    Hemoglobin 11.3 (L) 11.7 - 15.7 g/dL    Hematocrit 35.3 35.0 - 47.0 %    MCV 90 78 - 100 fL    MCH 28.7 26.5 - 33.0 pg    MCHC 32.0 31.5 - 36.5 g/dL    RDW 14.6 10.0 - 15.0 %    Platelet Count 489 (H) 150 - 450 10e3/uL   Basic metabolic panel    Collection Time: 04/01/22  4:24 AM   Result Value Ref Range    Sodium 141 136 - 145 mmol/L    Potassium 4.5 3.5 - 5.0 mmol/L    Chloride 109 (H) 98 - 107 mmol/L    Carbon Dioxide (CO2) 22 22 - 31 mmol/L    Anion Gap 10 5 - 18 mmol/L    Urea Nitrogen 11 8 - 22 mg/dL    Creatinine 0.70 0.60 - 1.10 mg/dL    Calcium 8.9 8.5 - 10.5 mg/dL    Glucose 144 (H) 70 - 125 mg/dL    GFR Estimate >90 >60 mL/min/1.73m2   B-Type Natriuretic Peptide ([facility] Only)    Collection Time: 04/01/22  4:24 AM   Result Value Ref Range    BNP 36 0 - 64 pg/mL   Cell Count Body Fluid    Collection Time: 04/01/22 10:50 AM   Result Value Ref Range    Color Colorless Colorless, Yellow    Clarity Hazy (A) Clear, Bloody    Total Nucleated Cells 128 /uL    Cell Count Fluid Source Lung, Lower Lobe, Right    Differential Body Fluid    Collection Time: 04/01/22 10:50 AM   Result Value Ref Range    % Neutrophils 6 %    % Lymphocytes 75 %    % Monocyte/Macrophages 10 %    % Eosinophils 9 %        Imaging    No results  found.     Signed by: Juan Lizarraga MD

## 2022-04-01 NOTE — PROGRESS NOTES
"Pulmonary Progress Note  4/1/2022      Admit Date: 3/29/2022  Hospital Day: 3   CODE: Full Code    Reason for Consult: pneumonia in setting of immunesuppression        Assessment/Plan:   Amna Oneil is a 41 year old female admitted for dyspnea and hypoxia with bilateral, lobe lobe pneumonia.  This is a ground glass pneumonia which can be seen with drug reactions, however given immunesuppression there is a concern for PJP pneumonia or other opportunistic infections such as fungal or viral pneumonias.  She has just undergone a bronchoscopy without issue.  Results from this, whether negative or positive will help guide therapy.        Plan:  1) Pneumonia  - continue treatment with zosyn and azithromycin.  Also on solumedrol for the concern of a drug reaction  2) acute hypoxic respiratory failure - from pneumonia, titrate nasal canula to keep FiO2 >90%  3) Hx of PE - remain on xarelto, current problems are not related to this PE  4) I have sent the BAL obtained for numerous infections and stains.  These will take time to process, but we should have a clear trajectory of improvement and a diagnosis before we consider discharge      Interim Events:     Overnight required increase in oxygen to 4L.  She feels OK this morning and is ready for bronchoscopy.          Medications:       sodium chloride Stopped (04/01/22 1017)     sodium chloride Stopped (04/01/22 0851)       azithromycin  250 mg Intravenous Q24H     escitalopram  10 mg Oral At Bedtime     famotidine  20 mg Oral BID     lactobacillus rhamnosus (GG)  1 capsule Oral BID     loratadine  10 mg Oral At Bedtime     methylPREDNISolone  62.5 mg Intravenous Q12H     piperacillin-tazobactam  3.375 g Intravenous Q8H     [Held by provider] rivaroxaban ANTICOAGULANT  20 mg Oral Daily with supper         Exam/Data:   Vitals  /64   Pulse 93   Temp 97.6  F (36.4  C) (Oral)   Resp (!) 38   Ht 1.702 m (5' 7\")   Wt 79.4 kg (175 lb)   SpO2 93%   BMI 27.41 kg/m  " "  BP - Mean:  [73-80] 73  I/O last 3 completed shifts:  In: 1360 [P.O.:1360]  Out: 75 [Urine:75]  Weight change: 0.59 kg (1 lb 4.8 oz)  [unfilled]  Resp: (!) 38      EXAM:  /64   Pulse 93   Temp 97.6  F (36.4  C) (Oral)   Resp (!) 38   Ht 1.702 m (5' 7\")   Wt 79.4 kg (175 lb)   SpO2 93%   BMI 27.41 kg/m    General - Well nourished  Ears/Mouth - OP pink moist, no thrush  Neck - no cervical lymphadenopathy  Lungs - Clear to ausculation bilaterally  CVS - regular rhythm with no murmurs, rubs or gallups  Abdomen - soft, NT, ND, NABS  Ext - no cyanosis, clubbing or edema  Skin - no rash  Psychology - alert and oriented, answers appropriate        DATA    IMAGING:   CT Chest Pulmonary Embolism w Contrast    Result Date: 3/29/2022  EXAM: CT CHEST PULMONARY EMBOLISM W CONTRAST LOCATION: [facility] DATE/TIME: 3/29/2022 10:10 AM INDICATION: Chest pain, shortness of breath, history of pulmonary embolus. COMPARISON: 02/23/2022. TECHNIQUE: CT chest pulmonary angiogram during arterial phase injection of IV contrast. Multiplanar reformats and MIP reconstructions were performed. Dose reduction techniques were used. CONTRAST: Isovue 370, 65 mL. FINDINGS: ANGIOGRAM CHEST: Suboptimal timing of contrast bolus limiting evaluation of the segmental and subsegmental branches. Within the limits of this exam there are no convincing pulmonary artery filling defects. The aorta is normal in caliber. LUNGS AND PLEURA: There are new patchy bibasilar predominant groundglass and consolidative opacities, right greater than left. No pleural effusions. Stable 4 mm left lower lobe pulmonary nodule (series 7/180). MEDIASTINUM/AXILLAE: Right IJ port terminates over the proximal right atrium. Heart size is normal. Persistent borderline to mildly enlarged mediastinal lymph nodes. A few of these have slightly increased when compared to prior exam. For example in the left AP window measuring 2.1 x 1.2 cm compared to " 2.1 x 0.9 cm previously and in the anterior mediastinum measuring 1.8 x 1.1 cm compared to 1.6 x 1.0 cm previously (series 6/111 and 123 respectively). CORONARY ARTERY CALCIFICATION: None. UPPER ABDOMEN: No significant finding. MUSCULOSKELETAL: No suspicious osseous lesions.     IMPRESSION: 1.  No convincing pulmonary emboli. 2.  New patchy basilar predominant groundglass consolidative opacities, right greater than left. Findings are suggestive of multifocal infection. 3.  Persistent and slightly increased mediastinal adenopathy. 4.  Stable 4 mm left lower lobe pulmonary nodule. Continued attention on follow-up.         Alon Whittington MD PhD  [facility] Pulmonary Critical Care

## 2022-04-01 NOTE — PROGRESS NOTES
Pt to be NPO till 1215.  No hot food or liquids till 1815.  Tolerated procedure.    Nancy Shelton RN

## 2022-04-01 NOTE — PROCEDURES
FIBEROPTIC BRONCHOSCOPY PROCEDURE NOTE (NON-OR)  Date of Procedure: 4/1/2022  Performing Physician: Alon Whittington MD  Pre-Procedure Diagnosis:   pneumonia  Post-Procedure Diagnosis:  Same as Pre-Procedure Diagnosis  Procedure:  Diagnostic Flexible Fiberoptic Bronchoscopy   Indications:  Amna Oneil is a 41 year old female with immune suppression from treatment for lymphoma with new hypoxia and lower lobe infiltrates  Preop evaluation:  Procedure:  Intravenous Sedation.    Expected level:  Moderate sedation  ASA Class:  General appearance: alert, no acute distress  Eyes: Conjunctiva without erythema or mattering.  Ears: Tympanic membranes with good landmarks bilaterally.  Normal color.  Nose: Nares without erythema or drainage.  Throat: without erythema or exudate.  No tonsilar hypertrophy.  Neck: No lymphadenopathy or masses.  Lungs: clear to auscultation.  Heart: regular rate and rhythm without murmurs, rubs or gallops  Abdomen soft, non-distended, non-tender, no hepatosplenomegally.  Skin: no suspicious lesions or rashes  Mallampati:  2.  Anesthesia:  2 mg Versed IV, 100 mcg fentanyl, 6cc 4%lidocaine above cords, 6cc 1% lidocaine below cords  Specimen:  BAL in the lateral segment of the right lower lobe  Estimated Blood Loss:  Minimal ml  Complications:  none    Findings:  Vocal Cords  Abnormal, erythemaous with prominent submucosal hyperemia and vasculature  Around the epiglottis, cords normal  Trachea  Abnormal, similar mucosal appearance as the epiglotis    Diane  Normal    Right Bronchial Tree  Normal    Left Bronchial Tree  Normal        Procedure Details:   The patient was seen and the risks, benefits, complications, treatment options, and expected outcomes were discussed with PATIENT   . The risks and potential complications of their problem and proposed treatment include but are not limited to infection, bleeding, pain, adverse drug reaction, pulmonary aspiration, the need for additional  procedures, failure to diagnose a condition, creating a complication requiring transfusion or operation, and complication secondary to the anesthetic.  The patient/alternate (see above) concurred with the proposed plan, giving informed consent.  The patient was identified as Amna Oneil with Date of Birth 1980 and the procedure verified as Diagnostic Flexible Fiberoptic Bronchoscopy .  A Time Out was held and the above information confirmed.    After application of topical nasopharyngeal anesthesia, the patient was placed in the supine position and the bronchoscope was passed through the mouth.  The vocal cords were visualized . The cord findings are noted above.  The scope was then passed into the trachea  Careful inspection of the tracheal lumen was accomplished. The scope was sequentially passed into the left main and then left upper and lower bronchi and segmental bronchi.   Findings and specimen details recorded above.  The scope was then withdrawn and advanced into the right main bronchus and then into the RUL, RML, and RLL bronchi and segmental bronchi.   Findings and specimen details recorded above.     BAL was performed in the lateral segment of the right lower lobe.  100cc were instilled and 30cc of clear to slightly cloudy fluid was withdrawn    Additional Procedures: none    The patient tolerated the procedure well.      Alon Whittington MD, 4/1/2022, 10:47 AM    Referring Physician: * No referring provider recorded for this case *  Attending Physician: Mona Herrera MD  Primary Care Physician: Jayna Luna MD [facility]

## 2022-04-01 NOTE — PLAN OF CARE
Problem: Gas Exchange Impaired  Goal: Optimal Gas Exchange  Outcome: Ongoing, Progressing  Intervention: Optimize Oxygenation and Ventilation  Recent Flowsheet Documentation  Taken 3/31/2022 1707 by Indira Rojas RN  Head of Bed (HOB) Positioning: HOB at 20-30 degrees   Problem: Plan of Care - These are the overarching goals to be used throughout the patient stay.    Goal: Absence of Hospital-Acquired Illness or Injury  Intervention: Identify and Manage Fall Risk  Recent Flowsheet Documentation  Taken 3/31/2022 1707 by Indira Rojas RN  Safety Promotion/Fall Prevention:   clutter free environment maintained   lighting adjusted   nonskid shoes/slippers when out of bed   Goal Outcome Evaluation:    Problem: Gas Exchange Impaired  Goal: Optimal Gas Exchange  Intervention: Optimize Oxygenation and Ventilation  Recent Flowsheet Documentation  Taken 3/31/2022 1707 by Indira Rojas RN  Head of Bed (HOB) Positioning: HOB at 20-30 degrees     Patient is alert and oriented. Vitally stable. She continues to have SOB/dyspnea when ambulating to the bathroom. 02 sats are back to 90's when she settles down. Denies chest pain or discomfort, Has zosyn running 100ml/hr, has procedure to be done tomorrow (bronchoscopy), Xarelto held for this evening. Patient is NPO starting midnight.

## 2022-04-01 NOTE — CONSULTS
"[facility] Pulmonary/Critical Care Consult Team Note    Amna Oneil,  1980, MRN [id number]  Admitting Dx: Acute pneumonia [J18.9]  Date / Time of Admission:  3/29/2022  8:27 AM      Assessment/Plan: Amna Oneil is a 41 year old female with PMHx of Classical Hodgkin lymphoma, last chemotherapy on 3/17/2022 of cycle #2 ABVD who presents with acute resp failure with hypoxia due to bilateral infiltrates with concern for drug induced lung injury vs infection.    Acute resp failure on 1-02 L at rest and up to 4L with exertion  - agree with broad spectrum Abx until we have bronch cultures    Classical Hodgkin lymphoma, last chemotherapy on 3/17/2022 of cycle #2 ABVD.   - concern for pulmonary toxicity due to medication as above vs infection     I had a long discussion with Amna and she agrees to having a bronchoscopy tomorrow to help delineate infection vs medication induced lung toxicity.  - I paged RT and primary service about the plan for tomorrow.    I will make her NPO after midnight and I discussed with RN    Medical Care Time excluding procedures and family discussions greater than: 30 Minutes    Risk Factors Present on Admission:  Clinically Significant Risk Factors Present on Admission               # Overweight: Estimated body mass index is 28.11 kg/m  as calculated from the following:    Height as of this encounter: 1.702 m (5' 7\").    Weight as of this encounter: 81.4 kg (179 lb 8 oz).          Jennifer Rolle,   Pulmonary and Critical Care Attending  pgr [phone]    Allergies   Allergen Reactions     Avocado GI Disturbance       Meds: See MAR    Physical Exam:  /66 (BP Location: Right arm)   Pulse 101   Temp 97.1  F (36.2  C) (Axillary)   Resp 16   Ht 1.702 m (5' 7\")   Wt 81.4 kg (179 lb 8 oz)   SpO2 93%   BMI 28.11 kg/m    Intake/Output this shift:  No intake/output data recorded.    Pertinent Labs: Latest lab results in EHR personally reviewed.   CMP  Recent Labs   Lab " 03/31/22  0638 03/29/22  0938 03/28/22  0846    138  --    POTASSIUM 4.2 3.9  --    CHLORIDE 105 106  --    CO2 25 21*  --    ANIONGAP 10 11  --     101 104*   BUN 8 9  --    CR 0.83 0.71  --    GFRESTIMATED 90 >90  --    SAMY 8.8 8.6  --    MAG  --  1.9  --    PROTTOTAL  --  6.4  --    ALBUMIN  --  3.1*  --    BILITOTAL  --  0.3  --    ALKPHOS  --  66  --    AST  --  14  --    ALT  --  <9  --      CBC  Recent Labs   Lab 03/31/22  0638 03/30/22  0431 03/29/22  0938   WBC 4.5 3.4* 3.6*   RBC 3.88 3.74* 3.83   HGB 10.9* 10.8* 11.0*   HCT 34.4* 32.5* 33.4*   MCV 89 87 87   MCH 28.1 28.9 28.7   MCHC 31.7 33.2 32.9   RDW 14.8 14.8 14.8    423 393     INR  Recent Labs   Lab 03/29/22  1143   INR 1.11     Arterial Blood GasNo lab results found in last 7 days.    Results for orders placed or performed during the hospital encounter of 03/29/22   CT Chest Pulmonary Embolism w Contrast    Narrative    EXAM: CT CHEST PULMONARY EMBOLISM W CONTRAST  LOCATION: [facility]  DATE/TIME: 3/29/2022 10:10 AM    INDICATION: Chest pain, shortness of breath, history of pulmonary embolus.  COMPARISON: 02/23/2022.   TECHNIQUE: CT chest pulmonary angiogram during arterial phase injection of IV contrast. Multiplanar reformats and MIP reconstructions were performed. Dose reduction techniques were used.   CONTRAST: Isovue 370, 65 mL.     FINDINGS:  ANGIOGRAM CHEST: Suboptimal timing of contrast bolus limiting evaluation of the segmental and subsegmental branches. Within the limits of this exam there are no convincing pulmonary artery filling defects. The aorta is normal in caliber.    LUNGS AND PLEURA: There are new patchy bibasilar predominant groundglass and consolidative opacities, right greater than left. No pleural effusions. Stable 4 mm left lower lobe pulmonary nodule (series 7/180).    MEDIASTINUM/AXILLAE: Right IJ port terminates over the proximal right atrium. Heart size is normal. Persistent  borderline to mildly enlarged mediastinal lymph nodes. A few of these have slightly increased when compared to prior exam. For example in the   left AP window measuring 2.1 x 1.2 cm compared to 2.1 x 0.9 cm previously and in the anterior mediastinum measuring 1.8 x 1.1 cm compared to 1.6 x 1.0 cm previously (series 6/111 and 123 respectively).    CORONARY ARTERY CALCIFICATION: None.    UPPER ABDOMEN: No significant finding.    MUSCULOSKELETAL: No suspicious osseous lesions.      Impression    IMPRESSION:  1.  No convincing pulmonary emboli.  2.  New patchy basilar predominant groundglass consolidative opacities, right greater than left. Findings are suggestive of multifocal infection.  3.  Persistent and slightly increased mediastinal adenopathy.  4.  Stable 4 mm left lower lobe pulmonary nodule. Continued attention on follow-up.       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

## 2022-04-01 NOTE — PROGRESS NOTES
[facility] MEDICINE PROGRESS NOTE      Identification/Summary:    41 year old female on hospital day number 4 for bilateral pneumonia.  Pt has a history of Hodgkins lymphoma, bilateral PE who arrived with  Cough, dyspnea.  Admitted here for iv antibiotic treatment, oxygen support.   She is currently on azithromycin, zosyn and now steroids with the consideration  The infilitrates are a reaction to bleomycin.     Assessment and Plan:      # bilateral pneumonitis:  CAP versus drug reaction (to bleomycin)                                         Pulmonary input appreciated; Bronch pending for today                                         Pt continues to need oxygen support at 4 liters                                         Change regimen according to cultures and stain results    #bilateral PE:   Continue xarelto  # volume status:  She is 4 liters in and 1 liter out                               I will stop any additional fluids, BNP was normal, echo normal in Jan  #Lymphoma:  Recent PET scan showed improvement on 3/28                          Heme/onc rescheduled her chemo                                                        Diet: Regular Diet Adult  DVT Prophylaxis:  DOAC  Code Status: Full Code    Anticipated possible discharge in 3-4 days pending culture, stain results  And oxygen needs    Expected Discharge: 04/05/2022     Anticipated discharge location:  Awaiting care coordination huddle  Delays:  progression of pneumonia        The patient's care was discussed with the Patient.    Mona Herrera MD  [facility] Medicine  [facility]  Phone: #[phone]    Interval History/Subjective:  Above  Pt was started on solumedrol overnight in case this is a drug reaction to  bleomycin    Physical Exam/Objective:  Temp:  [97.1  F (36.2  C)-97.7  F (36.5  C)] 97.3  F (36.3  C)  Pulse:  [] 65  Resp:  [13-42] 20  BP: ()/(55-79) 100/55  Cuff Mean  (mmHg):  [71-80] 71  SpO2:  [92 %-100 %] 94 %  Body mass index is 27.41 kg/m .    GENERAL:  Alert, appears comfortable, in no acute distress, appears stated age   HEAD:  Normocephalic, without obvious abnormality, atraumatic   EYES:  PERRL, conjunctiva/corneas clear, no scleral icterus, EOM's intact   NOSE: Nares normal, septum midline, mucosa normal, no drainage   THROAT: Lips, mucosa, and tongue normal; teeth and gums normal, mouth moist   NECK: Supple, symmetrical, trachea midline   BACK:   Symmetric, no curvature, ROM normal   LUNGS:   Base of right lung with mild crackles   CHEST WALL:  No tenderness or deformity   HEART:  Regular rate and rhythm, S1 and S2 normal, no murmur, rub, or gallop    ABDOMEN:   Soft, non-tender, bowel sounds active all four quadrants, no masses, no organomegaly, no rebound or guarding   EXTREMITIES: Extremities normal, atraumatic, no cyanosis or edema    SKIN: Dry to touch, no exanthems in the visualized areas   NEURO: Alert, oriented x3, moves all four extremities freely   PSYCH: Cooperative, behavior is appropriate      Data reviewed today: I personally reviewed all new medications, labs, imaging/diagnostics reports over the past 24 hours. Pertinent findings include:    Imaging:   No results found for this or any previous visit (from the past 24 hour(s)).    Labs:  Most Recent 3 BMP's:Recent Labs   Lab Test 04/01/22  0424 03/31/22  0638 03/29/22  0938    140 138   POTASSIUM 4.5 4.2 3.9   CHLORIDE 109* 105 106   CO2 22 25 21*   BUN 11 8 9   CR 0.70 0.83 0.71   ANIONGAP 10 10 11   SAMY 8.9 8.8 8.6   * 109 101       Medications:   Personally Reviewed.  Medications       azithromycin  250 mg Intravenous Q24H     escitalopram  10 mg Oral At Bedtime     famotidine  20 mg Oral BID     lactobacillus rhamnosus (GG)  1 capsule Oral BID     loratadine  10 mg Oral At Bedtime     methylPREDNISolone  62.5 mg Intravenous Q12H     piperacillin-tazobactam  3.375 g Intravenous Q8H      [Held by provider] rivaroxaban ANTICOAGULANT  20 mg Oral Daily with supper

## 2022-04-01 NOTE — PLAN OF CARE
Goal Outcome Evaluation:        Problem: Infection (Pneumonia)  Goal: Resolution of Infection Signs and Symptoms  Outcome: Ongoing, Not Progressing     Problem: Gas Exchange Impaired  Goal: Optimal Gas Exchange  Outcome: Ongoing, Not Progressing   Pt was on 2L O2 via NCsating 91% at sleep. However pt reported she would wake up from sleep every 30 minutes gasping. O2 was turned up to 4L O2 and sating 93%. Pt gets SOB with activity and desated down 86% after getting up to the bathroom. O2 turned up to 6L O2 to recover.   Lung sounds - diminished. Pt has dry unproductive cough. IS - 500 - encouraged use.   Continue with IV abx.   Pulmonology consulted - started on IV solu-medrol.   Broncho scheduled for later today. Pt NPO after midnight and IV fluids.   BC pending.

## 2022-04-01 NOTE — PLAN OF CARE
Goal Outcome Evaluation:      Generally feeling better today. Tolerated bronch this morning without complication. Returned to 2North on 10L of oxygen via oxymask but was able to wean oxygen fairly quickly. Has been on 2-3L via NC since return from bronch. Remaining vitals stable but BP a bit soft this afternoon (91/54) but MAP = 68 and patient asymptomatic.     IV fluids discontinued this morning. Continues on IV azithromycin and IV zosyn via Port. Nursing to continue to monitor.

## 2022-04-02 LAB
CMV DNA SPEC NAA+PROBE-ACNC: NOT DETECTED IU/ML
FLUAV H1 2009 PAND RNA SPEC QL NAA+PROBE: NEGATIVE
FLUAV H1 RNA SPEC QL NAA+PROBE: NEGATIVE
FLUAV H3 RNA SPEC QL NAA+PROBE: NEGATIVE
FLUAV RNA SPEC QL NAA+PROBE: NEGATIVE
FLUBV RNA SPEC QL NAA+PROBE: NEGATIVE
HADV DNA SPEC QL NAA+PROBE: NEGATIVE
HADV DNA SPEC QL NAA+PROBE: NEGATIVE
HMPV RNA SPEC QL NAA+PROBE: NEGATIVE
HPIV1 RNA SPEC QL NAA+PROBE: NEGATIVE
HPIV2 RNA SPEC QL NAA+PROBE: NEGATIVE
HPIV3 RNA SPEC QL NAA+PROBE: NEGATIVE
RHINOVIRUS RNA SPEC QL NAA+PROBE: NEGATIVE
RSV RNA SPEC QL NAA+PROBE: NEGATIVE
RSV RNA SPEC QL NAA+PROBE: NEGATIVE

## 2022-04-02 PROCEDURE — 250N000013 HC RX MED GY IP 250 OP 250 PS 637: Performed by: FAMILY MEDICINE

## 2022-04-02 PROCEDURE — 250N000011 HC RX IP 250 OP 636: Performed by: INTERNAL MEDICINE

## 2022-04-02 PROCEDURE — 120N000001 HC R&B MED SURG/OB

## 2022-04-02 PROCEDURE — 99233 SBSQ HOSP IP/OBS HIGH 50: CPT | Performed by: INTERNAL MEDICINE

## 2022-04-02 PROCEDURE — 250N000011 HC RX IP 250 OP 636: Performed by: FAMILY MEDICINE

## 2022-04-02 PROCEDURE — 99232 SBSQ HOSP IP/OBS MODERATE 35: CPT | Performed by: EMERGENCY MEDICINE

## 2022-04-02 PROCEDURE — 258N000003 HC RX IP 258 OP 636: Performed by: INTERNAL MEDICINE

## 2022-04-02 PROCEDURE — 250N000013 HC RX MED GY IP 250 OP 250 PS 637: Performed by: INTERNAL MEDICINE

## 2022-04-02 RX ADMIN — FAMOTIDINE 20 MG: 20 TABLET ORAL at 09:48

## 2022-04-02 RX ADMIN — ESCITALOPRAM OXALATE 10 MG: 10 TABLET ORAL at 20:34

## 2022-04-02 RX ADMIN — RIVAROXABAN 20 MG: 10 TABLET, FILM COATED ORAL at 17:16

## 2022-04-02 RX ADMIN — METHYLPREDNISOLONE SODIUM SUCCINATE 62.5 MG: 125 INJECTION, POWDER, FOR SOLUTION INTRAMUSCULAR; INTRAVENOUS at 23:46

## 2022-04-02 RX ADMIN — PIPERACILLIN AND TAZOBACTAM 3.38 G: 3; .375 INJECTION, POWDER, LYOPHILIZED, FOR SOLUTION INTRAVENOUS at 04:44

## 2022-04-02 RX ADMIN — Medication 1 CAPSULE: at 20:34

## 2022-04-02 RX ADMIN — METHYLPREDNISOLONE SODIUM SUCCINATE 62.5 MG: 125 INJECTION, POWDER, FOR SOLUTION INTRAMUSCULAR; INTRAVENOUS at 00:04

## 2022-04-02 RX ADMIN — Medication 1 CAPSULE: at 09:48

## 2022-04-02 RX ADMIN — LORATADINE 10 MG: 10 TABLET ORAL at 20:34

## 2022-04-02 RX ADMIN — PIPERACILLIN AND TAZOBACTAM 3.38 G: 3; .375 INJECTION, POWDER, LYOPHILIZED, FOR SOLUTION INTRAVENOUS at 12:39

## 2022-04-02 RX ADMIN — AZITHROMYCIN MONOHYDRATE 250 MG: 500 INJECTION, POWDER, LYOPHILIZED, FOR SOLUTION INTRAVENOUS at 10:52

## 2022-04-02 RX ADMIN — PIPERACILLIN AND TAZOBACTAM 3.38 G: 3; .375 INJECTION, POWDER, LYOPHILIZED, FOR SOLUTION INTRAVENOUS at 20:35

## 2022-04-02 RX ADMIN — METHYLPREDNISOLONE SODIUM SUCCINATE 62.5 MG: 125 INJECTION, POWDER, FOR SOLUTION INTRAMUSCULAR; INTRAVENOUS at 11:01

## 2022-04-02 RX ADMIN — FAMOTIDINE 20 MG: 20 TABLET ORAL at 20:34

## 2022-04-02 ASSESSMENT — ACTIVITIES OF DAILY LIVING (ADL)
ADLS_ACUITY_SCORE: 5

## 2022-04-02 NOTE — PROGRESS NOTES
[facility] MEDICINE PROGRESS NOTE      41 year old female here for hospital day number 5  Due to pneumonitis and acute hypoxemic respiratory  Failure.  Pt has a history of hodgkins lymphoma and is on   Active chemotherapy. (ABVD).  Pt had a diagnostic bronchoscopy  Yesterday.  Cultures and stains will result in 1-2 days.      Assessment and Plan:      # Pneumonitis:  Community acquired versus drug reaction                              Bronchoscopy (cultures and stains) pending                              Continue methylprednisolone 62.5 mg iv BID, azithromycin,                              zosyn    #Acute hypoxemic respiratory failure:  She is on 3 liters today                                                                    Which is improved from her 6L                                                                    Reports decreased work of                                                                    Breathing with ambulation                                                                     (?steroid related)                                                                    Further antibiotics recs per pulm    #Hodgkins Lymphoma:  Per oncology; chemo on hold for now until resolution    #H/O bilateral PE:  Continue xarelto 20 mg once daily           Diet: Regular Diet Adult  DVT Prophylaxis:  DOAC  Code Status: Full Code    Anticipated possible discharge in 3 days if improvement continues    Disposition Plan   Expected Discharge: 04/05/2022     Anticipated discharge location:  Awaiting care coordination huddle  Delays:  new culture results        The patient's care was discussed with the Patient.    Mona Herrera MD  [facility]  [facility]  [facility]  Phone: #[phone]    Interval History/Subjective:    Looks more vigorous from overnight; no chest pain.  She  Has less oxygen needs today. (6L to 3L today)    Physical  Exam/Objective:  Temp:  [97.3  F (36.3  C)-97.8  F (36.6  C)] 97.5  F (36.4  C)  Pulse:  [] 64  Resp:  [18-42] 18  BP: ()/(51-79) 90/51  Cuff Mean (mmHg):  [68-73] 68  SpO2:  [92 %-100 %] 95 %  Body mass index is 28.43 kg/m .    GENERAL:  Alert, appears comfortable, in no acute distress, appears stated age   HEAD:  Normocephalic, without obvious abnormality, atraumatic   EYES:  PERRL, conjunctiva/corneas clear, no scleral icterus, EOM's intact   NOSE: Nares normal, septum midline, mucosa normal, no drainage   THROAT: Lips, mucosa, and tongue normal; teeth and gums normal, mouth moist   NECK: Supple, symmetrical, trachea midline   BACK:   Symmetric, no curvature, ROM normal   LUNGS:   No wheezes, nonlabored breathing   CHEST WALL:  No tenderness or deformity   HEART:  Regular rate and rhythm, S1 and S2 normal, no murmur, rub, or gallop    ABDOMEN:   Soft, non-tender, bowel sounds active all four quadrants, no masses, no organomegaly, no rebound or guarding   EXTREMITIES: Extremities normal, atraumatic, no cyanosis or edema    SKIN: Dry to touch, no exanthems in the visualized areas   NEURO: Alert, oriented x3, moves all four extremities freely   PSYCH: Cooperative, behavior is appropriate        Labs: none today      Medications       azithromycin  250 mg Intravenous Q24H     escitalopram  10 mg Oral At Bedtime     famotidine  20 mg Oral BID     lactobacillus rhamnosus (GG)  1 capsule Oral BID     loratadine  10 mg Oral At Bedtime     methylPREDNISolone  62.5 mg Intravenous Q12H     piperacillin-tazobactam  3.375 g Intravenous Q8H     rivaroxaban ANTICOAGULANT  20 mg Oral Daily with supper

## 2022-04-02 NOTE — PLAN OF CARE
Problem: Gas Exchange Impaired  Goal: Optimal Gas Exchange  Outcome: Ongoing, Progressing   Goal Outcome Evaluation:        Patient is on 3L O2 during the night. Patient's O2 sats drop during activity to around 87-88%, patient recovers quickly. Received IV Zosyn on shift through port, saline locked in between.

## 2022-04-02 NOTE — PROGRESS NOTES
Pulmonary Progress Note  4/2/2022      Admit Date: 3/29/2022  Hospital Day: 4   CODE: Full Code    Reason for Consult:  Pneumonia vs pneumonitis in immunosuppressed patient        Assessment/Plan:   Amna Oneil is a 41 year old female admitted for dyspnea and hypoxia with bilateral, lobe lobe pneumonia.  This is a ground glass pneumonia which can be seen with drug reactions, however given immunesuppression there is a concern for PJP pneumonia or other opportunistic infections such as fungal or viral pneumonias.  Her studies so far appear bland, but most remain pending.         Plan:  1) Pneumonia  - continue treatment with zosyn and azithromycin.  Also on solumedrol for the concern of bleomycin drug reaction.  Given appearance of diffuse airway inflammation, I suspect drug reaction is more likely than a bacterial or fungal infection.  Sometimes viral processes can appear this way, but may not warrant specific new therapy.  2) acute hypoxic respiratory failure - from pneumonia, titrate nasal canula to keep FiO2 >90% - coming down on dose, reassuring this is responding to therapy  3) Hx of PE - remain on xarelto, current problems are not related to this PE  4) I have sent the BAL obtained for numerous infections and stains.  These will take time to process, but we should have a clear trajectory of improvement and a diagnosis before we consider discharge    Interim Events:     Feels a little better today.  Oxygen needs down to 3L from 4L.        Medications:       azithromycin  250 mg Intravenous Q24H     escitalopram  10 mg Oral At Bedtime     famotidine  20 mg Oral BID     lactobacillus rhamnosus (GG)  1 capsule Oral BID     loratadine  10 mg Oral At Bedtime     methylPREDNISolone  62.5 mg Intravenous Q12H     piperacillin-tazobactam  3.375 g Intravenous Q8H     rivaroxaban ANTICOAGULANT  20 mg Oral Daily with supper         Exam/Data:   Vitals  BP 90/51 (BP Location: Left arm)   Pulse 64   Temp 97.5  F (36.4  " C) (Oral)   Resp 18   Ht 1.702 m (5' 7\")   Wt 82.3 kg (181 lb 8 oz)   SpO2 95%   BMI 28.43 kg/m    BP - Mean:  [68-73] 68  I/O last 3 completed shifts:  In: 1903 [P.O.:760; I.V.:1143]  Out: 600 [Urine:600]  Weight change: -2.041 kg (-4 lb 8 oz)  Resp: 18      EXAM:  BP 90/51 (BP Location: Left arm)   Pulse 64   Temp 97.5  F (36.4  C) (Oral)   Resp 18   Ht 1.702 m (5' 7\")   Wt 82.3 kg (181 lb 8 oz)   SpO2 95%   BMI 28.43 kg/m    General - Well nourished  Ears/Mouth - OP pink moist, no thrush  Neck - no cervical lymphadenopathy  Lungs - Clear to ausculation bilaterally   CVS - regular rhythm with no murmurs, rubs or gallups  Abdomen - soft, NT, ND, NABS  Ext - no cyanosis, clubbing or edema  Skin - no rash  Psychology - alert and oriented, answers appropriate        DATA    IMAGING:   CT Chest Pulmonary Embolism w Contrast    Result Date: 3/29/2022  EXAM: CT CHEST PULMONARY EMBOLISM W CONTRAST LOCATION: [facility] DATE/TIME: 3/29/2022 10:10 AM INDICATION: Chest pain, shortness of breath, history of pulmonary embolus. COMPARISON: 02/23/2022. TECHNIQUE: CT chest pulmonary angiogram during arterial phase injection of IV contrast. Multiplanar reformats and MIP reconstructions were performed. Dose reduction techniques were used. CONTRAST: Isovue 370, 65 mL. FINDINGS: ANGIOGRAM CHEST: Suboptimal timing of contrast bolus limiting evaluation of the segmental and subsegmental branches. Within the limits of this exam there are no convincing pulmonary artery filling defects. The aorta is normal in caliber. LUNGS AND PLEURA: There are new patchy bibasilar predominant groundglass and consolidative opacities, right greater than left. No pleural effusions. Stable 4 mm left lower lobe pulmonary nodule (series 7/180). MEDIASTINUM/AXILLAE: Right IJ port terminates over the proximal right atrium. Heart size is normal. Persistent borderline to mildly enlarged mediastinal lymph nodes. A few of these " have slightly increased when compared to prior exam. For example in the left AP window measuring 2.1 x 1.2 cm compared to 2.1 x 0.9 cm previously and in the anterior mediastinum measuring 1.8 x 1.1 cm compared to 1.6 x 1.0 cm previously (series 6/111 and 123 respectively). CORONARY ARTERY CALCIFICATION: None. UPPER ABDOMEN: No significant finding. MUSCULOSKELETAL: No suspicious osseous lesions.     IMPRESSION: 1.  No convincing pulmonary emboli. 2.  New patchy basilar predominant groundglass consolidative opacities, right greater than left. Findings are suggestive of multifocal infection. 3.  Persistent and slightly increased mediastinal adenopathy. 4.  Stable 4 mm left lower lobe pulmonary nodule. Continued attention on follow-up.         Alon Whittington MD PhD  [facility] Pulmonary Critical Care

## 2022-04-02 NOTE — PLAN OF CARE
Problem: Fall Injury Risk  Goal: Absence of Fall and Fall-Related Injury  Outcome: Ongoing, Progressing  Pt independent in room. Went for a walk around the unit today. Calls appropriately and can make needs known. Call light within reach.      Problem: Pain Acute  Goal: Acceptable Pain Control and Functional Ability  Outcome: Met  Pt complains of no pain.      Problem: Gas Exchange Impaired  Goal: Optimal Gas Exchange  Outcome: Ongoing, Progressing      Weaned O2 from nasal cannula 3L to 1L sating at 95%. Will continue to monitor and wean. IV solumedrol.       Problem: Infection (Pneumonia)  Goal: Resolution of Infection Signs and Symptoms  Outcome: Ongoing, Progressing   IV azithromycin and zosyn. Afebrile.     Problem: Plan of Care - These are the overarching goals to be used throughout the patient stay.    Goal: Plan of Care Review/Shift Note  Description: The Plan of Care Review/Shift note should be completed every shift.  The Outcome Evaluation is a brief statement about your assessment that the patient is improving, declining, or no change.  This information will be displayed automatically on your shift note.  Outcome: Ongoing, Progressing           Expected discharge 4/5/22 if continual improvement and awaiting culture results. Followed by Pulm and Onc.

## 2022-04-03 VITALS
RESPIRATION RATE: 16 BRPM | HEART RATE: 69 BPM | SYSTOLIC BLOOD PRESSURE: 100 MMHG | OXYGEN SATURATION: 93 % | WEIGHT: 181.2 LBS | BODY MASS INDEX: 28.44 KG/M2 | HEIGHT: 67 IN | DIASTOLIC BLOOD PRESSURE: 61 MMHG | TEMPERATURE: 97.4 F

## 2022-04-03 LAB
BACTERIA BLD CULT: NO GROWTH
BACTERIA BRONCH: NO GROWTH

## 2022-04-03 PROCEDURE — 250N000011 HC RX IP 250 OP 636: Performed by: HOSPITALIST

## 2022-04-03 PROCEDURE — 99239 HOSP IP/OBS DSCHRG MGMT >30: CPT | Performed by: HOSPITALIST

## 2022-04-03 PROCEDURE — 250N000012 HC RX MED GY IP 250 OP 636 PS 637: Performed by: INTERNAL MEDICINE

## 2022-04-03 PROCEDURE — 250N000011 HC RX IP 250 OP 636: Performed by: INTERNAL MEDICINE

## 2022-04-03 PROCEDURE — 250N000013 HC RX MED GY IP 250 OP 250 PS 637: Performed by: INTERNAL MEDICINE

## 2022-04-03 PROCEDURE — 99231 SBSQ HOSP IP/OBS SF/LOW 25: CPT | Performed by: INTERNAL MEDICINE

## 2022-04-03 PROCEDURE — 99233 SBSQ HOSP IP/OBS HIGH 50: CPT | Performed by: INTERNAL MEDICINE

## 2022-04-03 PROCEDURE — 250N000013 HC RX MED GY IP 250 OP 250 PS 637: Performed by: FAMILY MEDICINE

## 2022-04-03 RX ORDER — PREDNISONE 5 MG/1
10 TABLET ORAL DAILY
Status: DISCONTINUED | OUTPATIENT
Start: 2022-04-12 | End: 2022-04-03 | Stop reason: HOSPADM

## 2022-04-03 RX ORDER — PREDNISONE 10 MG/1
TABLET ORAL
Qty: 30 TABLET | Refills: 0 | Status: SHIPPED | OUTPATIENT
Start: 2022-04-03 | End: 2022-11-16

## 2022-04-03 RX ORDER — PREDNISONE 20 MG/1
40 TABLET ORAL DAILY
Status: DISCONTINUED | OUTPATIENT
Start: 2022-04-03 | End: 2022-04-03 | Stop reason: HOSPADM

## 2022-04-03 RX ORDER — PREDNISONE 20 MG/1
20 TABLET ORAL DAILY
Status: DISCONTINUED | OUTPATIENT
Start: 2022-04-09 | End: 2022-04-03 | Stop reason: HOSPADM

## 2022-04-03 RX ORDER — CALCIUM CARBONATE 500(1250)
1 TABLET ORAL DAILY
Start: 2022-04-03 | End: 2022-11-16

## 2022-04-03 RX ORDER — HEPARIN SODIUM (PORCINE) LOCK FLUSH IV SOLN 100 UNIT/ML 100 UNIT/ML
5-10 SOLUTION INTRAVENOUS
Status: DISCONTINUED | OUTPATIENT
Start: 2022-04-03 | End: 2022-04-03 | Stop reason: HOSPADM

## 2022-04-03 RX ORDER — DOXYCYCLINE 100 MG/1
100 CAPSULE ORAL EVERY 12 HOURS
Qty: 10 CAPSULE | Refills: 0 | Status: SHIPPED | OUTPATIENT
Start: 2022-04-03 | End: 2022-04-08

## 2022-04-03 RX ORDER — DOXYCYCLINE HYCLATE 50 MG/1
100 CAPSULE ORAL EVERY 12 HOURS SCHEDULED
Status: DISCONTINUED | OUTPATIENT
Start: 2022-04-03 | End: 2022-04-03 | Stop reason: HOSPADM

## 2022-04-03 RX ORDER — LACTOBACILLUS RHAMNOSUS GG 10B CELL
1 CAPSULE ORAL 2 TIMES DAILY
Qty: 10 CAPSULE | Refills: 0 | COMMUNITY
Start: 2022-04-03 | End: 2022-04-08

## 2022-04-03 RX ADMIN — FAMOTIDINE 20 MG: 20 TABLET ORAL at 09:16

## 2022-04-03 RX ADMIN — Medication 1 CAPSULE: at 09:16

## 2022-04-03 RX ADMIN — PIPERACILLIN AND TAZOBACTAM 3.38 G: 3; .375 INJECTION, POWDER, LYOPHILIZED, FOR SOLUTION INTRAVENOUS at 04:54

## 2022-04-03 RX ADMIN — PREDNISONE 40 MG: 20 TABLET ORAL at 10:29

## 2022-04-03 RX ADMIN — DOXYCYCLINE HYCLATE 100 MG: 50 CAPSULE ORAL at 10:29

## 2022-04-03 RX ADMIN — HEPARIN SODIUM (PORCINE) LOCK FLUSH IV SOLN 100 UNIT/ML 5 ML: 100 SOLUTION at 14:16

## 2022-04-03 ASSESSMENT — ACTIVITIES OF DAILY LIVING (ADL)
ADLS_ACUITY_SCORE: 5

## 2022-04-03 NOTE — PROGRESS NOTES
Pulmonary Progress Note  4/2/2022      Admit Date: 3/29/2022  Hospital Day: 5   CODE: Full Code    Reason for Consult:  Pneumonia vs pneumonitis in immunosuppressed patient        Assessment/Plan:   Amna Oneil is a 41 year old female admitted for dyspnea and hypoxia with bilateral, lobe lobe pneumonia.  This is a ground glass pneumonia which can be seen with drug reactions, however given immunesuppression there is a concern for PJP pneumonia or other opportunistic infections such as fungal or viral pneumonias.  Her studies so far appear bland with no suggestion of infection.  Now on room air and OK to discharge from my perspective.        Plan:  1) Pneumonia  - transition to doxycycline for 5 day course at home to ensure we aren't missing a bacterial infection.    2) acute hypoxic respiratory failure - now weaned to room air, may briefly be hypoxic with ambulation but expect this to improve.  Consider home oximeter for self monitoring.  3) Hx of PE - remain on xarelto, current problems are not related to this PE  4) Pneumonitis - this is seeming more and more like bleomycin lung injury.  12 day prednisone taper as ordered.  40 for 3days, 30 for 3 days, 20 for 3 days, 10 for 3 days.  If any further bleo is being considered I would start her on an inhaled steroid in anticipation as this sometimes blunts drug induced pneumonitis.  Expect heme onc to understand if more bleo is in her future.    Interim Events:     Feels better, off oxygen now.        Medications:       doxycycline hyclate  100 mg Oral Q12H TRACY     escitalopram  10 mg Oral At Bedtime     famotidine  20 mg Oral BID     lactobacillus rhamnosus (GG)  1 capsule Oral BID     loratadine  10 mg Oral At Bedtime     predniSONE  40 mg Oral Daily    Followed by     [START ON 4/6/2022] predniSONE  30 mg Oral Daily    Followed by     [START ON 4/9/2022] predniSONE  20 mg Oral Daily    Followed by     [START ON 4/12/2022] predniSONE  10 mg Oral Daily      "rivaroxaban ANTICOAGULANT  20 mg Oral Daily with supper         Exam/Data:   Vitals  /61   Pulse 69   Temp 97.4  F (36.3  C) (Oral)   Resp 16   Ht 1.702 m (5' 7\")   Wt 82.2 kg (181 lb 3.2 oz)   SpO2 93%   BMI 28.38 kg/m    BP - Mean:  [78] 78  I/O last 3 completed shifts:  In: 1260 [P.O.:1260]  Out: -   Weight change:   Resp: 16      EXAM:  /61   Pulse 69   Temp 97.4  F (36.3  C) (Oral)   Resp 16   Ht 1.702 m (5' 7\")   Wt 82.2 kg (181 lb 3.2 oz)   SpO2 93%   BMI 28.38 kg/m    General - Well nourished  Ears/Mouth - OP pink moist, no thrush  Neck - no cervical lymphadenopathy  Lungs - Clear to ausculation bilaterally   CVS - regular rhythm with no murmurs, rubs or gallups  Abdomen - soft, NT, ND, NABS  Ext - no cyanosis, clubbing or edema  Skin - no rash  Psychology - alert and oriented, answers appropriate        DATA    IMAGING:   CT Chest Pulmonary Embolism w Contrast    Result Date: 3/29/2022  EXAM: CT CHEST PULMONARY EMBOLISM W CONTRAST LOCATION: [facility] DATE/TIME: 3/29/2022 10:10 AM INDICATION: Chest pain, shortness of breath, history of pulmonary embolus. COMPARISON: 02/23/2022. TECHNIQUE: CT chest pulmonary angiogram during arterial phase injection of IV contrast. Multiplanar reformats and MIP reconstructions were performed. Dose reduction techniques were used. CONTRAST: Isovue 370, 65 mL. FINDINGS: ANGIOGRAM CHEST: Suboptimal timing of contrast bolus limiting evaluation of the segmental and subsegmental branches. Within the limits of this exam there are no convincing pulmonary artery filling defects. The aorta is normal in caliber. LUNGS AND PLEURA: There are new patchy bibasilar predominant groundglass and consolidative opacities, right greater than left. No pleural effusions. Stable 4 mm left lower lobe pulmonary nodule (series 7/180). MEDIASTINUM/AXILLAE: Right IJ port terminates over the proximal right atrium. Heart size is normal. Persistent " borderline to mildly enlarged mediastinal lymph nodes. A few of these have slightly increased when compared to prior exam. For example in the left AP window measuring 2.1 x 1.2 cm compared to 2.1 x 0.9 cm previously and in the anterior mediastinum measuring 1.8 x 1.1 cm compared to 1.6 x 1.0 cm previously (series 6/111 and 123 respectively). CORONARY ARTERY CALCIFICATION: None. UPPER ABDOMEN: No significant finding. MUSCULOSKELETAL: No suspicious osseous lesions.     IMPRESSION: 1.  No convincing pulmonary emboli. 2.  New patchy basilar predominant groundglass consolidative opacities, right greater than left. Findings are suggestive of multifocal infection. 3.  Persistent and slightly increased mediastinal adenopathy. 4.  Stable 4 mm left lower lobe pulmonary nodule. Continued attention on follow-up.         Alon Whittington MD PhD  [facility] Pulmonary Critical Care

## 2022-04-03 NOTE — PROGRESS NOTES
"[facility] Inpatient Progress Note    Patient: Amna Oneil  MRN: [id number]  Date of Service: April 3, 2022         Reason for Visit    Follow up for HD nd respiratory failure    Assessment and Plan    #.  Acute hypoxic respiratory failure, on supplemental oxygen 2-4 L   #.  Acute bilateral basilar groundglass consolidative opacity-concerning for bleomycin-induced lung toxicity or atypical infection  #.  Classical Hodgkin lymphoma, last chemotherapy on 3/17/2022.  In CR.  #.  Bilateral pulmonary emboli in 2/2022.  On Xarelto.     Clinically much improved and not requiring oxygen.  Agree with discharge plan on prednisone taper.    Will have clinic call to set up follow up with plans to resume chemotherapy without Bleomycin.    PLAN:    As above    ECOG Performance Status: 1      ______________________________________________________________________________    History of Present Illness    Ms. Amna Oneil is feeling much better.  Eager for discharge    Review of Systems    As per HPI    Physical Exam    /61   Pulse 69   Temp 97.4  F (36.3  C) (Oral)   Resp 16   Ht 1.702 m (5' 7\")   Wt 82.2 kg (181 lb 3.2 oz)   SpO2 93%   BMI 28.38 kg/m        GENERAL: Alert and oriented to time place and person. Seated comfortably. In no distress.    HEAD: Atraumatic and normocephalic.    EYES: RACHELL, EOMI.  No pallor.  No icterus.    Oral cavity: no mucosal lesion or tonsillar enlargement.    NECK: supple. JVP normal.  No thyroid enlargement.    LYMPH NODES: No palpable, cervical, axillary or inguinal lymphadenopathy.    CHEST: clear to auscultation bilaterally.  Resonant to percussion throughout bilaterally.  Symmetrical breath movements bilaterally.    CVS: S1 and S2 are heard. Regular rate and rhythm.  No murmur or gallop or rub heard.  No peripheral edema.    ABDOMEN: Soft. Not tender. Not distended.  No palpable hepatomegaly or splenomegaly.  No other mass palpable.  Bowel " sounds heard.    EXTREMITIES: Warm.    SKIN: no rash, or bruising or purpura.  Has a full head of hair.      Lab Results    No results found for this or any previous visit (from the past 24 hour(s)).     Imaging    No results found.     Signed by: Amilcar Rich MD

## 2022-04-03 NOTE — PLAN OF CARE
Problem: Plan of Care - These are the overarching goals to be used throughout the patient stay.    Goal: Absence of Hospital-Acquired Illness or Injury  Intervention: Identify and Manage Fall Risk  Recent Flowsheet Documentation  Taken 4/3/2022 0025 by Terry Esquivel RN  Safety Promotion/Fall Prevention:   fall prevention program maintained   nonskid shoes/slippers when out of bed   safety round/check completed  Taken 4/2/2022 2050 by Terry Esquivel RN  Safety Promotion/Fall Prevention:   fall prevention program maintained   nonskid shoes/slippers when out of bed   safety round/check completed  Intervention: Prevent and Manage VTE (Venous Thromboembolism) Risk  Recent Flowsheet Documentation  Taken 4/3/2022 0025 by Terry Esquivel RN  Activity Management: ambulated to bathroom  Taken 4/2/2022 2050 by Terry Esquivel RN  Activity Management: ambulated to bathroom     Problem: Infection (Pneumonia)  Goal: Resolution of Infection Signs and Symptoms  Outcome: Ongoing, Progressing     Problem: Pain Acute  Goal: Acceptable Pain Control and Functional Ability  Outcome: Ongoing, Progressing  Intervention: Prevent or Manage Pain  Recent Flowsheet Documentation  Taken 4/3/2022 0025 by Terry Esquivel RN  Medication Review/Management: medications reviewed  Taken 4/2/2022 2050 by Terry Esquivel RN  Medication Review/Management: medications reviewed   Goal Outcome Evaluation:        Pt on RA sats in lower to mid 90s. Pt notes SOB following activity, desats to 87-88% immediately following activity. Pt independent in the room. Giving IV abx and Solumedrol. Chest port. Will continue to monitor.

## 2022-04-03 NOTE — PROGRESS NOTES
[facility] PROGRESS NOTE      Identification/Summary:   41 year old female here for hospital day number 6 Due to pneumonitis and acute hypoxemic respiratory failure.  Pt has a history of hodgkins lymphoma and is on   active chemotherapy. (ABVD).  Pt had a diagnostic bronchoscopy  yesterday.  Cultures and stains pending.     Assessment and Plan:  Pneumonia, community-acquired versus drug reaction  Acute respiratory failure with hypoxia  Appreciate pulmonary consult  Preliminary culture negative  Completed azithromycin, continue Zosyn, Solu-Medrol pending pulmonary recs  Back to room air today  Could possibly change to oral steroid, discharge to home    Hodgkin's lymphoma  Holding chemotherapy, last on 3/17    History of DVT, PE  Continues on Xarelto    Diet: Regular Diet Adult  Fluids: None  Pain meds: Tylenol as needed  Therapy: None  DVT Prophylaxis: Xarelto  Code Status: Full Code  Disposition: Home when okay with pulmonary    Interval History/Subjective:  Feeling much better overall.  Still little bit short of breath but not really coughing.  Has a little bit of pain with deep breathing but much improved.  No nausea, vomiting, diarrhea, dysuria, lower extremity edema.    Physical Exam/Objective:  Gen: no acute distress, comfortable, alert, pleasant  ENT: no scleral icterus  Pulm: lungs are clear without wheezing, crackles  CV: regular rate and rhythm, no significant pitting edema  Psych: appropriate affect    Medications:     escitalopram  10 mg Oral At Bedtime     famotidine  20 mg Oral BID     lactobacillus rhamnosus (GG)  1 capsule Oral BID     loratadine  10 mg Oral At Bedtime     methylPREDNISolone  62.5 mg Intravenous Q12H     piperacillin-tazobactam  3.375 g Intravenous Q8H     rivaroxaban ANTICOAGULANT  20 mg Oral Daily with supper       Tirso Araya DO   Hospitalist  [facility]

## 2022-04-03 NOTE — DISCHARGE SUMMARY
[facility] MEDICINE  DISCHARGE SUMMARY     Primary Care Physician: Jayna Luna  Admission Date: 3/29/2022   Discharge Provider: Tirso Araya DO Discharge Date: 4/3/2022   Diet:   Active Diet and Nourishment Order   Procedures     Regular Diet Adult     Diet       Code Status: Full Code   Activity: as tolerated        Condition at Discharge: Stable     REASON FOR PRESENTATION(See Admission Note for Details)   Shortness of breath  PRINCIPAL & ACTIVE DISCHARGE DIAGNOSES   Community acquired pneumonia vs pneumonitis related to chemotherapy  Hodgkin's lymphoma  Hx of DVT/PE  PENDING LABS     Unresulted Labs Ordered in the Past 30 Days of this Admission     Date and Time Order Name Status Description    4/1/2022 10:46 AM Histoplasma Capsulatum Agn Non Blood In process     4/1/2022 10:46 AM Mycoplasma pneumoniae by PCR - BAL Site 1 In process     4/1/2022 10:46 AM Pneumocystis jirovecil by PCR - BAL Site 1 In process     4/1/2022 10:46 AM Aspergillus Galactomannan Agn Bronchial - BAL Site 1 In process     4/1/2022 10:46 AM Fungus Culture, non-blood - BAL Site 1 Preliminary     4/1/2022 10:46 AM Acid-Fast Bacilli Culture and Stain with AFB Stain In process     4/1/2022 10:46 AM Cytology non gyn - BAL Sites In process     3/29/2022 11:29 AM Blood Culture Peripheral Blood Preliminary         PROCEDURES ( this hospitalization only)    Bronchoscopy with BAL  RECOMMENDATIONS TO OUTPATIENT PROVIDER FOR F/U VISIT   Follow-up Appointments     Follow-up and recommended labs and tests       Follow up with primary care provider, Jayna Luna, within 7 days for   hospital follow- up.  The following labs/tests are recommended: BMP, CBC.             Further follow up per oncology  DISPOSITION   Home  SUMMARY OF HOSPITAL COURSE:    41 year old female here for hospital day number 6 Due to pneumonitis and acute hypoxemic respiratory failure.  Pt has a history of hodgkins lymphoma and is on    active chemotherapy. (ABVD).  Pt had a diagnostic bronchoscopy, BAL with final studies pending. Preliminarily it appears this may be related to drug induced lung injury.      Assessment and Plan:  Pneumonia, community-acquired versus drug reaction  Acute respiratory failure with hypoxia  Appreciate pulmonary consult  Preliminary culture negative  Now back to room air  Discharge on doxycycline, prednisone taper     Hodgkin's lymphoma  Holding chemotherapy, last on 3/17  Continue follow up with oncology regarding resumption of chemo     History of DVT, PE  Continues on Xarelto  Discharge Medications with Med changes:     Current Discharge Medication List      START taking these medications    Details   doxycycline hyclate (VIBRAMYCIN) 100 MG capsule Take 1 capsule (100 mg) by mouth every 12 hours for 5 days  Qty: 10 capsule, Refills: 0    Associated Diagnoses: Acute pneumonia      lactobacillus rhamnosus, GG, (CULTURELL) capsule Take 1 capsule by mouth 2 times daily for 5 days  Qty: 10 capsule, Refills: 0    Associated Diagnoses: Acute pneumonia      predniSONE (DELTASONE) 10 MG tablet Take 4 tabs daily for 3 days, then 3 tabs daily for 3 days, then 2 tabs daily for 3 days, then 1 tab daily for 3 days, then stop.  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)         CONTINUE these medications which have NOT CHANGED    Details   dexamethasone (DECADRON) 4 MG tablet 4 mg daily (with breakfast) On Chemo Days 2,3,4      escitalopram (LEXAPRO) 10 MG tablet Take 1 tablet (10 mg) by mouth daily  Qty: 90 tablet, Refills: 4    Associated Diagnoses: Current moderate episode of major depressive disorder without prior episode (H)      famotidine  (PEPCID) 20 MG tablet Take 20 mg by mouth daily before breakfast Takes twice daily during week of chemo      Iron-vit C-vit B12-folic acid (IRON 100 PLUS) 100-250-0.025-1 MG TABS Take 1 tablet by mouth daily      loratadine (CLARITIN) 10 MG tablet Take 10 mg by mouth      LORazepam (ATIVAN) 0.5 MG tablet Take 1 tablet (0.5 mg) by mouth every 6 hours as needed for anxiety  Qty: 30 tablet, Refills: 0    Associated Diagnoses: Adjustment disorder with anxious mood      Probiotic Product (PROBIOTIC-10 PO) Take 2 tablets by mouth daily      prochlorperazine (COMPAZINE) 10 MG tablet Take 1 tablet (10 mg) by mouth every 6 hours as needed (Nausea/Vomiting)  Qty: 30 tablet, Refills: 5    Associated 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  No convincing pulmonary emboli.  2.  New patchy basilar predominant groundglass consolidative opacities, right greater than left. Findings are suggestive  of multifocal infection.  3.  Persistent and slightly increased mediastinal adenopathy.  4.  Stable 4 mm left lower lobe pulmonary nodule. Continued attention on follow-up.     SIGNIFICANT LABORATORY FINDINGS   Viral resp panel negative  KOH prep neg  Sputum cx negative  CMV negative  BNP normal  Influenza, COVD negative  Strep pneumo ag neg  procal normal  Discharge Orders        Reason for your hospital stay    Pneumonia due to bacterial infection vs lung inflammation due to medication     Follow-up and recommended labs and tests     Follow up with primary care provider, Jayna Luna, within 7 days for hospital follow- up.  The following labs/tests are recommended: BMP, CBC.     Activity    Your activity upon discharge: activity as tolerated     Diet    Follow this diet upon discharge: Orders Placed This Encounter      Regular Diet Adult     Examination   Physical Exam   Temp:  [97.3  F (36.3  C)-97.7  F (36.5  C)] 97.4  F (36.3  C)  Pulse:  [69-73] 69  Resp:  [16-20] 16  BP: ()/(55-64) 100/61  SpO2:  [92 %-96 %] 93 %  Wt Readings from Last 1 Encounters:   04/03/22 82.2 kg (181 lb 3.2 oz)       Feeling much better overall.  Still little bit short of breath but not really coughing.  Has a little bit of pain with deep breathing but much improved.  No nausea, vomiting, diarrhea, dysuria, lower extremity edema.     Physical Exam/Objective:  Gen: no acute distress, comfortable, alert, pleasant  ENT: no scleral icterus  Pulm: lungs are clear without wheezing, crackles  CV: regular rate and rhythm, no significant pitting edema  Psych: appropriate affect    Please see EMR for more detailed significant labs, imaging, consultant notes etc.    ITirso DO, personally saw the patient today and spent greater than 30 minutes discharging this patient.    Tirso Araya DO  [facility]    CC:Jayna Luna

## 2022-04-04 LAB
PATH REPORT.COMMENTS IMP SPEC: NORMAL
PATH REPORT.COMMENTS IMP SPEC: NORMAL
PATH REPORT.FINAL DX SPEC: NORMAL
PATH REPORT.GROSS SPEC: NORMAL
PATH REPORT.MICROSCOPIC SPEC OTHER STN: NORMAL
PATH REPORT.RELEVANT HX SPEC: NORMAL

## 2022-04-05 LAB
GALACTOMANNAN AG BAL QL: NEGATIVE
GALACTOMANNAN AG SPEC IA-ACNC: 0.08

## 2022-04-06 LAB
M PNEUMO DNA SPEC QL NAA+PROBE: NOT DETECTED
P JIROVECII DNA SPEC QL NAA+PROBE: DETECTED

## 2022-04-07 ENCOUNTER — TELEPHONE (OUTPATIENT)
Dept: INFECTIOUS DISEASES | Facility: CLINIC | Age: 42
End: 2022-04-07
Payer: COMMERCIAL

## 2022-04-07 ENCOUNTER — PATIENT OUTREACH (OUTPATIENT)
Dept: ONCOLOGY | Facility: CLINIC | Age: 42
End: 2022-04-07
Payer: COMMERCIAL

## 2022-04-07 DIAGNOSIS — B59 PNEUMONIA OF BOTH LOWER LOBES DUE TO PNEUMOCYSTIS JIROVECII (H): Primary | ICD-10-CM

## 2022-04-07 LAB — SCANNED LAB RESULT: NORMAL

## 2022-04-07 RX ORDER — SULFAMETHOXAZOLE/TRIMETHOPRIM 800-160 MG
2 TABLET ORAL 3 TIMES DAILY
Qty: 126 TABLET | Refills: 0 | Status: SHIPPED | OUTPATIENT
Start: 2022-04-07 | End: 2022-04-28

## 2022-04-07 NOTE — PROGRESS NOTES
[facility]: Post-Discharge Note  SITUATION                                                      Admission:    Admission Date: 03/29/22   Reason for Admission: SOB  Discharge:   Discharge Date: 04/03/22  Discharge Diagnosis: Community acquired pneumonia vs pneumonitis related to chemotherapy, Hodgkin's lymphoma, Hx of DVT/PE    BACKGROUND                                                      Per hospital discharge summary and inpatient provider notes:  3/29-4/3/22                   ASSESSMENT           Discharge Assessment  How are you doing now that you are home?: Doing ok. Just found out diagnosed with Pneumocystis so has some questions re: this Diagnosis. Waiting to talk to Dr. Whittington.  How are your symptoms? (Red Flag symptoms escalate to triage hotline per guidelines): Improved  Do you feel your condition is stable enough to be safe at home until your provider visit?: Yes  Does the patient have their discharge instructions? : Yes  Does the patient have questions regarding their discharge instructions? : No (but had questions on new Pneumocystis Dx and medications)  Were you started on any new medications or were there changes to any of your previous medications? : Yes  Does the patient have all of their medications?: Yes  Do you have questions regarding any of your medications? : Yes (see comment) (Will be starting on Bactrim for Pneumocystis.  Patient wondering if should continue on doxycline and prednisone. Recommended she direct these questions to Dr. Whittington whens he talks to him. She verbalized she would.)  Do you have all of your needed medical supplies or equipment (DME)?  (i.e. oxygen tank, CPAP, cane, etc.): Yes  Discharge follow-up appointment scheduled within 14 calendar days? : Yes  Discharge Follow Up Appointment Date: 04/13/22 (Oncology appointment:  labs, provider, infusion)  Discharge Follow Up Appointment Scheduled with?: Specialty Care Provider (Talib Gaxiola CNP)          Patient  wondering if Bactrim sent to her pharmacy by Dr. Whittington. Confirmed Bactrim sent to [place] on [address].  She will  after she talks to Dr. Whittington. She said she has further questions she'd like to discuss with him. She will talk to Dr. Whittington about whether to continue doxycyline (she has 2 days left) and prednisone. She is currently taking 30 mg and will decrease to 20 mg on Sunday.        PLAN                                                      Outpatient Plan: Patient will follow-up with Infectious Disease. Patient reports will need weekly labs. Patient will have these labs drawn when here for Infusion Appointments and on weeks she is not here, she will have labs drawn at PCP.    Oncology follow-up as below:  Future Appointments   Date Time Provider Department Center   4/13/2022  9:30 AM [facility] INFUSION INFUSION LAB [facility]   4/13/2022 10:00 AM Isabela Forrest MD [facility]   4/13/2022 10:30 AM [facility]   4/28/2022  8:45 AM [facility] INFUSION INFUSION LAB [facility]   4/28/2022  9:15 AM Talib Gaxiola CNP [facility]   4/28/2022  9:45 AM [facility] INFUSION NURSE [facility]         For any urgent concerns, please contact our 24 hour nurse triage line: [phone] ([phone])         Ping Gonzales RN

## 2022-04-07 NOTE — TELEPHONE ENCOUNTER
ID Team    Please review and advise on scheduling.  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

## 2022-04-12 ENCOUNTER — OFFICE VISIT (OUTPATIENT)
Dept: INFECTIOUS DISEASES | Facility: CLINIC | Age: 42
End: 2022-04-12
Attending: INTERNAL MEDICINE
Payer: COMMERCIAL

## 2022-04-12 VITALS
DIASTOLIC BLOOD PRESSURE: 68 MMHG | HEART RATE: 108 BPM | TEMPERATURE: 99 F | SYSTOLIC BLOOD PRESSURE: 96 MMHG | WEIGHT: 180 LBS | BODY MASS INDEX: 28.19 KG/M2

## 2022-04-12 DIAGNOSIS — B59 PNEUMONIA OF BOTH LOWER LOBES DUE TO PNEUMOCYSTIS JIROVECII (H): ICD-10-CM

## 2022-04-12 PROCEDURE — 99203 OFFICE O/P NEW LOW 30 MIN: CPT | Performed by: INTERNAL MEDICINE

## 2022-04-12 NOTE — PROGRESS NOTES
Infectious Disease Clinic    Chief Complaint:  Consult    Date: 04/12/2022   Patient name: AMNA ONEIL   YOB: 1980  MRN: [id number]    Assessment/Plan:  PJP PNA: noted on PCR on bronchoscopy. Started on bactrim, and on prednisone taper per pulmonary. Clinically feeling much better. Would finish course of bactrim, monitor CMP and CBC diff while on treatment-she is getting labs drawn tomorrow.     Return to clinic PRN.    Ximena Arevalo MD  [facility]   Clinic phone: [phone]   Clinic fax: [phone]    HPI:  Amna Oneil is a 41 year old female who is referred for evaluation of PJP PNA    Hospitalized at , short of breath, felt terrible, required O2. Got bronchoscopy and on steroids, thought from chemo and she discharged off supplemental O2 and was slowly feeling better. PJP PCR positive so started on bactrim by pulmonary 4/7. Has been taking bactrim without issue. Some loose stools. No coughing, breathing better. Thinks she's back to around her prior baseline. There are plans to resume the chemotherapy next week, has labs and oncology visit tomorrow.   Discussed PJP and questions answered.     ROS: Complete 10-point ROS is negative except as noted above.    Past Medical History:  Past Medical History:   Diagnosis Date     Hodgkin lymphoma (H)      Pulmonary emboli (H)        Past Surgical History:  Past Surgical History:   Procedure Laterality Date     IR CHEST PORT PLACEMENT > 5 YRS OF AGE  1/21/2022       Social History:  Social History     Social History Narrative    Lives with Christian and daughter        Family Medical History:  No FH frequent infections    Allergies:  Allergies   Allergen Reactions     Avocado GI Disturbance     Bleomycin Swelling     Lung inflammation       Medications:  Current Outpatient Medications   Medication Sig Dispense Refill     calcium carbonate 500 mg, elemental, (OSCAL) 500 MG tablet Take 1 tablet (500 mg) by mouth daily        dexamethasone (DECADRON) 4 MG tablet 4 mg daily (with breakfast) On Chemo Days 2,3,4       escitalopram (LEXAPRO) 10 MG tablet Take 1 tablet (10 mg) by mouth daily 90 tablet 4     famotidine (PEPCID) 20 MG tablet Take 20 mg by mouth daily before breakfast Takes twice daily during week of chemo       Iron-vit C-vit B12-folic acid (IRON 100 PLUS) 100-250-0.025-1 MG TABS Take 1 tablet by mouth daily       lidocaine-prilocaine (EMLA) 2.5-2.5 % external cream Apply topically once for 1 dose Apply to the port site about 30-45 minutes prior to access. 30 g 1     loratadine (CLARITIN) 10 MG tablet Take 10 mg by mouth       LORazepam (ATIVAN) 0.5 MG tablet Take 1 tablet (0.5 mg) by mouth every 6 hours as needed for anxiety 30 tablet 0     predniSONE (DELTASONE) 10 MG tablet Take 4 tabs daily for 3 days, then 3 tabs daily for 3 days, then 2 tabs daily for 3 days, then 1 tab daily for 3 days, then stop. 30 tablet 0     Probiotic Product (PROBIOTIC-10 PO) Take 2 tablets by mouth daily       prochlorperazine (COMPAZINE) 10 MG tablet Take 1 tablet (10 mg) by mouth every 6 hours as needed (Nausea/Vomiting) 30 tablet 5     spironolactone (ALDACTONE) 50 MG tablet Take 1 tablet (50 mg) by mouth daily       sulfamethoxazole-trimethoprim (BACTRIM DS) 800-160 MG tablet Take 2 tablets by mouth 3 times daily for 21 days 126 tablet 0     XARELTO ANTICOAGULANT 20 MG TABS tablet Take 1 tablet (20 mg) by mouth daily (with dinner) 30 tablet 3       Immunizations:  Immunization History   Administered Date(s) Administered     COVID-19,PF,Pfizer (12+ Yrs) 03/31/2021, 04/21/2021, 11/18/2021     COVID-19,PF,Pfizer 12+ Yrs (2022 and After) 03/16/2022     Flu, Unspecified 09/12/2019     HepA-Adult 03/17/2014, 07/23/2018     HepB-Adult 06/26/2014, 07/31/2014, 12/26/2014     Influenza Vaccine IM > 6 months Valent IIV4 (Alfuria,Fluzone) 10/10/2014, 02/03/2017, 09/12/2019, 08/25/2020     Influenza Vaccine Im 4yrs+ 4 Valent CCIIV4 09/15/2021      Influenza Vaccine, 6+MO IM (QUADRIVALENT W/PRESERVATIVES) 09/06/2018     MMR 08/19/1996     Pneumococcal 23 valent 03/16/2022     Tdap (Adacel,Boostrix) 08/26/2009, 11/28/2014     Typhoid IM 03/17/2014       Exam:  B/P: 96/68, T: 99, P: 108, R: Data Unavailable, Weight: 180 lbs 0 oz  Gen: Alert and in no distress.   Psych: Normal affect. Alert and oriented.   HEENT:  No icterus.  Neck: No lymphadenopathy.   CV: Regular rate and rhythm without m/r/g.   Chest: Clear to auscultation bilaterally without wheezes or crackles.   Abdomen: Soft, non-distended. Non-tender. Normal bowel sounds.   Extremities: Warm and well perfused.   Skin: No rashes or lesions noted.     Labs:  Reviewed in EPIC.     Imaging:  Recent Results (from the past 744 hour(s))   PET Oncology Whole Body    Narrative    Combined Report of:    PET and CT on  3/28/2022 10:26 AM :    1. PET of the neck, chest, abdomen, and pelvis.  2. PET CT Fusion for Attenuation Correction and Anatomical  Localization:    3. Diagnostic CT scan of the chest, abdomen, and pelvis with  intravenous contrast for interpretation.  3. CT of the chest, abdomen and pelvis obtained for diagnostic  interpretation.  4. 3D MIP and PET-CT fused images were processed on an independent  workstation and archived to PACS and reviewed by a radiologist.    Technique:    1. PET: The patient received 10.46 mCi of F-18-FDG; the serum glucose  was 104 prior to administration, body weight was 179 lb. Images were  evaluated in the axial, sagittal, and coronal planes as well as the  rotational whole body MIP. Images were acquired from the Vertex to the  Feet.    UPTAKE WAS MEASURED AT 61 MINUTES.     BACKGROUND:  Liver SUV max= 4.26,   Aorta Blood SUV Max: 2.93.     2. CT: Volumetric acquisition for clinical interpretation of the  chest, abdomen, and pelvis acquired at 3 mm sections . The chest,  abdomen, and pelvis were evaluated at 5 mm sections in bone, soft  tissue, and lung windows.      The  patient received 88 cc of Isovue 370 intravenously for the  examination.      3. 3D MIP and PET-CT fused images were processed on an independent  workstation and archived to PACS and reviewed by a radiologist.    INDICATION: Classical Hodgkin lymphoma (H)    ADDITIONAL INFORMATION OBTAINED FROM EMR:     Classical Hodgkin lymphoma, stage II (favorable risk) IPSS-0 risk  factor, FFP 88%, overall survival 98%.  She tolerates current chemotherapy very well.  She is here for cycle  #2 ABVD.  Reviewed labs.  Her CBC, CMP are all within normal range.   We will continue with cycle #2 ABVD at under percent dose   To obtain PET scan prior to cycle #3 ABVD.  Will decide whether we  would proceed with involved site radiation after 3 or 4 cycles  depending on the PET scan and chemotherapy tolerance.     COMPARISON: PET/CT 12/23/2021.    FINDINGS:     HEAD/NECK:  There is no  suspicious FDG uptake in the neck.     The paranasal sinuses are clear. The mastoid air cells are clear.     The mucosal pharyngeal space, the , prevertebral and carotid  spaces are within normal limits.     No masses, mass effect or pathologically enlarged lymph nodes are  evident. The thyroid gland is unremarkable.    CHEST:  Completely resolved hypermetabolic lymphadenopathy throughout the base  of the neck, mediastinum and bilateral axillary regions since PET/CT  from 12/23/2021.    Prominent mildly hypermetabolic right hilar and subcarinal nodes, for  example subcarinal node with SUV max of 5.5 (Series 3 image 201),  likely reactive in etiology.     1 cm non-FDG avid nodule in the right major fissure (Series 8 image  63).    There is no significant pericardial or pleural effusions. Right chest  wall Port-A-Cath with its tip in the right atrium. Bibasilar  atelectasis.     ABDOMEN AND PELVIS:  There is no suspicious FDG uptake in the abdomen or pelvis.    There are no suspicious hepatic lesions. There is no splenomegaly or  evidence for splenic  or pancreatic mass lesion.  There are no suspicious adrenal mass lesions or opaque gallbladder  calculi. There is symmetric nephrographic renal phase without  hydronephrosis.    There is no evidence for diverticulitis, bowel obstruction or free  fluid.    LOWER EXTREMITIES:   No abnormal masses or hypermetabolic lesions.    BONES:   Mild diffuse FDG uptake by the bone marrow is likely due to reactive  bone marrow activation.      Impression    IMPRESSION: In this patient with a recent diagnosis of classical  Hodgkin's lymphoma status post-chemotherapy;  1. Evidence of complete response per Lugano criteria since PET/CT from  12/23/2021;  1a. Completely resolved hypermetabolic lymphadenopathy throughout the  base of the neck, mediastinum and bilateral axillary regions.  1b. Prominent mildly hypermetabolic right hilar and subcarinal nodes,  likely reactive in etiology. Attention on follow-up.   2. 1 cm non-FDG avid nodule in the right major fissure, likely a lymph  node. Recommend attention on follow-up.     Lugano Criteria for Lymphoma response to therapy methodology  Reported as: ex.  Lugano Criteria: Complete Response    PET response  Used for FDG avid Lymphomas (Hodgkins & Diffuse Large B-Cell)  Complete                     <=Liver  (Deauville 1-3)  Partial                          > Liver & decreased from baseline   (Deauville 4-5)  Stable/No response     > Liver & no change from baseline  (Deauville  4-5)  Progressive                 > Liver & Increased or new FDG avid  lesion (Deauville 4-5)    CT response  Used for variable uptake Lymphomas (Follicular, Marginal zone, CLL,  Mantle Cell)  Complete                     all nodes <1.5 cm  (longest diameter)  Partial                          Shrank > 50%        (SPD*)  Stable/No response      Shrank <50%         (SPD*)  Progressive disease      New or Increased size of lesions    *SPD = (sum of up to six lesions (longest x shortest diameter)    Source: Ludin et  al.  Imaging for Staging and Response Assessment in  Lymphoma.  Radiology August 2015.    _____________________________________________    The Deauville 5-point scoring system is an internationally accepted  and utilized five-point scoring system for the fluorodeoxyglucose  (FDG) avidity of a Hodgkin lymphoma or Non-Hodgkin lymphoma tumor mass  as seen on FDG positron emission tomography:[1]    Score 1: No uptake above the background  Score 2: Uptake ? mediastinum  Score 3: Uptake > mediastinum but ? liver  Score 4: Uptake moderately increased compared to the liver at any site  Score 5: Uptake markedly increased compared to the liver at any site  Score X: New areas of uptake unlikely to be related to lymphoma    I have personally reviewed the examination and initial interpretation  and I agree with the findings.    AMANDA KING MD         SYSTEM ID:  [id number]   CT Chest/Abdomen/Pelvis w Contrast    Narrative    Combined Report of:    PET and CT on  3/28/2022 10:26 AM :    1. PET of the neck, chest, abdomen, and pelvis.  2. PET CT Fusion for Attenuation Correction and Anatomical  Localization:    3. Diagnostic CT scan of the chest, abdomen, and pelvis with  intravenous contrast for interpretation.  3. CT of the chest, abdomen and pelvis obtained for diagnostic  interpretation.  4. 3D MIP and PET-CT fused images were processed on an independent  workstation and archived to PACS and reviewed by a radiologist.    Technique:    1. PET: The patient received 10.46 mCi of F-18-FDG; the serum glucose  was 104 prior to administration, body weight was 179 lb. Images were  evaluated in the axial, sagittal, and coronal planes as well as the  rotational whole body MIP. Images were acquired from the Vertex to the  Feet.    UPTAKE WAS MEASURED AT 61 MINUTES.     BACKGROUND:  Liver SUV max= 4.26,   Aorta Blood SUV Max: 2.93.     2. CT: Volumetric acquisition for clinical interpretation of the  chest, abdomen, and pelvis acquired at  3 mm sections . The chest,  abdomen, and pelvis were evaluated at 5 mm sections in bone, soft  tissue, and lung windows.      The patient received 88 cc of Isovue 370 intravenously for the  examination.      3. 3D MIP and PET-CT fused images were processed on an independent  workstation and archived to PACS and reviewed by a radiologist.    INDICATION: Classical Hodgkin lymphoma (H)    ADDITIONAL INFORMATION OBTAINED FROM EMR:     Classical Hodgkin lymphoma, stage II (favorable risk) IPSS-0 risk  factor, FFP 88%, overall survival 98%.  She tolerates current chemotherapy very well.  She is here for cycle  #2 ABVD.  Reviewed labs.  Her CBC, CMP are all within normal range.   We will continue with cycle #2 ABVD at under percent dose   To obtain PET scan prior to cycle #3 ABVD.  Will decide whether we  would proceed with involved site radiation after 3 or 4 cycles  depending on the PET scan and chemotherapy tolerance.     COMPARISON: PET/CT 12/23/2021.    FINDINGS:     HEAD/NECK:  There is no  suspicious FDG uptake in the neck.     The paranasal sinuses are clear. The mastoid air cells are clear.     The mucosal pharyngeal space, the , prevertebral and carotid  spaces are within normal limits.     No masses, mass effect or pathologically enlarged lymph nodes are  evident. The thyroid gland is unremarkable.    CHEST:  Completely resolved hypermetabolic lymphadenopathy throughout the base  of the neck, mediastinum and bilateral axillary regions since PET/CT  from 12/23/2021.    Prominent mildly hypermetabolic right hilar and subcarinal nodes, for  example subcarinal node with SUV max of 5.5 (Series 3 image 201),  likely reactive in etiology.     1 cm non-FDG avid nodule in the right major fissure (Series 8 image  63).    There is no significant pericardial or pleural effusions. Right chest  wall Port-A-Cath with its tip in the right atrium. Bibasilar  atelectasis.     ABDOMEN AND PELVIS:  There is no suspicious  FDG uptake in the abdomen or pelvis.    There are no suspicious hepatic lesions. There is no splenomegaly or  evidence for splenic or pancreatic mass lesion.  There are no suspicious adrenal mass lesions or opaque gallbladder  calculi. There is symmetric nephrographic renal phase without  hydronephrosis.    There is no evidence for diverticulitis, bowel obstruction or free  fluid.    LOWER EXTREMITIES:   No abnormal masses or hypermetabolic lesions.    BONES:   Mild diffuse FDG uptake by the bone marrow is likely due to reactive  bone marrow activation.      Impression    IMPRESSION: In this patient with a recent diagnosis of classical  Hodgkin's lymphoma status post-chemotherapy;  1. Evidence of complete response per Lugano criteria since PET/CT from  12/23/2021;  1a. Completely resolved hypermetabolic lymphadenopathy throughout the  base of the neck, mediastinum and bilateral axillary regions.  1b. Prominent mildly hypermetabolic right hilar and subcarinal nodes,  likely reactive in etiology. Attention on follow-up.   2. 1 cm non-FDG avid nodule in the right major fissure, likely a lymph  node. Recommend attention on follow-up.     Lugano Criteria for Lymphoma response to therapy methodology  Reported as: ex.  Lugano Criteria: Complete Response    PET response  Used for FDG avid Lymphomas (Hodgkins & Diffuse Large B-Cell)  Complete                     <=Liver  (Deauville 1-3)  Partial                          > Liver & decreased from baseline   (Deauville 4-5)  Stable/No response     > Liver & no change from baseline  (Deauville  4-5)  Progressive                 > Liver & Increased or new FDG avid  lesion (Deauville 4-5)    CT response  Used for variable uptake Lymphomas (Follicular, Marginal zone, CLL,  Mantle Cell)  Complete                     all nodes <1.5 cm  (longest diameter)  Partial                          Shrank > 50%        (SPD*)  Stable/No response      Shrank <50%         (SPD*)  Progressive  disease      New or Increased size of lesions    *SPD = (sum of up to six lesions (longest x shortest diameter)    Source: Ludin et al.  Imaging for Staging and Response Assessment in  Lymphoma.  Radiology August 2015.    _____________________________________________    The Deauville 5-point scoring system is an internationally accepted  and utilized five-point scoring system for the fluorodeoxyglucose  (FDG) avidity of a Hodgkin lymphoma or Non-Hodgkin lymphoma tumor mass  as seen on FDG positron emission tomography:[1]    Score 1: No uptake above the background  Score 2: Uptake ? mediastinum  Score 3: Uptake > mediastinum but ? liver  Score 4: Uptake moderately increased compared to the liver at any site  Score 5: Uptake markedly increased compared to the liver at any site  Score X: New areas of uptake unlikely to be related to lymphoma    I have personally reviewed the examination and initial interpretation  and I agree with the findings.    AMANDA KING MD         SYSTEM ID:  [id number]   CT Chest Pulmonary Embolism w Contrast    Narrative    EXAM: CT CHEST PULMONARY EMBOLISM W CONTRAST  LOCATION: [facility]  DATE/TIME: 3/29/2022 10:10 AM    INDICATION: Chest pain, shortness of breath, history of pulmonary embolus.  COMPARISON: 02/23/2022.   TECHNIQUE: CT chest pulmonary angiogram during arterial phase injection of IV contrast. Multiplanar reformats and MIP reconstructions were performed. Dose reduction techniques were used.   CONTRAST: Isovue 370, 65 mL.     FINDINGS:  ANGIOGRAM CHEST: Suboptimal timing of contrast bolus limiting evaluation of the segmental and subsegmental branches. Within the limits of this exam there are no convincing pulmonary artery filling defects. The aorta is normal in caliber.    LUNGS AND PLEURA: There are new patchy bibasilar predominant groundglass and consolidative opacities, right greater than left. No pleural effusions. Stable 4 mm left lower lobe  pulmonary nodule (series 7/180).    MEDIASTINUM/AXILLAE: Right IJ port terminates over the proximal right atrium. Heart size is normal. Persistent borderline to mildly enlarged mediastinal lymph nodes. A few of these have slightly increased when compared to prior exam. For example in the   left AP window measuring 2.1 x 1.2 cm compared to 2.1 x 0.9 cm previously and in the anterior mediastinum measuring 1.8 x 1.1 cm compared to 1.6 x 1.0 cm previously (series 6/111 and 123 respectively).    CORONARY ARTERY CALCIFICATION: None.    UPPER ABDOMEN: No significant finding.    MUSCULOSKELETAL: No suspicious osseous lesions.      Impression    IMPRESSION:  1.  No convincing pulmonary emboli.  2.  New patchy basilar predominant groundglass consolidative opacities, right greater than left. Findings are suggestive of multifocal infection.  3.  Persistent and slightly increased mediastinal adenopathy.  4.  Stable 4 mm left lower lobe pulmonary nodule. Continued attention on follow-up.        Total Time Spent 30 minutes including chart review, time with patient, orders, and documentation.

## 2022-04-12 NOTE — Clinical Note
She is going to get labs drawn tomorrow by oncology, can you please keep an eye out for these? Thanks!

## 2022-04-13 ENCOUNTER — LAB (OUTPATIENT)
Dept: INFUSION THERAPY | Facility: CLINIC | Age: 42
End: 2022-04-13
Attending: NURSE PRACTITIONER
Payer: COMMERCIAL

## 2022-04-13 ENCOUNTER — ONCOLOGY VISIT (OUTPATIENT)
Dept: ONCOLOGY | Facility: CLINIC | Age: 42
End: 2022-04-13
Attending: NURSE PRACTITIONER
Payer: COMMERCIAL

## 2022-04-13 VITALS
RESPIRATION RATE: 18 BRPM | HEIGHT: 67 IN | DIASTOLIC BLOOD PRESSURE: 72 MMHG | WEIGHT: 180.8 LBS | BODY MASS INDEX: 28.38 KG/M2 | OXYGEN SATURATION: 99 % | SYSTOLIC BLOOD PRESSURE: 115 MMHG | HEART RATE: 91 BPM | TEMPERATURE: 97.6 F

## 2022-04-13 DIAGNOSIS — Z51.11 ENCOUNTER FOR ANTINEOPLASTIC CHEMOTHERAPY: ICD-10-CM

## 2022-04-13 DIAGNOSIS — C81.18 NODULAR SCLEROSIS HODGKIN LYMPHOMA OF LYMPH NODES OF MULTIPLE REGIONS (H): ICD-10-CM

## 2022-04-13 DIAGNOSIS — C81.18 NODULAR SCLEROSIS HODGKIN LYMPHOMA OF LYMPH NODES OF MULTIPLE REGIONS (H): Primary | ICD-10-CM

## 2022-04-13 DIAGNOSIS — I26.99 ACUTE PULMONARY EMBOLISM WITHOUT ACUTE COR PULMONALE, UNSPECIFIED PULMONARY EMBOLISM TYPE (H): ICD-10-CM

## 2022-04-13 DIAGNOSIS — K59.01 SLOW TRANSIT CONSTIPATION: ICD-10-CM

## 2022-04-13 LAB
ALBUMIN SERPL-MCNC: 3.6 G/DL (ref 3.5–5)
ALP SERPL-CCNC: 54 U/L (ref 45–120)
ALT SERPL W P-5'-P-CCNC: 12 U/L (ref 0–45)
ANION GAP SERPL CALCULATED.3IONS-SCNC: 12 MMOL/L (ref 5–18)
AST SERPL W P-5'-P-CCNC: 13 U/L (ref 0–40)
BASOPHILS # BLD AUTO: 0.1 10E3/UL (ref 0–0.2)
BASOPHILS NFR BLD AUTO: 1 %
BILIRUB SERPL-MCNC: 0.3 MG/DL (ref 0–1)
BUN SERPL-MCNC: 17 MG/DL (ref 8–22)
CALCIUM SERPL-MCNC: 8.9 MG/DL (ref 8.5–10.5)
CHLORIDE BLD-SCNC: 105 MMOL/L (ref 98–107)
CO2 SERPL-SCNC: 20 MMOL/L (ref 22–31)
CREAT SERPL-MCNC: 0.98 MG/DL (ref 0.6–1.1)
EOSINOPHIL # BLD AUTO: 0.1 10E3/UL (ref 0–0.7)
EOSINOPHIL NFR BLD AUTO: 1 %
ERYTHROCYTE [DISTWIDTH] IN BLOOD BY AUTOMATED COUNT: 15.5 % (ref 10–15)
GFR SERPL CREATININE-BSD FRML MDRD: 74 ML/MIN/1.73M2
GLUCOSE BLD-MCNC: 95 MG/DL (ref 70–125)
HCT VFR BLD AUTO: 36.5 % (ref 35–47)
HGB BLD-MCNC: 11.9 G/DL (ref 11.7–15.7)
IMM GRANULOCYTES # BLD: 0.2 10E3/UL
IMM GRANULOCYTES NFR BLD: 1 %
LYMPHOCYTES # BLD AUTO: 2 10E3/UL (ref 0.8–5.3)
LYMPHOCYTES NFR BLD AUTO: 13 %
MCH RBC QN AUTO: 28.2 PG (ref 26.5–33)
MCHC RBC AUTO-ENTMCNC: 32.6 G/DL (ref 31.5–36.5)
MCV RBC AUTO: 87 FL (ref 78–100)
MONOCYTES # BLD AUTO: 1.2 10E3/UL (ref 0–1.3)
MONOCYTES NFR BLD AUTO: 8 %
NEUTROPHILS # BLD AUTO: 11.9 10E3/UL (ref 1.6–8.3)
NEUTROPHILS NFR BLD AUTO: 76 %
NRBC # BLD AUTO: 0 10E3/UL
NRBC BLD AUTO-RTO: 0 /100
PLATELET # BLD AUTO: 440 10E3/UL (ref 150–450)
POTASSIUM BLD-SCNC: 4 MMOL/L (ref 3.5–5)
PROT SERPL-MCNC: 6.7 G/DL (ref 6–8)
RBC # BLD AUTO: 4.22 10E6/UL (ref 3.8–5.2)
SODIUM SERPL-SCNC: 137 MMOL/L (ref 136–145)
WBC # BLD AUTO: 15.5 10E3/UL (ref 4–11)

## 2022-04-13 PROCEDURE — 96375 TX/PRO/DX INJ NEW DRUG ADDON: CPT

## 2022-04-13 PROCEDURE — 82040 ASSAY OF SERUM ALBUMIN: CPT | Performed by: INTERNAL MEDICINE

## 2022-04-13 PROCEDURE — 96367 TX/PROPH/DG ADDL SEQ IV INF: CPT

## 2022-04-13 PROCEDURE — 85004 AUTOMATED DIFF WBC COUNT: CPT | Performed by: INTERNAL MEDICINE

## 2022-04-13 PROCEDURE — 258N000003 HC RX IP 258 OP 636: Performed by: NURSE PRACTITIONER

## 2022-04-13 PROCEDURE — 99214 OFFICE O/P EST MOD 30 MIN: CPT | Performed by: NURSE PRACTITIONER

## 2022-04-13 PROCEDURE — 250N000011 HC RX IP 250 OP 636

## 2022-04-13 PROCEDURE — 96411 CHEMO IV PUSH ADDL DRUG: CPT

## 2022-04-13 PROCEDURE — G0463 HOSPITAL OUTPT CLINIC VISIT: HCPCS | Mod: 25

## 2022-04-13 PROCEDURE — 96413 CHEMO IV INFUSION 1 HR: CPT

## 2022-04-13 PROCEDURE — 250N000011 HC RX IP 250 OP 636: Performed by: NURSE PRACTITIONER

## 2022-04-13 PROCEDURE — 80053 COMPREHEN METABOLIC PANEL: CPT | Performed by: INTERNAL MEDICINE

## 2022-04-13 RX ORDER — ALBUTEROL SULFATE 90 UG/1
1-2 AEROSOL, METERED RESPIRATORY (INHALATION)
Status: CANCELLED
Start: 2022-04-13

## 2022-04-13 RX ORDER — MEPERIDINE HYDROCHLORIDE 50 MG/ML
25 INJECTION INTRAMUSCULAR; INTRAVENOUS; SUBCUTANEOUS EVERY 30 MIN PRN
Status: CANCELLED | OUTPATIENT
Start: 2022-04-13

## 2022-04-13 RX ORDER — NALOXONE HYDROCHLORIDE 0.4 MG/ML
0.2 INJECTION, SOLUTION INTRAMUSCULAR; INTRAVENOUS; SUBCUTANEOUS
Status: CANCELLED | OUTPATIENT
Start: 2022-04-13

## 2022-04-13 RX ORDER — EPINEPHRINE 1 MG/ML
0.3 INJECTION, SOLUTION, CONCENTRATE INTRAVENOUS EVERY 5 MIN PRN
Status: DISCONTINUED | OUTPATIENT
Start: 2022-04-13 | End: 2022-04-13 | Stop reason: HOSPADM

## 2022-04-13 RX ORDER — LORAZEPAM 2 MG/ML
0.5 INJECTION INTRAMUSCULAR EVERY 4 HOURS PRN
Status: DISCONTINUED | OUTPATIENT
Start: 2022-04-13 | End: 2022-04-13 | Stop reason: HOSPADM

## 2022-04-13 RX ORDER — METHYLPREDNISOLONE SODIUM SUCCINATE 125 MG/2ML
125 INJECTION, POWDER, LYOPHILIZED, FOR SOLUTION INTRAMUSCULAR; INTRAVENOUS
Status: DISCONTINUED | OUTPATIENT
Start: 2022-04-13 | End: 2022-04-13 | Stop reason: HOSPADM

## 2022-04-13 RX ORDER — DOXORUBICIN HYDROCHLORIDE 2 MG/ML
25 INJECTION, SOLUTION INTRAVENOUS ONCE
Status: COMPLETED | OUTPATIENT
Start: 2022-04-13 | End: 2022-04-13

## 2022-04-13 RX ORDER — ALBUTEROL SULFATE 0.83 MG/ML
2.5 SOLUTION RESPIRATORY (INHALATION)
Status: DISCONTINUED | OUTPATIENT
Start: 2022-04-13 | End: 2022-04-13 | Stop reason: HOSPADM

## 2022-04-13 RX ORDER — HEPARIN SODIUM (PORCINE) LOCK FLUSH IV SOLN 100 UNIT/ML 100 UNIT/ML
SOLUTION INTRAVENOUS
Status: COMPLETED
Start: 2022-04-13 | End: 2022-04-13

## 2022-04-13 RX ORDER — LORAZEPAM 2 MG/ML
0.5 INJECTION INTRAMUSCULAR EVERY 4 HOURS PRN
Status: CANCELLED | OUTPATIENT
Start: 2022-04-13

## 2022-04-13 RX ORDER — HEPARIN SODIUM,PORCINE 10 UNIT/ML
5 VIAL (ML) INTRAVENOUS
Status: DISCONTINUED | OUTPATIENT
Start: 2022-04-13 | End: 2022-04-13 | Stop reason: HOSPADM

## 2022-04-13 RX ORDER — HEPARIN SODIUM,PORCINE 10 UNIT/ML
5 VIAL (ML) INTRAVENOUS
Status: CANCELLED | OUTPATIENT
Start: 2022-04-13

## 2022-04-13 RX ORDER — ALBUTEROL SULFATE 0.83 MG/ML
2.5 SOLUTION RESPIRATORY (INHALATION)
Status: CANCELLED | OUTPATIENT
Start: 2022-04-13

## 2022-04-13 RX ORDER — ALBUTEROL SULFATE 90 UG/1
1-2 AEROSOL, METERED RESPIRATORY (INHALATION)
Status: DISCONTINUED | OUTPATIENT
Start: 2022-04-13 | End: 2022-04-13 | Stop reason: HOSPADM

## 2022-04-13 RX ORDER — DOXORUBICIN HYDROCHLORIDE 2 MG/ML
25 INJECTION, SOLUTION INTRAVENOUS ONCE
Status: CANCELLED | OUTPATIENT
Start: 2022-04-13

## 2022-04-13 RX ORDER — METHYLPREDNISOLONE SODIUM SUCCINATE 125 MG/2ML
125 INJECTION, POWDER, LYOPHILIZED, FOR SOLUTION INTRAMUSCULAR; INTRAVENOUS
Status: CANCELLED
Start: 2022-04-13

## 2022-04-13 RX ORDER — PALONOSETRON 0.05 MG/ML
0.25 INJECTION, SOLUTION INTRAVENOUS ONCE
Status: CANCELLED
Start: 2022-04-13

## 2022-04-13 RX ORDER — DIPHENHYDRAMINE HYDROCHLORIDE 50 MG/ML
50 INJECTION INTRAMUSCULAR; INTRAVENOUS
Status: CANCELLED
Start: 2022-04-13

## 2022-04-13 RX ORDER — EPINEPHRINE 1 MG/ML
0.3 INJECTION, SOLUTION, CONCENTRATE INTRAVENOUS EVERY 5 MIN PRN
Status: CANCELLED | OUTPATIENT
Start: 2022-04-13

## 2022-04-13 RX ORDER — HEPARIN SODIUM (PORCINE) LOCK FLUSH IV SOLN 100 UNIT/ML 100 UNIT/ML
5 SOLUTION INTRAVENOUS
Status: DISCONTINUED | OUTPATIENT
Start: 2022-04-13 | End: 2022-04-13 | Stop reason: HOSPADM

## 2022-04-13 RX ORDER — HEPARIN SODIUM (PORCINE) LOCK FLUSH IV SOLN 100 UNIT/ML 100 UNIT/ML
5 SOLUTION INTRAVENOUS
Status: CANCELLED | OUTPATIENT
Start: 2022-04-13

## 2022-04-13 RX ORDER — DIPHENHYDRAMINE HYDROCHLORIDE 50 MG/ML
50 INJECTION INTRAMUSCULAR; INTRAVENOUS
Status: DISCONTINUED | OUTPATIENT
Start: 2022-04-13 | End: 2022-04-13 | Stop reason: HOSPADM

## 2022-04-13 RX ORDER — PALONOSETRON 0.05 MG/ML
0.25 INJECTION, SOLUTION INTRAVENOUS ONCE
Status: COMPLETED | OUTPATIENT
Start: 2022-04-13 | End: 2022-04-13

## 2022-04-13 RX ORDER — MEPERIDINE HYDROCHLORIDE 50 MG/ML
25 INJECTION INTRAMUSCULAR; INTRAVENOUS; SUBCUTANEOUS EVERY 30 MIN PRN
Status: DISCONTINUED | OUTPATIENT
Start: 2022-04-13 | End: 2022-04-13 | Stop reason: HOSPADM

## 2022-04-13 RX ORDER — NALOXONE HYDROCHLORIDE 0.4 MG/ML
0.2 INJECTION, SOLUTION INTRAMUSCULAR; INTRAVENOUS; SUBCUTANEOUS
Status: DISCONTINUED | OUTPATIENT
Start: 2022-04-13 | End: 2022-04-13 | Stop reason: HOSPADM

## 2022-04-13 RX ADMIN — DOXORUBICIN HYDROCHLORIDE 50 MG: 2 INJECTION, SOLUTION INTRAVENOUS at 13:07

## 2022-04-13 RX ADMIN — HEPARIN SODIUM (PORCINE) LOCK FLUSH IV SOLN 100 UNIT/ML 500 UNITS: 100 SOLUTION at 14:12

## 2022-04-13 RX ADMIN — DACARBAZINE 730 MG: 200 INJECTION, POWDER, FOR SOLUTION INTRAVENOUS at 13:29

## 2022-04-13 RX ADMIN — PALONOSETRON 0.25 MG: 0.05 INJECTION, SOLUTION INTRAVENOUS at 12:28

## 2022-04-13 RX ADMIN — SODIUM CHLORIDE 250 ML: 9 INJECTION, SOLUTION INTRAVENOUS at 12:21

## 2022-04-13 RX ADMIN — VINBLASTINE SULFATE 11.6 MG: 1 INJECTION INTRAVENOUS at 13:07

## 2022-04-13 RX ADMIN — FOSAPREPITANT: 150 INJECTION, POWDER, LYOPHILIZED, FOR SOLUTION INTRAVENOUS at 12:41

## 2022-04-13 ASSESSMENT — PAIN SCALES - GENERAL: PAINLEVEL: NO PAIN (0)

## 2022-04-13 NOTE — PATIENT INSTRUCTIONS
Recent Results (from the past 24 hour(s))   Comprehensive metabolic panel    Collection Time: 04/13/22 10:59 AM   Result Value Ref Range    Sodium 137 136 - 145 mmol/L    Potassium 4.0 3.5 - 5.0 mmol/L    Chloride 105 98 - 107 mmol/L    Carbon Dioxide (CO2) 20 (L) 22 - 31 mmol/L    Anion Gap 12 5 - 18 mmol/L    Urea Nitrogen 17 8 - 22 mg/dL    Creatinine 0.98 0.60 - 1.10 mg/dL    Calcium 8.9 8.5 - 10.5 mg/dL    Glucose 95 70 - 125 mg/dL    Alkaline Phosphatase 54 45 - 120 U/L    AST 13 0 - 40 U/L    ALT 12 0 - 45 U/L    Protein Total 6.7 6.0 - 8.0 g/dL    Albumin 3.6 3.5 - 5.0 g/dL    Bilirubin Total 0.3 0.0 - 1.0 mg/dL    GFR Estimate 74 >60 mL/min/1.73m2   CBC with platelets and differential    Collection Time: 04/13/22 10:59 AM   Result Value Ref Range    WBC Count 15.5 (H) 4.0 - 11.0 10e3/uL    RBC Count 4.22 3.80 - 5.20 10e6/uL    Hemoglobin 11.9 11.7 - 15.7 g/dL    Hematocrit 36.5 35.0 - 47.0 %    MCV 87 78 - 100 fL    MCH 28.2 26.5 - 33.0 pg    MCHC 32.6 31.5 - 36.5 g/dL    RDW 15.5 (H) 10.0 - 15.0 %    Platelet Count 440 150 - 450 10e3/uL    % Neutrophils 76 %    % Lymphocytes 13 %    % Monocytes 8 %    % Eosinophils 1 %    % Basophils 1 %    % Immature Granulocytes 1 %    NRBCs per 100 WBC 0 <1 /100    Absolute Neutrophils 11.9 (H) 1.6 - 8.3 10e3/uL    Absolute Lymphocytes 2.0 0.8 - 5.3 10e3/uL    Absolute Monocytes 1.2 0.0 - 1.3 10e3/uL    Absolute Eosinophils 0.1 0.0 - 0.7 10e3/uL    Absolute Basophils 0.1 0.0 - 0.2 10e3/uL    Absolute Immature Granulocytes 0.2 <=0.4 10e3/uL    Absolute NRBCs 0.0 10e3/uL

## 2022-04-13 NOTE — PROGRESS NOTES
Infusion Nursing Note:  Amna DENIZ Oneil presents today for chemo for hodgkins lumphoma.    Patient seen by provider today: Yes: Talib Gaxiola   present during visit today: Not Applicable.    Note: N/A.      Intravenous Access:  Implanted Port.    Treatment Conditions:  Results reviewed, labs MET treatment parameters, ok to proceed with treatment.      Post Infusion Assessment:  Patient tolerated infusion without incident.  .       Discharge Plan:   Patient and/or family verbalized understanding of discharge instructions and all questions answered.      Kaitlyn rBandon RN

## 2022-04-13 NOTE — LETTER
"    4/13/2022         RE: Amna Oneil  [address] [place] [address]        Dear Colleague,    Thank you for referring your patient, Amna Oneil, to the [facility]. Please see a copy of my visit note below.    Oncology Rooming Note    April 13, 2022 11:16 AM   Amna Oneil is a 41 year old female who presents for:    Chief Complaint   Patient presents with     Oncology Clinic Visit     Initial Vitals: /72   Pulse 91   Temp 97.6  F (36.4  C)   Resp 18   Ht 1.702 m (5' 7\")   Wt 82 kg (180 lb 12.8 oz)   SpO2 99%   BMI 28.32 kg/m   Estimated body mass index is 28.32 kg/m  as calculated from the following:    Height as of this encounter: 1.702 m (5' 7\").    Weight as of this encounter: 82 kg (180 lb 12.8 oz). Body surface area is 1.97 meters squared.  No Pain (0) Comment: Data Unavailable   No LMP recorded. (Menstrual status: Birth Control).  Allergies reviewed: Yes  Medications reviewed: Yes    Medications: Medication refills not needed today.  Pharmacy name entered into [employer]:    [facility] -- [place] - [place], [place] - [address]  [id number] - [place], [place] - [address]  AT [facility] [address]    Clinical concerns:  Labs and chemo      Brenda J. Alfredoanel              [facility] Hematology and Oncology Progress Note    Patient: Amna Oneil  MRN: [id number]  Date of Service: Apr 13, 2022         Reason for Visit:    D1C3 ABVD chemotherapy    Assessment and Plan:    1. Stage II classical Hodgkin lymphoma.    41 year old     Amna presents accompanied by her spouse anticipating C3 ABVD chemotherapy.  She was hospitalized 03/29 - 04/03/2022 with community acquired pneumonia vs pneumonitis related to chemotherapy.  She looks and is generally feeling well today.  Continues on tid Bactrim.  Heartburn managed on OTC Pepcid 1-2 times daily.  No nausea.  She tried the medical cannabis but didn't " like the dizziness she got from it.  She also noted some constipation - managed with increasing oral hydration and eating black beans.  She feels much less short of breath.  No cough.  Her appetite and weight remain very stable with start of treatment.  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  She denies B-symptoms, cough, fever, chills, unusual headaches, visual or mentation disturbance, bowel or bladder issues, neuropathies, rash.     CMP - basically WNL.    CBC - mild neutrophilia.  Otherwise WNL    I again reviewed the potential side effects and schedule of planned ABVD chemotherapy.  I reviewed the uses of her take-home dexamethasone and compazine antiemetic medications and EMLA cream.  I encouraged her to be proactive in treating any nausea, vomiting or bowel changes.      NM PET s/p 2 cycles ABVD chemotherapy - complete response per Lugano criteria.     Proceed with D1C3 of a planned 3-4 cycles ABVD chemotherapy.  Will delete Bleomycin with concern of drug-induced pneumonitis.    No G-CSF.    Instructed to contact us/be seen with a fever > 100.4F as antibiotics or even hospitalization may be indicated.    Shortness of breath with certain activities - much improved:    Pulse ox @ 99% room air at rest.  Not anemic.  Lungs clear.      Acute hypoxic respiratory failure:     Bilateral basilar groundglass consolidative opacity-concerning for Bleomycin-induced lung toxicity or atypical infection.      Bleomycin - being HELD this cycle.      Continue extended course Bactrim antibiotic.    If worsens she is to alert us.    02/22/2022 (B) pulmonary emboli:     On Xarelto.    Nausea - mild/chronic:    Reviewed use of dexamethasone 8 mg daily with breakfast for 3-days after each chemotherapy dose.    Has Compazine and medical cannabis if needed.      (?) relationship to Heart burn:    Well managed with OTC Pepcid - continue.    Situational anxiety - no concerning issues.    No recent ativan use.    Met with  Nicolette Ribeiro, our cancer care psychotherapist - following with her own therapist.    I addition to receiving 3-SARScovid.19 vaccines, Amna had the Covid infection in January, 2022, and recovered well.    04/28/2022 - f/up D15C3 AVD chemotherapy (HOLDING Bleomycin) with labs per Treatment Plan.    Anticipating 3-4 cycles of ABVD followed by involved site EBRT.                Hematologic History:    1. Classical Hodgkin lymphoma   Stage II (favorable risk)    IPSS-0 risk factor (FFP 88%, overall survival 98%).    12/13/2021 - diagnosed by left axillary lymph node core needle biopsy.    12/23/2021 NM PET/CT - hypermetabolic lymph nodes involving basilar neck/superior mediastinum, mediastinum, right pericardial space, bilateral axillae.    second opinion pathology evaluation at [facility] confirmed classical Hodgkin lymphoma.      01/13/2022 Echocardiogram - normal left ventricular size and systolic performance.  LVEF @ 60-65%.  No significant valvular heart disease identified. Normal right ventricular size and systolic performance.    01/18/2022 bone marrow biopsy - no evidence of Hodgkin lymphoma involvement.      01/19/2022 pulmonary function testing - normal    01/21/2022 - IR port-a-cath placed.      02/03/2022 - D1C1 ABVD (Bleomyci HELD s/p 2 cycles) chemotherapy.     03/28/2022 NM PET - complete response per Lugano criteria s/p 2 cycles chemotherapy.    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    Talib Gaxiola CNP     CC: Physician No Ref-Primary   ___________________________________________________________________________    Review of Systems:    No fever or night sweats.  No loss of weight.  No lumps or bumps anywhere.  No unusual headaches or eyesight issues.  No dizziness.  No bleeding from the nose.  No sores in the mouth. No problems with swallowing.  No chest pain. No shortness of breath. No cough.  No abdominal pain. No nausea or vomiting.  No diarrhea or constipation.  No blood in stool or black colored stools.  No problems passing urine.  No numbness or tingling in hands or feet.  No skin rashes.    A 14 point review of systems is otherwise negative.    Past History:    Past Medical History:   Diagnosis Date     Hodgkin lymphoma (H)      Pulmonary emboli (H)        Past Surgical History:   Procedure Laterality Date     IR CHEST PORT PLACEMENT > 5 YRS OF AGE  1/21/2022     Physical Exam:    /72   Pulse 91   Temp 97.6  F (36.4  C)   Resp 18   Ht 1.702 m (5' 7\")   Wt 82 kg (180 lb 12.8 oz)   SpO2 99%   BMI 28.32 kg/m      GENERAL:   Very pleasant.  Alert and oriented. Seated comfortably. In no distress.    HEENT:   Atraumatic and normocephalic.  PATRICIA.  EOMI.  No pallor.  No icterus.  No mucosal lesions.    LYMPH NODES:  Soft (~2 cm) (L) axillary LN - previously (B) axillary adenopathy.  No palpable cervical or inguinal lymphadenopathy.    CHEST:   Lungs clear to auscultation bilaterally.    Port-a-cath in place - no s/s infection.  Working well.    CVS:    S1 and S2 heard. Regular rate and rhythm.  No murmur, gallop or rub heard.  No peripheral edema.    ABDOMEN:   Soft.  Not tender or distended.  No palpable hepatomegaly or splenomegaly, masses or ascites.    EXTREMITIES:  Warm.    SKIN:    No rash bruising or purpura noted.  Full head of hair.    Lab Results:    Reviewed " with patient.    Recent Results (from the past 24 hour(s))   Comprehensive metabolic panel    Collection Time: 04/13/22 10:59 AM   Result Value Ref Range    Sodium 137 136 - 145 mmol/L    Potassium 4.0 3.5 - 5.0 mmol/L    Chloride 105 98 - 107 mmol/L    Carbon Dioxide (CO2) 20 (L) 22 - 31 mmol/L    Anion Gap 12 5 - 18 mmol/L    Urea Nitrogen 17 8 - 22 mg/dL    Creatinine 0.98 0.60 - 1.10 mg/dL    Calcium 8.9 8.5 - 10.5 mg/dL    Glucose 95 70 - 125 mg/dL    Alkaline Phosphatase 54 45 - 120 U/L    AST 13 0 - 40 U/L    ALT 12 0 - 45 U/L    Protein Total 6.7 6.0 - 8.0 g/dL    Albumin 3.6 3.5 - 5.0 g/dL    Bilirubin Total 0.3 0.0 - 1.0 mg/dL    GFR Estimate 74 >60 mL/min/1.73m2   CBC with platelets and differential    Collection Time: 04/13/22 10:59 AM   Result Value Ref Range    WBC Count 15.5 (H) 4.0 - 11.0 10e3/uL    RBC Count 4.22 3.80 - 5.20 10e6/uL    Hemoglobin 11.9 11.7 - 15.7 g/dL    Hematocrit 36.5 35.0 - 47.0 %    MCV 87 78 - 100 fL    MCH 28.2 26.5 - 33.0 pg    MCHC 32.6 31.5 - 36.5 g/dL    RDW 15.5 (H) 10.0 - 15.0 %    Platelet Count 440 150 - 450 10e3/uL    % Neutrophils 76 %    % Lymphocytes 13 %    % Monocytes 8 %    % Eosinophils 1 %    % Basophils 1 %    % Immature Granulocytes 1 %    NRBCs per 100 WBC 0 <1 /100    Absolute Neutrophils 11.9 (H) 1.6 - 8.3 10e3/uL    Absolute Lymphocytes 2.0 0.8 - 5.3 10e3/uL    Absolute Monocytes 1.2 0.0 - 1.3 10e3/uL    Absolute Eosinophils 0.1 0.0 - 0.7 10e3/uL    Absolute Basophils 0.1 0.0 - 0.2 10e3/uL    Absolute Immature Granulocytes 0.2 <=0.4 10e3/uL    Absolute NRBCs 0.0 10e3/uL     Imaging:    No new imaging.      Again, thank you for allowing me to participate in the care of your patient.        Sincerely,        Talib Gaxiola CNP

## 2022-04-13 NOTE — PROGRESS NOTES
"Oncology Rooming Note    April 13, 2022 11:16 AM   Amna Oneil is a 41 year old female who presents for:    Chief Complaint   Patient presents with     Oncology Clinic Visit     Initial Vitals: /72   Pulse 91   Temp 97.6  F (36.4  C)   Resp 18   Ht 1.702 m (5' 7\")   Wt 82 kg (180 lb 12.8 oz)   SpO2 99%   BMI 28.32 kg/m   Estimated body mass index is 28.32 kg/m  as calculated from the following:    Height as of this encounter: 1.702 m (5' 7\").    Weight as of this encounter: 82 kg (180 lb 12.8 oz). Body surface area is 1.97 meters squared.  No Pain (0) Comment: Data Unavailable   No LMP recorded. (Menstrual status: Birth Control).  Allergies reviewed: Yes  Medications reviewed: Yes    Medications: Medication refills not needed today.  Pharmacy name entered into [email or web address]:    [facility] -- [place], [place] - [address]  [id number] - [place], [place] - [address] [facility] [address]    Clinical concerns:  Labs and chemo      Brenda Colin            "

## 2022-04-13 NOTE — PROGRESS NOTES
[facility] Hematology and Oncology Progress Note    Patient: Amna Oneil  MRN: [id number]  Date of Service: Apr 13, 2022         Reason for Visit:    D1C3 ABVD chemotherapy    Assessment and Plan:    1. Stage II classical Hodgkin lymphoma.    41 year old     Amna presents accompanied by her spouse anticipating C3 ABVD chemotherapy.  She was hospitalized 03/29 - 04/03/2022 with community acquired pneumonia vs pneumonitis related to chemotherapy.  She looks and is generally feeling well today.  Continues on tid Bactrim.  Heartburn managed on OTC Pepcid 1-2 times daily.  No nausea.  She tried the medical cannabis but didn't like the dizziness she got from it.  She also noted some constipation - managed with increasing oral hydration and eating black beans.  She feels much less short of breath.  No cough.  Her appetite and weight remain very stable with start of treatment.  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  She denies B-symptoms, cough, fever, chills, unusual headaches, visual or mentation disturbance, bowel or bladder issues, neuropathies, rash.     CMP - basically WNL.    CBC - mild neutrophilia.  Otherwise WNL    I again reviewed the potential side effects and schedule of planned ABVD chemotherapy.  I reviewed the uses of her take-home dexamethasone and compazine antiemetic medications and EMLA cream.  I encouraged her to be proactive in treating any nausea, vomiting or bowel changes.      NM PET s/p 2 cycles ABVD chemotherapy - complete response per Lugano criteria.     Proceed with D1C3 of a planned 3-4 cycles ABVD chemotherapy.  Will delete Bleomycin with concern of drug-induced pneumonitis.    No G-CSF.    Instructed to contact us/be seen with a fever > 100.4F as antibiotics or even hospitalization may be indicated.    Shortness of breath with certain activities - much improved:    Pulse ox @ 99% room air at rest.  Not anemic.  Lungs clear.      Acute hypoxic respiratory  failure:     Bilateral basilar groundglass consolidative opacity-concerning for Bleomycin-induced lung toxicity or atypical infection.      Bleomycin - being HELD this cycle.      Continue extended course Bactrim antibiotic.    If worsens she is to alert us.    02/22/2022 (B) pulmonary emboli:     On Xarelto.    Nausea - mild/chronic:    Reviewed use of dexamethasone 8 mg daily with breakfast for 3-days after each chemotherapy dose.    Has Compazine and medical cannabis if needed.      (?) relationship to Heart burn:    Well managed with OTC Pepcid - continue.    Situational anxiety - no concerning issues.    No recent ativan use.    Met with Nicolette Ribeiro, our cancer care psychotherapist - following with her own therapist.    I addition to receiving 3-SARScovid.19 vaccines, Amna had the Covid infection in January, 2022, and recovered well.    04/28/2022 - f/up D15C3 AVD chemotherapy (HOLDING Bleomycin) with labs per Treatment Plan.    Anticipating 3-4 cycles of ABVD followed by involved site EBRT.                Hematologic History:    1. Classical Hodgkin lymphoma   Stage II (favorable risk)    IPSS-0 risk factor (FFP 88%, overall survival 98%).    12/13/2021 - diagnosed by left axillary lymph node core needle biopsy.    12/23/2021 NM PET/CT - hypermetabolic lymph nodes involving basilar neck/superior mediastinum, mediastinum, right pericardial space, bilateral axillae.    second opinion pathology evaluation at [facility] confirmed classical Hodgkin lymphoma.      01/13/2022 Echocardiogram - normal left ventricular size and systolic performance.  LVEF @ 60-65%.  No significant valvular heart disease identified. Normal right ventricular size and systolic performance.    01/18/2022 bone marrow biopsy - no evidence of Hodgkin lymphoma involvement.      01/19/2022 pulmonary function testing - normal    01/21/2022 - IR port-a-cath placed.      02/03/2022 - D1C1 ABVD (Bleomyci HELD s/p 2 cycles)  "chemotherapy.     03/28/2022 NM PET - complete response per Lugano criteria s/p 2 cycles chemotherapy.    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    Talib Gaxiola CNP     CC: Physician No Ref-Primary   ___________________________________________________________________________    Review of Systems:    No fever or night sweats.  No loss of weight.  No lumps or bumps anywhere.  No unusual headaches or eyesight issues.  No dizziness.  No bleeding from the nose.  No sores in the mouth. No problems with swallowing.  No chest pain. No shortness of breath. No cough.  No abdominal pain. No nausea or vomiting.  No diarrhea or constipation.  No blood in stool or black colored stools.  No problems passing urine.  No numbness or tingling in hands or feet.  No skin rashes.    A 14 point review of systems is otherwise negative.    Past History:    Past Medical History:   Diagnosis Date     Hodgkin lymphoma (H)      Pulmonary emboli (H)        Past Surgical History:   Procedure Laterality Date     IR CHEST PORT PLACEMENT > 5 YRS OF AGE  1/21/2022     Physical Exam:    /72   Pulse 91   Temp 97.6  F (36.4  C)   Resp 18   Ht 1.702 m (5' 7\")   Wt 82 kg (180 lb 12.8 oz)   SpO2 99%   BMI 28.32 kg/m      GENERAL:   Very pleasant.  Alert and oriented. Seated comfortably. In no distress.    HEENT:   Atraumatic and " normocephalic.  PATRICIA.  EOMI.  No pallor.  No icterus.  No mucosal lesions.    LYMPH NODES:  Soft (~2 cm) (L) axillary LN - previously (B) axillary adenopathy.  No palpable cervical or inguinal lymphadenopathy.    CHEST:   Lungs clear to auscultation bilaterally.    Port-a-cath in place - no s/s infection.  Working well.    CVS:    S1 and S2 heard. Regular rate and rhythm.  No murmur, gallop or rub heard.  No peripheral edema.    ABDOMEN:   Soft.  Not tender or distended.  No palpable hepatomegaly or splenomegaly, masses or ascites.    EXTREMITIES:  Warm.    SKIN:    No rash bruising or purpura noted.  Full head of hair.    Lab Results:    Reviewed with patient.    Recent Results (from the past 24 hour(s))   Comprehensive metabolic panel    Collection Time: 04/13/22 10:59 AM   Result Value Ref Range    Sodium 137 136 - 145 mmol/L    Potassium 4.0 3.5 - 5.0 mmol/L    Chloride 105 98 - 107 mmol/L    Carbon Dioxide (CO2) 20 (L) 22 - 31 mmol/L    Anion Gap 12 5 - 18 mmol/L    Urea Nitrogen 17 8 - 22 mg/dL    Creatinine 0.98 0.60 - 1.10 mg/dL    Calcium 8.9 8.5 - 10.5 mg/dL    Glucose 95 70 - 125 mg/dL    Alkaline Phosphatase 54 45 - 120 U/L    AST 13 0 - 40 U/L    ALT 12 0 - 45 U/L    Protein Total 6.7 6.0 - 8.0 g/dL    Albumin 3.6 3.5 - 5.0 g/dL    Bilirubin Total 0.3 0.0 - 1.0 mg/dL    GFR Estimate 74 >60 mL/min/1.73m2   CBC with platelets and differential    Collection Time: 04/13/22 10:59 AM   Result Value Ref Range    WBC Count 15.5 (H) 4.0 - 11.0 10e3/uL    RBC Count 4.22 3.80 - 5.20 10e6/uL    Hemoglobin 11.9 11.7 - 15.7 g/dL    Hematocrit 36.5 35.0 - 47.0 %    MCV 87 78 - 100 fL    MCH 28.2 26.5 - 33.0 pg    MCHC 32.6 31.5 - 36.5 g/dL    RDW 15.5 (H) 10.0 - 15.0 %    Platelet Count 440 150 - 450 10e3/uL    % Neutrophils 76 %    % Lymphocytes 13 %    % Monocytes 8 %    % Eosinophils 1 %    % Basophils 1 %    % Immature Granulocytes 1 %    NRBCs per 100 WBC 0 <1 /100    Absolute Neutrophils 11.9 (H) 1.6 - 8.3  10e3/uL    Absolute Lymphocytes 2.0 0.8 - 5.3 10e3/uL    Absolute Monocytes 1.2 0.0 - 1.3 10e3/uL    Absolute Eosinophils 0.1 0.0 - 0.7 10e3/uL    Absolute Basophils 0.1 0.0 - 0.2 10e3/uL    Absolute Immature Granulocytes 0.2 <=0.4 10e3/uL    Absolute NRBCs 0.0 10e3/uL     Imaging:    No new imaging.

## 2022-04-15 ENCOUNTER — PATIENT OUTREACH (OUTPATIENT)
Dept: ONCOLOGY | Facility: CLINIC | Age: 42
End: 2022-04-15
Payer: COMMERCIAL

## 2022-04-15 DIAGNOSIS — C81.18 NODULAR SCLEROSIS HODGKIN LYMPHOMA OF LYMPH NODES OF MULTIPLE REGIONS (H): ICD-10-CM

## 2022-04-15 DIAGNOSIS — K21.00 GASTROESOPHAGEAL REFLUX DISEASE WITH ESOPHAGITIS WITHOUT HEMORRHAGE: Primary | ICD-10-CM

## 2022-04-15 RX ORDER — PANTOPRAZOLE SODIUM 40 MG/1
40 TABLET, DELAYED RELEASE ORAL DAILY
Qty: 30 TABLET | Refills: 0 | Status: SHIPPED | OUTPATIENT
Start: 2022-04-15 | End: 2022-04-28

## 2022-04-15 NOTE — PROGRESS NOTES
Lashon Mercedes, Adenike Gold CNP, RN  Caller: Unspecified (Today,  2:48 PM)  Let's try protonix. It works differently than pepcid. Rx sent.   Thanks,   K    The patient was contacted and is aware of plan and verbalizes agreement.  Adenike Colunga, JAMES

## 2022-04-15 NOTE — PROGRESS NOTES
Robyn MCGUIRE CNP can you please review and advise?Patient calls in for triage of heartburn. Treated C3 ABVD chemotherapy on 4/13/22 (Bleomycin held). Says it worsened yesterday. Burning in esophagus. Makes her feel like not eating. She is taking Pepcid 20 mg twice daily, which is no longer helping much. She is also still on dexamethasone 4 mg for days 2, 3, and 4.  Also on Bactrim DS for pneumonitis.  Pharmacy: [facility].   Adenike Colunga RN

## 2022-04-28 ENCOUNTER — INFUSION THERAPY VISIT (OUTPATIENT)
Dept: INFUSION THERAPY | Facility: CLINIC | Age: 42
End: 2022-04-28
Attending: INTERNAL MEDICINE
Payer: COMMERCIAL

## 2022-04-28 ENCOUNTER — ONCOLOGY VISIT (OUTPATIENT)
Dept: ONCOLOGY | Facility: CLINIC | Age: 42
End: 2022-04-28
Attending: INTERNAL MEDICINE
Payer: COMMERCIAL

## 2022-04-28 VITALS
WEIGHT: 183.3 LBS | OXYGEN SATURATION: 96 % | SYSTOLIC BLOOD PRESSURE: 105 MMHG | DIASTOLIC BLOOD PRESSURE: 73 MMHG | BODY MASS INDEX: 28.77 KG/M2 | HEART RATE: 123 BPM | HEIGHT: 67 IN | TEMPERATURE: 98.4 F

## 2022-04-28 DIAGNOSIS — Z51.11 ENCOUNTER FOR ANTINEOPLASTIC CHEMOTHERAPY: ICD-10-CM

## 2022-04-28 DIAGNOSIS — T45.1X5A CHEMOTHERAPY-INDUCED NEUTROPENIA (H): ICD-10-CM

## 2022-04-28 DIAGNOSIS — K21.00 GASTROESOPHAGEAL REFLUX DISEASE WITH ESOPHAGITIS WITHOUT HEMORRHAGE: ICD-10-CM

## 2022-04-28 DIAGNOSIS — D70.1 CHEMOTHERAPY-INDUCED NEUTROPENIA (H): ICD-10-CM

## 2022-04-28 DIAGNOSIS — Z51.11 ENCOUNTER FOR ANTINEOPLASTIC CHEMOTHERAPY: Primary | ICD-10-CM

## 2022-04-28 DIAGNOSIS — C81.18 NODULAR SCLEROSIS HODGKIN LYMPHOMA OF LYMPH NODES OF MULTIPLE REGIONS (H): ICD-10-CM

## 2022-04-28 DIAGNOSIS — C81.18 NODULAR SCLEROSIS HODGKIN LYMPHOMA OF LYMPH NODES OF MULTIPLE REGIONS (H): Primary | ICD-10-CM

## 2022-04-28 DIAGNOSIS — I26.99 ACUTE PULMONARY EMBOLISM WITHOUT ACUTE COR PULMONALE, UNSPECIFIED PULMONARY EMBOLISM TYPE (H): ICD-10-CM

## 2022-04-28 LAB
BASOPHILS # BLD AUTO: 0 10E3/UL (ref 0–0.2)
BASOPHILS NFR BLD AUTO: 1 %
EOSINOPHIL # BLD AUTO: 0.4 10E3/UL (ref 0–0.7)
EOSINOPHIL NFR BLD AUTO: 11 %
ERYTHROCYTE [DISTWIDTH] IN BLOOD BY AUTOMATED COUNT: 16.1 % (ref 10–15)
HCT VFR BLD AUTO: 34.2 % (ref 35–47)
HGB BLD-MCNC: 11 G/DL (ref 11.7–15.7)
IMM GRANULOCYTES # BLD: 0 10E3/UL
IMM GRANULOCYTES NFR BLD: 0 %
LYMPHOCYTES # BLD AUTO: 1.5 10E3/UL (ref 0.8–5.3)
LYMPHOCYTES NFR BLD AUTO: 46 %
MCH RBC QN AUTO: 28.9 PG (ref 26.5–33)
MCHC RBC AUTO-ENTMCNC: 32.2 G/DL (ref 31.5–36.5)
MCV RBC AUTO: 90 FL (ref 78–100)
MONOCYTES # BLD AUTO: 0.6 10E3/UL (ref 0–1.3)
MONOCYTES NFR BLD AUTO: 19 %
NEUTROPHILS # BLD AUTO: 0.7 10E3/UL (ref 1.6–8.3)
NEUTROPHILS NFR BLD AUTO: 23 %
NRBC # BLD AUTO: 0 10E3/UL
NRBC BLD AUTO-RTO: 0 /100
PLATELET # BLD AUTO: 370 10E3/UL (ref 150–450)
RBC # BLD AUTO: 3.81 10E6/UL (ref 3.8–5.2)
WBC # BLD AUTO: 3.3 10E3/UL (ref 4–11)

## 2022-04-28 PROCEDURE — 96367 TX/PROPH/DG ADDL SEQ IV INF: CPT

## 2022-04-28 PROCEDURE — 96411 CHEMO IV PUSH ADDL DRUG: CPT

## 2022-04-28 PROCEDURE — 85025 COMPLETE CBC W/AUTO DIFF WBC: CPT | Performed by: NURSE PRACTITIONER

## 2022-04-28 PROCEDURE — 99214 OFFICE O/P EST MOD 30 MIN: CPT | Performed by: NURSE PRACTITIONER

## 2022-04-28 PROCEDURE — 96413 CHEMO IV INFUSION 1 HR: CPT

## 2022-04-28 PROCEDURE — 96375 TX/PRO/DX INJ NEW DRUG ADDON: CPT

## 2022-04-28 PROCEDURE — 258N000003 HC RX IP 258 OP 636: Performed by: NURSE PRACTITIONER

## 2022-04-28 PROCEDURE — 250N000011 HC RX IP 250 OP 636: Performed by: NURSE PRACTITIONER

## 2022-04-28 PROCEDURE — G0463 HOSPITAL OUTPT CLINIC VISIT: HCPCS | Mod: 25

## 2022-04-28 PROCEDURE — 96549 UNLISTED CHEMOTHERAPY PX: CPT

## 2022-04-28 RX ORDER — ALBUTEROL SULFATE 0.83 MG/ML
2.5 SOLUTION RESPIRATORY (INHALATION)
Status: CANCELLED | OUTPATIENT
Start: 2022-04-28

## 2022-04-28 RX ORDER — MEPERIDINE HYDROCHLORIDE 50 MG/ML
25 INJECTION INTRAMUSCULAR; INTRAVENOUS; SUBCUTANEOUS EVERY 30 MIN PRN
Status: CANCELLED | OUTPATIENT
Start: 2022-04-28

## 2022-04-28 RX ORDER — PANTOPRAZOLE SODIUM 40 MG/1
40 TABLET, DELAYED RELEASE ORAL DAILY
Qty: 30 TABLET | Refills: 0 | Status: SHIPPED | OUTPATIENT
Start: 2022-04-28 | End: 2022-05-12

## 2022-04-28 RX ORDER — HEPARIN SODIUM (PORCINE) LOCK FLUSH IV SOLN 100 UNIT/ML 100 UNIT/ML
5 SOLUTION INTRAVENOUS
Status: DISCONTINUED | OUTPATIENT
Start: 2022-04-28 | End: 2022-04-28 | Stop reason: HOSPADM

## 2022-04-28 RX ORDER — METHYLPREDNISOLONE SODIUM SUCCINATE 125 MG/2ML
125 INJECTION, POWDER, LYOPHILIZED, FOR SOLUTION INTRAMUSCULAR; INTRAVENOUS
Status: DISCONTINUED | OUTPATIENT
Start: 2022-04-28 | End: 2022-04-28 | Stop reason: HOSPADM

## 2022-04-28 RX ORDER — DOXORUBICIN HYDROCHLORIDE 2 MG/ML
25 INJECTION, SOLUTION INTRAVENOUS ONCE
Status: COMPLETED | OUTPATIENT
Start: 2022-04-28 | End: 2022-04-28

## 2022-04-28 RX ORDER — METHYLPREDNISOLONE SODIUM SUCCINATE 125 MG/2ML
125 INJECTION, POWDER, LYOPHILIZED, FOR SOLUTION INTRAMUSCULAR; INTRAVENOUS
Status: CANCELLED
Start: 2022-04-28

## 2022-04-28 RX ORDER — LORAZEPAM 2 MG/ML
0.5 INJECTION INTRAMUSCULAR EVERY 4 HOURS PRN
Status: DISCONTINUED | OUTPATIENT
Start: 2022-04-28 | End: 2022-04-28 | Stop reason: HOSPADM

## 2022-04-28 RX ORDER — EPINEPHRINE 1 MG/ML
0.3 INJECTION, SOLUTION, CONCENTRATE INTRAVENOUS EVERY 5 MIN PRN
Status: DISCONTINUED | OUTPATIENT
Start: 2022-04-28 | End: 2022-04-28 | Stop reason: HOSPADM

## 2022-04-28 RX ORDER — DOXORUBICIN HYDROCHLORIDE 2 MG/ML
25 INJECTION, SOLUTION INTRAVENOUS ONCE
Status: CANCELLED | OUTPATIENT
Start: 2022-04-28

## 2022-04-28 RX ORDER — ALBUTEROL SULFATE 90 UG/1
1-2 AEROSOL, METERED RESPIRATORY (INHALATION)
Status: DISCONTINUED | OUTPATIENT
Start: 2022-04-28 | End: 2022-04-28 | Stop reason: HOSPADM

## 2022-04-28 RX ORDER — NALOXONE HYDROCHLORIDE 0.4 MG/ML
0.2 INJECTION, SOLUTION INTRAMUSCULAR; INTRAVENOUS; SUBCUTANEOUS
Status: CANCELLED | OUTPATIENT
Start: 2022-04-28

## 2022-04-28 RX ORDER — ALBUTEROL SULFATE 90 UG/1
1-2 AEROSOL, METERED RESPIRATORY (INHALATION)
Status: CANCELLED
Start: 2022-04-28

## 2022-04-28 RX ORDER — MEPERIDINE HYDROCHLORIDE 50 MG/ML
25 INJECTION INTRAMUSCULAR; INTRAVENOUS; SUBCUTANEOUS EVERY 30 MIN PRN
Status: DISCONTINUED | OUTPATIENT
Start: 2022-04-28 | End: 2022-04-28 | Stop reason: HOSPADM

## 2022-04-28 RX ORDER — HEPARIN SODIUM (PORCINE) LOCK FLUSH IV SOLN 100 UNIT/ML 100 UNIT/ML
5 SOLUTION INTRAVENOUS
Status: CANCELLED | OUTPATIENT
Start: 2022-04-28

## 2022-04-28 RX ORDER — DIPHENHYDRAMINE HYDROCHLORIDE 50 MG/ML
50 INJECTION INTRAMUSCULAR; INTRAVENOUS
Status: DISCONTINUED | OUTPATIENT
Start: 2022-04-28 | End: 2022-04-28 | Stop reason: HOSPADM

## 2022-04-28 RX ORDER — EPINEPHRINE 1 MG/ML
0.3 INJECTION, SOLUTION, CONCENTRATE INTRAVENOUS EVERY 5 MIN PRN
Status: CANCELLED | OUTPATIENT
Start: 2022-04-28

## 2022-04-28 RX ORDER — PALONOSETRON 0.05 MG/ML
0.25 INJECTION, SOLUTION INTRAVENOUS ONCE
Status: CANCELLED
Start: 2022-04-28

## 2022-04-28 RX ORDER — DIPHENHYDRAMINE HYDROCHLORIDE 50 MG/ML
50 INJECTION INTRAMUSCULAR; INTRAVENOUS
Status: CANCELLED
Start: 2022-04-28

## 2022-04-28 RX ORDER — HEPARIN SODIUM,PORCINE 10 UNIT/ML
5 VIAL (ML) INTRAVENOUS
Status: CANCELLED | OUTPATIENT
Start: 2022-04-28

## 2022-04-28 RX ORDER — NALOXONE HYDROCHLORIDE 0.4 MG/ML
0.2 INJECTION, SOLUTION INTRAMUSCULAR; INTRAVENOUS; SUBCUTANEOUS
Status: DISCONTINUED | OUTPATIENT
Start: 2022-04-28 | End: 2022-04-28 | Stop reason: HOSPADM

## 2022-04-28 RX ORDER — PALONOSETRON 0.05 MG/ML
0.25 INJECTION, SOLUTION INTRAVENOUS ONCE
Status: COMPLETED | OUTPATIENT
Start: 2022-04-28 | End: 2022-04-28

## 2022-04-28 RX ORDER — LORAZEPAM 2 MG/ML
0.5 INJECTION INTRAMUSCULAR EVERY 4 HOURS PRN
Status: CANCELLED | OUTPATIENT
Start: 2022-04-28

## 2022-04-28 RX ORDER — ALBUTEROL SULFATE 0.83 MG/ML
2.5 SOLUTION RESPIRATORY (INHALATION)
Status: DISCONTINUED | OUTPATIENT
Start: 2022-04-28 | End: 2022-04-28 | Stop reason: HOSPADM

## 2022-04-28 RX ADMIN — PALONOSETRON 0.25 MG: 0.05 INJECTION, SOLUTION INTRAVENOUS at 09:57

## 2022-04-28 RX ADMIN — VINBLASTINE SULFATE 11.6 MG: 1 INJECTION INTRAVENOUS at 11:00

## 2022-04-28 RX ADMIN — HEPARIN SODIUM (PORCINE) LOCK FLUSH IV SOLN 100 UNIT/ML 5 ML: 100 SOLUTION at 11:47

## 2022-04-28 RX ADMIN — FOSAPREPITANT: 150 INJECTION, POWDER, LYOPHILIZED, FOR SOLUTION INTRAVENOUS at 10:13

## 2022-04-28 RX ADMIN — DOXORUBICIN HYDROCHLORIDE 50 MG: 2 INJECTION, SOLUTION INTRAVENOUS at 10:48

## 2022-04-28 RX ADMIN — DACARBAZINE 730 MG: 200 INJECTION, POWDER, FOR SOLUTION INTRAVENOUS at 11:08

## 2022-04-28 RX ADMIN — SODIUM CHLORIDE 250 ML: 9 INJECTION, SOLUTION INTRAVENOUS at 09:57

## 2022-04-28 ASSESSMENT — PAIN SCALES - GENERAL: PAINLEVEL: NO PAIN (0)

## 2022-04-28 NOTE — PROGRESS NOTES
"Oncology Rooming Note    April 28, 2022 9:04 AM   Amna Oneil is a 41 year old female who presents for:    Chief Complaint   Patient presents with     Oncology Clinic Visit     Initial Vitals: /73 (BP Location: Left arm, Patient Position: Standing, Cuff Size: Adult Regular)   Pulse (!) 123   Temp 98.4  F (36.9  C)   Ht 1.702 m (5' 7\")   Wt 83.1 kg (183 lb 4.8 oz)   SpO2 96%   BMI 28.71 kg/m   Estimated body mass index is 28.71 kg/m  as calculated from the following:    Height as of this encounter: 1.702 m (5' 7\").    Weight as of this encounter: 83.1 kg (183 lb 4.8 oz). Body surface area is 1.98 meters squared.  No Pain (0) Comment: Data Unavailable   No LMP recorded. (Menstrual status: Birth Control).  Allergies reviewed: Yes  Medications reviewed: Yes    Medications: MEDICATION REFILLS NEEDED TODAY. Provider was notified.  Pharmacy name entered into [employer]:    [facility] -- [place] - [place], [place] - [address]  [facility][id number] - [place], [place] - [address] [facility] [address]    Clinical concerns: 1 week follow up with labs and chemo      Batsheva Lackey MA            "

## 2022-04-28 NOTE — PROGRESS NOTES
[facility] Hematology and Oncology Progress Note    Patient: Amna Oneil  MRN: [id number]  Date of Service: Apr 28, 2022         Reason for Visit:    D15C3 AVD (B-HELD since after C2) chemotherapy    Assessment and Plan:    1. Stage II classical Hodgkin lymphoma.    41 year old     Amna presents accompanied by her mother, Ward, anticipating D15C3 ABVD chemotherapy.  She looks and states she's been feeling quite good.  She feels much less short of breath - she's returned to walking her dog about a mile/day.  No cough.  Her appetite is good - weight up ~5 pounds with start of treatment.  Pantoprazole 40 mg once daily is managing her heartburn completely, much better than Pepcid was.  No nausea.  She was hospitalized 03/29 - 04/03/2022 with community acquired pneumonia vs pneumonitis related to chemotherapy.  Continues on tid Bactrim.  She notes 3-4 days of constipation with each chemotherapy dose - managed with increasing oral hydration and eating black beans.  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  She tried the medical cannabis but didn't like the dizziness she got from it.  She denies B-symptoms, cough, fever, chills, unusual headaches, visual or mentation disturbance, bowel or bladder issues, neuropathies, rash.     CBC - WBC 3.3.  .  HgB stable @ 11.0.  PLTs WNL    Neutropenic precautions reviewed.    Amna is now again subjectively tolerating AVD chemotherapy acceptably well since the Bleomycin has been HELD after C2 d/t pneumonitis.      NM PET s/p 2 cycles ABVD chemotherapy - complete response per Lugano criteria.     Proceed with D1C3 of a planned 3-4 cycles ABVD chemotherapy (Bleomycin deleted after C2 with concern of drug-induced pneumonitis).    I reviewed the uses of her take-home dexamethasone and compazine antiemetic medications and EMLA cream.      I encouraged her to be proactive in treating any nausea, vomiting or bowel changes.      No G-CSF.    Instructed to  contact us/be seen with a fever > 100.4F as antibiotics or even hospitalization may be indicated.    Shortness of breath with certain activities - much improved:    Pulse ox @ 96% room air at rest.  Not anemic.  Lungs clear.      Acute hypoxic respiratory failure:     Bilateral basilar groundglass consolidative opacity - concerning for Bleomycin-induced lung toxicity or atypical infection.      Bleomycin discontinued.      Continue extended course Bactrim antibiotic.    If worsens she is to alert us.    02/22/2022 (B) pulmonary emboli:     On Xarelto.    Nausea - mild/chronic:    Reviewed use of dexamethasone 8 mg daily with breakfast for 3-days after each chemotherapy dose.    Has Compazine and medical cannabis if needed.      Heart burn - well managed with Pantoprazole 40 mg once daily.    Situational anxiety - no concerning issues.    No recent ativan use.    Met with Nicolette Ribeiro, our cancer care psychotherapist - following with her own therapist.    I addition to receiving 3-SARScovid.19 vaccines, Amna had the Covid infection in January, 2022, and recovered well.    05/12/2022 - f/up D1C4 AVD chemotherapy (Bleomycin discontinued) with labs per Treatment Plan.    Anticipating 3-4 cycles of ABVD followed by involved site EBRT - has not yet been referred to [facility].                Hematologic History:    1. Classical Hodgkin lymphoma   Stage II (favorable risk)    IPSS-0 risk factor (FFP 88%, overall survival 98%).    12/13/2021 - diagnosed by left axillary lymph node core needle biopsy.    12/23/2021 NM PET/CT - hypermetabolic lymph nodes involving basilar neck/superior mediastinum, mediastinum, right pericardial space, bilateral axillae.    second opinion pathology evaluation at [facility] confirmed classical Hodgkin lymphoma.      01/13/2022 Echocardiogram - normal left ventricular size and systolic performance.  LVEF @ 60-65%.  No significant valvular heart disease identified. Normal right  ventricular size and systolic performance.    01/18/2022 bone marrow biopsy - no evidence of Hodgkin lymphoma involvement.      01/19/2022 pulmonary function testing - normal    01/21/2022 - IR port-a-cath placed.      02/03/2022 - D1C1 ABVD (Bleomyci HELD s/p 2 cycles) chemotherapy.     03/28/2022 NM PET - complete response per Lugano criteria s/p 2 cycles chemotherapy.    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    Talib Gaxiola CNP     CC: Physician No Ref-Primary   ___________________________________________________________________________    Review of Systems:    No fever or night sweats.  No loss of weight.  No lumps or bumps anywhere.  No unusual headaches or eyesight issues.  No dizziness.  No bleeding from the nose.  No sores in the mouth. No problems with swallowing.  No chest pain. No shortness of breath. No cough.  No abdominal pain. No nausea or vomiting.  No diarrhea or constipation.  No blood in stool or black colored stools.  No problems passing urine.  No numbness or tingling in hands or feet.  No skin rashes.    A 14 point review of systems is otherwise negative.    Past History:    Past Medical History:   Diagnosis Date      "Hodgkin lymphoma (H)      Pulmonary emboli (H)        Past Surgical History:   Procedure Laterality Date     IR CHEST PORT PLACEMENT > 5 YRS OF AGE  1/21/2022     Physical Exam:    /73 (BP Location: Left arm, Patient Position: Standing, Cuff Size: Adult Regular)   Pulse (!) 123   Temp 98.4  F (36.9  C)   Ht 1.702 m (5' 7\")   Wt 83.1 kg (183 lb 4.8 oz)   SpO2 96%   BMI 28.71 kg/m      GENERAL:   Very pleasant.  Alert and oriented. Seated comfortably. In no distress.    HEENT:   Atraumatic and normocephalic.  PATRICIA.  EOMI.  No pallor.  No icterus.  No mucosal lesions.    LYMPH NODES:  No palpable cervical, axillary or inguinal lymphadenopathy.    CHEST:   Lungs clear to auscultation bilaterally.    Port-a-cath in place - no s/s infection.  Working well.    CVS:    S1 and S2 heard. Regular rate and rhythm.  No murmur, gallop or rub heard.  No peripheral edema.    ABDOMEN:   Soft.  Not tender or distended.  No palpable hepatomegaly or splenomegaly, masses or ascites.    EXTREMITIES:  Warm.    SKIN:    No rash bruising or purpura noted.     Lab Results:    Reviewed with patient.    Recent Results (from the past 24 hour(s))   CBC with platelets and differential    Collection Time: 04/28/22  8:37 AM   Result Value Ref Range    WBC Count 3.3 (L) 4.0 - 11.0 10e3/uL    RBC Count 3.81 3.80 - 5.20 10e6/uL    Hemoglobin 11.0 (L) 11.7 - 15.7 g/dL    Hematocrit 34.2 (L) 35.0 - 47.0 %    MCV 90 78 - 100 fL    MCH 28.9 26.5 - 33.0 pg    MCHC 32.2 31.5 - 36.5 g/dL    RDW 16.1 (H) 10.0 - 15.0 %    Platelet Count 370 150 - 450 10e3/uL    % Neutrophils 23 %    % Lymphocytes 46 %    % Monocytes 19 %    % Eosinophils 11 %    % Basophils 1 %    % Immature Granulocytes 0 %    NRBCs per 100 WBC 0 <1 /100    Absolute Neutrophils 0.7 (L) 1.6 - 8.3 10e3/uL    Absolute Lymphocytes 1.5 0.8 - 5.3 10e3/uL    Absolute Monocytes 0.6 0.0 - 1.3 10e3/uL    Absolute Eosinophils 0.4 0.0 - 0.7 10e3/uL    Absolute Basophils 0.0 0.0 - 0.2 10e3/uL "    Absolute Immature Granulocytes 0.0 <=0.4 10e3/uL    Absolute NRBCs 0.0 10e3/uL     Imaging:    No new imaging.

## 2022-04-28 NOTE — PROGRESS NOTES
Infusion Nursing Note:  Amna Oneil presents today for infusion & chemotherapy    Patient seen by provider today: No   present during visit today: Not Applicable.    Note: N/A      Intravenous Access:  Implanted Port.    Treatment Conditions:  Results reviewed, Talib DENG NP stated to go ahead with infusions with ANC 0.7      Post Infusion Assessment:  Patient tolerated infusion without incident.       Discharge Plan:   Patient discharged in stable condition accompanied by: self.      Bernadette BROTHERS RN

## 2022-04-28 NOTE — LETTER
"    4/28/2022         RE: Amna Oneil  [address]  [place] [address]        Dear Colleague,    Thank you for referring your patient, Amna Oneil, to the [facility] [place]. Please see a copy of my visit note below.    Oncology Rooming Note    April 28, 2022 9:04 AM   Amna Oneil is a 41 year old female who presents for:    Chief Complaint   Patient presents with     Oncology Clinic Visit     Initial Vitals: /73 (BP Location: Left arm, Patient Position: Standing, Cuff Size: Adult Regular)   Pulse (!) 123   Temp 98.4  F (36.9  C)   Ht 1.702 m (5' 7\")   Wt 83.1 kg (183 lb 4.8 oz)   SpO2 96%   BMI 28.71 kg/m   Estimated body mass index is 28.71 kg/m  as calculated from the following:    Height as of this encounter: 1.702 m (5' 7\").    Weight as of this encounter: 83.1 kg (183 lb 4.8 oz). Body surface area is 1.98 meters squared.  No Pain (0) Comment: Data Unavailable   No LMP recorded. (Menstrual status: Birth Control).  Allergies reviewed: Yes  Medications reviewed: Yes    Medications: MEDICATION REFILLS NEEDED TODAY. Provider was notified.  Pharmacy name entered into [employer]:    [facility] -- [place], [place] - [address]  [id number] - [place], [place] - [address] AT [facility] [address]    Clinical concerns: 1 week follow up with labs and chemo      Batsheva Lackey MA              [facility] Hematology and Oncology Progress Note    Patient: Amna Oneil  MRN: [id number]  Date of Service: Apr 28, 2022         Reason for Visit:    D15C3 AVD (B-HELD since after C2) chemotherapy    Assessment and Plan:    1. Stage II classical Hodgkin lymphoma.    41 year old     Amna presents accompanied by her mother, Ward, anticipating D15C3 ABVD chemotherapy.  She looks and states she's been feeling quite good.  She feels much less short of breath - she's returned to walking her dog about a mile/day.  " No cough.  Her appetite is good - weight up ~5 pounds with start of treatment.  Pantoprazole 40 mg once daily is managing her heartburn completely, much better than Pepcid was.  No nausea.  She was hospitalized 03/29 - 04/03/2022 with community acquired pneumonia vs pneumonitis related to chemotherapy.  Continues on tid Bactrim.  She notes 3-4 days of constipation with each chemotherapy dose - managed with increasing oral hydration and eating black beans.  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  She tried the medical cannabis but didn't like the dizziness she got from it.  She denies B-symptoms, cough, fever, chills, unusual headaches, visual or mentation disturbance, bowel or bladder issues, neuropathies, rash.     CBC - WBC 3.3.  .  HgB stable @ 11.0.  PLTs WNL    Neutropenic precautions reviewed.    Amna is now again subjectively tolerating AVD chemotherapy acceptably well since the Bleomycin has been HELD after C2 d/t pneumonitis.      NM PET s/p 2 cycles ABVD chemotherapy - complete response per Lugano criteria.     Proceed with D1C3 of a planned 3-4 cycles ABVD chemotherapy (Bleomycin deleted after C2 with concern of drug-induced pneumonitis).    I reviewed the uses of her take-home dexamethasone and compazine antiemetic medications and EMLA cream.      I encouraged her to be proactive in treating any nausea, vomiting or bowel changes.      No G-CSF.    Instructed to contact us/be seen with a fever > 100.4F as antibiotics or even hospitalization may be indicated.    Shortness of breath with certain activities - much improved:    Pulse ox @ 96% room air at rest.  Not anemic.  Lungs clear.      Acute hypoxic respiratory failure:     Bilateral basilar groundglass consolidative opacity - concerning for Bleomycin-induced lung toxicity or atypical infection.      Bleomycin discontinued.      Continue extended course Bactrim antibiotic.    If worsens she is to alert us.    02/22/2022 (B)  pulmonary emboli:     On Xarelto.    Nausea - mild/chronic:    Reviewed use of dexamethasone 8 mg daily with breakfast for 3-days after each chemotherapy dose.    Has Compazine and medical cannabis if needed.      Heart burn - well managed with Pantoprazole 40 mg once daily.    Situational anxiety - no concerning issues.    No recent ativan use.    Met with Nicolette Ribeiro, our cancer care psychotherapist - following with her own therapist.    I addition to receiving 3-SARScovid.19 vaccines, Amna had the Covid infection in January, 2022, and recovered well.    05/12/2022 - f/up D1C4 AVD chemotherapy (Bleomycin discontinued) with labs per Treatment Plan.    Anticipating 3-4 cycles of ABVD followed by involved site EBRT - has not yet been referred to [facility].                Hematologic History:    1. Classical Hodgkin lymphoma   Stage II (favorable risk)    IPSS-0 risk factor (FFP 88%, overall survival 98%).    12/13/2021 - diagnosed by left axillary lymph node core needle biopsy.    12/23/2021 NM PET/CT - hypermetabolic lymph nodes involving basilar neck/superior mediastinum, mediastinum, right pericardial space, bilateral axillae.    second opinion pathology evaluation at [facility] confirmed classical Hodgkin lymphoma.      01/13/2022 Echocardiogram - normal left ventricular size and systolic performance.  LVEF @ 60-65%.  No significant valvular heart disease identified. Normal right ventricular size and systolic performance.    01/18/2022 bone marrow biopsy - no evidence of Hodgkin lymphoma involvement.      01/19/2022 pulmonary function testing - normal    01/21/2022 - IR port-a-cath placed.      02/03/2022 - D1C1 ABVD (Bleomyci HELD s/p 2 cycles) chemotherapy.     03/28/2022 NM PET - complete response per Lugano criteria s/p 2 cycles chemotherapy.    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    Talib Gaxiola CNP     CC: Physician No Ref-Primary   ___________________________________________________________________________    Review of Systems:    No fever or night sweats.  No loss of weight.  No lumps or bumps anywhere.  No unusual headaches or eyesight issues.  No dizziness.  No bleeding from the nose.  No sores in the mouth. No problems with swallowing.  No chest pain. No shortness of breath. No cough.  No abdominal pain. No nausea or vomiting.  No diarrhea or constipation.  No blood in stool or black colored stools.  No problems passing urine.  No numbness or tingling in hands or feet.  No skin rashes.    A 14 point review of systems is otherwise negative.    Past History:    Past Medical History:   Diagnosis Date     Hodgkin lymphoma (H)      Pulmonary emboli (H)        Past Surgical History:   Procedure Laterality Date     IR CHEST PORT PLACEMENT > 5 YRS OF AGE  1/21/2022     Physical Exam:    /73 (BP Location: Left arm, Patient Position: Standing, Cuff Size: Adult Regular)   Pulse (!) 123   Temp 98.4  F (36.9  C)   Ht 1.702 m (5' 7\")   Wt 83.1 kg (183 lb 4.8 oz)   SpO2 96%   BMI 28.71 kg/m      GENERAL:   Very pleasant.  Alert and oriented. Seated comfortably. In no distress.    HEENT:   Atraumatic and normocephalic.  PATRICIA.  " EOMI.  No pallor.  No icterus.  No mucosal lesions.    LYMPH NODES:  No palpable cervical, axillary or inguinal lymphadenopathy.    CHEST:   Lungs clear to auscultation bilaterally.    Port-a-cath in place - no s/s infection.  Working well.    CVS:    S1 and S2 heard. Regular rate and rhythm.  No murmur, gallop or rub heard.  No peripheral edema.    ABDOMEN:   Soft.  Not tender or distended.  No palpable hepatomegaly or splenomegaly, masses or ascites.    EXTREMITIES:  Warm.    SKIN:    No rash bruising or purpura noted.     Lab Results:    Reviewed with patient.    Recent Results (from the past 24 hour(s))   CBC with platelets and differential    Collection Time: 04/28/22  8:37 AM   Result Value Ref Range    WBC Count 3.3 (L) 4.0 - 11.0 10e3/uL    RBC Count 3.81 3.80 - 5.20 10e6/uL    Hemoglobin 11.0 (L) 11.7 - 15.7 g/dL    Hematocrit 34.2 (L) 35.0 - 47.0 %    MCV 90 78 - 100 fL    MCH 28.9 26.5 - 33.0 pg    MCHC 32.2 31.5 - 36.5 g/dL    RDW 16.1 (H) 10.0 - 15.0 %    Platelet Count 370 150 - 450 10e3/uL    % Neutrophils 23 %    % Lymphocytes 46 %    % Monocytes 19 %    % Eosinophils 11 %    % Basophils 1 %    % Immature Granulocytes 0 %    NRBCs per 100 WBC 0 <1 /100    Absolute Neutrophils 0.7 (L) 1.6 - 8.3 10e3/uL    Absolute Lymphocytes 1.5 0.8 - 5.3 10e3/uL    Absolute Monocytes 0.6 0.0 - 1.3 10e3/uL    Absolute Eosinophils 0.4 0.0 - 0.7 10e3/uL    Absolute Basophils 0.0 0.0 - 0.2 10e3/uL    Absolute Immature Granulocytes 0.0 <=0.4 10e3/uL    Absolute NRBCs 0.0 10e3/uL     Imaging:    No new imaging.      Again, thank you for allowing me to participate in the care of your patient.        Sincerely,        Talib Gaxiola, CNP

## 2022-04-29 LAB — BACTERIA BRONCH: NO GROWTH

## 2022-05-02 ENCOUNTER — INFUSION THERAPY VISIT (OUTPATIENT)
Dept: NURSING | Facility: CLINIC | Age: 42
End: 2022-05-02
Payer: COMMERCIAL

## 2022-05-02 DIAGNOSIS — Z29.89 PROPHYLACTIC IMMUNOTHERAPY: Primary | ICD-10-CM

## 2022-05-02 PROCEDURE — M0220 PR INJECTION TIXAGEVIMAB & CILGAVIMAB (EVUSHELD): HCPCS | Performed by: INTERNAL MEDICINE

## 2022-05-02 NOTE — PROGRESS NOTES
Evusheld Consent   Confirmed patient received the Emergency Use Authorization Fact Sheet for Patients, Parents and Caregivers prior to receiving medication. We discussed the following risks and benefits of receiving EVUSHELD, as well as alternative treatments and the patient wished to proceed with EVUSHELD.     Providers believe the benefits of Evusheld outweigh the risks for all moderately to severely immunocompromised patients.      Benefits:   Evusheld is a synthetic antibody that provides protection against COVID-19 for 6 months and is recommended for patients that have a weakened immune system that may not be able to make antibodies themselves.     Risks:    There is a very small risk of allergic reactions and you should notify us if you have any symptoms of an allergic reaction after the injection. There were also some extremely rare cardiac events reported in the initial trials in people that already had heart disease, but experts do not think these were caused by the medication. We will observe you for any chest pain or trouble breathing after the injections and you can reach out to your provider if any of these symptoms develop.     Alternatives:   Vaccines to prevent COVID-19 are approved or available under Emergency Use Authorization. Use of EVUSHELD does not replace vaccination against COVID-19. You can continue to mask and isolate to avoid infections. It is your choice to receive or not receive EVUSHELD. Should you decide not to receive EVUSHELD, it will not change your standard medical care.     Patient does consent to the injection.  EVUSHELD Administration Note:  Amna Oneil presents today for EVUSHELD.   present during visit today: Not Applicable.    Consent:   Informed Consent confirmed in chart, obtained on this date: 5/2/22    Post Injection Assessment:   Patient tolerated injection without incident.  Patient observed for 60 minutes post injection per protocol.      Patient was  observed in the room for a minimum of 10 minutes after injection per standard, then remained in the buidling for a total 60 minute observation period.         Discharge Plan:    Patient discharged in stable condition accompanied by: self.     .kat

## 2022-05-04 ENCOUNTER — PATIENT OUTREACH (OUTPATIENT)
Dept: ONCOLOGY | Facility: CLINIC | Age: 42
End: 2022-05-04
Payer: COMMERCIAL

## 2022-05-04 DIAGNOSIS — B37.31 YEAST INFECTION OF THE VAGINA: Primary | ICD-10-CM

## 2022-05-04 RX ORDER — FLUCONAZOLE 150 MG/1
150 TABLET ORAL ONCE
Qty: 2 TABLET | Refills: 0 | Status: CANCELLED | OUTPATIENT
Start: 2022-05-04 | End: 2022-05-04

## 2022-05-04 RX ORDER — FLUCONAZOLE 150 MG/1
150 TABLET ORAL ONCE
Qty: 1 TABLET | Refills: 0 | Status: SHIPPED | OUTPATIENT
Start: 2022-05-04 | End: 2022-05-04

## 2022-05-04 NOTE — PROGRESS NOTES
"[facility]: Cancer Care Short Note                                    Discussion with Patient:                                                      Patient called and left message on triage line stating she had fever in the night @ 0100 of 100.4, along with fever, chills, HA, muscle aches.  She asked for call back to [phone].     Assessment:                                                      Called patient. Patient reports checked temperature this morning and down to 98.9 to 99.4 after Tylenol.    She reports she still has HA and HA pain is behind her eyes. She said drinking fluids and Tylenol has helped to dull the pain.     Patient reports muscle aches are not as pronounced.     Patient reports abd pain. She reports pain is lower mid pelvic pain.  She said she has had pain like this in past and been diagnosed with bacterial vaginosis.  She is concerned could also be yeast infection since she has been on antibiotics recently. Finished Bactrim 1 week ago.  Patient denies vaginal itching but reports whitish discharge \"different than normal\". Denies pain or burning with urination.     Told patient will discuss with Dr. Lizarraga and call her back with update.  In the meantime, patient should continue monitor her symptoms, periodically checking temperature, especially if she has chills or sweats.  Instructed to continue to push fluids.  Patient instructed to notify Cancer Care if temp >100.4 or other concerning symptoms. Patient verbalized understanding.       Intervention/Education provided during outreach:                                                       Message sent to Dr. Lizarraga.    Discussed with Dr. Lizarraga.  She said typically patients don't have fever with yeast infection. She asked that patient continue to monitor symptoms and if develops another fever 100.4 or greater to call Cancer Care. Patient should call if develops fever anytime of day.  Dr. Lizarraga will order dose of fluconazole to see if improves " discharge, pelvic pain.  Called patient with this information.  Patient stressed to call if fever 100.4 or greater any time of day. Instructed to monitor abd pain, discharge and to call if worsens or doesn't improve or other concerning symptoms. Patient verbalized understanding.      Patient to follow up as scheduled at next apt    Signature:  Ping Gonzales RN

## 2022-05-12 ENCOUNTER — PATIENT OUTREACH (OUTPATIENT)
Dept: ONCOLOGY | Facility: CLINIC | Age: 42
End: 2022-05-12

## 2022-05-12 ENCOUNTER — LAB (OUTPATIENT)
Dept: INFUSION THERAPY | Facility: CLINIC | Age: 42
End: 2022-05-12
Attending: INTERNAL MEDICINE
Payer: COMMERCIAL

## 2022-05-12 ENCOUNTER — ONCOLOGY VISIT (OUTPATIENT)
Dept: ONCOLOGY | Facility: CLINIC | Age: 42
End: 2022-05-12
Attending: INTERNAL MEDICINE
Payer: COMMERCIAL

## 2022-05-12 VITALS
HEIGHT: 67 IN | BODY MASS INDEX: 28.88 KG/M2 | HEART RATE: 99 BPM | TEMPERATURE: 98.6 F | WEIGHT: 184 LBS | SYSTOLIC BLOOD PRESSURE: 105 MMHG | DIASTOLIC BLOOD PRESSURE: 70 MMHG | OXYGEN SATURATION: 98 %

## 2022-05-12 DIAGNOSIS — C81.18 NODULAR SCLEROSIS HODGKIN LYMPHOMA OF LYMPH NODES OF MULTIPLE REGIONS (H): Primary | ICD-10-CM

## 2022-05-12 DIAGNOSIS — Z51.11 ENCOUNTER FOR ANTINEOPLASTIC CHEMOTHERAPY: ICD-10-CM

## 2022-05-12 DIAGNOSIS — I26.99 BILATERAL PULMONARY EMBOLISM (H): ICD-10-CM

## 2022-05-12 DIAGNOSIS — K21.00 GASTROESOPHAGEAL REFLUX DISEASE WITH ESOPHAGITIS WITHOUT HEMORRHAGE: ICD-10-CM

## 2022-05-12 DIAGNOSIS — C81.18 NODULAR SCLEROSIS HODGKIN LYMPHOMA OF LYMPH NODES OF MULTIPLE REGIONS (H): ICD-10-CM

## 2022-05-12 LAB
ALBUMIN SERPL-MCNC: 3.6 G/DL (ref 3.5–5)
ALP SERPL-CCNC: 58 U/L (ref 45–120)
ALT SERPL W P-5'-P-CCNC: 21 U/L (ref 0–45)
ANION GAP SERPL CALCULATED.3IONS-SCNC: 11 MMOL/L (ref 5–18)
AST SERPL W P-5'-P-CCNC: 17 U/L (ref 0–40)
BASOPHILS # BLD AUTO: 0.1 10E3/UL (ref 0–0.2)
BASOPHILS NFR BLD AUTO: 1 %
BILIRUB SERPL-MCNC: 0.3 MG/DL (ref 0–1)
BUN SERPL-MCNC: 13 MG/DL (ref 8–22)
CALCIUM SERPL-MCNC: 9.2 MG/DL (ref 8.5–10.5)
CHLORIDE BLD-SCNC: 110 MMOL/L (ref 98–107)
CO2 SERPL-SCNC: 22 MMOL/L (ref 22–31)
CREAT SERPL-MCNC: 0.81 MG/DL (ref 0.6–1.1)
EOSINOPHIL # BLD AUTO: 0.4 10E3/UL (ref 0–0.7)
EOSINOPHIL NFR BLD AUTO: 7 %
ERYTHROCYTE [DISTWIDTH] IN BLOOD BY AUTOMATED COUNT: 15.7 % (ref 10–15)
GFR SERPL CREATININE-BSD FRML MDRD: >90 ML/MIN/1.73M2
GLUCOSE BLD-MCNC: 85 MG/DL (ref 70–125)
HCT VFR BLD AUTO: 36.3 % (ref 35–47)
HGB BLD-MCNC: 11.5 G/DL (ref 11.7–15.7)
IMM GRANULOCYTES # BLD: 0 10E3/UL
IMM GRANULOCYTES NFR BLD: 0 %
LYMPHOCYTES # BLD AUTO: 1.9 10E3/UL (ref 0.8–5.3)
LYMPHOCYTES NFR BLD AUTO: 40 %
MCH RBC QN AUTO: 28.9 PG (ref 26.5–33)
MCHC RBC AUTO-ENTMCNC: 31.7 G/DL (ref 31.5–36.5)
MCV RBC AUTO: 91 FL (ref 78–100)
MONOCYTES # BLD AUTO: 0.9 10E3/UL (ref 0–1.3)
MONOCYTES NFR BLD AUTO: 19 %
NEUTROPHILS # BLD AUTO: 1.6 10E3/UL (ref 1.6–8.3)
NEUTROPHILS NFR BLD AUTO: 33 %
NRBC # BLD AUTO: 0 10E3/UL
NRBC BLD AUTO-RTO: 0 /100
PLATELET # BLD AUTO: 448 10E3/UL (ref 150–450)
POTASSIUM BLD-SCNC: 4 MMOL/L (ref 3.5–5)
PROT SERPL-MCNC: 6.6 G/DL (ref 6–8)
RBC # BLD AUTO: 3.98 10E6/UL (ref 3.8–5.2)
SODIUM SERPL-SCNC: 143 MMOL/L (ref 136–145)
WBC # BLD AUTO: 4.8 10E3/UL (ref 4–11)

## 2022-05-12 PROCEDURE — 80053 COMPREHEN METABOLIC PANEL: CPT | Performed by: NURSE PRACTITIONER

## 2022-05-12 PROCEDURE — 96523 IRRIG DRUG DELIVERY DEVICE: CPT

## 2022-05-12 PROCEDURE — G0463 HOSPITAL OUTPT CLINIC VISIT: HCPCS | Mod: 25

## 2022-05-12 PROCEDURE — 99215 OFFICE O/P EST HI 40 MIN: CPT | Performed by: INTERNAL MEDICINE

## 2022-05-12 PROCEDURE — 85014 HEMATOCRIT: CPT | Performed by: NURSE PRACTITIONER

## 2022-05-12 PROCEDURE — G0463 HOSPITAL OUTPT CLINIC VISIT: HCPCS

## 2022-05-12 PROCEDURE — 36591 DRAW BLOOD OFF VENOUS DEVICE: CPT

## 2022-05-12 RX ORDER — HEPARIN SODIUM (PORCINE) LOCK FLUSH IV SOLN 100 UNIT/ML 100 UNIT/ML
5 SOLUTION INTRAVENOUS
Status: DISCONTINUED | OUTPATIENT
Start: 2022-05-12 | End: 2022-05-12 | Stop reason: HOSPADM

## 2022-05-12 RX ORDER — PANTOPRAZOLE SODIUM 40 MG/1
TABLET, DELAYED RELEASE ORAL
Qty: 30 TABLET | Refills: 0 | Status: SHIPPED | OUTPATIENT
Start: 2022-05-12 | End: 2022-11-16

## 2022-05-12 ASSESSMENT — PAIN SCALES - GENERAL: PAINLEVEL: NO PAIN (0)

## 2022-05-12 NOTE — PROGRESS NOTES
[facility] Hematology and Oncology Progress Note    Patient: Amna Oneil  MRN: [id number]  Date of Service: May 12, 2022     Reason for Visit    Chief Complaint   Patient presents with     Oncology Clinic Visit       Assessment and Plan    Cancer Staging  No matching staging information was found for the patient.    ECOG Performance    0 - Independent     Pain  Pain Score: No Pain (0)    #.  Classical Hodgkin lymphoma, stage II (favorable risk) IPSS-0 risk factor, FFP 88%, overall survival 98%.      Clinically well.  I reviewed her labs.  She had neutropenia in the last cycle of chemotherapy but at this point her CBC is completely recovered.  Kidney function, liver functions normal.    Referral to radiation oncology was made for consideration of consolidation radiation therapy.  She she had negative PET after 2 cycles of ABVD which carries favorable response.  Typically 3 (preferred) to 4 cycles followed by involved site radiation has shown low was relapse rate.  She has very valid concern about area of radiation and potential late side effects of coronary artery disease, breast cancer etc.  I will definitely request our radiation oncologist to advise.  If she decides against involved site radiation or she is not a candidate for involved site radiation, we will complete cycle #4 ABD. We went over acute and late side effects of chemotherapy as well.   For treatment planning purposes, I requested PET/CT scan.       Follow-up with me depending on her PET scan and consultation with radiation oncology    #.  Acute bilateral pulmonary emboli without evidence of saddle embolus or central emboli in 2/2022.   Likely provoked in the setting of recent COVID-19 infection, active chemotherapy for lymphoma in the setting of ongoing birth control pill use.   She has stopped birth control pills.  She tolerated Xarelto well without side effects or intolerance.  We will plan to continue until she completes cancer therapy,  not sooner than 8/2022.     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  12/23/2021-PET/CT scan showed hypermetabolic lymph nodes involving basilar neck/superior mediastinum, mediastinum, right pericardial space, bilateral axillae.   -second opinion pathology evaluation at [facility] and it confirmed classical Hodgkin lymphoma.  Echocardiogram showed adequate cardiac function.  Pulmonary function test was normal    Treatment to date  2/3/2022-4/27/2022-completed 3 cycles of ABVD (bleomycin was omitted and cycle #3 due to pulmonary toxicity) treatment course was complicated by pulmonary emboli, PCP pneumonia.  She achieved complete radiographic response (no uptake by PET) after 2 cycles of ABVD.    History of Present Illness    Ms. Amna Oneil presented today accompanied by her .  That she does not have any shortness of breath.  No chest pain.  She has heartburn for which she takes Protonix and it helps somewhat.  No neuropathy.     She completed fluconazole.  Pelvic pain has much improved as well as vaginal discharge.    Review of systems  Apart from describing in HPI, the remainder of comprehensive ROS was negative.    Past History    Past Medical History:   Diagnosis Date     Hodgkin lymphoma (H)      Pulmonary emboli (H)        Past Surgical History:   Procedure Laterality Date     IR CHEST PORT PLACEMENT > 5 YRS OF AGE  1/21/2022         Physical Exam    /70 (BP Location: Right arm, Patient Position:  "Sitting, Cuff Size: Adult Regular)   Pulse 99   Temp 98.6  F (37  C)   Ht 1.702 m (5' 7\")   Wt 83.5 kg (184 lb)   SpO2 98%   BMI 28.82 kg/m      General: alert, awake, not in acute distress  HEENT: Head: Normal, normocephalic, atraumatic.  Eye: Normal external eye, conjunctiva, lids cornea, PATRICIA.  Pharynx: Normal buccal mucosa. Normal pharynx.  Neck / Thyroid: Supple, no masses, nodes, nodules or enlargement.  Lymphatics: No abnormally enlarged lymph nodes.  Chest: Normal chest wall and respirations. Clear to auscultation.  Heart: S1 S2 RRR.   Abdomen: abdomen is soft without significant tenderness, masses, organomegaly or guarding  Extremities: normal strength, tone, and muscle mass  Skin: normal. no rash or abnormalities  CNS: non focal.    Lab Results    Recent Results (from the past 168 hour(s))   Comprehensive metabolic panel   Result Value Ref Range    Sodium 143 136 - 145 mmol/L    Potassium 4.0 3.5 - 5.0 mmol/L    Chloride 110 (H) 98 - 107 mmol/L    Carbon Dioxide (CO2) 22 22 - 31 mmol/L    Anion Gap 11 5 - 18 mmol/L    Urea Nitrogen 13 8 - 22 mg/dL    Creatinine 0.81 0.60 - 1.10 mg/dL    Calcium 9.2 8.5 - 10.5 mg/dL    Glucose 85 70 - 125 mg/dL    Alkaline Phosphatase 58 45 - 120 U/L    AST 17 0 - 40 U/L    ALT 21 0 - 45 U/L    Protein Total 6.6 6.0 - 8.0 g/dL    Albumin 3.6 3.5 - 5.0 g/dL    Bilirubin Total 0.3 0.0 - 1.0 mg/dL    GFR Estimate >90 >60 mL/min/1.73m2   CBC with platelets and differential   Result Value Ref Range    WBC Count 4.8 4.0 - 11.0 10e3/uL    RBC Count 3.98 3.80 - 5.20 10e6/uL    Hemoglobin 11.5 (L) 11.7 - 15.7 g/dL    Hematocrit 36.3 35.0 - 47.0 %    MCV 91 78 - 100 fL    MCH 28.9 26.5 - 33.0 pg    MCHC 31.7 31.5 - 36.5 g/dL    RDW 15.7 (H) 10.0 - 15.0 %    Platelet Count 448 150 - 450 10e3/uL    % Neutrophils 33 %    % Lymphocytes 40 %    % Monocytes 19 %    % Eosinophils 7 %    % Basophils 1 %    % Immature Granulocytes 0 %    NRBCs per 100 WBC 0 <1 /100    Absolute " Neutrophils 1.6 1.6 - 8.3 10e3/uL    Absolute Lymphocytes 1.9 0.8 - 5.3 10e3/uL    Absolute Monocytes 0.9 0.0 - 1.3 10e3/uL    Absolute Eosinophils 0.4 0.0 - 0.7 10e3/uL    Absolute Basophils 0.1 0.0 - 0.2 10e3/uL    Absolute Immature Granulocytes 0.0 <=0.4 10e3/uL    Absolute NRBCs 0.0 10e3/uL       Imaging    No results found.    40 minutes spent on the date of the encounter doing chart review, history and exam, documentation and further activities as noted above.    Signed by: Juan Lizarraga MD

## 2022-05-12 NOTE — PROGRESS NOTES
"Oncology Rooming Note    May 12, 2022 9:24 AM   Amna Oneil is a 41 year old female who presents for:    Chief Complaint   Patient presents with     Oncology Clinic Visit     Initial Vitals: /70 (BP Location: Right arm, Patient Position: Sitting, Cuff Size: Adult Regular)   Pulse 99   Temp 98.6  F (37  C)   Ht 1.702 m (5' 7\")   Wt 83.5 kg (184 lb)   SpO2 98%   BMI 28.82 kg/m   Estimated body mass index is 28.82 kg/m  as calculated from the following:    Height as of this encounter: 1.702 m (5' 7\").    Weight as of this encounter: 83.5 kg (184 lb). Body surface area is 1.99 meters squared.  No Pain (0) Comment: Data Unavailable   No LMP recorded. (Menstrual status: Birth Control).  Allergies reviewed: Yes  Medications reviewed: Yes    Medications: Medication refills not needed today.  Pharmacy name entered into [employer] via [place]:    [facility] - [place], [place] - [address]  [id number] - [place], [place] - [address] [facility] [address]    Clinical concerns: follow up with labs and chemo    Batsheva Lackey MA            "

## 2022-05-12 NOTE — PROGRESS NOTES
[facility]: Cancer Care Short Note                                    Discussion with Patient:                                                      Patient here today for follow-up with Dr. Lizarraga. Accompanied by , Christian.    Checked in with patient while waiting for Dr. Lizarraga.    Assessment:                                                       Patient reports she has been feeling good.  She denies SOB today.  Patient's hair is growing.  She said she is in need of haircut to get shaped up and planning to go to [employer].    Intervention/Education provided during outreach:                                                       Patient's reports she forgot her plastic spoon today so that is her only request today. She will get one when in Infusion. Patient denies further needs from [facility] today. She has my contact information if questions or concerns arise.    Update:  After google, search found [employer] has Cl[employer] of Kindness program, offering free clipper cuts to customers facing hair loss due to cancer treatments.  https://www.[employer]/about-us/giving/hptvg-xw-kjgucawm.  Called patient with this information and encouraged her to ask about this program at her local [employer]. Patient expressed appreciation for the message.    Follow-up with Dr. Lizarraga pending.  Patient has consultation with Dr. Choi, Radiology on 5/18/22.  Will continue to follow.    Signature:  Ping Gonzales, RN

## 2022-05-12 NOTE — PROGRESS NOTES
PORT accessed per protocol and labs drawn. Good blood return. Flushed PORT with 20 cc Normal saline.

## 2022-05-12 NOTE — LETTER
"    5/12/2022         RE: Amna Oneil  [address] [place] [address]        Dear Colleague,    Thank you for referring your patient, Amna Oneil, to the [facility]. Please see a copy of my visit note below.    Oncology Rooming Note    May 12, 2022 9:24 AM   Amna Oneil is a 41 year old female who presents for:    Chief Complaint   Patient presents with     Oncology Clinic Visit     Initial Vitals: /70 (BP Location: Right arm, Patient Position: Sitting, Cuff Size: Adult Regular)   Pulse 99   Temp 98.6  F (37  C)   Ht 1.702 m (5' 7\")   Wt 83.5 kg (184 lb)   SpO2 98%   BMI 28.82 kg/m   Estimated body mass index is 28.82 kg/m  as calculated from the following:    Height as of this encounter: 1.702 m (5' 7\").    Weight as of this encounter: 83.5 kg (184 lb). Body surface area is 1.99 meters squared.  No Pain (0) Comment: Data Unavailable   No LMP recorded. (Menstrual status: Birth Control).  Allergies reviewed: Yes  Medications reviewed: Yes    Medications: Medication refills not needed today.  Pharmacy name entered into [employer]:    [facility] [place] - [place], [place] - [address]  [id number] - [place], [place] - [address] AT [facility] [address]    Clinical concerns: follow up with labs and chemo    Batsheva Lackey MA              [facility] Hematology and Oncology Progress Note    Patient: Amna Oneil  MRN: [id number]  Date of Service: May 12, 2022     Reason for Visit    Chief Complaint   Patient presents with     Oncology Clinic Visit       Assessment and Plan    Cancer Staging  No matching staging information was found for the patient.    ECOG Performance    0 - Independent     Pain  Pain Score: No Pain (0)    #.  Classical Hodgkin lymphoma, stage II (favorable risk) IPSS-0 risk factor, FFP 88%, overall survival 98%.      Clinically well.  I reviewed her labs.  She had neutropenia in " the last cycle of chemotherapy but at this point her CBC is completely recovered.  Kidney function, liver functions normal.    Referral to radiation oncology was made for consideration of consolidation radiation therapy.  She she had negative PET after 2 cycles of ABVD which carries favorable response.  Typically 3 (preferred) to 4 cycles followed by involved site radiation has shown low was relapse rate.  She has very valid concern about area of radiation and potential late side effects of coronary artery disease, breast cancer etc.  I will definitely request our radiation oncologist to advise.  If she decides against involved site radiation or she is not a candidate for involved site radiation, we will complete cycle #4 ABD. We went over acute and late side effects of chemotherapy as well.   For treatment planning purposes, I requested PET/CT scan.       Follow-up with me depending on her PET scan and consultation with radiation oncology    #.  Acute bilateral pulmonary emboli without evidence of saddle embolus or central emboli in 2/2022.   Likely provoked in the setting of recent COVID-19 infection, active chemotherapy for lymphoma in the setting of ongoing birth control pill use.   She has stopped birth control pills.  She tolerated Xarelto well without side effects or intolerance.  We will plan to continue until she completes cancer therapy, not sooner than 8/2022.     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  12/23/2021-PET/CT scan  "showed hypermetabolic lymph nodes involving basilar neck/superior mediastinum, mediastinum, right pericardial space, bilateral axillae.   -second opinion pathology evaluation at [facility] and it confirmed classical Hodgkin lymphoma.  Echocardiogram showed adequate cardiac function.  Pulmonary function test was normal    Treatment to date  2/3/2022-4/27/2022-completed 3 cycles of ABVD (bleomycin was omitted and cycle #3 due to pulmonary toxicity) treatment course was complicated by pulmonary emboli, PCP pneumonia.  She achieved complete radiographic response (no uptake by PET) after 2 cycles of ABVD.    History of Present Illness    Ms. Amna Oneil presented today accompanied by her .  That she does not have any shortness of breath.  No chest pain.  She has heartburn for which she takes Protonix and it helps somewhat.  No neuropathy.     She completed fluconazole.  Pelvic pain has much improved as well as vaginal discharge.    Review of systems  Apart from describing in HPI, the remainder of comprehensive ROS was negative.    Past History    Past Medical History:   Diagnosis Date     Hodgkin lymphoma (H)      Pulmonary emboli (H)        Past Surgical History:   Procedure Laterality Date     IR CHEST PORT PLACEMENT > 5 YRS OF AGE  1/21/2022         Physical Exam    /70 (BP Location: Right arm, Patient Position: Sitting, Cuff Size: Adult Regular)   Pulse 99   Temp 98.6  F (37  C)   Ht 1.702 m (5' 7\")   Wt 83.5 kg (184 lb)   SpO2 98%   BMI 28.82 kg/m      General: alert, awake, not in acute distress  HEENT: Head: Normal, normocephalic, atraumatic.  Eye: Normal external eye, conjunctiva, lids cornea, PATRICIA.  Pharynx: Normal buccal mucosa. Normal pharynx.  Neck / Thyroid: Supple, no masses, nodes, nodules or enlargement.  Lymphatics: No abnormally enlarged lymph nodes.  Chest: Normal chest wall and respirations. Clear to auscultation.  Heart: S1 S2 RRR.   Abdomen: abdomen is soft " without significant tenderness, masses, organomegaly or guarding  Extremities: normal strength, tone, and muscle mass  Skin: normal. no rash or abnormalities  CNS: non focal.    Lab Results    Recent Results (from the past 168 hour(s))   Comprehensive metabolic panel   Result Value Ref Range    Sodium 143 136 - 145 mmol/L    Potassium 4.0 3.5 - 5.0 mmol/L    Chloride 110 (H) 98 - 107 mmol/L    Carbon Dioxide (CO2) 22 22 - 31 mmol/L    Anion Gap 11 5 - 18 mmol/L    Urea Nitrogen 13 8 - 22 mg/dL    Creatinine 0.81 0.60 - 1.10 mg/dL    Calcium 9.2 8.5 - 10.5 mg/dL    Glucose 85 70 - 125 mg/dL    Alkaline Phosphatase 58 45 - 120 U/L    AST 17 0 - 40 U/L    ALT 21 0 - 45 U/L    Protein Total 6.6 6.0 - 8.0 g/dL    Albumin 3.6 3.5 - 5.0 g/dL    Bilirubin Total 0.3 0.0 - 1.0 mg/dL    GFR Estimate >90 >60 mL/min/1.73m2   CBC with platelets and differential   Result Value Ref Range    WBC Count 4.8 4.0 - 11.0 10e3/uL    RBC Count 3.98 3.80 - 5.20 10e6/uL    Hemoglobin 11.5 (L) 11.7 - 15.7 g/dL    Hematocrit 36.3 35.0 - 47.0 %    MCV 91 78 - 100 fL    MCH 28.9 26.5 - 33.0 pg    MCHC 31.7 31.5 - 36.5 g/dL    RDW 15.7 (H) 10.0 - 15.0 %    Platelet Count 448 150 - 450 10e3/uL    % Neutrophils 33 %    % Lymphocytes 40 %    % Monocytes 19 %    % Eosinophils 7 %    % Basophils 1 %    % Immature Granulocytes 0 %    NRBCs per 100 WBC 0 <1 /100    Absolute Neutrophils 1.6 1.6 - 8.3 10e3/uL    Absolute Lymphocytes 1.9 0.8 - 5.3 10e3/uL    Absolute Monocytes 0.9 0.0 - 1.3 10e3/uL    Absolute Eosinophils 0.4 0.0 - 0.7 10e3/uL    Absolute Basophils 0.1 0.0 - 0.2 10e3/uL    Absolute Immature Granulocytes 0.0 <=0.4 10e3/uL    Absolute NRBCs 0.0 10e3/uL       Imaging    No results found.    40 minutes spent on the date of the encounter doing chart review, history and exam, documentation and further activities as noted above.    Signed by: Juan Lizarraga MD      Again, thank you for allowing me to participate in the care of your patient.         Sincerely,        Juan Lizarraga MD

## 2022-05-13 NOTE — PROGRESS NOTES
MEDICAL RECORDS REQUEST   Radiation Oncology  [address] [place] [address]  PHONE: [phone]  Fax: [phone]        FUTURE VISIT INFORMATION                                                   TITUS Olson: 1980 scheduled for future visit at [facility] Radiation Oncology    APPOINTMENT INFORMATION:    Date: 2022    Provider:  Dr. Choi    Reason for Visit/Diagnosis: Nodular sclerosis Hodgkin lymphoma of lymph nodes of multiple regions.    REFERRAL INFORMATION:    Referring provider:  Juan Lizarraga MD    Specialty: [facility] MEDICAL ONCOLOGY    Referring providers clinic:  [facility]    Clinic contact number:      RECORDS REQUESTED FOR VISIT                                                     Action    Action Taken 2022 8:17AM DELMA     I called pt Amna watson.        LUNG/CHEST WALL    CHEST CT AND REPORTS yes     CT Chest 3/29/2022  CT Chest Abdomen 3/28/2022  CT Chest 2022  More in PACS   PRE & POST-OP SCANS AND REPORT yes   PET/CT AND REPORT yes     3/28/2022  2021   PULMONARY FUNCTION TEST yes     2022   REFERRAL DICTATION (H&P) yes   ANY RECENT LABS yes   SURGICAL REPORT yes   PATHOLOGY REPORT yes     2022  BRONCHOALVEOLAR LAVAGE, RIGHT LUNG:    LARGE NUMBERS OF CHRONIC INFLAMMATORY CELLS    MODERATE TO MARKED EOSINOPHILIA, SUGGESTIVE OF ALLERGIC OR HYPERSENSITIVITY RESPONSE, EOSINOPHILIC PNEUMONITIS, OR THERAPY-ASSOCIATED EOSINOPHILIC INFILTRATES     NEGATIVE FOR FUNGAL OR PNEUMOCYSTIS ORGANISMS BY HISTOCHEMISTRY    NEGATIVE FOR ATYPICAL OR MALIGNANT CELLS   BRONCHOSCOPY REPORT yes     3/29/2022   PULMONONOLOGIST CONSULT NOTE yes   MyChart notes with Alon Whittington MD 2022       CHEMO & MED ONC REPORTS  Yes - chemo treatment records are in EPIC   *Send all radiation Prior radiation records for both [facility] and [facility] Radiation Oncology patients to [facility] with  ATTN: BALL/Yary Dosi/Physics      PRE-VISIT CHECKLIST      Record collection complete Yes   Appointment appropriately scheduled           (right time/right provider)         DISPLAY PLAN FREE TEXT DISPLAY PLAN FREE TEXT DISPLAY PLAN FREE TEXT DISPLAY PLAN FREE TEXT DISPLAY PLAN FREE TEXT

## 2022-05-16 ENCOUNTER — HOSPITAL ENCOUNTER (OUTPATIENT)
Dept: PET IMAGING | Facility: HOSPITAL | Age: 42
Discharge: HOME | End: 2022-05-16
Attending: INTERNAL MEDICINE
Payer: COMMERCIAL

## 2022-05-16 DIAGNOSIS — C81.18 NODULAR SCLEROSIS HODGKIN LYMPHOMA OF LYMPH NODES OF MULTIPLE REGIONS (H): ICD-10-CM

## 2022-05-16 LAB — GLUCOSE BLDC GLUCOMTR-MCNC: 98 MG/DL (ref 70–99)

## 2022-05-16 PROCEDURE — 78816 PET IMAGE W/CT FULL BODY: CPT | Mod: PS

## 2022-05-16 PROCEDURE — A9552 F18 FDG: HCPCS | Performed by: INTERNAL MEDICINE

## 2022-05-16 PROCEDURE — 343N000001 HC RX 343: Performed by: INTERNAL MEDICINE

## 2022-05-16 PROCEDURE — 74177 CT ABD & PELVIS W/CONTRAST: CPT

## 2022-05-16 PROCEDURE — 82962 GLUCOSE BLOOD TEST: CPT

## 2022-05-16 PROCEDURE — 250N000011 HC RX IP 250 OP 636: Performed by: INTERNAL MEDICINE

## 2022-05-16 RX ORDER — IOPAMIDOL 755 MG/ML
91 INJECTION, SOLUTION INTRAVASCULAR ONCE
Status: COMPLETED | OUTPATIENT
Start: 2022-05-16 | End: 2022-05-16

## 2022-05-16 RX ORDER — HEPARIN SODIUM (PORCINE) LOCK FLUSH IV SOLN 100 UNIT/ML 100 UNIT/ML
500 SOLUTION INTRAVENOUS ONCE
Status: COMPLETED | OUTPATIENT
Start: 2022-05-16 | End: 2022-05-16

## 2022-05-16 RX ADMIN — HEPARIN 500 UNITS: 100 SYRINGE at 08:44

## 2022-05-16 RX ADMIN — IOPAMIDOL 91 ML: 755 INJECTION, SOLUTION INTRAVENOUS at 08:13

## 2022-05-16 RX ADMIN — FLUDEOXYGLUCOSE F-18 10.76 MCI.: 500 INJECTION, SOLUTION INTRAVENOUS at 07:13

## 2022-05-17 ENCOUNTER — TELEPHONE (OUTPATIENT)
Dept: ONCOLOGY | Facility: CLINIC | Age: 42
End: 2022-05-17
Payer: COMMERCIAL

## 2022-05-17 DIAGNOSIS — Z51.11 ENCOUNTER FOR ANTINEOPLASTIC CHEMOTHERAPY: ICD-10-CM

## 2022-05-17 DIAGNOSIS — C81.18 NODULAR SCLEROSIS HODGKIN LYMPHOMA OF LYMPH NODES OF MULTIPLE REGIONS (H): Primary | ICD-10-CM

## 2022-05-17 RX ORDER — LORAZEPAM 2 MG/ML
0.5 INJECTION INTRAMUSCULAR EVERY 4 HOURS PRN
Status: CANCELLED | OUTPATIENT
Start: 2022-05-19

## 2022-05-17 RX ORDER — PALONOSETRON 0.05 MG/ML
0.25 INJECTION, SOLUTION INTRAVENOUS ONCE
Status: CANCELLED
Start: 2022-05-19

## 2022-05-17 RX ORDER — HEPARIN SODIUM,PORCINE 10 UNIT/ML
5 VIAL (ML) INTRAVENOUS
Status: CANCELLED | OUTPATIENT
Start: 2022-05-19

## 2022-05-17 RX ORDER — HEPARIN SODIUM (PORCINE) LOCK FLUSH IV SOLN 100 UNIT/ML 100 UNIT/ML
5 SOLUTION INTRAVENOUS
Status: CANCELLED | OUTPATIENT
Start: 2022-06-01

## 2022-05-17 RX ORDER — ALBUTEROL SULFATE 0.83 MG/ML
2.5 SOLUTION RESPIRATORY (INHALATION)
Status: CANCELLED | OUTPATIENT
Start: 2022-05-19

## 2022-05-17 RX ORDER — NALOXONE HYDROCHLORIDE 0.4 MG/ML
0.2 INJECTION, SOLUTION INTRAMUSCULAR; INTRAVENOUS; SUBCUTANEOUS
Status: CANCELLED | OUTPATIENT
Start: 2022-05-19

## 2022-05-17 RX ORDER — EPINEPHRINE 1 MG/ML
0.3 INJECTION, SOLUTION, CONCENTRATE INTRAVENOUS EVERY 5 MIN PRN
Status: CANCELLED | OUTPATIENT
Start: 2022-05-19

## 2022-05-17 RX ORDER — ALBUTEROL SULFATE 0.83 MG/ML
2.5 SOLUTION RESPIRATORY (INHALATION)
Status: CANCELLED | OUTPATIENT
Start: 2022-06-01

## 2022-05-17 RX ORDER — MEPERIDINE HYDROCHLORIDE 25 MG/ML
25 INJECTION INTRAMUSCULAR; INTRAVENOUS; SUBCUTANEOUS EVERY 30 MIN PRN
Status: CANCELLED | OUTPATIENT
Start: 2022-06-01

## 2022-05-17 RX ORDER — HEPARIN SODIUM (PORCINE) LOCK FLUSH IV SOLN 100 UNIT/ML 100 UNIT/ML
5 SOLUTION INTRAVENOUS
Status: CANCELLED | OUTPATIENT
Start: 2022-05-19

## 2022-05-17 RX ORDER — ALBUTEROL SULFATE 90 UG/1
1-2 AEROSOL, METERED RESPIRATORY (INHALATION)
Status: CANCELLED
Start: 2022-06-01

## 2022-05-17 RX ORDER — LORAZEPAM 2 MG/ML
0.5 INJECTION INTRAMUSCULAR EVERY 4 HOURS PRN
Status: CANCELLED | OUTPATIENT
Start: 2022-06-01

## 2022-05-17 RX ORDER — MEPERIDINE HYDROCHLORIDE 25 MG/ML
25 INJECTION INTRAMUSCULAR; INTRAVENOUS; SUBCUTANEOUS EVERY 30 MIN PRN
Status: CANCELLED | OUTPATIENT
Start: 2022-05-19

## 2022-05-17 RX ORDER — ALBUTEROL SULFATE 90 UG/1
1-2 AEROSOL, METERED RESPIRATORY (INHALATION)
Status: CANCELLED
Start: 2022-05-19

## 2022-05-17 RX ORDER — DOXORUBICIN HYDROCHLORIDE 2 MG/ML
25 INJECTION, SOLUTION INTRAVENOUS ONCE
Status: CANCELLED | OUTPATIENT
Start: 2022-05-19

## 2022-05-17 RX ORDER — NALOXONE HYDROCHLORIDE 0.4 MG/ML
0.2 INJECTION, SOLUTION INTRAMUSCULAR; INTRAVENOUS; SUBCUTANEOUS
Status: CANCELLED | OUTPATIENT
Start: 2022-06-01

## 2022-05-17 RX ORDER — PALONOSETRON 0.05 MG/ML
0.25 INJECTION, SOLUTION INTRAVENOUS ONCE
Status: CANCELLED
Start: 2022-06-01

## 2022-05-17 RX ORDER — DIPHENHYDRAMINE HYDROCHLORIDE 50 MG/ML
50 INJECTION INTRAMUSCULAR; INTRAVENOUS
Status: CANCELLED
Start: 2022-06-01

## 2022-05-17 RX ORDER — DOXORUBICIN HYDROCHLORIDE 2 MG/ML
25 INJECTION, SOLUTION INTRAVENOUS ONCE
Status: CANCELLED | OUTPATIENT
Start: 2022-06-01

## 2022-05-17 RX ORDER — DIPHENHYDRAMINE HYDROCHLORIDE 50 MG/ML
50 INJECTION INTRAMUSCULAR; INTRAVENOUS
Status: CANCELLED
Start: 2022-05-19

## 2022-05-17 RX ORDER — HEPARIN SODIUM,PORCINE 10 UNIT/ML
5 VIAL (ML) INTRAVENOUS
Status: CANCELLED | OUTPATIENT
Start: 2022-06-01

## 2022-05-17 RX ORDER — EPINEPHRINE 1 MG/ML
0.3 INJECTION, SOLUTION, CONCENTRATE INTRAVENOUS EVERY 5 MIN PRN
Status: CANCELLED | OUTPATIENT
Start: 2022-06-01

## 2022-05-17 NOTE — TELEPHONE ENCOUNTER
Reviewed the case with Dr. Choi from radiation oncology.  She remains PET negative and in CR.  The area needed for consolidation radiation is extensive including mediastinum, aortopulmonary window, pericardial space, bilateral axillae.  Consensus is to proceed with chemotherapy alone option rather than combined modality of consolidation radiation.    I informed the patient with the discussion outcome. She agreed with the plan.    -We will cancel consultation with radiation oncology.  -She will complete cycle #4 AVD.   -Follow-up labs and clinical exam in 4 months for posttreatment surveillance (3 months after completion of therapy).  H&P every 3-6 months for 1-2-year, then every 6-12 months until year 3, then annually.  Consider CT scan of the neck/chest/abdomen/pelvis with contrast not more than every 6 months for first 2-year or sooner with clinical indication.  Do not recommend a surveillance PET scan.      Juan Lizarraga MD

## 2022-05-18 ENCOUNTER — PRE VISIT (OUTPATIENT)
Dept: RADIATION ONCOLOGY | Facility: CLINIC | Age: 42
End: 2022-05-18

## 2022-05-23 ENCOUNTER — LAB (OUTPATIENT)
Dept: INFUSION THERAPY | Facility: CLINIC | Age: 42
End: 2022-05-23
Attending: INTERNAL MEDICINE
Payer: COMMERCIAL

## 2022-05-23 VITALS
RESPIRATION RATE: 16 BRPM | OXYGEN SATURATION: 99 % | SYSTOLIC BLOOD PRESSURE: 104 MMHG | HEART RATE: 95 BPM | TEMPERATURE: 98.4 F | DIASTOLIC BLOOD PRESSURE: 72 MMHG

## 2022-05-23 DIAGNOSIS — Z51.11 ENCOUNTER FOR ANTINEOPLASTIC CHEMOTHERAPY: ICD-10-CM

## 2022-05-23 DIAGNOSIS — C81.18 NODULAR SCLEROSIS HODGKIN LYMPHOMA OF LYMPH NODES OF MULTIPLE REGIONS (H): Primary | ICD-10-CM

## 2022-05-23 LAB
ALBUMIN SERPL-MCNC: 3.5 G/DL (ref 3.5–5)
ALP SERPL-CCNC: 69 U/L (ref 45–120)
ALT SERPL W P-5'-P-CCNC: 24 U/L (ref 0–45)
ANION GAP SERPL CALCULATED.3IONS-SCNC: 10 MMOL/L (ref 5–18)
AST SERPL W P-5'-P-CCNC: 20 U/L (ref 0–40)
BASOPHILS # BLD MANUAL: 0.1 10E3/UL (ref 0–0.2)
BASOPHILS NFR BLD MANUAL: 1 %
BILIRUB SERPL-MCNC: 0.2 MG/DL (ref 0–1)
BUN SERPL-MCNC: 14 MG/DL (ref 8–22)
CALCIUM SERPL-MCNC: 9.3 MG/DL (ref 8.5–10.5)
CHLORIDE BLD-SCNC: 109 MMOL/L (ref 98–107)
CO2 SERPL-SCNC: 23 MMOL/L (ref 22–31)
CREAT SERPL-MCNC: 0.84 MG/DL (ref 0.6–1.1)
EOSINOPHIL # BLD MANUAL: 3.1 10E3/UL (ref 0–0.7)
EOSINOPHIL NFR BLD MANUAL: 28 %
ERYTHROCYTE [DISTWIDTH] IN BLOOD BY AUTOMATED COUNT: 15.7 % (ref 10–15)
GFR SERPL CREATININE-BSD FRML MDRD: 88 ML/MIN/1.73M2
GLUCOSE BLD-MCNC: 82 MG/DL (ref 70–125)
HCT VFR BLD AUTO: 37.5 % (ref 35–47)
HGB BLD-MCNC: 12.2 G/DL (ref 11.7–15.7)
LYMPHOCYTES # BLD MANUAL: 2.6 10E3/UL (ref 0.8–5.3)
LYMPHOCYTES NFR BLD MANUAL: 24 %
MCH RBC QN AUTO: 29.5 PG (ref 26.5–33)
MCHC RBC AUTO-ENTMCNC: 32.5 G/DL (ref 31.5–36.5)
MCV RBC AUTO: 91 FL (ref 78–100)
MONOCYTES # BLD MANUAL: 0.9 10E3/UL (ref 0–1.3)
MONOCYTES NFR BLD MANUAL: 8 %
NEUTROPHILS # BLD MANUAL: 4.3 10E3/UL (ref 1.6–8.3)
NEUTROPHILS NFR BLD MANUAL: 39 %
PLAT MORPH BLD: ABNORMAL
PLATELET # BLD AUTO: 431 10E3/UL (ref 150–450)
POTASSIUM BLD-SCNC: 3.7 MMOL/L (ref 3.5–5)
PROT SERPL-MCNC: 6.6 G/DL (ref 6–8)
RBC # BLD AUTO: 4.13 10E6/UL (ref 3.8–5.2)
RBC MORPH BLD: ABNORMAL
SODIUM SERPL-SCNC: 142 MMOL/L (ref 136–145)
WBC # BLD AUTO: 11 10E3/UL (ref 4–11)

## 2022-05-23 PROCEDURE — 96411 CHEMO IV PUSH ADDL DRUG: CPT

## 2022-05-23 PROCEDURE — 96367 TX/PROPH/DG ADDL SEQ IV INF: CPT

## 2022-05-23 PROCEDURE — 250N000011 HC RX IP 250 OP 636: Performed by: INTERNAL MEDICINE

## 2022-05-23 PROCEDURE — 258N000003 HC RX IP 258 OP 636: Performed by: INTERNAL MEDICINE

## 2022-05-23 PROCEDURE — 96375 TX/PRO/DX INJ NEW DRUG ADDON: CPT

## 2022-05-23 PROCEDURE — 85027 COMPLETE CBC AUTOMATED: CPT | Performed by: INTERNAL MEDICINE

## 2022-05-23 PROCEDURE — 96413 CHEMO IV INFUSION 1 HR: CPT

## 2022-05-23 PROCEDURE — 80053 COMPREHEN METABOLIC PANEL: CPT | Performed by: INTERNAL MEDICINE

## 2022-05-23 PROCEDURE — 85007 BL SMEAR W/DIFF WBC COUNT: CPT | Performed by: INTERNAL MEDICINE

## 2022-05-23 RX ORDER — HEPARIN SODIUM (PORCINE) LOCK FLUSH IV SOLN 100 UNIT/ML 100 UNIT/ML
5 SOLUTION INTRAVENOUS
Status: DISCONTINUED | OUTPATIENT
Start: 2022-05-23 | End: 2022-05-23 | Stop reason: HOSPADM

## 2022-05-23 RX ORDER — HEPARIN SODIUM,PORCINE 10 UNIT/ML
5 VIAL (ML) INTRAVENOUS
Status: DISCONTINUED | OUTPATIENT
Start: 2022-05-23 | End: 2022-05-23 | Stop reason: HOSPADM

## 2022-05-23 RX ORDER — PALONOSETRON 0.05 MG/ML
0.25 INJECTION, SOLUTION INTRAVENOUS ONCE
Status: COMPLETED | OUTPATIENT
Start: 2022-05-23 | End: 2022-05-23

## 2022-05-23 RX ORDER — DOXORUBICIN HYDROCHLORIDE 2 MG/ML
25 INJECTION, SOLUTION INTRAVENOUS ONCE
Status: COMPLETED | OUTPATIENT
Start: 2022-05-23 | End: 2022-05-23

## 2022-05-23 RX ADMIN — VINBLASTINE SULFATE 11.6 MG: 1 INJECTION INTRAVENOUS at 11:05

## 2022-05-23 RX ADMIN — DOXORUBICIN HYDROCHLORIDE 50 MG: 2 INJECTION, SOLUTION INTRAVENOUS at 10:57

## 2022-05-23 RX ADMIN — DEXAMETHASONE SODIUM PHOSPHATE: 10 INJECTION, SOLUTION INTRAMUSCULAR; INTRAVENOUS at 10:04

## 2022-05-23 RX ADMIN — DACARBAZINE 730 MG: 200 INJECTION, POWDER, FOR SOLUTION INTRAVENOUS at 11:10

## 2022-05-23 RX ADMIN — HEPARIN SODIUM (PORCINE) LOCK FLUSH IV SOLN 100 UNIT/ML 5 ML: 100 SOLUTION at 11:50

## 2022-05-23 RX ADMIN — PALONOSETRON 0.25 MG: 0.05 INJECTION, SOLUTION INTRAVENOUS at 09:34

## 2022-05-23 RX ADMIN — SODIUM CHLORIDE 250 ML: 9 INJECTION, SOLUTION INTRAVENOUS at 09:35

## 2022-05-23 NOTE — PROGRESS NOTES
Infusion Nursing Note:  Amna Oneil presents today for chemotherapy.    Patient seen by provider today: No   present during visit today: Not Applicable.    Note: Tolerated well.      Intravenous Access:  Implanted Port.    Treatment Conditions:  Results reviewed, labs MET treatment parameters, ok to proceed with treatment.      Post Infusion Assessment:  Patient tolerated infusion without incident.  No evidence of extravasations.  Access discontinued per protocol.       Discharge Plan:   Patient and/or family verbalized understanding of discharge instructions and all questions answered.      Priscilla Araiza RN

## 2022-05-27 LAB
ACID FAST STAIN (ARUP): NORMAL

## 2022-06-06 ENCOUNTER — LAB (OUTPATIENT)
Dept: INFUSION THERAPY | Facility: CLINIC | Age: 42
End: 2022-06-06
Attending: INTERNAL MEDICINE
Payer: COMMERCIAL

## 2022-06-06 VITALS
DIASTOLIC BLOOD PRESSURE: 82 MMHG | HEART RATE: 85 BPM | RESPIRATION RATE: 16 BRPM | OXYGEN SATURATION: 98 % | SYSTOLIC BLOOD PRESSURE: 111 MMHG | TEMPERATURE: 97.8 F

## 2022-06-06 DIAGNOSIS — C81.18 NODULAR SCLEROSIS HODGKIN LYMPHOMA OF LYMPH NODES OF MULTIPLE REGIONS (H): Primary | ICD-10-CM

## 2022-06-06 DIAGNOSIS — Z51.11 ENCOUNTER FOR ANTINEOPLASTIC CHEMOTHERAPY: ICD-10-CM

## 2022-06-06 LAB
BASOPHILS # BLD AUTO: 0.1 10E3/UL (ref 0–0.2)
BASOPHILS NFR BLD AUTO: 1 %
EOSINOPHIL # BLD AUTO: 2.3 10E3/UL (ref 0–0.7)
EOSINOPHIL NFR BLD AUTO: 34 %
ERYTHROCYTE [DISTWIDTH] IN BLOOD BY AUTOMATED COUNT: 15.4 % (ref 10–15)
HCT VFR BLD AUTO: 35.4 % (ref 35–47)
HGB BLD-MCNC: 11.4 G/DL (ref 11.7–15.7)
IMM GRANULOCYTES # BLD: 0 10E3/UL
IMM GRANULOCYTES NFR BLD: 0 %
LYMPHOCYTES # BLD AUTO: 1.7 10E3/UL (ref 0.8–5.3)
LYMPHOCYTES NFR BLD AUTO: 24 %
MCH RBC QN AUTO: 29.6 PG (ref 26.5–33)
MCHC RBC AUTO-ENTMCNC: 32.2 G/DL (ref 31.5–36.5)
MCV RBC AUTO: 92 FL (ref 78–100)
MONOCYTES # BLD AUTO: 0.9 10E3/UL (ref 0–1.3)
MONOCYTES NFR BLD AUTO: 12 %
NEUTROPHILS # BLD AUTO: 2.1 10E3/UL (ref 1.6–8.3)
NEUTROPHILS NFR BLD AUTO: 29 %
NRBC # BLD AUTO: 0 10E3/UL
NRBC BLD AUTO-RTO: 0 /100
PLATELET # BLD AUTO: 365 10E3/UL (ref 150–450)
RBC # BLD AUTO: 3.85 10E6/UL (ref 3.8–5.2)
WBC # BLD AUTO: 7 10E3/UL (ref 4–11)

## 2022-06-06 PROCEDURE — 85025 COMPLETE CBC W/AUTO DIFF WBC: CPT | Performed by: INTERNAL MEDICINE

## 2022-06-06 PROCEDURE — 96413 CHEMO IV INFUSION 1 HR: CPT

## 2022-06-06 PROCEDURE — 96375 TX/PRO/DX INJ NEW DRUG ADDON: CPT

## 2022-06-06 PROCEDURE — 96367 TX/PROPH/DG ADDL SEQ IV INF: CPT

## 2022-06-06 PROCEDURE — 258N000003 HC RX IP 258 OP 636: Performed by: INTERNAL MEDICINE

## 2022-06-06 PROCEDURE — 96411 CHEMO IV PUSH ADDL DRUG: CPT

## 2022-06-06 PROCEDURE — 250N000011 HC RX IP 250 OP 636: Performed by: INTERNAL MEDICINE

## 2022-06-06 RX ORDER — DIPHENHYDRAMINE HYDROCHLORIDE 50 MG/ML
50 INJECTION INTRAMUSCULAR; INTRAVENOUS
Status: DISCONTINUED | OUTPATIENT
Start: 2022-06-06 | End: 2022-06-06 | Stop reason: HOSPADM

## 2022-06-06 RX ORDER — HEPARIN SODIUM (PORCINE) LOCK FLUSH IV SOLN 100 UNIT/ML 100 UNIT/ML
5 SOLUTION INTRAVENOUS
Status: DISCONTINUED | OUTPATIENT
Start: 2022-06-06 | End: 2022-06-06 | Stop reason: HOSPADM

## 2022-06-06 RX ORDER — NALOXONE HYDROCHLORIDE 0.4 MG/ML
0.2 INJECTION, SOLUTION INTRAMUSCULAR; INTRAVENOUS; SUBCUTANEOUS
Status: DISCONTINUED | OUTPATIENT
Start: 2022-06-06 | End: 2022-06-06 | Stop reason: HOSPADM

## 2022-06-06 RX ORDER — ALBUTEROL SULFATE 90 UG/1
1-2 AEROSOL, METERED RESPIRATORY (INHALATION)
Status: DISCONTINUED | OUTPATIENT
Start: 2022-06-06 | End: 2022-06-06 | Stop reason: HOSPADM

## 2022-06-06 RX ORDER — METHYLPREDNISOLONE SODIUM SUCCINATE 125 MG/2ML
125 INJECTION, POWDER, LYOPHILIZED, FOR SOLUTION INTRAMUSCULAR; INTRAVENOUS
Status: DISCONTINUED | OUTPATIENT
Start: 2022-06-06 | End: 2022-06-06 | Stop reason: HOSPADM

## 2022-06-06 RX ORDER — ALBUTEROL SULFATE 0.83 MG/ML
2.5 SOLUTION RESPIRATORY (INHALATION)
Status: DISCONTINUED | OUTPATIENT
Start: 2022-06-06 | End: 2022-06-06 | Stop reason: HOSPADM

## 2022-06-06 RX ORDER — PALONOSETRON 0.05 MG/ML
0.25 INJECTION, SOLUTION INTRAVENOUS ONCE
Status: COMPLETED | OUTPATIENT
Start: 2022-06-06 | End: 2022-06-06

## 2022-06-06 RX ORDER — EPINEPHRINE 1 MG/ML
0.3 INJECTION, SOLUTION, CONCENTRATE INTRAVENOUS EVERY 5 MIN PRN
Status: DISCONTINUED | OUTPATIENT
Start: 2022-06-06 | End: 2022-06-06 | Stop reason: HOSPADM

## 2022-06-06 RX ORDER — MEPERIDINE HYDROCHLORIDE 50 MG/ML
25 INJECTION INTRAMUSCULAR; INTRAVENOUS; SUBCUTANEOUS EVERY 30 MIN PRN
Status: DISCONTINUED | OUTPATIENT
Start: 2022-06-06 | End: 2022-06-06 | Stop reason: HOSPADM

## 2022-06-06 RX ORDER — DOXORUBICIN HYDROCHLORIDE 2 MG/ML
25 INJECTION, SOLUTION INTRAVENOUS ONCE
Status: COMPLETED | OUTPATIENT
Start: 2022-06-06 | End: 2022-06-06

## 2022-06-06 RX ADMIN — HEPARIN SODIUM (PORCINE) LOCK FLUSH IV SOLN 100 UNIT/ML 5 ML: 100 SOLUTION at 10:36

## 2022-06-06 RX ADMIN — SODIUM CHLORIDE 250 ML: 9 INJECTION, SOLUTION INTRAVENOUS at 08:56

## 2022-06-06 RX ADMIN — DEXAMETHASONE SODIUM PHOSPHATE: 10 INJECTION, SOLUTION INTRAMUSCULAR; INTRAVENOUS at 08:59

## 2022-06-06 RX ADMIN — DOXORUBICIN HYDROCHLORIDE 50 MG: 2 INJECTION, SOLUTION INTRAVENOUS at 09:38

## 2022-06-06 RX ADMIN — SODIUM CHLORIDE 730 MG: 9 INJECTION, SOLUTION INTRAVENOUS at 10:00

## 2022-06-06 RX ADMIN — PALONOSETRON 0.25 MG: 0.05 INJECTION, SOLUTION INTRAVENOUS at 08:56

## 2022-06-06 RX ADMIN — VINBLASTINE SULFATE 11.6 MG: 1 INJECTION INTRAVENOUS at 09:51

## 2022-06-06 NOTE — PROGRESS NOTES
Infusion Nursing Note:  Amna Oneil presents today for Cycle 4, Day 15 of her chemotherapy plan.    Patient seen by provider today: No   present during visit today: Not Applicable.    Note: Pre medications Aloxi, Emend/Dexamethasone administered 30 minutes prior to treatment as ordered.    Intravenous Access:  Implanted Port.    Treatment Conditions:  Lab Results   Component Value Date    HGB 11.4 (L) 06/06/2022    WBC 7.0 06/06/2022    ANEU 4.3 05/23/2022    ANEUTAUTO 2.1 06/06/2022     06/06/2022      Results reviewed, labs MET treatment parameters, ok to proceed with treatment.    Post Infusion Assessment:  Patient tolerated infusion without incident.  Blood return noted pre and post infusion.  Site patent and intact, free from redness, edema or discomfort.  Access discontinued per protocol.     Discharge Plan:   Patient will stop at the scheduling desk to arrange for port flush and her three month appointment.   Patient discharged in stable condition accompanied by: self.  Departure Mode: Ambulatory.      Nika Perez RN

## 2022-06-27 ENCOUNTER — PATIENT OUTREACH (OUTPATIENT)
Dept: ONCOLOGY | Facility: CLINIC | Age: 42
End: 2022-06-27

## 2022-06-27 NOTE — PROGRESS NOTES
"[facility]: Cancer Care Short Note                                    Discussion with Patient:                                                      Patient called and left message on Cancer Care Triage line wondering when she needed to have port flushed again. She asked for call back to [phone]     Called patient back. See assessment below.    Assessment:                                                      Patient reports she is feeling \"great!\" She says \"I finally feel like I'm on the road to recovery.    Port last accessed on 6/6/22.    Intervention/Education provided during outreach:                                                       Told patient port flushes every 8 weeks.  Patient would like to schedule now, along with her 3 month CT, labs, provider.     Call transferred to Grace Eugene to schedule.    Patient to follow up as scheduled at next appt    Signature:  Ping Gonzales RN  "

## 2022-07-21 ENCOUNTER — INFUSION THERAPY VISIT (OUTPATIENT)
Dept: INFUSION THERAPY | Facility: CLINIC | Age: 42
End: 2022-07-21
Attending: INTERNAL MEDICINE
Payer: COMMERCIAL

## 2022-07-21 DIAGNOSIS — C81.18 NODULAR SCLEROSIS HODGKIN LYMPHOMA OF LYMPH NODES OF MULTIPLE REGIONS (H): Primary | ICD-10-CM

## 2022-07-21 PROCEDURE — 96523 IRRIG DRUG DELIVERY DEVICE: CPT

## 2022-07-21 PROCEDURE — 250N000011 HC RX IP 250 OP 636: Performed by: INTERNAL MEDICINE

## 2022-07-21 RX ORDER — HEPARIN SODIUM (PORCINE) LOCK FLUSH IV SOLN 100 UNIT/ML 100 UNIT/ML
5 SOLUTION INTRAVENOUS
Status: CANCELLED | OUTPATIENT
Start: 2022-07-21

## 2022-07-21 RX ORDER — HEPARIN SODIUM (PORCINE) LOCK FLUSH IV SOLN 100 UNIT/ML 100 UNIT/ML
5 SOLUTION INTRAVENOUS
Status: DISCONTINUED | OUTPATIENT
Start: 2022-07-21 | End: 2022-07-21 | Stop reason: HOSPADM

## 2022-07-21 RX ORDER — HEPARIN SODIUM,PORCINE 10 UNIT/ML
5 VIAL (ML) INTRAVENOUS
Status: CANCELLED | OUTPATIENT
Start: 2022-07-21

## 2022-07-21 RX ADMIN — HEPARIN SODIUM (PORCINE) LOCK FLUSH IV SOLN 100 UNIT/ML 5 ML: 100 SOLUTION at 12:54

## 2022-07-22 DIAGNOSIS — I26.99 ACUTE PULMONARY EMBOLISM WITHOUT ACUTE COR PULMONALE, UNSPECIFIED PULMONARY EMBOLISM TYPE (H): ICD-10-CM

## 2022-07-22 RX ORDER — RIVAROXABAN 20 MG/1
20 TABLET, FILM COATED ORAL
Qty: 30 TABLET | Refills: 3 | Status: SHIPPED | OUTPATIENT
Start: 2022-07-22 | End: 2022-11-16

## 2022-07-22 NOTE — TELEPHONE ENCOUNTER
[facility]: Cancer Care                                                                                          Refill request was received via [employer] for Xarelto, last filled on 3/3/22 by Dr. Lizarraga, 30 tablets, 3 refills.  Patient is scheduled for future follow up on 8/31    Signature:  Indira Caro RN

## 2022-08-29 ENCOUNTER — HOSPITAL ENCOUNTER (OUTPATIENT)
Dept: CT IMAGING | Facility: CLINIC | Age: 42
Discharge: HOME | End: 2022-08-29
Attending: INTERNAL MEDICINE
Payer: COMMERCIAL

## 2022-08-29 DIAGNOSIS — C81.18 NODULAR SCLEROSIS HODGKIN LYMPHOMA OF LYMPH NODES OF MULTIPLE REGIONS (H): ICD-10-CM

## 2022-08-29 PROCEDURE — 74177 CT ABD & PELVIS W/CONTRAST: CPT

## 2022-08-29 PROCEDURE — 250N000011 HC RX IP 250 OP 636: Performed by: INTERNAL MEDICINE

## 2022-08-29 PROCEDURE — 70491 CT SOFT TISSUE NECK W/DYE: CPT

## 2022-08-29 RX ORDER — IOPAMIDOL 755 MG/ML
100 INJECTION, SOLUTION INTRAVASCULAR ONCE
Status: COMPLETED | OUTPATIENT
Start: 2022-08-29 | End: 2022-08-29

## 2022-08-29 RX ORDER — HEPARIN SODIUM (PORCINE) LOCK FLUSH IV SOLN 100 UNIT/ML 100 UNIT/ML
500 SOLUTION INTRAVENOUS ONCE
Status: COMPLETED | OUTPATIENT
Start: 2022-08-29 | End: 2022-08-29

## 2022-08-29 RX ADMIN — HEPARIN SODIUM (PORCINE) LOCK FLUSH IV SOLN 100 UNIT/ML 500 UNITS: 100 SOLUTION at 09:10

## 2022-08-29 RX ADMIN — IOPAMIDOL 100 ML: 755 INJECTION, SOLUTION INTRAVENOUS at 09:10

## 2022-08-31 ENCOUNTER — PATIENT OUTREACH (OUTPATIENT)
Dept: ONCOLOGY | Facility: CLINIC | Age: 42
End: 2022-08-31

## 2022-08-31 ENCOUNTER — LAB (OUTPATIENT)
Dept: INFUSION THERAPY | Facility: CLINIC | Age: 42
End: 2022-08-31
Attending: INTERNAL MEDICINE
Payer: COMMERCIAL

## 2022-08-31 ENCOUNTER — ONCOLOGY VISIT (OUTPATIENT)
Dept: ONCOLOGY | Facility: CLINIC | Age: 42
End: 2022-08-31
Attending: INTERNAL MEDICINE
Payer: COMMERCIAL

## 2022-08-31 VITALS
OXYGEN SATURATION: 98 % | SYSTOLIC BLOOD PRESSURE: 99 MMHG | RESPIRATION RATE: 16 BRPM | HEIGHT: 67 IN | BODY MASS INDEX: 29.57 KG/M2 | DIASTOLIC BLOOD PRESSURE: 71 MMHG | TEMPERATURE: 98.6 F | WEIGHT: 188.4 LBS | HEART RATE: 72 BPM

## 2022-08-31 DIAGNOSIS — C81.18 NODULAR SCLEROSIS HODGKIN LYMPHOMA OF LYMPH NODES OF MULTIPLE REGIONS (H): Primary | ICD-10-CM

## 2022-08-31 DIAGNOSIS — I26.99 BILATERAL PULMONARY EMBOLISM (H): ICD-10-CM

## 2022-08-31 LAB
ALBUMIN SERPL-MCNC: 3.8 G/DL (ref 3.5–5)
ALP SERPL-CCNC: 68 U/L (ref 45–120)
ALT SERPL W P-5'-P-CCNC: 14 U/L (ref 0–45)
ANION GAP SERPL CALCULATED.3IONS-SCNC: 8 MMOL/L (ref 5–18)
AST SERPL W P-5'-P-CCNC: 18 U/L (ref 0–40)
BASOPHILS # BLD AUTO: 0.1 10E3/UL (ref 0–0.2)
BASOPHILS NFR BLD AUTO: 1 %
BILIRUB SERPL-MCNC: 0.6 MG/DL (ref 0–1)
BUN SERPL-MCNC: 18 MG/DL (ref 8–22)
CALCIUM SERPL-MCNC: 9.2 MG/DL (ref 8.5–10.5)
CHLORIDE BLD-SCNC: 108 MMOL/L (ref 98–107)
CO2 SERPL-SCNC: 22 MMOL/L (ref 22–31)
CREAT SERPL-MCNC: 0.71 MG/DL (ref 0.6–1.1)
EOSINOPHIL # BLD AUTO: 1.2 10E3/UL (ref 0–0.7)
EOSINOPHIL NFR BLD AUTO: 14 %
ERYTHROCYTE [DISTWIDTH] IN BLOOD BY AUTOMATED COUNT: 13.1 % (ref 10–15)
ERYTHROCYTE [SEDIMENTATION RATE] IN BLOOD BY WESTERGREN METHOD: 8 MM/HR (ref 0–20)
GFR SERPL CREATININE-BSD FRML MDRD: >90 ML/MIN/1.73M2
GLUCOSE BLD-MCNC: 88 MG/DL (ref 70–125)
HCT VFR BLD AUTO: 39.1 % (ref 35–47)
HGB BLD-MCNC: 13 G/DL (ref 11.7–15.7)
IMM GRANULOCYTES # BLD: 0 10E3/UL
IMM GRANULOCYTES NFR BLD: 0 %
LYMPHOCYTES # BLD AUTO: 1.8 10E3/UL (ref 0.8–5.3)
LYMPHOCYTES NFR BLD AUTO: 21 %
MCH RBC QN AUTO: 30.2 PG (ref 26.5–33)
MCHC RBC AUTO-ENTMCNC: 33.2 G/DL (ref 31.5–36.5)
MCV RBC AUTO: 91 FL (ref 78–100)
MONOCYTES # BLD AUTO: 0.7 10E3/UL (ref 0–1.3)
MONOCYTES NFR BLD AUTO: 8 %
NEUTROPHILS # BLD AUTO: 4.9 10E3/UL (ref 1.6–8.3)
NEUTROPHILS NFR BLD AUTO: 56 %
NRBC # BLD AUTO: 0 10E3/UL
NRBC BLD AUTO-RTO: 0 /100
PLATELET # BLD AUTO: 368 10E3/UL (ref 150–450)
POTASSIUM BLD-SCNC: 4.3 MMOL/L (ref 3.5–5)
PROT SERPL-MCNC: 6.2 G/DL (ref 6–8)
RBC # BLD AUTO: 4.31 10E6/UL (ref 3.8–5.2)
SODIUM SERPL-SCNC: 138 MMOL/L (ref 136–145)
WBC # BLD AUTO: 8.6 10E3/UL (ref 4–11)

## 2022-08-31 PROCEDURE — G0463 HOSPITAL OUTPT CLINIC VISIT: HCPCS

## 2022-08-31 PROCEDURE — 36591 DRAW BLOOD OFF VENOUS DEVICE: CPT

## 2022-08-31 PROCEDURE — 85652 RBC SED RATE AUTOMATED: CPT

## 2022-08-31 PROCEDURE — 85004 AUTOMATED DIFF WBC COUNT: CPT

## 2022-08-31 PROCEDURE — 82040 ASSAY OF SERUM ALBUMIN: CPT

## 2022-08-31 PROCEDURE — 99214 OFFICE O/P EST MOD 30 MIN: CPT | Performed by: INTERNAL MEDICINE

## 2022-08-31 PROCEDURE — 80053 COMPREHEN METABOLIC PANEL: CPT

## 2022-08-31 RX ORDER — HEPARIN SODIUM (PORCINE) LOCK FLUSH IV SOLN 100 UNIT/ML 100 UNIT/ML
5 SOLUTION INTRAVENOUS
Status: DISCONTINUED | OUTPATIENT
Start: 2022-08-31 | End: 2022-08-31 | Stop reason: HOSPADM

## 2022-08-31 RX ORDER — HEPARIN SODIUM,PORCINE 10 UNIT/ML
5 VIAL (ML) INTRAVENOUS
Status: CANCELLED | OUTPATIENT
Start: 2022-08-31

## 2022-08-31 RX ORDER — HEPARIN SODIUM (PORCINE) LOCK FLUSH IV SOLN 100 UNIT/ML 100 UNIT/ML
5 SOLUTION INTRAVENOUS
Status: CANCELLED | OUTPATIENT
Start: 2022-08-31

## 2022-08-31 RX ORDER — HEPARIN SODIUM,PORCINE 10 UNIT/ML
5 VIAL (ML) INTRAVENOUS
Status: DISCONTINUED | OUTPATIENT
Start: 2022-08-31 | End: 2022-08-31 | Stop reason: HOSPADM

## 2022-08-31 ASSESSMENT — PAIN SCALES - GENERAL: PAINLEVEL: MODERATE PAIN (4)

## 2022-08-31 NOTE — PROGRESS NOTES
"Oncology Rooming Note    August 31, 2022 1:40 PM   Amna Oneil is a 42 year old female who presents for:    Chief Complaint   Patient presents with     Oncology Clinic Visit     Nodular sclerosis Hodgkin lymphoma of lymph nodes of multiple regions      Initial Vitals: BP 99/71   Pulse 72   Temp 98.6  F (37  C) (Oral)   Resp 16   Ht 1.702 m (5' 7\")   Wt 85.5 kg (188 lb 6.4 oz)   SpO2 98%   BMI 29.51 kg/m   Estimated body mass index is 29.51 kg/m  as calculated from the following:    Height as of this encounter: 1.702 m (5' 7\").    Weight as of this encounter: 85.5 kg (188 lb 6.4 oz). Body surface area is 2.01 meters squared.  Moderate Pain (4) Comment: Data Unavailable   No LMP recorded. (Menstrual status: Birth Control).  Allergies reviewed: Yes  Medications reviewed: Yes    Medications: Medication refills not needed today.  Pharmacy name entered into [employer]:    [facility] -- [place] - [place], [place] - [address]  [id number] - [place], [place] - [address] AT [facility] [address]    Clinical concers: Follow up.       Leonardo Corona LPN            "

## 2022-08-31 NOTE — LETTER
"    8/31/2022         RE: Amna Oneil  [address] [place] [address]        Dear Colleague,    Thank you for referring your patient, Amna Oneil, to the [facility]. Please see a copy of my visit note below.    Oncology Rooming Note    August 31, 2022 1:40 PM   Amna Oneil is a 42 year old female who presents for:    Chief Complaint   Patient presents with     Oncology Clinic Visit     Nodular sclerosis Hodgkin lymphoma of lymph nodes of multiple regions      Initial Vitals: BP 99/71   Pulse 72   Temp 98.6  F (37  C) (Oral)   Resp 16   Ht 1.702 m (5' 7\")   Wt 85.5 kg (188 lb 6.4 oz)   SpO2 98%   BMI 29.51 kg/m   Estimated body mass index is 29.51 kg/m  as calculated from the following:    Height as of this encounter: 1.702 m (5' 7\").    Weight as of this encounter: 85.5 kg (188 lb 6.4 oz). Body surface area is 2.01 meters squared.  Moderate Pain (4) Comment: Data Unavailable   No LMP recorded. (Menstrual status: Birth Control).  Allergies reviewed: Yes  Medications reviewed: Yes    Medications: Medication refills not needed today.  Pharmacy name entered into [employer]:    [facility] -- [place] - [place], [place] - [address]  [id number] - [place], [place] - [address] [facility] [address]    Clinical concers: Follow up.       Leonardo Corona LPN              [facility] Hematology and Oncology Progress Note    Patient: Amna Oneil  MRN: [id number]  Date of Service: Aug 31, 2022     Reason for Visit    Chief Complaint   Patient presents with     Oncology Clinic Visit     Nodular sclerosis Hodgkin lymphoma of lymph nodes of multiple regions        Assessment and Plan    Cancer Staging  No matching staging information was found for the patient.    ECOG Performance    0 - Independent     Pain  Pain Score: Moderate Pain (4)    #.  Classical Hodgkin lymphoma, stage II (favorable risk) IPSS-0 risk factor, " FFP 88%, overall survival 98%.   Clinically well.  Exam is unremarkable.  I reviewed her labs including CBC, CMP and ESR and they are all within normal range. I reviewed the CT scan of neck, chest, abdomen and pelvis and also reviewed the report in detail.  There is no evidence of lymphadenopathy.  I inform her that she is in clinical and radiographic remission at this point.   Recommended clinical exam and labs in 3 months, or another CT is in 6 months or sooner with any clinical concern.  I explained to her that we will plan to obtain CT imaging not more than 2 times a year for surveillance in the first 2-3 years, then clinical exam and labs to complete 5 years of surveillance.    #.  Acute bilateral pulmonary emboli without evidence of saddle embolus or central emboli in 2/2022.   Likely provoked in the setting of recent COVID-19 infection, active chemotherapy for lymphoma in the setting of ongoing birth control pill use.   She has stopped birth control pills.  She tolerated Xarelto well without side effects or intolerance.     At this point she completed 6 months of anticoagulation therapy.  She completes chemotherapy.  No evidence of active cancer.  Okay to stop Xarelto at this point.  She just started a new bottle of Xarelto, therefore I's advised her to complete the current prescription then stop.      #.  Arthritis/arthralgia   Could be late side effects from chemotherapy, and also could be from deconditioning.  Advised to continue and increase activity and exercises..    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    Relevant Orders    CBC with Platelets & Differential    Comprehensive metabolic panel    ESR: Erythrocyte sedimentation rate       Other    Bilateral pulmonary embolism (H)             CC: Physician No Ref-Primary  "  ______________________________________________________________________________  Diagnosis  12/13/2021-classical Hodgkin lymphoma by left axillary lymph node core needle biopsy.  12/23/2021-PET/CT scan showed hypermetabolic lymph nodes involving basilar neck/superior mediastinum, mediastinum, right pericardial space, bilateral axillae.   -second opinion pathology evaluation at [facility] and it confirmed classical Hodgkin lymphoma.  Echocardiogram showed adequate cardiac function.  Pulmonary function test was normal    Treatment to date  2/3/2022-5/23/2022-completed 4 cycles of ABVD (bleomycin was omitted in cycle #3 and #4 due to pulmonary toxicity) treatment course was complicated by pulmonary emboli, PCP pneumonia.  She achieved complete radiographic response (no uptake by PET) after 2 cycles of ABVD.    History of Present Illness    Ms. Amna Oneil presented today accompanied by her .    She reported intermittent joint pains after chemotherapy and wondering whether they are late side effects.  She remains on Xarelto.  No bleeding.  No neuropathy.     Review of systems  Apart from describing in HPI, the remainder of comprehensive ROS was negative.    Past History    Past Medical History:   Diagnosis Date     Hodgkin lymphoma (H)      Pulmonary emboli (H)        Past Surgical History:   Procedure Laterality Date     IR CHEST PORT PLACEMENT > 5 YRS OF AGE  1/21/2022         Physical Exam    BP 99/71   Pulse 72   Temp 98.6  F (37  C) (Oral)   Resp 16   Ht 1.702 m (5' 7\")   Wt 85.5 kg (188 lb 6.4 oz)   SpO2 98%   BMI 29.51 kg/m      General: alert, awake, not in acute distress  HEENT: Head: Normal, normocephalic, atraumatic.  Eye: Normal external eye, conjunctiva, lids cornea, PATRICIA.  Pharynx: Normal buccal mucosa. Normal pharynx.  Neck / Thyroid: Supple, no masses, nodes, nodules or enlargement.  Lymphatics: No abnormally enlarged lymph nodes.  Chest: Normal chest wall and " respirations. Clear to auscultation.  Heart: S1 S2 RRR.   Abdomen: abdomen is soft without significant tenderness, masses, organomegaly or guarding  Extremities: normal strength, tone, and muscle mass  Skin: normal. no rash or abnormalities  CNS: non focal.    Lab Results    Recent Results (from the past 168 hour(s))   Comprehensive metabolic panel   Result Value Ref Range    Sodium 138 136 - 145 mmol/L    Potassium 4.3 3.5 - 5.0 mmol/L    Chloride 108 (H) 98 - 107 mmol/L    Carbon Dioxide (CO2) 22 22 - 31 mmol/L    Anion Gap 8 5 - 18 mmol/L    Urea Nitrogen 18 8 - 22 mg/dL    Creatinine 0.71 0.60 - 1.10 mg/dL    Calcium 9.2 8.5 - 10.5 mg/dL    Glucose 88 70 - 125 mg/dL    Alkaline Phosphatase 68 45 - 120 U/L    AST 18 0 - 40 U/L    ALT 14 0 - 45 U/L    Protein Total 6.2 6.0 - 8.0 g/dL    Albumin 3.8 3.5 - 5.0 g/dL    Bilirubin Total 0.6 0.0 - 1.0 mg/dL    GFR Estimate >90 >60 mL/min/1.73m2   ESR: Erythrocyte sedimentation rate   Result Value Ref Range    Erythrocyte Sedimentation Rate 8 0 - 20 mm/hr   CBC with platelets and differential   Result Value Ref Range    WBC Count 8.6 4.0 - 11.0 10e3/uL    RBC Count 4.31 3.80 - 5.20 10e6/uL    Hemoglobin 13.0 11.7 - 15.7 g/dL    Hematocrit 39.1 35.0 - 47.0 %    MCV 91 78 - 100 fL    MCH 30.2 26.5 - 33.0 pg    MCHC 33.2 31.5 - 36.5 g/dL    RDW 13.1 10.0 - 15.0 %    Platelet Count 368 150 - 450 10e3/uL    % Neutrophils 56 %    % Lymphocytes 21 %    % Monocytes 8 %    % Eosinophils 14 %    % Basophils 1 %    % Immature Granulocytes 0 %    NRBCs per 100 WBC 0 <1 /100    Absolute Neutrophils 4.9 1.6 - 8.3 10e3/uL    Absolute Lymphocytes 1.8 0.8 - 5.3 10e3/uL    Absolute Monocytes 0.7 0.0 - 1.3 10e3/uL    Absolute Eosinophils 1.2 (H) 0.0 - 0.7 10e3/uL    Absolute Basophils 0.1 0.0 - 0.2 10e3/uL    Absolute Immature Granulocytes 0.0 <=0.4 10e3/uL    Absolute NRBCs 0.0 10e3/uL       Imaging    CT Chest/Abdomen/Pelvis w Contrast    Result Date: 8/29/2022  EXAM: CT  CHEST/ABDOMEN/PELVIS W CONTRAST LOCATION: [facility] DATE/TIME: 8/29/2022 9:15 AM INDICATION:  Nodular sclerosis Hodgkin lymphoma of lymph nodes of multiple regions (H) COMPARISON: 03/28/2022 TECHNIQUE: CT scan of the chest, abdomen, and pelvis was performed following injection of IV contrast. Multiplanar reformats were obtained. Dose reduction techniques were used. CONTRAST: Isovue 370 100ml FINDINGS: LUNGS AND PLEURA: Normal. MEDIASTINUM/AXILLAE: Normal. CORONARY ARTERY CALCIFICATION: None. HEPATOBILIARY: Normal. PANCREAS: Normal. SPLEEN: Normal. ADRENAL GLANDS: Normal. KIDNEYS/BLADDER: Normal. BOWEL: Normal. LYMPH NODES: Normal. VASCULATURE: Unremarkable. PELVIC ORGANS: Normal. MUSCULOSKELETAL: Normal.     IMPRESSION: 1.  Normal examination of the chest, abdomen and pelvis. Nothing for recurrent disease.    CT Soft Tissue Neck w Contrast    Result Date: 8/29/2022  EXAM: CT SOFT TISSUE NECK W CONTRAST LOCATION: [facility] DATE/TIME: 8/29/2022 9:19 AM INDICATION:  Nodular sclerosis Hodgkin lymphoma of lymph nodes of multiple regions (H) COMPARISON: PET/CT 05/16/2022. CONTRAST: Isovue 370 100ml TECHNIQUE: Routine CT Soft Tissue Neck with IV contrast. Multiplanar reformats. Dose reduction techniques were used. FINDINGS: MUCOSAL SPACES/SOFT TISSUES: Normal mucosal spaces of the upper aerodigestive tract. No mucosal mass or inflammation identified. Normal vocal cords and infraglottic trachea. Normal parapharyngeal space and subcutaneous soft tissues. Normal oral cavity,  spaces, and floor of mouth structures. LYMPH NODES: No pathologic lymph nodes by size or morphology criteria. SALIVARY GLANDS: Normal parotid and submandibular glands. THYROID: Normal. VESSELS: Vascular structures of the neck are patent. Right IJ dash catheter directed into the SVC. VISUALIZED INTRACRANIAL/ORBITS/SINUSES: No abnormality of the visualized intracranial compartment or orbits.  Visualized paranasal sinuses and mastoid air cells are clear. OTHER: No destructive osseous lesion. The included lung apices are clear.     IMPRESSION: 1.  No evidence of lymphoma in the neck. No cervical mass or lymphadenopathy.    30 minutes spent on the date of the encounter doing chart review, history and exam, documentation and further activities as noted above.    Signed by: Juan Lizarraga MD      Again, thank you for allowing me to participate in the care of your patient.        Sincerely,        Juan Lizarraga MD

## 2022-08-31 NOTE — PROGRESS NOTES
[facility]: Cancer Care Short Note                                    Discussion with Patient:                                                      Patient here today for follow-up with . Accompanied by , Christian. Checked in with patient while she was waiting to see Dr. Lizarraga.     Assessment:                                                      Patient reports she has been feeling good. No symptoms to report.     Intervention/Education provided during outreach:                                                       Patient denies needs from [facility]CC today.     Patient to follow up as scheduled at next appt    Signature:  Ping Gonzales RN

## 2022-09-04 NOTE — PROGRESS NOTES
[facility] Hematology and Oncology Progress Note    Patient: Amna Oneil  MRN: [id number]  Date of Service: Aug 31, 2022     Reason for Visit    Chief Complaint   Patient presents with     Oncology Clinic Visit     Nodular sclerosis Hodgkin lymphoma of lymph nodes of multiple regions        Assessment and Plan    Cancer Staging  No matching staging information was found for the patient.    ECOG Performance    0 - Independent     Pain  Pain Score: Moderate Pain (4)    #.  Classical Hodgkin lymphoma, stage II (favorable risk) IPSS-0 risk factor, FFP 88%, overall survival 98%.   Clinically well.  Exam is unremarkable.  I reviewed her labs including CBC, CMP and ESR and they are all within normal range. I reviewed the CT scan of neck, chest, abdomen and pelvis and also reviewed the report in detail.  There is no evidence of lymphadenopathy.  I inform her that she is in clinical and radiographic remission at this point.   Recommended clinical exam and labs in 3 months, or another CT is in 6 months or sooner with any clinical concern.  I explained to her that we will plan to obtain CT imaging not more than 2 times a year for surveillance in the first 2-3 years, then clinical exam and labs to complete 5 years of surveillance.    #.  Acute bilateral pulmonary emboli without evidence of saddle embolus or central emboli in 2/2022.   Likely provoked in the setting of recent COVID-19 infection, active chemotherapy for lymphoma in the setting of ongoing birth control pill use.   She has stopped birth control pills.  She tolerated Xarelto well without side effects or intolerance.     At this point she completed 6 months of anticoagulation therapy.  She completes chemotherapy.  No evidence of active cancer.  Okay to stop Xarelto at this point.  She just started a new bottle of Xarelto, therefore I's advised her to complete the current prescription then stop.      #.  Arthritis/arthralgia   Could be late side effects  from chemotherapy, and also could be from deconditioning.  Advised to continue and increase activity and exercises..    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    Relevant Orders    CBC with Platelets & Differential    Comprehensive metabolic panel    ESR: Erythrocyte sedimentation rate       Other    Bilateral pulmonary embolism (H)             CC: Physician No Ref-Primary   ______________________________________________________________________________  Diagnosis  12/13/2021-classical Hodgkin lymphoma by left axillary lymph node core needle biopsy.  12/23/2021-PET/CT scan showed hypermetabolic lymph nodes involving basilar neck/superior mediastinum, mediastinum, right pericardial space, bilateral axillae.   -second opinion pathology evaluation at [facility] and it confirmed classical Hodgkin lymphoma.  Echocardiogram showed adequate cardiac function.  Pulmonary function test was normal    Treatment to date  2/3/2022-5/23/2022-completed 4 cycles of ABVD (bleomycin was omitted in cycle #3 and #4 due to pulmonary toxicity) treatment course was complicated by pulmonary emboli, PCP pneumonia.  She achieved complete radiographic response (no uptake by PET) after 2 cycles of ABVD.    History of Present Illness    Ms. Amna Oneil presented today accompanied by her .    She reported intermittent joint pains after chemotherapy and wondering whether they are late side effects.  She remains on Xarelto.  No bleeding.  No neuropathy.     Review of systems  Apart from describing in HPI, the remainder of comprehensive ROS was negative.    Past History    Past Medical History:   Diagnosis Date     Hodgkin lymphoma (H)      Pulmonary emboli (H)        Past Surgical History:   Procedure Laterality Date     IR CHEST PORT PLACEMENT > 5 YRS OF AGE  1/21/2022         Physical  "Exam    BP 99/71   Pulse 72   Temp 98.6  F (37  C) (Oral)   Resp 16   Ht 1.702 m (5' 7\")   Wt 85.5 kg (188 lb 6.4 oz)   SpO2 98%   BMI 29.51 kg/m      General: alert, awake, not in acute distress  HEENT: Head: Normal, normocephalic, atraumatic.  Eye: Normal external eye, conjunctiva, lids cornea, PATRICIA.  Pharynx: Normal buccal mucosa. Normal pharynx.  Neck / Thyroid: Supple, no masses, nodes, nodules or enlargement.  Lymphatics: No abnormally enlarged lymph nodes.  Chest: Normal chest wall and respirations. Clear to auscultation.  Heart: S1 S2 RRR.   Abdomen: abdomen is soft without significant tenderness, masses, organomegaly or guarding  Extremities: normal strength, tone, and muscle mass  Skin: normal. no rash or abnormalities  CNS: non focal.    Lab Results    Recent Results (from the past 168 hour(s))   Comprehensive metabolic panel   Result Value Ref Range    Sodium 138 136 - 145 mmol/L    Potassium 4.3 3.5 - 5.0 mmol/L    Chloride 108 (H) 98 - 107 mmol/L    Carbon Dioxide (CO2) 22 22 - 31 mmol/L    Anion Gap 8 5 - 18 mmol/L    Urea Nitrogen 18 8 - 22 mg/dL    Creatinine 0.71 0.60 - 1.10 mg/dL    Calcium 9.2 8.5 - 10.5 mg/dL    Glucose 88 70 - 125 mg/dL    Alkaline Phosphatase 68 45 - 120 U/L    AST 18 0 - 40 U/L    ALT 14 0 - 45 U/L    Protein Total 6.2 6.0 - 8.0 g/dL    Albumin 3.8 3.5 - 5.0 g/dL    Bilirubin Total 0.6 0.0 - 1.0 mg/dL    GFR Estimate >90 >60 mL/min/1.73m2   ESR: Erythrocyte sedimentation rate   Result Value Ref Range    Erythrocyte Sedimentation Rate 8 0 - 20 mm/hr   CBC with platelets and differential   Result Value Ref Range    WBC Count 8.6 4.0 - 11.0 10e3/uL    RBC Count 4.31 3.80 - 5.20 10e6/uL    Hemoglobin 13.0 11.7 - 15.7 g/dL    Hematocrit 39.1 35.0 - 47.0 %    MCV 91 78 - 100 fL    MCH 30.2 26.5 - 33.0 pg    MCHC 33.2 31.5 - 36.5 g/dL    RDW 13.1 10.0 - 15.0 %    Platelet Count 368 150 - 450 10e3/uL    % Neutrophils 56 %    % Lymphocytes 21 %    % Monocytes 8 %    % " Eosinophils 14 %    % Basophils 1 %    % Immature Granulocytes 0 %    NRBCs per 100 WBC 0 <1 /100    Absolute Neutrophils 4.9 1.6 - 8.3 10e3/uL    Absolute Lymphocytes 1.8 0.8 - 5.3 10e3/uL    Absolute Monocytes 0.7 0.0 - 1.3 10e3/uL    Absolute Eosinophils 1.2 (H) 0.0 - 0.7 10e3/uL    Absolute Basophils 0.1 0.0 - 0.2 10e3/uL    Absolute Immature Granulocytes 0.0 <=0.4 10e3/uL    Absolute NRBCs 0.0 10e3/uL       Imaging    CT Chest/Abdomen/Pelvis w Contrast    Result Date: 8/29/2022  EXAM: CT CHEST/ABDOMEN/PELVIS W CONTRAST LOCATION: [facility] DATE/TIME: 8/29/2022 9:15 AM INDICATION:  Nodular sclerosis Hodgkin lymphoma of lymph nodes of multiple regions (H) COMPARISON: 03/28/2022 TECHNIQUE: CT scan of the chest, abdomen, and pelvis was performed following injection of IV contrast. Multiplanar reformats were obtained. Dose reduction techniques were used. CONTRAST: Isovue 370 100ml FINDINGS: LUNGS AND PLEURA: Normal. MEDIASTINUM/AXILLAE: Normal. CORONARY ARTERY CALCIFICATION: None. HEPATOBILIARY: Normal. PANCREAS: Normal. SPLEEN: Normal. ADRENAL GLANDS: Normal. KIDNEYS/BLADDER: Normal. BOWEL: Normal. LYMPH NODES: Normal. VASCULATURE: Unremarkable. PELVIC ORGANS: Normal. MUSCULOSKELETAL: Normal.     IMPRESSION: 1.  Normal examination of the chest, abdomen and pelvis. Nothing for recurrent disease.    CT Soft Tissue Neck w Contrast    Result Date: 8/29/2022  EXAM: CT SOFT TISSUE NECK W CONTRAST LOCATION: [facility] DATE/TIME: 8/29/2022 9:19 AM INDICATION:  Nodular sclerosis Hodgkin lymphoma of lymph nodes of multiple regions (H) COMPARISON: PET/CT 05/16/2022. CONTRAST: Isovue 370 100ml TECHNIQUE: Routine CT Soft Tissue Neck with IV contrast. Multiplanar reformats. Dose reduction techniques were used. FINDINGS: MUCOSAL SPACES/SOFT TISSUES: Normal mucosal spaces of the upper aerodigestive tract. No mucosal mass or inflammation identified. Normal vocal cords and  infraglottic trachea. Normal parapharyngeal space and subcutaneous soft tissues. Normal oral cavity,  spaces, and floor of mouth structures. LYMPH NODES: No pathologic lymph nodes by size or morphology criteria. SALIVARY GLANDS: Normal parotid and submandibular glands. THYROID: Normal. VESSELS: Vascular structures of the neck are patent. Right IJ dash catheter directed into the SVC. VISUALIZED INTRACRANIAL/ORBITS/SINUSES: No abnormality of the visualized intracranial compartment or orbits. Visualized paranasal sinuses and mastoid air cells are clear. OTHER: No destructive osseous lesion. The included lung apices are clear.     IMPRESSION: 1.  No evidence of lymphoma in the neck. No cervical mass or lymphadenopathy.    30 minutes spent on the date of the encounter doing chart review, history and exam, documentation and further activities as noted above.    Signed by: Juan Lizarraga MD

## 2022-09-08 ENCOUNTER — TELEPHONE (OUTPATIENT)
Dept: ONCOLOGY | Facility: HOSPITAL | Age: 42
End: 2022-09-08

## 2022-09-08 NOTE — TELEPHONE ENCOUNTER
Oncology Distress Screen    Called Amna in regards to her positive oncology nutrition distress screen:    9. If you want to be contacted by one of our professionals, I can send a message to them right now. : ONCOLOGY DIETITIAN    Attempted to connect with Amna over the phone. Voicemail left with call back information.      Sylvia Riley, MS, RD, LD

## 2022-10-03 ENCOUNTER — HEALTH MAINTENANCE LETTER (OUTPATIENT)
Age: 42
End: 2022-10-03

## 2022-10-05 ENCOUNTER — INFUSION THERAPY VISIT (OUTPATIENT)
Dept: INFUSION THERAPY | Facility: CLINIC | Age: 42
End: 2022-10-05
Attending: INTERNAL MEDICINE
Payer: COMMERCIAL

## 2022-10-05 DIAGNOSIS — C81.18 NODULAR SCLEROSIS HODGKIN LYMPHOMA OF LYMPH NODES OF MULTIPLE REGIONS (H): Primary | ICD-10-CM

## 2022-10-05 PROCEDURE — 96523 IRRIG DRUG DELIVERY DEVICE: CPT

## 2022-10-05 PROCEDURE — 250N000011 HC RX IP 250 OP 636: Performed by: INTERNAL MEDICINE

## 2022-10-05 RX ORDER — HEPARIN SODIUM (PORCINE) LOCK FLUSH IV SOLN 100 UNIT/ML 100 UNIT/ML
5 SOLUTION INTRAVENOUS
Status: CANCELLED | OUTPATIENT
Start: 2022-10-05

## 2022-10-05 RX ORDER — HEPARIN SODIUM,PORCINE 10 UNIT/ML
5 VIAL (ML) INTRAVENOUS
Status: CANCELLED | OUTPATIENT
Start: 2022-10-05

## 2022-10-05 RX ORDER — HEPARIN SODIUM (PORCINE) LOCK FLUSH IV SOLN 100 UNIT/ML 100 UNIT/ML
5 SOLUTION INTRAVENOUS
Status: DISCONTINUED | OUTPATIENT
Start: 2022-10-05 | End: 2022-10-05 | Stop reason: HOSPADM

## 2022-10-05 RX ADMIN — HEPARIN 5 ML: 100 SYRINGE at 08:25

## 2022-10-28 ENCOUNTER — PATIENT OUTREACH (OUTPATIENT)
Dept: ONCOLOGY | Facility: CLINIC | Age: 42
End: 2022-10-28

## 2022-10-28 NOTE — PROGRESS NOTES
[facility]: Cancer Care                                                                                          Patient sent [employer] request to complete [employer] Form.    [employer] Form completed and faxed to [phone]. Right Fax confirmed sent.    Copy sent to HIM.    Patient notified form sent via [employer] message.    Signature:  Ping Gonzales RN

## 2022-11-16 ENCOUNTER — LAB (OUTPATIENT)
Dept: INFUSION THERAPY | Facility: CLINIC | Age: 42
End: 2022-11-16
Attending: INTERNAL MEDICINE
Payer: COMMERCIAL

## 2022-11-16 ENCOUNTER — ONCOLOGY VISIT (OUTPATIENT)
Dept: ONCOLOGY | Facility: CLINIC | Age: 42
End: 2022-11-16
Attending: INTERNAL MEDICINE
Payer: COMMERCIAL

## 2022-11-16 VITALS
WEIGHT: 180 LBS | HEART RATE: 78 BPM | SYSTOLIC BLOOD PRESSURE: 120 MMHG | OXYGEN SATURATION: 98 % | RESPIRATION RATE: 16 BRPM | BODY MASS INDEX: 28.25 KG/M2 | DIASTOLIC BLOOD PRESSURE: 78 MMHG | HEIGHT: 67 IN

## 2022-11-16 DIAGNOSIS — C81.18 NODULAR SCLEROSIS HODGKIN LYMPHOMA OF LYMPH NODES OF MULTIPLE REGIONS (H): Primary | ICD-10-CM

## 2022-11-16 LAB
ALBUMIN SERPL-MCNC: 3.9 G/DL (ref 3.5–5)
ALP SERPL-CCNC: 75 U/L (ref 45–120)
ALT SERPL W P-5'-P-CCNC: 12 U/L (ref 0–45)
ANION GAP SERPL CALCULATED.3IONS-SCNC: 9 MMOL/L (ref 5–18)
AST SERPL W P-5'-P-CCNC: 14 U/L (ref 0–40)
BASOPHILS # BLD AUTO: 0 10E3/UL (ref 0–0.2)
BASOPHILS NFR BLD AUTO: 1 %
BILIRUB SERPL-MCNC: 0.5 MG/DL (ref 0–1)
BUN SERPL-MCNC: 26 MG/DL (ref 8–22)
CALCIUM SERPL-MCNC: 9.2 MG/DL (ref 8.5–10.5)
CHLORIDE BLD-SCNC: 108 MMOL/L (ref 98–107)
CO2 SERPL-SCNC: 23 MMOL/L (ref 22–31)
CREAT SERPL-MCNC: 0.74 MG/DL (ref 0.6–1.1)
EOSINOPHIL # BLD AUTO: 0.6 10E3/UL (ref 0–0.7)
EOSINOPHIL NFR BLD AUTO: 10 %
ERYTHROCYTE [DISTWIDTH] IN BLOOD BY AUTOMATED COUNT: 12.8 % (ref 10–15)
ERYTHROCYTE [SEDIMENTATION RATE] IN BLOOD BY WESTERGREN METHOD: 8 MM/HR (ref 0–20)
GFR SERPL CREATININE-BSD FRML MDRD: >90 ML/MIN/1.73M2
GLUCOSE BLD-MCNC: 96 MG/DL (ref 70–125)
HCT VFR BLD AUTO: 40.4 % (ref 35–47)
HGB BLD-MCNC: 13.5 G/DL (ref 11.7–15.7)
IMM GRANULOCYTES # BLD: 0 10E3/UL
IMM GRANULOCYTES NFR BLD: 0 %
LYMPHOCYTES # BLD AUTO: 1.7 10E3/UL (ref 0.8–5.3)
LYMPHOCYTES NFR BLD AUTO: 29 %
MCH RBC QN AUTO: 30.5 PG (ref 26.5–33)
MCHC RBC AUTO-ENTMCNC: 33.4 G/DL (ref 31.5–36.5)
MCV RBC AUTO: 91 FL (ref 78–100)
MONOCYTES # BLD AUTO: 0.6 10E3/UL (ref 0–1.3)
MONOCYTES NFR BLD AUTO: 10 %
NEUTROPHILS # BLD AUTO: 2.8 10E3/UL (ref 1.6–8.3)
NEUTROPHILS NFR BLD AUTO: 50 %
NRBC # BLD AUTO: 0 10E3/UL
NRBC BLD AUTO-RTO: 0 /100
PLATELET # BLD AUTO: 315 10E3/UL (ref 150–450)
POTASSIUM BLD-SCNC: 3.9 MMOL/L (ref 3.5–5)
PROT SERPL-MCNC: 6.7 G/DL (ref 6–8)
RBC # BLD AUTO: 4.43 10E6/UL (ref 3.8–5.2)
SODIUM SERPL-SCNC: 140 MMOL/L (ref 136–145)
WBC # BLD AUTO: 5.7 10E3/UL (ref 4–11)

## 2022-11-16 PROCEDURE — 80053 COMPREHEN METABOLIC PANEL: CPT

## 2022-11-16 PROCEDURE — 99214 OFFICE O/P EST MOD 30 MIN: CPT | Performed by: NURSE PRACTITIONER

## 2022-11-16 PROCEDURE — 250N000011 HC RX IP 250 OP 636: Performed by: INTERNAL MEDICINE

## 2022-11-16 PROCEDURE — 85652 RBC SED RATE AUTOMATED: CPT

## 2022-11-16 PROCEDURE — 36591 DRAW BLOOD OFF VENOUS DEVICE: CPT

## 2022-11-16 PROCEDURE — G0463 HOSPITAL OUTPT CLINIC VISIT: HCPCS

## 2022-11-16 PROCEDURE — 85025 COMPLETE CBC W/AUTO DIFF WBC: CPT

## 2022-11-16 RX ORDER — HEPARIN SODIUM,PORCINE 10 UNIT/ML
5 VIAL (ML) INTRAVENOUS
Status: CANCELLED | OUTPATIENT
Start: 2022-11-16

## 2022-11-16 RX ORDER — HEPARIN SODIUM (PORCINE) LOCK FLUSH IV SOLN 100 UNIT/ML 100 UNIT/ML
5 SOLUTION INTRAVENOUS
Status: DISCONTINUED | OUTPATIENT
Start: 2022-11-16 | End: 2022-11-16 | Stop reason: HOSPADM

## 2022-11-16 RX ORDER — CHOLECALCIFEROL (VITAMIN D3) 50 MCG
1 TABLET ORAL DAILY
COMMUNITY

## 2022-11-16 RX ORDER — HEPARIN SODIUM (PORCINE) LOCK FLUSH IV SOLN 100 UNIT/ML 100 UNIT/ML
5 SOLUTION INTRAVENOUS
Status: CANCELLED | OUTPATIENT
Start: 2022-11-16

## 2022-11-16 RX ADMIN — HEPARIN 5 ML: 100 SYRINGE at 09:58

## 2022-11-16 ASSESSMENT — PAIN SCALES - GENERAL: PAINLEVEL: NO PAIN (0)

## 2022-11-16 NOTE — PROGRESS NOTES
"Oncology Rooming Note    November 16, 2022 9:12 AM   Amna Oneil is a 42 year old female who presents for:    Chief Complaint   Patient presents with     Oncology Clinic Visit     Nodular sclerosis Hodgkin lymphoma of lymph nodes of multiple regions     Initial Vitals: /78   Pulse 78   Resp 16   Ht 1.702 m (5' 7\")   Wt 81.6 kg (180 lb)   SpO2 98%   BMI 28.19 kg/m   Estimated body mass index is 28.19 kg/m  as calculated from the following:    Height as of this encounter: 1.702 m (5' 7\").    Weight as of this encounter: 81.6 kg (180 lb). Body surface area is 1.96 meters squared.  No Pain (0) Comment: Data Unavailable   No LMP recorded. (Menstrual status: Birth Control).  Allergies reviewed: Yes  Medications reviewed: Yes    Medications: Medication refills not needed today.  Pharmacy name entered into [employer]:    [facility] -- [place], [place] - [address]  [id number] - [place], [place] - [address] [facility] [address]    Clinical concerns: 3 month labs       Brenda Colin            "

## 2022-11-16 NOTE — PROGRESS NOTES
[facility] Hematology and Oncology Progress Note    Patient: Amna Oneil  MRN: [id number]  Date of Service: Nov 16, 2022         Reason for Visit:    Scheduled f/up    Assessment and Plan:    1. Stage II classical Hodgkin lymphoma.    42 year old     Amna presents accompanied by her father.  She looks and states she's been feeling quite good.  No shortness of breath - walking her dog ~3 miles/day.  No cough.  Her appetite is good - weight intentionally down ~10 pounds since start of treatment.  Heartburn resolved completely once completed chemotherapy.  No nausea.  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  She denies B-symptoms, cough, fever, chills, unusual headaches, visual or mentation disturbance, bowel or bladder issues, neuropathies, rash.     Sed rate - WNL.    CBC - WBC, differential, HgB and PLTs WNL    CMP - basically WNL.    Amna appears clinically LA NENA.  She achieved a complete radiographic response (no uptake by PET) after 2 cycles of ABVD chemotherapy.  Excellent laboratories.    3-month f-up with CBC, CMP, sed rate, and CT CAP and soft tissue neck ... sooner with any clinical concerns.      CT imaging not more than 2 times a year for surveillance for the first 2-3 years, then clinical exam and labs to complete 5 years of surveillance.    2. Acute bilateral pulmonary emboli without evidence of saddle embolus or central emboli     2022, February - diagnosed.    Likely provoked in the setting of recent COVID-19 infection, active chemotherapy for lymphoma in the setting of ongoing birth control pill use.    Completed 6 months of anticoagulation therapy with Xarelto without side effects or intolerance.          Hematologic History:    1. Classical Hodgkin lymphoma   Stage II (favorable risk)    IPSS-0 risk factor (FFP 88%, overall survival 98%).    12/13/2021 - diagnosed by left axillary lymph node core needle biopsy.    12/23/2021 NM PET/CT - hypermetabolic lymph nodes involving basilar  neck/superior mediastinum, mediastinum, right pericardial space, bilateral axillae.    second opinion pathology evaluation at [facility] confirmed classical Hodgkin lymphoma.      01/13/2022 Echocardiogram - normal left ventricular size and systolic performance.  LVEF @ 60-65%.  No significant valvular heart disease identified. Normal right ventricular size and systolic performance.    01/18/2022 bone marrow biopsy - no evidence of Hodgkin lymphoma involvement.      01/19/2022 pulmonary function testing - normal    01/21/2022 - IR port-a-cath placed.      02/03 - 06/06/2022 completed 4 cycles ABVD (Bleomyci HELD s/p 2 cycles) chemotherapy.     03/28/2022 ONC PET/CT - complete response per Lugano criteria s/p 2 cycles chemotherapy.    05/16/2022 ONC PET/CT - no evidence of disease.  Incidental tiny patch of left lower lobe pneumonia.    08/29/2022 CT CAP and soft tissue neck - nothing for recurrent disease.    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    Weight intentionally down ~10 pounds since start of treatment.  No lumps or bumps anywhere.  No unusual headaches or eyesight issues.  No dizziness.  No bleeding from the nose.  No sores in the mouth. No problems with swallowing.  No chest pain. No shortness of breath. No cough.  No abdominal pain. No nausea or vomiting.  No diarrhea or constipation.  No blood in stool or black colored stools.  No problems passing  "urine.  No numbness or tingling in hands or feet.  No skin rashes.    A 14 point review of systems is otherwise negative.    Past History:    Past Medical History:   Diagnosis Date     Hodgkin lymphoma (H)      Pulmonary emboli (H)        Past Surgical History:   Procedure Laterality Date     IR CHEST PORT PLACEMENT > 5 YRS OF AGE  1/21/2022     Physical Exam:    /78   Pulse 78   Resp 16   Ht 1.702 m (5' 7\")   Wt 81.6 kg (180 lb)   SpO2 98%   BMI 28.19 kg/m      GENERAL:   Very pleasant.  Alert and oriented. Seated comfortably. In no distress.    HEENT:   Atraumatic and normocephalic.  PATRICIA.  EOMI.  No pallor.  No icterus.  No mucosal lesions.    LYMPH NODES:  No palpable cervical, axillary or inguinal lymphadenopathy.    CHEST:   Lungs clear to auscultation bilaterally.    Port-a-cath in place - no s/s infection.  Working well.    CVS:    S1 and S2 heard. Regular rate and rhythm.  No murmur, gallop or rub heard.  No peripheral edema.    ABDOMEN:   Soft.  Not tender.  Not distended.  No palpable hepatomegaly or splenomegaly, masses or ascites.    EXTREMITIES:  Warm.    SKIN:    No rash bruising or purpura noted.     Lab Results:    Reviewed with patient.    Recent Results (from the past 24 hour(s))   Comprehensive metabolic panel    Collection Time: 11/16/22  8:54 AM   Result Value Ref Range    Sodium 140 136 - 145 mmol/L    Potassium 3.9 3.5 - 5.0 mmol/L    Chloride 108 (H) 98 - 107 mmol/L    Carbon Dioxide (CO2) 23 22 - 31 mmol/L    Anion Gap 9 5 - 18 mmol/L    Urea Nitrogen 26 (H) 8 - 22 mg/dL    Creatinine 0.74 0.60 - 1.10 mg/dL    Calcium 9.2 8.5 - 10.5 mg/dL    Glucose 96 70 - 125 mg/dL    Alkaline Phosphatase 75 45 - 120 U/L    AST 14 0 - 40 U/L    ALT 12 0 - 45 U/L    Protein Total 6.7 6.0 - 8.0 g/dL    Albumin 3.9 3.5 - 5.0 g/dL    Bilirubin Total 0.5 0.0 - 1.0 mg/dL    GFR Estimate >90 >60 mL/min/1.73m2   ESR: Erythrocyte sedimentation rate    Collection Time: 11/16/22  8:54 AM   Result Value " Ref Range    Erythrocyte Sedimentation Rate 8 0 - 20 mm/hr   CBC with platelets and differential    Collection Time: 11/16/22  8:54 AM   Result Value Ref Range    WBC Count 5.7 4.0 - 11.0 10e3/uL    RBC Count 4.43 3.80 - 5.20 10e6/uL    Hemoglobin 13.5 11.7 - 15.7 g/dL    Hematocrit 40.4 35.0 - 47.0 %    MCV 91 78 - 100 fL    MCH 30.5 26.5 - 33.0 pg    MCHC 33.4 31.5 - 36.5 g/dL    RDW 12.8 10.0 - 15.0 %    Platelet Count 315 150 - 450 10e3/uL    % Neutrophils 50 %    % Lymphocytes 29 %    % Monocytes 10 %    % Eosinophils 10 %    % Basophils 1 %    % Immature Granulocytes 0 %    NRBCs per 100 WBC 0 <1 /100    Absolute Neutrophils 2.8 1.6 - 8.3 10e3/uL    Absolute Lymphocytes 1.7 0.8 - 5.3 10e3/uL    Absolute Monocytes 0.6 0.0 - 1.3 10e3/uL    Absolute Eosinophils 0.6 0.0 - 0.7 10e3/uL    Absolute Basophils 0.0 0.0 - 0.2 10e3/uL    Absolute Immature Granulocytes 0.0 <=0.4 10e3/uL    Absolute NRBCs 0.0 10e3/uL     Imaging:    No new imaging.

## 2022-11-16 NOTE — PATIENT INSTRUCTIONS
Recent Results (from the past 24 hour(s))   Comprehensive metabolic panel    Collection Time: 11/16/22  8:54 AM   Result Value Ref Range    Sodium 140 136 - 145 mmol/L    Potassium 3.9 3.5 - 5.0 mmol/L    Chloride 108 (H) 98 - 107 mmol/L    Carbon Dioxide (CO2) 23 22 - 31 mmol/L    Anion Gap 9 5 - 18 mmol/L    Urea Nitrogen 26 (H) 8 - 22 mg/dL    Creatinine 0.74 0.60 - 1.10 mg/dL    Calcium 9.2 8.5 - 10.5 mg/dL    Glucose 96 70 - 125 mg/dL    Alkaline Phosphatase 75 45 - 120 U/L    AST 14 0 - 40 U/L    ALT 12 0 - 45 U/L    Protein Total 6.7 6.0 - 8.0 g/dL    Albumin 3.9 3.5 - 5.0 g/dL    Bilirubin Total 0.5 0.0 - 1.0 mg/dL    GFR Estimate >90 >60 mL/min/1.73m2   CBC with platelets and differential    Collection Time: 11/16/22  8:54 AM   Result Value Ref Range    WBC Count 5.7 4.0 - 11.0 10e3/uL    RBC Count 4.43 3.80 - 5.20 10e6/uL    Hemoglobin 13.5 11.7 - 15.7 g/dL    Hematocrit 40.4 35.0 - 47.0 %    MCV 91 78 - 100 fL    MCH 30.5 26.5 - 33.0 pg    MCHC 33.4 31.5 - 36.5 g/dL    RDW 12.8 10.0 - 15.0 %    Platelet Count 315 150 - 450 10e3/uL    % Neutrophils 50 %    % Lymphocytes 29 %    % Monocytes 10 %    % Eosinophils 10 %    % Basophils 1 %    % Immature Granulocytes 0 %    NRBCs per 100 WBC 0 <1 /100    Absolute Neutrophils 2.8 1.6 - 8.3 10e3/uL    Absolute Lymphocytes 1.7 0.8 - 5.3 10e3/uL    Absolute Monocytes 0.6 0.0 - 1.3 10e3/uL    Absolute Eosinophils 0.6 0.0 - 0.7 10e3/uL    Absolute Basophils 0.0 0.0 - 0.2 10e3/uL    Absolute Immature Granulocytes 0.0 <=0.4 10e3/uL    Absolute NRBCs 0.0 10e3/uL

## 2022-11-16 NOTE — LETTER
"    11/16/2022         RE: Amna Oneil  [address]  [place] [address]        Dear Colleague,    Thank you for referring your patient, Amna Oneil, to the [facility] CANCER [facility]. Please see a copy of my visit note below.    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    Weight as of this encounter: 81.6 kg (180 lb). Body surface area is 1.96 meters squared.  No Pain (0) Comment: Data Unavailable   No LMP recorded. (Menstrual status: Birth Control).  Allergies reviewed: Yes  Medications reviewed: Yes    Medications: Medication refills not needed today.  Pharmacy name entered into [employer]:    [facility] [place] - [place], [place] - [address]  [id number] - [place], [place] - [address]    Clinical concerns: 3 month labs       Brenda Colin              [facility] Hematology and Oncology Progress Note    Patient: Amna Oneil  MRN: [id number]  Date of Service: Nov 16, 2022         Reason for Visit:    Scheduled f/up    Assessment and Plan:    1. Stage II classical Hodgkin lymphoma.    42 year old     Amna presents accompanied by her father.  She looks and states she's been feeling quite good.  No shortness of breath - walking her dog ~3 miles/day.  No cough.  Her appetite is good - weight intentionally down ~10 pounds since start of treatment.  Heartburn resolved completely once completed chemotherapy.  No nausea.  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  She denies B-symptoms, cough, fever, chills, unusual headaches, visual or mentation disturbance, bowel or bladder issues, neuropathies, rash.     Sed rate - WNL.    CBC - WBC, differential, HgB and PLTs WNL    CMP - basically WNL.    Amna appears clinically LA NENA.  She achieved a complete radiographic response (no uptake by PET) after 2 cycles of ABVD chemotherapy.  Excellent laboratories.    3-month f-up with CBC, CMP, sed rate, and CT CAP and soft tissue neck ... sooner with any clinical concerns.      CT imaging not more than 2 times a year for surveillance for the first 2-3 years, then clinical exam and labs to complete 5 years of surveillance.    2. Acute bilateral pulmonary emboli without evidence of saddle embolus or central emboli     2022, February - diagnosed.    Likely provoked in the setting of recent COVID-19 infection, active chemotherapy for lymphoma in the setting of ongoing birth control pill use.    Completed 6 months of anticoagulation therapy with Xarelto without side effects or intolerance.          Hematologic History:    1. Classical Hodgkin lymphoma   Stage II (favorable risk)    IPSS-0 risk factor (FFP 88%, overall survival 98%).    12/13/2021 - diagnosed by left axillary lymph node core needle biopsy.    12/23/2021 NM PET/CT - hypermetabolic lymph nodes involving basilar neck/superior mediastinum, mediastinum, right pericardial space, bilateral axillae.    second opinion pathology evaluation at [facility] confirmed classical Hodgkin lymphoma.      01/13/2022 Echocardiogram - normal left ventricular size and systolic performance.  LVEF @ 60-65%.  No significant valvular heart disease identified. Normal right ventricular size and systolic performance.    01/18/2022 bone marrow biopsy - no evidence of Hodgkin lymphoma involvement.      01/19/2022 pulmonary function testing - normal    01/21/2022 - IR port-a-cath placed.      02/03 - 06/06/2022 completed  "4 cycles ABVD (Bleomyci HELD s/p 2 cycles) chemotherapy.     03/28/2022 ONC PET/CT - complete response per Lugano criteria s/p 2 cycles chemotherapy.    05/16/2022 ONC PET/CT - no evidence of disease.  Incidental tiny patch of left lower lobe pneumonia.    08/29/2022 CT CAP and soft tissue neck - nothing for recurrent disease.    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    Weight intentionally down ~10 pounds since start of treatment.  No lumps or bumps anywhere.  No unusual headaches or eyesight issues.  No dizziness.  No bleeding from the nose.  No sores in the mouth. No problems with swallowing.  No chest pain. No shortness of breath. No cough.  No abdominal pain. No nausea or vomiting.  No diarrhea or constipation.  No blood in stool or black colored stools.  No problems passing urine.  No numbness or tingling in hands or feet.  No skin rashes.    A 14 point review of systems is otherwise negative.    Past History:    Past Medical History:   Diagnosis Date     Hodgkin lymphoma (H)      Pulmonary emboli (H)        Past Surgical History:   Procedure Laterality Date     IR CHEST PORT PLACEMENT > 5 YRS OF AGE  1/21/2022     Physical Exam:    /78   Pulse 78   Resp 16   Ht 1.702 m (5' 7\")   Wt 81.6 kg (180 lb)   SpO2 98%   BMI 28.19 kg/m      GENERAL:   Very pleasant.  Alert and oriented. Seated comfortably. In no distress.    HEENT:   Atraumatic and normocephalic.  " PATRICIA.  EOMI.  No pallor.  No icterus.  No mucosal lesions.    LYMPH NODES:  No palpable cervical, axillary or inguinal lymphadenopathy.    CHEST:   Lungs clear to auscultation bilaterally.    Port-a-cath in place - no s/s infection.  Working well.    CVS:    S1 and S2 heard. Regular rate and rhythm.  No murmur, gallop or rub heard.  No peripheral edema.    ABDOMEN:   Soft.  Not tender.  Not distended.  No palpable hepatomegaly or splenomegaly, masses or ascites.    EXTREMITIES:  Warm.    SKIN:    No rash bruising or purpura noted.     Lab Results:    Reviewed with patient.    Recent Results (from the past 24 hour(s))   Comprehensive metabolic panel    Collection Time: 11/16/22  8:54 AM   Result Value Ref Range    Sodium 140 136 - 145 mmol/L    Potassium 3.9 3.5 - 5.0 mmol/L    Chloride 108 (H) 98 - 107 mmol/L    Carbon Dioxide (CO2) 23 22 - 31 mmol/L    Anion Gap 9 5 - 18 mmol/L    Urea Nitrogen 26 (H) 8 - 22 mg/dL    Creatinine 0.74 0.60 - 1.10 mg/dL    Calcium 9.2 8.5 - 10.5 mg/dL    Glucose 96 70 - 125 mg/dL    Alkaline Phosphatase 75 45 - 120 U/L    AST 14 0 - 40 U/L    ALT 12 0 - 45 U/L    Protein Total 6.7 6.0 - 8.0 g/dL    Albumin 3.9 3.5 - 5.0 g/dL    Bilirubin Total 0.5 0.0 - 1.0 mg/dL    GFR Estimate >90 >60 mL/min/1.73m2   ESR: Erythrocyte sedimentation rate    Collection Time: 11/16/22  8:54 AM   Result Value Ref Range    Erythrocyte Sedimentation Rate 8 0 - 20 mm/hr   CBC with platelets and differential    Collection Time: 11/16/22  8:54 AM   Result Value Ref Range    WBC Count 5.7 4.0 - 11.0 10e3/uL    RBC Count 4.43 3.80 - 5.20 10e6/uL    Hemoglobin 13.5 11.7 - 15.7 g/dL    Hematocrit 40.4 35.0 - 47.0 %    MCV 91 78 - 100 fL    MCH 30.5 26.5 - 33.0 pg    MCHC 33.4 31.5 - 36.5 g/dL    RDW 12.8 10.0 - 15.0 %    Platelet Count 315 150 - 450 10e3/uL    % Neutrophils 50 %    % Lymphocytes 29 %    % Monocytes 10 %    % Eosinophils 10 %    % Basophils 1 %    % Immature Granulocytes 0 %    NRBCs per 100 WBC 0  <1 /100    Absolute Neutrophils 2.8 1.6 - 8.3 10e3/uL    Absolute Lymphocytes 1.7 0.8 - 5.3 10e3/uL    Absolute Monocytes 0.6 0.0 - 1.3 10e3/uL    Absolute Eosinophils 0.6 0.0 - 0.7 10e3/uL    Absolute Basophils 0.0 0.0 - 0.2 10e3/uL    Absolute Immature Granulocytes 0.0 <=0.4 10e3/uL    Absolute NRBCs 0.0 10e3/uL     Imaging:    No new imaging.      Again, thank you for allowing me to participate in the care of your patient.        Sincerely,        Talib Gaxiola, CNP

## 2022-11-18 ENCOUNTER — PATIENT OUTREACH (OUTPATIENT)
Dept: ONCOLOGY | Facility: CLINIC | Age: 42
End: 2022-11-18

## 2022-11-18 NOTE — PROGRESS NOTES
[facility]: Cancer Care                                                                                          Attempted to call patient today to discuss the cancer treatment summary that was prepared and sent via [employer]. Left a generic voice message requesting a call back at my direct phone number: [phone].    Varsha Jimenez RN BSN  Cancer Survivorship Coordinator

## 2022-11-28 NOTE — PROGRESS NOTES
[facility]: Cancer Care                                                                                          Attempted to call Amna today to review her cancer treatment summary. This is my second attempt to reach her. Left a message requesting a call back with any questions or if she would like to review the document together.     Varsha Jimenez RN BSN  Cancer Survivorship Coordinator

## 2022-12-02 ENCOUNTER — OFFICE VISIT (OUTPATIENT)
Dept: FAMILY MEDICINE | Facility: CLINIC | Age: 42
End: 2022-12-02
Payer: COMMERCIAL

## 2022-12-02 VITALS
HEART RATE: 84 BPM | SYSTOLIC BLOOD PRESSURE: 92 MMHG | OXYGEN SATURATION: 98 % | WEIGHT: 178 LBS | TEMPERATURE: 97.3 F | DIASTOLIC BLOOD PRESSURE: 56 MMHG | BODY MASS INDEX: 27.88 KG/M2 | RESPIRATION RATE: 18 BRPM

## 2022-12-02 DIAGNOSIS — N76.0 BACTERIAL VAGINOSIS: ICD-10-CM

## 2022-12-02 DIAGNOSIS — B96.89 BACTERIAL VAGINOSIS: ICD-10-CM

## 2022-12-02 DIAGNOSIS — Z12.4 CERVICAL CANCER SCREENING: Primary | ICD-10-CM

## 2022-12-02 DIAGNOSIS — Z12.31 ENCOUNTER FOR SCREENING MAMMOGRAM FOR MALIGNANT NEOPLASM OF BREAST: ICD-10-CM

## 2022-12-02 DIAGNOSIS — F32.1 CURRENT MODERATE EPISODE OF MAJOR DEPRESSIVE DISORDER WITHOUT PRIOR EPISODE (H): ICD-10-CM

## 2022-12-02 PROBLEM — I26.99 BILATERAL PULMONARY EMBOLISM (H): Status: RESOLVED | Noted: 2022-03-16 | Resolved: 2022-12-02

## 2022-12-02 PROBLEM — J18.9 ACUTE PNEUMONIA: Status: RESOLVED | Noted: 2022-03-29 | Resolved: 2022-12-02

## 2022-12-02 LAB
CLUE CELLS: ABNORMAL
TRICHOMONAS, WET PREP: ABNORMAL
WBC'S/HIGH POWER FIELD, WET PREP: ABNORMAL
YEAST, WET PREP: ABNORMAL

## 2022-12-02 PROCEDURE — 87210 SMEAR WET MOUNT SALINE/INK: CPT | Performed by: FAMILY MEDICINE

## 2022-12-02 PROCEDURE — 87624 HPV HI-RISK TYP POOLED RSLT: CPT | Performed by: FAMILY MEDICINE

## 2022-12-02 PROCEDURE — G0145 SCR C/V CYTO,THINLAYER,RESCR: HCPCS | Performed by: FAMILY MEDICINE

## 2022-12-02 PROCEDURE — 99213 OFFICE O/P EST LOW 20 MIN: CPT | Performed by: FAMILY MEDICINE

## 2022-12-02 RX ORDER — ESCITALOPRAM OXALATE 10 MG/1
10 TABLET ORAL DAILY
Qty: 90 TABLET | Refills: 4 | Status: SHIPPED | OUTPATIENT
Start: 2022-12-02 | End: 2024-02-21

## 2022-12-02 ASSESSMENT — ENCOUNTER SYMPTOMS
WEAKNESS: 0
NAUSEA: 0
CONSTIPATION: 0
HEMATURIA: 0
PALPITATIONS: 0
DYSURIA: 0
PARESTHESIAS: 0
HEMATOCHEZIA: 0
CHILLS: 0
DIZZINESS: 0
EYE PAIN: 0
DIARRHEA: 0
COUGH: 0
HEADACHES: 0
FEVER: 0
HEARTBURN: 0
NERVOUS/ANXIOUS: 0
MYALGIAS: 0
BREAST MASS: 0
SORE THROAT: 0
ARTHRALGIAS: 0
SHORTNESS OF BREATH: 0
FREQUENCY: 0
JOINT SWELLING: 0
ABDOMINAL PAIN: 0

## 2022-12-02 ASSESSMENT — PATIENT HEALTH QUESTIONNAIRE - PHQ9
10. IF YOU CHECKED OFF ANY PROBLEMS, HOW DIFFICULT HAVE THESE PROBLEMS MADE IT FOR YOU TO DO YOUR WORK, TAKE CARE OF THINGS AT HOME, OR GET ALONG WITH OTHER PEOPLE: SOMEWHAT DIFFICULT
SUM OF ALL RESPONSES TO PHQ QUESTIONS 1-9: 2
SUM OF ALL RESPONSES TO PHQ QUESTIONS 1-9: 2

## 2022-12-02 NOTE — PROGRESS NOTES
[facility] Visit  Phone : none    Assessment/Plan:  Cervical cancer screening  Last pap smear 2017 WNL. Due today.  Plan:  - Pap Screen with HPV - recommended age 30 - 65 years    Current moderate episode of major depressive disorder without prior episode (H)  Well controlled. Continue with Lexapro 10mg daily, physical activity 150 minutes per week, and eating healthy foods.   Plan:  - escitalopram (LEXAPRO) 10 MG tablet  Dispense: 90 tablet; Refill: 4    Bacterial vaginosis  Asymptomatic. Has h/o recurrent BV. Negative today. Discussed vaginal hygiene measures, including vaginal probiotic use   Plan:  - Wet prep - Clinic Collect    Encounter for screening mammogram for malignant neoplasm of breast  - MA Screen Bilateral w/August      Return in about 4 months (around 4/2/2023) for Office Visit.      Stephy Woo is a 42 year old accompanied by her self, presenting for the following health issues:  Gyn Exam    Care Everywhere reviewed appears last pap smear completed at health [facility] 2/3/2017, HPV negative  No concerns for STDs  No new partners in the last year  Menstrual cycles regular but less discharge throughout each cycle   Has h/o recurrent BV, though no symptoms currently    Depression  Feeling really positive   Mood is improved  Will continue the lexapro but wants to come off of it possibly in the Spring of 2023    Skin tags of the neck would like checked today    Healthy Habits:     Getting at least 3 servings of Calcium per day:  Yes    Bi-annual eye exam:  Yes    Dental care twice a year:  Yes    Sleep apnea or symptoms of sleep apnea:  None    Diet:  Vegetarian/vegan    Frequency of exercise:  6-7 days/week    Duration of exercise:  Greater than 60 minutes    Taking medications regularly:  Yes    Medication side effects:  None    PHQ-2 Total Score: 0    Additional concerns today:  No       Review of Systems   Constitutional: Negative for chills and fever.    HENT: Negative for congestion, ear pain, hearing loss and sore throat.    Eyes: Negative for pain and visual disturbance.   Respiratory: Negative for cough and shortness of breath.    Cardiovascular: Negative for chest pain, palpitations and peripheral edema.   Gastrointestinal: Negative for abdominal pain, constipation, diarrhea, heartburn, hematochezia and nausea.   Breasts:  Negative for tenderness, breast mass and discharge.   Genitourinary: Negative for dysuria, frequency, genital sores, hematuria, pelvic pain, urgency, vaginal bleeding and vaginal discharge.   Musculoskeletal: Negative for arthralgias, joint swelling and myalgias.   Skin: Negative for rash.   Neurological: Negative for dizziness, weakness, headaches and paresthesias.   Psychiatric/Behavioral: Negative for mood changes. The patient is not nervous/anxious.           Objective    BP 92/56 (BP Location: Left arm, Patient Position: Sitting, Cuff Size: Adult Regular)   Pulse 84   Temp 97.3  F (36.3  C) (Temporal)   Resp 18   Wt 80.7 kg (178 lb)   LMP 11/06/2022   SpO2 98%   BMI 27.88 kg/m    Body mass index is 27.88 kg/m .     Physical Exam   Wt Readings from Last 3 Encounters:   12/02/22 80.7 kg (178 lb)   11/16/22 81.6 kg (180 lb)   08/31/22 85.5 kg (188 lb 6.4 oz)       Patient's last menstrual period was 11/06/2022.    All normal as below except abnormalities include: Approximately 7 acrochordons on the right lateral neck. Otherwise normal exam.  General is a  42 year old sitting comfortably in no apparent distress wearing a mask   Skin: No lesions or rashes noted  Pelvic: Normal external genitalia.  Healthy normal vaginal mucosa.  Health appearing cervix.  Testing obtained without pain or difficulty.        History summarized from1-2: 3/16/22 Physical, 11/16/22 Oncology visit.   Old Records-1: Outside allergies, meds, problems and immunizations were reconciled as needed from CareEverywhere  Medicine tests reviewed-1: [facility]  cytology 2014 negative, HPV 2017 negative.     Lesvia Mendoza, AIDAN, RN, FNP student    Physician Attestation   I, Grace Hagan, saw this patient and have discussed and personally reviewed information outlined in this document.    I agree with the findings and plan of care as documented in the note.      MD Grace Briggs MD              Answers for HPI/ROS submitted by the patient on 12/2/2022  If you checked off any problems, how difficult have these problems made it for you to do your work, take care of things at home, or get along with other people?: Somewhat difficult  PHQ9 TOTAL SCORE: 2

## 2022-12-02 NOTE — LETTER
My Depression Action Plan  Name: Amna Oneil   Date of Birth 1980  Date: 12/2/2022    My doctor: Jayna Luna   My clinic: [facility] [address] [place] [id number] [phone]          GREEN    ZONE   Good Control    What it looks like:     Things are going generally well. You have normal ups and downs. You may even feel depressed from time to time, but bad moods usually last less than a day.   What you need to do:  1. Continue to care for yourself (see self care plan)  2. Check your depression survival kit and update it as needed  3. Follow your physician s recommendations including any medication.  4. Do not stop taking medication unless you consult with your physician first.           YELLOW         ZONE Getting Worse    What it looks like:     Depression is starting to interfere with your life.     It may be hard to get out of bed; you may be starting to isolate yourself from others.    Symptoms of depression are starting to last most all day and this has happened for several days.     You may have suicidal thoughts but they are not constant.   What you need to do:     1. Call your care team. Your response to treatment will improve if you keep your care team informed of your progress. Yellow periods are signs an adjustment may need to be made.     2. Continue your self-care.  Just get dressed and ready for the day.  Don't give yourself time to talk yourself out of it.    3. Talk to someone in your support network.    4. Open up your Depression Self-Care Plan/Wellness Kit.           RED    ZONE Medical Alert - Get Help    What it looks like:     Depression is seriously interfering with your life.     You may experience these or other symptoms: You can t get out of bed most days, can t work or engage in other necessary activities, you have trouble taking care of basic hygiene, or basic responsibilities, thoughts of suicide or death that will not go  away, self-injurious behavior.     What you need to do:  1. Call your care team and request a same-day appointment. If they are not available (weekends or after hours) call your local crisis line, emergency room or 911.          Depression Self-Care Plan / Wellness Kit    Many people find that medication and therapy are helpful treatments for managing depression. In addition, making small changes to your everyday life can help to boost your mood and improve your wellbeing. Below are some tips for you to consider. Be sure to talk with your medical provider and/or behavioral health consultant if your symptoms are worsening or not improving.     Sleep   Sleep hygiene  means all of the habits that support good, restful sleep. It includes maintaining a consistent bedtime and wake time, using your bedroom only for sleeping or sex, and keeping the bedroom dark and free of distractions like a computer, smartphone, or television.     Develop a Healthy Routine  Maintain good hygiene. Get out of bed in the morning, make your bed, brush your teeth, take a shower, and get dressed. Don t spend too much time viewing media that makes you feel stressed. Find time to relax each day.    Exercise  Get some form of exercise every day. This will help reduce pain and release endorphins, the  feel good  chemicals in your brain. It can be as simple as just going for a walk or doing some gardening, anything that will get you moving.      Diet  Strive to eat healthy foods, including fruits and vegetables. Drink plenty of water. Avoid excessive sugar, caffeine, alcohol, and other mood-altering substances.     Stay Connected with Others  Stay in touch with friends and family members.    Manage Your Mood  Try deep breathing, massage therapy, biofeedback, or meditation. Take part in fun activities when you can. Try to find something to smile about each day.     Psychotherapy  Be open to working with a therapist if your provider recommends it.      Medication  Be sure to take your medication as prescribed. Most anti-depressants need to be taken every day. It usually takes several weeks for medications to work. Not all medicines work for all people. It is important to follow-up with your provider to make sure you have a treatment plan that is working for you. Do not stop your medication abruptly without first discussing it with your provider.    Crisis Resources   These hotlines are for both adults and children. They and are open 24 hours a day, 7 days a week unless noted otherwise.      National Suicide Prevention Lifeline   988 or 9-206-606-QZOZ (6298)      Crisis Text Line    www.crisistextline.org  Text HOME to 531618 from anywhere in the [place], anytime, about any type of crisis. A live, trained crisis counselor will receive the text and respond quickly.      [facility] Lifeline for LGBTQ Youth  A national crisis intervention and suicide lifeline for LGBTQ youth under 25. Provides a safe place to talk without judgement. Call 1-434.295.7558; text START to 113907 or visit www.thetrevorproject.org to talk to a trained counselor.      For [facility] crisis numbers, visit the [facility] website at:  https://[place].gov/dhs/people-we-serve/adults/health-care/mental-health/resources/crisis-contacts.jsp

## 2022-12-07 LAB
BKR LAB AP GYN ADEQUACY: NORMAL
BKR LAB AP GYN INTERPRETATION: NORMAL
BKR LAB AP HPV REFLEX: NORMAL
BKR LAB AP PREVIOUS ABNORMAL: NORMAL
PATH REPORT.COMMENTS IMP SPEC: NORMAL
PATH REPORT.COMMENTS IMP SPEC: NORMAL
PATH REPORT.RELEVANT HX SPEC: NORMAL

## 2022-12-09 LAB
HUMAN PAPILLOMA VIRUS 16 DNA: NEGATIVE
HUMAN PAPILLOMA VIRUS 18 DNA: NEGATIVE
HUMAN PAPILLOMA VIRUS FINAL DIAGNOSIS: NORMAL
HUMAN PAPILLOMA VIRUS OTHER HR: NEGATIVE

## 2022-12-30 ENCOUNTER — LAB (OUTPATIENT)
Dept: INFUSION THERAPY | Facility: CLINIC | Age: 42
End: 2022-12-30
Attending: INTERNAL MEDICINE
Payer: COMMERCIAL

## 2022-12-30 DIAGNOSIS — C81.18 NODULAR SCLEROSIS HODGKIN LYMPHOMA OF LYMPH NODES OF MULTIPLE REGIONS (H): Primary | ICD-10-CM

## 2022-12-30 PROCEDURE — 96523 IRRIG DRUG DELIVERY DEVICE: CPT

## 2022-12-30 PROCEDURE — 250N000011 HC RX IP 250 OP 636: Performed by: INTERNAL MEDICINE

## 2022-12-30 RX ORDER — HEPARIN SODIUM,PORCINE 10 UNIT/ML
5 VIAL (ML) INTRAVENOUS
Status: CANCELLED | OUTPATIENT
Start: 2022-12-30

## 2022-12-30 RX ORDER — HEPARIN SODIUM (PORCINE) LOCK FLUSH IV SOLN 100 UNIT/ML 100 UNIT/ML
5 SOLUTION INTRAVENOUS
Status: CANCELLED | OUTPATIENT
Start: 2022-12-30

## 2022-12-30 RX ORDER — HEPARIN SODIUM (PORCINE) LOCK FLUSH IV SOLN 100 UNIT/ML 100 UNIT/ML
5 SOLUTION INTRAVENOUS
Status: DISCONTINUED | OUTPATIENT
Start: 2022-12-30 | End: 2022-12-30 | Stop reason: HOSPADM

## 2022-12-30 RX ADMIN — HEPARIN 5 ML: 100 SYRINGE at 11:38

## 2022-12-30 NOTE — PROGRESS NOTES
Infusion Nursing Note:  Amna Oneil presents today for port flush.    Patient seen by provider today: No   present during visit today: Not Applicable.    Note: N/A.    Intravenous Access:  Implanted Port.    Treatment Conditions:  Not Applicable.    Post port flush assessment:  Site patent and intact, free from redness, edema or discomfort.  Access discontinued per protocol.  Positive blood return noted.     Discharge Plan:   Patient and/or family verbalized understanding of discharge instructions and all questions answered.  Patient discharged in stable condition accompanied by: self.  Departure Mode: Ambulatory.      Lalo Araya RN

## 2023-02-06 ENCOUNTER — HOSPITAL ENCOUNTER (OUTPATIENT)
Dept: MAMMOGRAPHY | Facility: CLINIC | Age: 43
Discharge: HOME | End: 2023-02-06
Attending: FAMILY MEDICINE | Admitting: FAMILY MEDICINE
Payer: COMMERCIAL

## 2023-02-06 DIAGNOSIS — Z12.31 ENCOUNTER FOR SCREENING MAMMOGRAM FOR MALIGNANT NEOPLASM OF BREAST: ICD-10-CM

## 2023-02-06 PROCEDURE — 77067 SCR MAMMO BI INCL CAD: CPT

## 2023-02-13 ENCOUNTER — HOSPITAL ENCOUNTER (OUTPATIENT)
Dept: CT IMAGING | Facility: CLINIC | Age: 43
Discharge: HOME | End: 2023-02-13
Attending: NURSE PRACTITIONER
Payer: COMMERCIAL

## 2023-02-13 DIAGNOSIS — C81.18 NODULAR SCLEROSIS HODGKIN LYMPHOMA OF LYMPH NODES OF MULTIPLE REGIONS (H): ICD-10-CM

## 2023-02-13 PROCEDURE — 74177 CT ABD & PELVIS W/CONTRAST: CPT

## 2023-02-13 PROCEDURE — 70491 CT SOFT TISSUE NECK W/DYE: CPT

## 2023-02-13 PROCEDURE — 250N000011 HC RX IP 250 OP 636: Performed by: NURSE PRACTITIONER

## 2023-02-13 RX ORDER — HEPARIN SODIUM (PORCINE) LOCK FLUSH IV SOLN 100 UNIT/ML 100 UNIT/ML
5-10 SOLUTION INTRAVENOUS
Status: DISCONTINUED | OUTPATIENT
Start: 2023-02-13 | End: 2023-02-14 | Stop reason: HOSPADM

## 2023-02-13 RX ORDER — IOPAMIDOL 755 MG/ML
100 INJECTION, SOLUTION INTRAVASCULAR ONCE
Status: COMPLETED | OUTPATIENT
Start: 2023-02-13 | End: 2023-02-13

## 2023-02-13 RX ADMIN — IOPAMIDOL 100 ML: 755 INJECTION, SOLUTION INTRAVENOUS at 12:49

## 2023-02-13 RX ADMIN — HEPARIN 5 ML: 100 SYRINGE at 12:54

## 2023-02-15 ENCOUNTER — ONCOLOGY VISIT (OUTPATIENT)
Dept: ONCOLOGY | Facility: CLINIC | Age: 43
End: 2023-02-15
Attending: INTERNAL MEDICINE
Payer: COMMERCIAL

## 2023-02-15 ENCOUNTER — LAB (OUTPATIENT)
Dept: INFUSION THERAPY | Facility: CLINIC | Age: 43
End: 2023-02-15
Attending: INTERNAL MEDICINE
Payer: COMMERCIAL

## 2023-02-15 VITALS
RESPIRATION RATE: 16 BRPM | OXYGEN SATURATION: 99 % | TEMPERATURE: 98 F | WEIGHT: 166.9 LBS | SYSTOLIC BLOOD PRESSURE: 91 MMHG | DIASTOLIC BLOOD PRESSURE: 62 MMHG | HEIGHT: 67 IN | HEART RATE: 80 BPM | BODY MASS INDEX: 26.19 KG/M2

## 2023-02-15 DIAGNOSIS — C81.18 NODULAR SCLEROSIS HODGKIN LYMPHOMA OF LYMPH NODES OF MULTIPLE REGIONS (H): Primary | ICD-10-CM

## 2023-02-15 DIAGNOSIS — Z51.11 ENCOUNTER FOR ANTINEOPLASTIC CHEMOTHERAPY: ICD-10-CM

## 2023-02-15 LAB
ALBUMIN SERPL-MCNC: 3.9 G/DL (ref 3.5–5)
ALP SERPL-CCNC: 70 U/L (ref 45–120)
ALT SERPL W P-5'-P-CCNC: 13 U/L (ref 0–45)
ANION GAP SERPL CALCULATED.3IONS-SCNC: 8 MMOL/L (ref 5–18)
AST SERPL W P-5'-P-CCNC: 14 U/L (ref 0–40)
BASOPHILS # BLD AUTO: 0.1 10E3/UL (ref 0–0.2)
BASOPHILS NFR BLD AUTO: 1 %
BILIRUB SERPL-MCNC: 0.5 MG/DL (ref 0–1)
BUN SERPL-MCNC: 26 MG/DL (ref 8–22)
CALCIUM SERPL-MCNC: 9.2 MG/DL (ref 8.5–10.5)
CHLORIDE BLD-SCNC: 108 MMOL/L (ref 98–107)
CO2 SERPL-SCNC: 24 MMOL/L (ref 22–31)
CREAT SERPL-MCNC: 0.73 MG/DL (ref 0.6–1.1)
EOSINOPHIL # BLD AUTO: 2 10E3/UL (ref 0–0.7)
EOSINOPHIL NFR BLD AUTO: 22 %
ERYTHROCYTE [DISTWIDTH] IN BLOOD BY AUTOMATED COUNT: 12.8 % (ref 10–15)
GFR SERPL CREATININE-BSD FRML MDRD: >90 ML/MIN/1.73M2
GLUCOSE BLD-MCNC: 91 MG/DL (ref 70–125)
HCT VFR BLD AUTO: 41.5 % (ref 35–47)
HGB BLD-MCNC: 13.8 G/DL (ref 11.7–15.7)
IMM GRANULOCYTES # BLD: 0 10E3/UL
IMM GRANULOCYTES NFR BLD: 0 %
LYMPHOCYTES # BLD AUTO: 2.2 10E3/UL (ref 0.8–5.3)
LYMPHOCYTES NFR BLD AUTO: 25 %
MCH RBC QN AUTO: 31.4 PG (ref 26.5–33)
MCHC RBC AUTO-ENTMCNC: 33.3 G/DL (ref 31.5–36.5)
MCV RBC AUTO: 94 FL (ref 78–100)
MONOCYTES # BLD AUTO: 0.6 10E3/UL (ref 0–1.3)
MONOCYTES NFR BLD AUTO: 7 %
NEUTROPHILS # BLD AUTO: 4 10E3/UL (ref 1.6–8.3)
NEUTROPHILS NFR BLD AUTO: 45 %
NRBC # BLD AUTO: 0 10E3/UL
NRBC BLD AUTO-RTO: 0 /100
PLATELET # BLD AUTO: 323 10E3/UL (ref 150–450)
POTASSIUM BLD-SCNC: 4 MMOL/L (ref 3.5–5)
PROT SERPL-MCNC: 6.5 G/DL (ref 6–8)
RBC # BLD AUTO: 4.4 10E6/UL (ref 3.8–5.2)
SODIUM SERPL-SCNC: 140 MMOL/L (ref 136–145)
WBC # BLD AUTO: 9 10E3/UL (ref 4–11)

## 2023-02-15 PROCEDURE — 99214 OFFICE O/P EST MOD 30 MIN: CPT | Performed by: INTERNAL MEDICINE

## 2023-02-15 PROCEDURE — 85025 COMPLETE CBC W/AUTO DIFF WBC: CPT

## 2023-02-15 PROCEDURE — 250N000011 HC RX IP 250 OP 636

## 2023-02-15 PROCEDURE — 36591 DRAW BLOOD OFF VENOUS DEVICE: CPT

## 2023-02-15 PROCEDURE — G0463 HOSPITAL OUTPT CLINIC VISIT: HCPCS | Performed by: INTERNAL MEDICINE

## 2023-02-15 PROCEDURE — 80053 COMPREHEN METABOLIC PANEL: CPT

## 2023-02-15 RX ORDER — HEPARIN SODIUM,PORCINE 10 UNIT/ML
5 VIAL (ML) INTRAVENOUS
Status: CANCELLED | OUTPATIENT
Start: 2023-02-15

## 2023-02-15 RX ORDER — HEPARIN SODIUM (PORCINE) LOCK FLUSH IV SOLN 100 UNIT/ML 100 UNIT/ML
5 SOLUTION INTRAVENOUS
Status: DISCONTINUED | OUTPATIENT
Start: 2023-02-15 | End: 2023-02-15 | Stop reason: HOSPADM

## 2023-02-15 RX ORDER — HEPARIN SODIUM (PORCINE) LOCK FLUSH IV SOLN 100 UNIT/ML 100 UNIT/ML
SOLUTION INTRAVENOUS
Status: COMPLETED
Start: 2023-02-15 | End: 2023-02-15

## 2023-02-15 RX ORDER — HEPARIN SODIUM (PORCINE) LOCK FLUSH IV SOLN 100 UNIT/ML 100 UNIT/ML
5 SOLUTION INTRAVENOUS
Status: CANCELLED | OUTPATIENT
Start: 2023-02-15

## 2023-02-15 RX ADMIN — HEPARIN 500 UNITS: 100 SYRINGE at 08:40

## 2023-02-15 ASSESSMENT — PAIN SCALES - GENERAL: PAINLEVEL: NO PAIN (0)

## 2023-02-15 NOTE — PROGRESS NOTES
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       Assessment and Plan     Cancer Staging   No matching staging information was found for the patient.    ECOG Performance    0 - Independent     Pain  Pain Score: No Pain (0)    #.  History of classical Hodgkin lymphoma, stage II (favorable risk) IPSS-0 risk factor, FFP 88%, overall survival 98%.   She looks clinically well. Exam is unremarkable. No signs and symptoms suggestive of lymphoma recurrence.    Reviewed labs.  CBC is unremarkable except mild to moderate eosinophilia.  CMP is normal.  I reviewed the CT scan of neck/chest/abdomen/pelvis and confirmed that there is no evidence of lymphoma recurrence.   Recommended clinical exam and labs in 6 months.  We will plan to obtain CT scan if needed at that point.  Otherwise we will plan to obtain surveillance CT scan in 1 year.    #.  Eosinophilia   No clinical signs and symptoms of allergic reaction or rash.  She has some runny nose.  She has history of Allergy in childhood but she now has a cat without obvious reaction.  Recommended to continue to monitor clinical signs and symptoms.  We will follow-up at next visit.  She is advised to call us with any appearance of allergic reaction or rash, then we we will recheck that eosinophil counts and perhaps biopsy if there is skin rash.    #.  History of acute bilateral pulmonary emboli without evidence of saddle embolus or central emboli in 2/2022.   Likely provoked in the setting of recent COVID-19 infection, active chemotherapy for lymphoma in the setting of ongoing birth control pill use. She has stopped birth control pills.  She tolerated Xarelto well without side effects or intolerance. She completed 6 months of  anticoagulation therapy in 8/2022.     #.  Creatine supplementation   It is probably safe but I will reach out to our nutritionist for any suggestion or recommendation.  On the other hand, she can continue routine workout without any supplementation.    #.  IR referral to port removal was placed today.    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  12/23/2021-PET/CT scan showed hypermetabolic lymph nodes involving basilar neck/superior mediastinum, mediastinum, right pericardial space, bilateral axillae.   -second opinion pathology evaluation at [facility] and it confirmed classical Hodgkin lymphoma.  Echocardiogram showed adequate cardiac function.  Pulmonary function test was normal    Treatment to date  2/3/2022-5/23/2022-completed 4 cycles of ABVD (bleomycin was omitted in cycle #3 and #4 due to pulmonary toxicity) treatment course was complicated by pulmonary emboli, PCP pneumonia.  She achieved complete radiographic response (no uptake by PET) after 2 cycles of ABVD.    History of Present Illness    Ms. Amna Oneil presented today accompanied by her .    She is doing very well.  No fever.  No drenching sweats.  She does not feel any palpable masses.  She has been doing fitness exercises and doing well.  She is wondering whether creatine supplement is okay for her to build up muscle.    She had a screening  "mammogram that was negative for malignancy.    They have 2 dogs and a cat.    Review of systems  Apart from describing in HPI, the remainder of comprehensive ROS was negative.    Past History    Past Medical History:   Diagnosis Date     Bilateral pulmonary embolism (H) 3/16/2022     Hodgkin lymphoma (H)      Pulmonary emboli (H)        Past Surgical History:   Procedure Laterality Date     IR CHEST PORT PLACEMENT > 5 YRS OF AGE  1/21/2022         Physical Exam    BP 91/62 (Patient Position: Sitting)   Pulse 80   Temp 98  F (36.7  C) (Oral)   Resp 16   Ht 1.702 m (5' 7\")   Wt 75.7 kg (166 lb 14.4 oz)   SpO2 99%   BMI 26.14 kg/m      General: alert, awake, not in acute distress  HEENT: Head: Normal, normocephalic, atraumatic.  Eye: Normal external eye, conjunctiva, lids cornea, PATRICIA.  Pharynx: Normal buccal mucosa. Normal pharynx.  Neck / Thyroid: Supple, no masses, nodes, nodules or enlargement.  Lymphatics: No abnormally enlarged lymph nodes.  Chest: Normal chest wall and respirations. Clear to auscultation.  Abdomen: abdomen is soft without significant tenderness, masses, organomegaly or guarding  Extremities: normal strength, tone, and muscle mass  Skin: normal. no rash or abnormalities  CNS: non focal.    Lab Results    Recent Results (from the past 168 hour(s))   Comprehensive metabolic panel   Result Value Ref Range    Sodium 140 136 - 145 mmol/L    Potassium 4.0 3.5 - 5.0 mmol/L    Chloride 108 (H) 98 - 107 mmol/L    Carbon Dioxide (CO2) 24 22 - 31 mmol/L    Anion Gap 8 5 - 18 mmol/L    Urea Nitrogen 26 (H) 8 - 22 mg/dL    Creatinine 0.73 0.60 - 1.10 mg/dL    Calcium 9.2 8.5 - 10.5 mg/dL    Glucose 91 70 - 125 mg/dL    Alkaline Phosphatase 70 45 - 120 U/L    AST 14 0 - 40 U/L    ALT 13 0 - 45 U/L    Protein Total 6.5 6.0 - 8.0 g/dL    Albumin 3.9 3.5 - 5.0 g/dL    Bilirubin Total 0.5 0.0 - 1.0 mg/dL    GFR Estimate >90 >60 mL/min/1.73m2   CBC with platelets and differential   Result Value Ref Range    " WBC Count 9.0 4.0 - 11.0 10e3/uL    RBC Count 4.40 3.80 - 5.20 10e6/uL    Hemoglobin 13.8 11.7 - 15.7 g/dL    Hematocrit 41.5 35.0 - 47.0 %    MCV 94 78 - 100 fL    MCH 31.4 26.5 - 33.0 pg    MCHC 33.3 31.5 - 36.5 g/dL    RDW 12.8 10.0 - 15.0 %    Platelet Count 323 150 - 450 10e3/uL    % Neutrophils 45 %    % Lymphocytes 25 %    % Monocytes 7 %    % Eosinophils 22 %    % Basophils 1 %    % Immature Granulocytes 0 %    NRBCs per 100 WBC 0 <1 /100    Absolute Neutrophils 4.0 1.6 - 8.3 10e3/uL    Absolute Lymphocytes 2.2 0.8 - 5.3 10e3/uL    Absolute Monocytes 0.6 0.0 - 1.3 10e3/uL    Absolute Eosinophils 2.0 (H) 0.0 - 0.7 10e3/uL    Absolute Basophils 0.1 0.0 - 0.2 10e3/uL    Absolute Immature Granulocytes 0.0 <=0.4 10e3/uL    Absolute NRBCs 0.0 10e3/uL       Imaging    CT Chest/Abdomen/Pelvis w Contrast    Result Date: 2/13/2023  EXAM: CT CHEST/ABDOMEN/PELVIS W CONTRAST LOCATION: [facility] DATE/TIME: 2/13/2023 12:58 PM INDICATION:  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 Multiplanar reformats were obtained. Dose reduction techniques were used. CONTRAST: ISOVUE 370 100ML FINDINGS: LUNGS AND PLEURA: Normal. MEDIASTINUM/AXILLAE: Right-sided portacatheter is stable. Stable coarse left breast calcification. CORONARY ARTERY CALCIFICATION: None. HEPATOBILIARY: Normal. PANCREAS: Normal. SPLEEN: Normal. ADRENAL GLANDS: Normal. KIDNEYS/BLADDER: Normal. BOWEL: Previous appendectomy. LYMPH NODES: Normal. VASCULATURE: Unremarkable. PELVIC ORGANS: Normal. MUSCULOSKELETAL: Normal.     IMPRESSION: 1.  No lymphadenopathy in the chest, abdomen or pelvis 2.  Right-sided portacatheter. 3.  Previous appendectomy.    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 CONTRAST: ISOVUE 370 100ML TECHNIQUE: Routine CT Soft Tissue Neck with IV contrast. Multiplanar reformats. Dose reduction techniques were used. FINDINGS: MUCOSAL SPACES/SOFT TISSUES: Normal mucosal spaces of the upper aerodigestive tract. No mucosal mass or inflammation identified. Normal vocal cords and infraglottic trachea. Normal parapharyngeal space and subcutaneous soft tissues. Normal oral cavity,  spaces, and floor of mouth structures. LYMPH NODES: No pathologic lymph nodes by size or morphology criteria. SALIVARY GLANDS: Normal parotid and submandibular glands. THYROID: Normal. VESSELS: Vascular structures of the neck are patent. Redemonstration of right IJ port catheter directed into the superior vena cava. VISUALIZED INTRACRANIAL/ORBITS/SINUSES: No abnormality of the visualized intracranial compartment or orbits. Visualized paranasal sinuses and mastoid air cells are clear. OTHER: No destructive osseous lesion. The included lung apices are clear.     IMPRESSION: 1.  No evidence of lymphoma in the neck. No cervical mass or lymphadenopathy.    MA Screen Bilateral w/August    Result Date: 2/7/2023  BILATERAL FULL FIELD DIGITAL SCREENING MAMMOGRAM WITH TOMOSYNTHESIS Performed on: 2/6/23 Compared to: 12/13/2021 and 12/09/2021 Technique:  This study was evaluated with the assistance of Computer-Aided Detection.  Breast Tomosynthesis was used in interpretation. Findings: The breasts are heterogeneously dense, which may obscure small masses.  There is no radiographic evidence of malignancy.     IMPRESSION: ACR BI-RADS Category 1: Negative RECOMMENDED FOLLOW-UP: Annual routine screening mammogram The results and recommendations of this examination will be communicated to the patient. Deepali Washington MD     30 minutes spent on  the date of the encounter doing chart review, history and exam, documentation and further activities as noted above.    Signed by: Juan Lizarraga MD

## 2023-02-15 NOTE — LETTER
2/15/2023         RE: Amna Oneil  [address]  [place] [address]        Dear Colleague,    Thank you for referring your patient, Amna Oneil, to the [facility] [place]. Please see a copy of my visit note below.    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       Assessment and Plan     Cancer Staging   No matching staging information was found for the patient.    ECOG Performance    0 - Independent     Pain  Pain Score: No Pain (0)    #.  History of classical Hodgkin lymphoma, stage II (favorable risk) IPSS-0 risk factor, FFP 88%, overall survival 98%.   She looks clinically well. Exam is unremarkable. No signs and symptoms suggestive of lymphoma recurrence.    Reviewed labs.  CBC is unremarkable except mild to moderate eosinophilia.  CMP is normal.  I reviewed the CT scan of neck/chest/abdomen/pelvis and confirmed that there is no evidence of lymphoma recurrence.   Recommended clinical exam and labs in 6 months.  We will plan to obtain CT scan if needed at that point.  Otherwise we will plan to obtain surveillance CT scan in 1 year.    #.  Eosinophilia   No clinical signs and symptoms of allergic reaction or rash.  She has some runny nose.  She has history of Allergy in childhood but she now has a cat without obvious reaction.  Recommended to continue to monitor clinical signs and symptoms.  We will follow-up at next visit.  She is advised to call us with any appearance of allergic reaction or rash, then we we will recheck that eosinophil counts and perhaps biopsy if there is skin rash.    #.  History of acute bilateral pulmonary emboli without evidence of saddle embolus or central emboli in 2/2022.   Likely provoked in  the setting of recent COVID-19 infection, active chemotherapy for lymphoma in the setting of ongoing birth control pill use. She has stopped birth control pills.  She tolerated Xarelto well without side effects or intolerance. She completed 6 months of anticoagulation therapy in 8/2022.     #.  Creatine supplementation   It is probably safe but I will reach out to our nutritionist for any suggestion or recommendation.  On the other hand, she can continue routine workout without any supplementation.    #.  IR referral to port removal was placed today.    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  12/23/2021-PET/CT scan showed hypermetabolic lymph nodes involving basilar neck/superior mediastinum, mediastinum, right pericardial space, bilateral axillae.   -second opinion pathology evaluation at [facility] and it confirmed classical Hodgkin lymphoma.  Echocardiogram showed adequate cardiac function.  Pulmonary function test was normal    Treatment to date  2/3/2022-5/23/2022-completed 4 cycles of ABVD (bleomycin was omitted in cycle #3 and #4 due to pulmonary toxicity) treatment course was complicated by pulmonary emboli, PCP pneumonia.  She achieved complete radiographic response (no uptake by PET) after 2 cycles of ABVD.    History of Present Illness    Ms. Amna Oneil presented today accompanied by her .    She is  "doing very well.  No fever.  No drenching sweats.  She does not feel any palpable masses.  She has been doing fitness exercises and doing well.  She is wondering whether creatine supplement is okay for her to build up muscle.    She had a screening mammogram that was negative for malignancy.    They have 2 dogs and a cat.    Review of systems  Apart from describing in HPI, the remainder of comprehensive ROS was negative.    Past History    Past Medical History:   Diagnosis Date     Bilateral pulmonary embolism (H) 3/16/2022     Hodgkin lymphoma (H)      Pulmonary emboli (H)        Past Surgical History:   Procedure Laterality Date     IR CHEST PORT PLACEMENT > 5 YRS OF AGE  1/21/2022         Physical Exam    BP 91/62 (Patient Position: Sitting)   Pulse 80   Temp 98  F (36.7  C) (Oral)   Resp 16   Ht 1.702 m (5' 7\")   Wt 75.7 kg (166 lb 14.4 oz)   SpO2 99%   BMI 26.14 kg/m      General: alert, awake, not in acute distress  HEENT: Head: Normal, normocephalic, atraumatic.  Eye: Normal external eye, conjunctiva, lids cornea, PATRICIA.  Pharynx: Normal buccal mucosa. Normal pharynx.  Neck / Thyroid: Supple, no masses, nodes, nodules or enlargement.  Lymphatics: No abnormally enlarged lymph nodes.  Chest: Normal chest wall and respirations. Clear to auscultation.  Abdomen: abdomen is soft without significant tenderness, masses, organomegaly or guarding  Extremities: normal strength, tone, and muscle mass  Skin: normal. no rash or abnormalities  CNS: non focal.    Lab Results    Recent Results (from the past 168 hour(s))   Comprehensive metabolic panel   Result Value Ref Range    Sodium 140 136 - 145 mmol/L    Potassium 4.0 3.5 - 5.0 mmol/L    Chloride 108 (H) 98 - 107 mmol/L    Carbon Dioxide (CO2) 24 22 - 31 mmol/L    Anion Gap 8 5 - 18 mmol/L    Urea Nitrogen 26 (H) 8 - 22 mg/dL    Creatinine 0.73 0.60 - 1.10 mg/dL    Calcium 9.2 8.5 - 10.5 mg/dL    Glucose 91 70 - 125 mg/dL    Alkaline Phosphatase 70 45 - 120 U/L    " AST 14 0 - 40 U/L    ALT 13 0 - 45 U/L    Protein Total 6.5 6.0 - 8.0 g/dL    Albumin 3.9 3.5 - 5.0 g/dL    Bilirubin Total 0.5 0.0 - 1.0 mg/dL    GFR Estimate >90 >60 mL/min/1.73m2   CBC with platelets and differential   Result Value Ref Range    WBC Count 9.0 4.0 - 11.0 10e3/uL    RBC Count 4.40 3.80 - 5.20 10e6/uL    Hemoglobin 13.8 11.7 - 15.7 g/dL    Hematocrit 41.5 35.0 - 47.0 %    MCV 94 78 - 100 fL    MCH 31.4 26.5 - 33.0 pg    MCHC 33.3 31.5 - 36.5 g/dL    RDW 12.8 10.0 - 15.0 %    Platelet Count 323 150 - 450 10e3/uL    % Neutrophils 45 %    % Lymphocytes 25 %    % Monocytes 7 %    % Eosinophils 22 %    % Basophils 1 %    % Immature Granulocytes 0 %    NRBCs per 100 WBC 0 <1 /100    Absolute Neutrophils 4.0 1.6 - 8.3 10e3/uL    Absolute Lymphocytes 2.2 0.8 - 5.3 10e3/uL    Absolute Monocytes 0.6 0.0 - 1.3 10e3/uL    Absolute Eosinophils 2.0 (H) 0.0 - 0.7 10e3/uL    Absolute Basophils 0.1 0.0 - 0.2 10e3/uL    Absolute Immature Granulocytes 0.0 <=0.4 10e3/uL    Absolute NRBCs 0.0 10e3/uL       Imaging    CT Chest/Abdomen/Pelvis w Contrast    Result Date: 2/13/2023  EXAM: CT CHEST/ABDOMEN/PELVIS W CONTRAST LOCATION: [facility] DATE/TIME: 2/13/2023 12:58 PM INDICATION:  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 Multiplanar reformats were obtained. Dose reduction techniques were used. CONTRAST: ISOVUE 370 100ML FINDINGS: LUNGS AND PLEURA: Normal. MEDIASTINUM/AXILLAE: Right-sided portacatheter is stable. Stable coarse left breast calcification. CORONARY ARTERY CALCIFICATION: None. HEPATOBILIARY: Normal. PANCREAS: Normal. SPLEEN: Normal. ADRENAL GLANDS: Normal. KIDNEYS/BLADDER: Normal. BOWEL: Previous appendectomy. LYMPH NODES: Normal. VASCULATURE: Unremarkable. PELVIC ORGANS: Normal. MUSCULOSKELETAL: Normal.     IMPRESSION: 1.  No  lymphadenopathy in the chest, abdomen or pelvis 2.  Right-sided portacatheter. 3.  Previous appendectomy.    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 CONTRAST: ISOVUE 370 100ML TECHNIQUE: Routine CT Soft Tissue Neck with IV contrast. Multiplanar reformats. Dose reduction techniques were used. FINDINGS: MUCOSAL SPACES/SOFT TISSUES: Normal mucosal spaces of the upper aerodigestive tract. No mucosal mass or inflammation identified. Normal vocal cords and infraglottic trachea. Normal parapharyngeal space and subcutaneous soft tissues. Normal oral cavity,  spaces, and floor of mouth structures. LYMPH NODES: No pathologic lymph nodes by size or morphology criteria. SALIVARY GLANDS: Normal parotid and submandibular glands. THYROID: Normal. VESSELS: Vascular structures of the neck are patent. Redemonstration of right IJ port catheter directed into the superior vena cava. VISUALIZED INTRACRANIAL/ORBITS/SINUSES: No abnormality of the visualized intracranial compartment or orbits. Visualized paranasal sinuses and mastoid air cells are clear. OTHER: No destructive osseous lesion. The included lung apices are clear.     IMPRESSION: 1.  No evidence of lymphoma in the neck. No cervical mass or lymphadenopathy.    MA Screen Bilateral w/August    Result Date: 2/7/2023  BILATERAL FULL FIELD DIGITAL SCREENING MAMMOGRAM WITH TOMOSYNTHESIS Performed on: 2/6/23 Compared to: 12/13/2021 and 12/09/2021 Technique:  This study was evaluated with the assistance of Computer-Aided Detection.  Breast Tomosynthesis was used in interpretation. Findings: The breasts are heterogeneously dense, which may obscure small masses.  There is no radiographic evidence of malignancy.  "    IMPRESSION: ACR BI-RADS Category 1: Negative RECOMMENDED FOLLOW-UP: Annual routine screening mammogram The results and recommendations of this examination will be communicated to the patient. Deepali Washington MD     30 minutes spent on the date of the encounter doing chart review, history and exam, documentation and further activities as noted above.    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     Initial Vitals: BP 91/62 (Patient Position: Sitting)   Pulse 80   Temp 98  F (36.7  C) (Oral)   Resp 16   Ht 1.702 m (5' 7\")   Wt 75.7 kg (166 lb 14.4 oz)   SpO2 99%   BMI 26.14 kg/m   Estimated body mass index is 26.14 kg/m  as calculated from the following:    Height as of this encounter: 1.702 m (5' 7\").    Weight as of this encounter: 75.7 kg (166 lb 14.4 oz). Body surface area is 1.89 meters squared.  No Pain (0) Comment: Data Unavailable   No LMP recorded.  Allergies reviewed: Yes  Medications reviewed: Yes    Medications: Medication refills not needed today.  Pharmacy name entered into [employer]:    [facility] -- [place], [place] - [address]  [id number] - [place], [place] - [address]    Clinical concerns: no concerns today.       Leonardo Corona LPN                Again, thank you for allowing me to participate in the care of your patient.        Sincerely,        Juan Lizarraga MD    "

## 2023-02-15 NOTE — PROGRESS NOTES
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     Initial Vitals: BP 91/62 (Patient Position: Sitting)   Pulse 80   Temp 98  F (36.7  C) (Oral)   Resp 16   Ht 1.702 m (5' 7\")   Wt 75.7 kg (166 lb 14.4 oz)   SpO2 99%   BMI 26.14 kg/m   Estimated body mass index is 26.14 kg/m  as calculated from the following:    Height as of this encounter: 1.702 m (5' 7\").    Weight as of this encounter: 75.7 kg (166 lb 14.4 oz). Body surface area is 1.89 meters squared.  No Pain (0) Comment: Data Unavailable   No LMP recorded.  Allergies reviewed: Yes  Medications reviewed: Yes    Medications: Medication refills not needed today.  Pharmacy name entered into [employer]:    [facility] -- [place] - [place], [place] - [address]  [id number] - [place], [place] - [address] AT [facility] [address]    Clinical concerns: no concerns today.       Leonardo Corona LPN            "

## 2023-02-28 ENCOUNTER — MYC MEDICAL ADVICE (OUTPATIENT)
Dept: INTERVENTIONAL RADIOLOGY/VASCULAR | Facility: CLINIC | Age: 43
End: 2023-02-28
Payer: COMMERCIAL

## 2023-03-01 NOTE — TELEPHONE ENCOUNTER
Voicemail message left with request for patient to contact Interventional Radiology Department and acknowledge understanding of MYCHART instructions that were sent in anticipation of procedure scheduled 3/8/2023.      IR contact number provided in [facility].    Sylvia FOLEY  Interventional Radiology RN   [phone]

## 2023-03-06 NOTE — PROGRESS NOTES
Interventional Radiology - CHART REVIEW Pre-Procedure Note:  3/8/2023    Procedure Requested: port removal  Requested by: Juan Lizarraga MD    History and Physical Reviewed: H&P documented within 30 days (by Juan Lizarraga MD on 2/15/23).  I have personally reviewed the patient's medical history and have updated the medical record as necessary.    Brief HPI: Amna Oneil is a 42 year old female with previously diagnosed Hodgkin lymphoma stage II. Underwent right sided port placement 1/21/22 to facilitate chemotherapy treatments. Followed by oncology who notes patient is clinically well with unremarkable exam; without S/S suggestive of lymphoma recurrence. She is no longer in need or port and presents today for port removal.     IMAGING:  [facility]  LOCATION: [facility]  DATE: 1/21/2022     PROCEDURE:   1.  CENTRAL VENOUS PORT PLACEMENT  2.  FLUOROSCOPIC GUIDANCE FOR CATHETER PLACEMENT  3.  ULTRASOUND GUIDANCE FOR VASCULAR ACCESS  4.  CONSCIOUS SEDATION     INTERVENTIONAL RADIOLOGIST: Deangelo Mckeon MD.     INDICATION: Lymphoma, port for chemotherapy.     SEDATION: Versed 2 mg. Fentanyl 100 mcg. The procedure was performed with administration intravenous conscious sedation with appropriate preoperative, intraoperative, and postoperative evaluation. During the time-out, immediately prior to administration   of medications, the patient was reassessed for adequacy to receive conscious sedation.  15 minutes of supervised face to face conscious sedation time was provided by a radiology nurse under my direct supervision.     ANTIBIOTICS: Ancef 2 gram IV.  Air Kerma: 2 mGy  FLUOROSCOPIC TIME: 0.2 minutes   CONTRAST: None.     COMPLICATIONS: No immediate complications.     STERILE BARRIER TECHNIQUE: Maximum sterile barrier technique was used. Cutaneous antisepsis was performed at the operative site with application of 2% chlorhexidine and large sterile drape. Prior to the  procedure, the surgeon and assistant performed hand   hygiene and wore hat, mask, sterile gown, and sterile gloves during the entire procedure.     PROCEDURE:   Ultrasound demonstrated a patent and compressible right internal  jugular vein, and an ultrasound image was obtained and placed in the patient's permanent medical record. The right neck and chest were prepped and draped in usual sterile fashion. Using   real-time ultrasound guidance, the right internal jugular vein was accessed. A subcutaneous pocket was created and irrigated with sterile normal saline. The catheter was tunneled in an antegrade fashion. Over the guidewire, a peel-away sheath was   advanced with fluoroscopic monitoring. Through the peel-away sheath, the catheter was advanced until the tip was at the cavoatrial junction. The catheter was cut to length and attached firmly to the port. The incisions were closed with layered absorbable   suture and surgical glue.     FINDINGS:   Ultrasound shows an anechoic and compressible jugular vein. At the completion of the study, the port tip lies at the cavoatrial junction.     TYPE OF PORT:  8 [employer] PowerPort                                                                      IMPRESSION:  Successful power-injectable venous port placement.    NPO: to be reviewed by preprocedure RN  ANTICOAGULANTS: none  ANTIBIOTICS: one indicated for procedure    ALLERGIES  Allergies   Allergen Reactions     Avocado GI Disturbance     Bleomycin Swelling     Lung inflammation     Kiwi GI Disturbance, Itching and Nausea and Vomiting         LABS:  INR   Date Value Ref Range Status   04/01/2022 1.01 0.85 - 1.15 Final      Hemoglobin   Date Value Ref Range Status   02/15/2023 13.8 11.7 - 15.7 g/dL Final     Platelet Count   Date Value Ref Range Status   02/15/2023 323 150 - 450 10e3/uL Final     Creatinine   Date Value Ref Range Status   02/15/2023 0.73 0.60 - 1.10 mg/dL Final     Potassium   Date Value Ref Range Status    02/15/2023 4.0 3.5 - 5.0 mmol/L Final     Collect urine pregnancy test    Exam and presedation assessment:  To be completed by IR proceduralist      ASSESSMENT/PLAN:   Right sided port removal with sedation    Procedural education to be reviewed with patient/family by proceduralist in detail including, but not limited to risks, benefits and alternatives.    Note compiled by:  LORI Aceves CNP  Interventional Radiology

## 2023-03-08 ENCOUNTER — HOSPITAL ENCOUNTER (OUTPATIENT)
Dept: INTERVENTIONAL RADIOLOGY/VASCULAR | Facility: CLINIC | Age: 43
Discharge: HOME | End: 2023-03-08
Attending: INTERNAL MEDICINE | Admitting: RADIOLOGY
Payer: COMMERCIAL

## 2023-03-08 VITALS
DIASTOLIC BLOOD PRESSURE: 63 MMHG | RESPIRATION RATE: 24 BRPM | TEMPERATURE: 98.3 F | HEART RATE: 60 BPM | OXYGEN SATURATION: 99 % | SYSTOLIC BLOOD PRESSURE: 102 MMHG

## 2023-03-08 DIAGNOSIS — Z51.11 ENCOUNTER FOR ANTINEOPLASTIC CHEMOTHERAPY: ICD-10-CM

## 2023-03-08 DIAGNOSIS — C81.18 NODULAR SCLEROSIS HODGKIN LYMPHOMA OF LYMPH NODES OF MULTIPLE REGIONS (H): ICD-10-CM

## 2023-03-08 PROCEDURE — 99152 MOD SED SAME PHYS/QHP 5/>YRS: CPT

## 2023-03-08 PROCEDURE — 36590 REMOVAL TUNNELED CV CATH: CPT

## 2023-03-08 PROCEDURE — 250N000011 HC RX IP 250 OP 636: Performed by: RADIOLOGY

## 2023-03-08 PROCEDURE — C1750 CATH, HEMODIALYSIS,LONG-TERM: HCPCS

## 2023-03-08 PROCEDURE — 272N000602 HC WOUND GLUE CR1

## 2023-03-08 PROCEDURE — 250N000009 HC RX 250: Performed by: RADIOLOGY

## 2023-03-08 RX ORDER — LIDOCAINE 40 MG/G
CREAM TOPICAL
Status: DISCONTINUED | OUTPATIENT
Start: 2023-03-08 | End: 2023-03-09 | Stop reason: HOSPADM

## 2023-03-08 RX ORDER — FENTANYL CITRATE 50 UG/ML
25-50 INJECTION, SOLUTION INTRAMUSCULAR; INTRAVENOUS EVERY 5 MIN PRN
Status: DISCONTINUED | OUTPATIENT
Start: 2023-03-08 | End: 2023-03-09 | Stop reason: HOSPADM

## 2023-03-08 RX ORDER — FLUMAZENIL 0.1 MG/ML
0.2 INJECTION, SOLUTION INTRAVENOUS
Status: DISCONTINUED | OUTPATIENT
Start: 2023-03-08 | End: 2023-03-09 | Stop reason: HOSPADM

## 2023-03-08 RX ORDER — HEPARIN SODIUM 200 [USP'U]/100ML
1 INJECTION, SOLUTION INTRAVENOUS CONTINUOUS PRN
Status: DISCONTINUED | OUTPATIENT
Start: 2023-03-08 | End: 2023-03-09 | Stop reason: HOSPADM

## 2023-03-08 RX ORDER — NALOXONE HYDROCHLORIDE 0.4 MG/ML
0.2 INJECTION, SOLUTION INTRAMUSCULAR; INTRAVENOUS; SUBCUTANEOUS
Status: DISCONTINUED | OUTPATIENT
Start: 2023-03-08 | End: 2023-03-09 | Stop reason: HOSPADM

## 2023-03-08 RX ORDER — ACETAMINOPHEN 325 MG/1
650 TABLET ORAL
Status: DISCONTINUED | OUTPATIENT
Start: 2023-03-08 | End: 2023-03-09 | Stop reason: HOSPADM

## 2023-03-08 RX ORDER — NALOXONE HYDROCHLORIDE 0.4 MG/ML
0.4 INJECTION, SOLUTION INTRAMUSCULAR; INTRAVENOUS; SUBCUTANEOUS
Status: DISCONTINUED | OUTPATIENT
Start: 2023-03-08 | End: 2023-03-09 | Stop reason: HOSPADM

## 2023-03-08 RX ADMIN — LIDOCAINE HYDROCHLORIDE 10 ML: 10 INJECTION, SOLUTION INFILTRATION; PERINEURAL at 13:10

## 2023-03-08 RX ADMIN — FENTANYL CITRATE 50 MCG: 50 INJECTION, SOLUTION INTRAMUSCULAR; INTRAVENOUS at 13:10

## 2023-03-08 RX ADMIN — MIDAZOLAM HYDROCHLORIDE 2 MG: 1 INJECTION, SOLUTION INTRAMUSCULAR; INTRAVENOUS at 13:00

## 2023-03-08 RX ADMIN — FENTANYL CITRATE 50 MCG: 50 INJECTION, SOLUTION INTRAMUSCULAR; INTRAVENOUS at 13:00

## 2023-03-08 NOTE — IP AVS SNAPSHOT
[facility] Interventional Radiology  [address] [place] [address]  Phone: [phone]  Fax: [phone]                                    After Visit Summary   3/8/2023    Amna Oneil   MRN: [id number]           After Visit Summary Signature Page    I have received my discharge instructions, and my questions have been answered. I have discussed any challenges I see with this plan with the nurse or doctor.    ..........................................................................................................................................  Patient/Patient Representative Signature      ..........................................................................................................................................  Patient Representative Print Name and Relationship to Patient    ..................................................               ................................................  Date                                   Time    ..........................................................................................................................................  Reviewed by Signature/Title    ...................................................              ..............................................  Date                                               Time          22EPIC Rev 08/18

## 2023-03-08 NOTE — PRE-PROCEDURE
GENERAL PRE-PROCEDURE:   Procedure:  Port removal  Date/Time:  3/8/2023 12:48 PM    Written consent obtained?: Yes    Risks and benefits: Risks, benefits and alternatives were discussed    DC Plan: Appropriate discharge home plan in place for patients who are going home after procedure   Consent given by:  Patient  Patient states understanding of procedure being performed: Yes    Patient's understanding of procedure matches consent: Yes    Procedure consent matches procedure scheduled: Yes    Expected level of sedation:  Moderate  Appropriately NPO:  Yes  Mallampati  :  Grade 1- soft palate, uvula, tonsillar pillars, and posterior pharyngeal wall visible  Lungs:  Lungs clear with good breath sounds bilaterally  Heart:  Normal heart sounds and rate  History & Physical reviewed:  History and physical reviewed and no updates needed  Statement of review:  I have reviewed the lab findings, diagnostic data, medications, and the plan for sedation

## 2023-03-08 NOTE — DISCHARGE INSTRUCTIONS
Port Removal Discharge Instructions:  You had your port-a-catheter removed today. Please follow the below instructions following your port removal:    Care Instructions:  - Avoid tub baths, Jacuzzi and pool soaks for 10 days.  - You may shower beginning tomorrow. Do not scrub site until well healed; pat dry.  - If you experience significant bleeding at site, apply pressure with hands above the clavicle bone, sit upright.    Seek medical assistance for any of the following:   - Uncontrolled bleeding.  - You have a fever (greater than 101 F (38.3C)).  - Purulent (yellow/green/foul smelling) drainage from previous catheter insertion site.  - Increasing redness at previous catheter insertion site.  - Increasing pain at previous catheter insertion site.  - Increasing swelling at previous catheter insertion site.    Call [facility] ([phone])*** with questions or concerns.    *Limit lifting to 10 pounds x3 days  *Hold off on resuming upper body weight lifting until 3/20.

## 2023-03-08 NOTE — PROGRESS NOTES
Patient Name: Amna Oneil  Medical Record Number: [id number]  Today's Date: 3/8/2023    Procedure: port removal with moderate sedation  Proceduralist: Dr. Baugh    Procedure Start: 1308  Procedure end: 1321  Sedation medications administered: 2 mg midazolam and 100 mcg fentanyl   Sedation time: 15 minutes    Report given to: pre/post RN  : none    Other Notes: Pt arrived to IR room 1 from Pre/post bay 1. Consent reviewed. Pt denies any questions or concerns regarding procedure. Pt positioned supine and monitored per protocol. Pt tolerated procedure without any noted complications. VSS on RA. Pt transferred back to Pre/post bay 1.    Discharge instructions reviewed with patient and , Christian; AVS provided.  All questions answered.  Right chest port removal site clean and dry and open to air.  Patient discharged to home accompanied by  as  in personal vehicle.

## 2023-05-03 ENCOUNTER — MYC MEDICAL ADVICE (OUTPATIENT)
Dept: FAMILY MEDICINE | Facility: CLINIC | Age: 43
End: 2023-05-03

## 2023-05-03 ENCOUNTER — OFFICE VISIT (OUTPATIENT)
Dept: URGENT CARE | Facility: URGENT CARE | Age: 43
End: 2023-05-03
Payer: COMMERCIAL

## 2023-05-03 ENCOUNTER — ANCILLARY PROCEDURE (OUTPATIENT)
Dept: GENERAL RADIOLOGY | Facility: CLINIC | Age: 43
End: 2023-05-03
Attending: INTERNAL MEDICINE
Payer: COMMERCIAL

## 2023-05-03 ENCOUNTER — NURSE TRIAGE (OUTPATIENT)
Dept: NURSING | Facility: CLINIC | Age: 43
End: 2023-05-03
Payer: COMMERCIAL

## 2023-05-03 VITALS
HEART RATE: 67 BPM | TEMPERATURE: 98 F | BODY MASS INDEX: 24.43 KG/M2 | OXYGEN SATURATION: 98 % | RESPIRATION RATE: 16 BRPM | DIASTOLIC BLOOD PRESSURE: 68 MMHG | SYSTOLIC BLOOD PRESSURE: 99 MMHG | WEIGHT: 156 LBS

## 2023-05-03 DIAGNOSIS — R05.8 DRY COUGH: ICD-10-CM

## 2023-05-03 DIAGNOSIS — R09.89 RHONCHI AT RIGHT LUNG BASE: Primary | ICD-10-CM

## 2023-05-03 DIAGNOSIS — C81.18 NODULAR SCLEROSIS HODGKIN LYMPHOMA OF LYMPH NODES OF MULTIPLE REGIONS (H): ICD-10-CM

## 2023-05-03 DIAGNOSIS — Z86.711 HISTORY OF PULMONARY EMBOLISM: ICD-10-CM

## 2023-05-03 DIAGNOSIS — R07.89 CHEST TIGHTNESS: ICD-10-CM

## 2023-05-03 DIAGNOSIS — R09.89 RHONCHI AT RIGHT LUNG BASE: ICD-10-CM

## 2023-05-03 PROCEDURE — 99214 OFFICE O/P EST MOD 30 MIN: CPT | Performed by: INTERNAL MEDICINE

## 2023-05-03 PROCEDURE — 71046 X-RAY EXAM CHEST 2 VIEWS: CPT | Mod: TC | Performed by: RADIOLOGY

## 2023-05-03 RX ORDER — TRETINOIN 0.25 MG/G
CREAM TOPICAL
COMMUNITY
Start: 2023-03-10

## 2023-05-03 RX ORDER — ALBUTEROL SULFATE 90 UG/1
2 AEROSOL, METERED RESPIRATORY (INHALATION) EVERY 6 HOURS PRN
Qty: 18 G | Refills: 0 | Status: SHIPPED | OUTPATIENT
Start: 2023-05-03 | End: 2023-09-05

## 2023-05-03 RX ORDER — AZITHROMYCIN 250 MG/1
TABLET, FILM COATED ORAL
Qty: 6 TABLET | Refills: 0 | Status: SHIPPED | OUTPATIENT
Start: 2023-05-03 | End: 2023-05-08

## 2023-05-03 RX ORDER — SPIRONOLACTONE 50 MG/1
100 TABLET, FILM COATED ORAL
COMMUNITY
Start: 2023-03-10 | End: 2023-08-23

## 2023-05-03 ASSESSMENT — ENCOUNTER SYMPTOMS
BACK PAIN: 1
NAUSEA: 0
HEADACHES: 0
DIARRHEA: 0
CHILLS: 0
SORE THROAT: 0
MYALGIAS: 0
SHORTNESS OF BREATH: 0
DIZZINESS: 1
DIAPHORESIS: 1
FEVER: 0

## 2023-05-03 NOTE — TELEPHONE ENCOUNTER
"Contacted patient and direct transferred call to [facility] Triage for chest tightness and  \"rumbling\" or \"crackling\", especially when I laugh or take a deep breath.  See triage encounter      Natacha Mc RN  [facility]      Good afternoon, Dr. Hagan,     I have a cough that's been lingering for about a week (repeated negative COVID tests). Also a bit of chest tightness and a bit of dull back pain, but no fever or congestion.     I can feel it sort of \"rumbling\" or \"crackling\", especially when I laugh or take a deep breath, but my coughing isn't producing any mucus. Given my health history (cancer -- currently [place] NENA, PE, pneumocystis, high eosinophils on last CBC in February), is this something I should have checked out? Or wait it out a bit longer?      Thanks so much for any guidance!     Warmly,     Amna    "

## 2023-05-03 NOTE — PROGRESS NOTES
ASSESSMENT AND PLAN:      ICD-10-CM    1. Rhonchi at right lung base  R09.89 XR Chest 2 Views     azithromycin (ZITHROMAX) 250 MG tablet      2. History of pulmonary embolism  Z86.711 XR Chest 2 Views      3. 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 Chest tightness  R07.89 XR Chest 2 Views     albuterol (PROAIR HFA/PROVENTIL HFA/VENTOLIN HFA) 108 (90 Base) MCG/ACT inhaler     azithromycin (ZITHROMAX) 250 MG tablet      5. Dry cough  R05.8 XR Chest 2 Views        Well criteria is 1-2  score for pulmonary embolism    (hx pulmonary embolism  & ? Cancer score as non-active)    Chart sent to oncology & primary      Patient Instructions       chest x-ray - negative for pneumonia  Radiology review pending.    Albuterol inhaler   & zpak antibiotics     Concern with chest tightness & cough without typical cold/bronchitis  symptoms     With history of pulmonary embolism & hodgkin's lymphoma in remission,   Discussed referral to ER or ADS (Acute Diagnostic Services) for evaluation of pulmonary embolism      Prefers to not have evaluation at this time as symptoms are not like when had blood clot,  Would like to avoid radiation.            Return in about 1 week (around 5/10/2023).        Milli Alvarenga MD  [facility] URGENT CARE    Subjective     Amna DENIZ Clarice is a 42 year old who presents for Patient presents with:  Urgent Care  Cough: COUGH X1 WEEK, CHEST TIGHTNESS BEFORE THAT    an established patient of [facility].    [facility]    Onset of COUGH symptoms was 1 week(s) ago.  Preceded by few days of chest tightneess  Rumbling/crackling cough  Feels like air bubbles popply crackling  Dry  Not productive  Nurse line referred in with her history  Treatment measures tried include None tried.  No exposures to illness.  8 yr old child is not sick    covid negative test twice.    Past Medical History:   Diagnosis Date     Bilateral pulmonary embolism  (H) 3/16/2022     Hodgkin lymphoma (H)      Pulmonary emboli (H)      Current Outpatient Medications   Medication Sig Dispense Refill     albuterol (PROAIR HFA/PROVENTIL HFA/VENTOLIN HFA) 108 (90 Base) MCG/ACT inhaler Inhale 2 puffs into the lungs every 6 hours as needed for shortness of breath, wheezing or cough 18 g 0     azithromycin (ZITHROMAX) 250 MG tablet Take 2 tablets (500 mg) by mouth daily for 1 day, THEN 1 tablet (250 mg) daily for 4 days. 6 tablet 0     escitalopram (LEXAPRO) 10 MG tablet Take 1 tablet (10 mg) by mouth daily 90 tablet 4     Iron-vit C-vit B12-folic acid (IRON 100 PLUS) 100-250-0.025-1 MG TABS Take 1 tablet by mouth daily       loratadine (CLARITIN) 10 MG tablet Take 10 mg by mouth       spironolactone (ALDACTONE) 50 MG tablet Take 100 mg by mouth       spironolactone (ALDACTONE) 50 MG tablet Take 1 tablet (50 mg) by mouth daily       tretinoin (RETIN-A) 0.025 % external cream Apply a pea sized amount to the face every 3 days for 3 weeks, then every other night for 2 weeks, then increase to nightly if tolerated.       lidocaine-prilocaine (EMLA) 2.5-2.5 % external cream Apply topically once for 1 dose Apply to the port site about 30-45 minutes prior to access. 30 g 1     vitamin D3 (CHOLECALCIFEROL) 50 mcg (2000 units) tablet Take 1 tablet by mouth daily             Review of Systems   Constitutional: Positive for diaphoresis (perimenopausal). Negative for chills and fever.   HENT: Negative for sore throat. Rhinorrhea: always.    Respiratory: Negative for shortness of breath.    Cardiovascular: Negative for chest pain.   Gastrointestinal: Negative for diarrhea and nausea.   Musculoskeletal: Positive for back pain (upper back pain from ? cough). Negative for myalgias.   Neurological: Positive for dizziness (on high dose spironolactone). Negative for headaches.           Objective    BP 99/68   Pulse 67   Temp 98  F (36.7  C) (Temporal)   Resp 16   Wt 70.8 kg (156 lb)   SpO2 98%    BMI 24.43 kg/m    Physical Exam  Vitals reviewed.   Constitutional:       Appearance: Normal appearance.   HENT:      Right Ear: Tympanic membrane normal.      Left Ear: Tympanic membrane normal.      Mouth/Throat:      Mouth: Mucous membranes are moist.      Pharynx: Oropharynx is clear.   Cardiovascular:      Rate and Rhythm: Normal rate and regular rhythm.      Pulses: Normal pulses.      Heart sounds: Normal heart sounds.   Pulmonary:      Breath sounds: Rhonchi (right lower lobe) present.   Musculoskeletal:      Right lower leg: No edema.      Left lower leg: No edema.   Neurological:      Mental Status: She is alert.            CXR - Reviewed and interpreted by me Normal- no infiltrates, effusions, pneumothoraces, cardiomegaly or masses

## 2023-05-03 NOTE — PATIENT INSTRUCTIONS
chest x-ray - negative for pneumonia  Radiology review pending.    Albuterol inhaler   & zpak antibiotics     Concern with chest tightness & cough without typical cold/bronchitis  symptoms     With history of pulmonary embolism & hodgkin's lymphoma in remission,   Discussed referral to ER or ADS (Acute Diagnostic Services) for evaluation of pulmonary embolism      Prefers to not have evaluation at this time as symptoms are not like when had blood clot,  Would like to avoid radiation.    Recheck 1 week with repeat lung exam.

## 2023-05-03 NOTE — TELEPHONE ENCOUNTER
Nurse Triage SBAR    Is this a 2nd Level Triage? YES, LICENSED PRACTITIONER REVIEW IS REQUIRED    Situation: Patient calling with ongoing cough X 1 week + history of PE with cancer.  Consent: not needed    Background: Cough for about a week - and tightness in my chest.  States cough has a crackle/rumble especially when I take a deep breath.  States     Assessment:   * Started a week ago.   * Coughing a few times an hour.  States she can feel a cracking/rumbling sensation and it makes her feel like she wants to clear it.    * No Sputum at this time.    * Denies coughing up blood.    * Denies any new SOB.    * Denies fever.    * Denies heart history.   * Pt had pulmonary embolisms throughout chemo and was on blood thinners for this.  Was hospitalized during chemo for pneumocystis and had a lung infection during treatment.    * Pt was on chemo for hodgkins lymphoma and ended chemo in June 2022.   * Slight wheezing when breathing - and notes this more when she's on her back.   * Denies head congestions, runny nose or any sinus symptoms.   * Denies any swollen lymph nodes.    * Denies chest pain but reports mild dull pain in mid to upper bilateral back.         Protocol Recommended Disposition:   See HCP within 4 hours.     Recommendation: Advised patient to make an appointment. Transferred to scheduling. . Reviewed concerning symptoms and when to call back. Pt is agreeable to being seen in [facility]/ if no clinic appts available and will go to [facility].     Shantal Little RN [facility] 5/3/2023 12:39 PM    Reason for Disposition    Wheezing is present    Additional Information    Negative: SEVERE difficulty breathing (e.g., struggling for each breath, speaks in single words)    Negative: [1] Rapid onset of cough AND [2] has hives    Negative: Bluish (or gray) lips or face now    Negative: Coughing started suddenly after medicine, an allergic food or bee sting    Negative: [1] Difficulty  breathing AND [2] exposure to flames, smoke, or fumes    Negative: [1] Stridor AND [2] difficulty breathing    Negative: Sounds like a life-threatening emergency to the triager    Negative: Choked on object of food that could be caught in the throat    Negative: Chest pain is main symptom    Negative: [1] Previous asthma attacks AND [2] this feels like asthma attack    Negative: Cough lasts > 3 weeks    Negative: Wet cough (productive; white-yellow, yellow, green, or reena colored sputum)    Negative: [1] Dry cough (non-productive;  no sputum or minimal clear sputum) AND [2] within 14 days of COVID-19 Exposure    Negative: [1] MODERATE difficulty breathing (e.g., speaks in phrases, SOB even at rest, pulse 100-120) AND [2] still present when not coughing    Negative: Chest pain  (Exception: MILD central chest pain, present only when coughing)    Negative: Patient sounds very sick or weak to the triager    Negative: [1] MILD difficulty breathing (e.g., minimal/no SOB at rest, SOB with walking, pulse <100) AND [2] still present when not coughing    Negative: [1] Coughed up blood AND [2] > 1 tablespoon (15 ml)  (Exception: Blood-tinged sputum.)    Negative: Fever > 103 F (39.4 C)    Negative: [1] Fever > 101 F (38.3 C) AND [2] age > 60 years    Negative: [1] Fever > 100.0 F (37.8 C) AND [2] bedridden (e.g., nursing home patient, CVA, chronic illness, recovering from surgery)    Negative: [1] Fever > 100.0 F (37.8 C) AND [2] diabetes mellitus or weak immune system (e.g., HIV positive, cancer chemo, splenectomy, organ transplant, chronic steroids)    Protocols used: COUGH - ACUTE NON-PRODUCTIVE-A-AH

## 2023-05-04 NOTE — TELEPHONE ENCOUNTER
Patient triaged and seen in UC on 5/3/23  No further action needed.    Natacha Mc RN  [facility]

## 2023-05-20 ENCOUNTER — HEALTH MAINTENANCE LETTER (OUTPATIENT)
Age: 43
End: 2023-05-20

## 2023-08-16 ENCOUNTER — LAB (OUTPATIENT)
Dept: INFUSION THERAPY | Facility: CLINIC | Age: 43
End: 2023-08-16
Attending: INTERNAL MEDICINE
Payer: COMMERCIAL

## 2023-08-16 ENCOUNTER — ONCOLOGY VISIT (OUTPATIENT)
Dept: ONCOLOGY | Facility: CLINIC | Age: 43
End: 2023-08-16
Attending: INTERNAL MEDICINE
Payer: COMMERCIAL

## 2023-08-16 VITALS
WEIGHT: 161.6 LBS | BODY MASS INDEX: 25.36 KG/M2 | TEMPERATURE: 98.8 F | RESPIRATION RATE: 16 BRPM | SYSTOLIC BLOOD PRESSURE: 100 MMHG | HEART RATE: 76 BPM | HEIGHT: 67 IN | DIASTOLIC BLOOD PRESSURE: 66 MMHG | OXYGEN SATURATION: 98 %

## 2023-08-16 DIAGNOSIS — C81.18 NODULAR SCLEROSIS HODGKIN LYMPHOMA OF LYMPH NODES OF MULTIPLE REGIONS (H): Primary | ICD-10-CM

## 2023-08-16 DIAGNOSIS — C81.18 NODULAR SCLEROSIS HODGKIN LYMPHOMA OF LYMPH NODES OF MULTIPLE REGIONS (H): ICD-10-CM

## 2023-08-16 LAB
ALBUMIN SERPL-MCNC: 3.9 G/DL (ref 3.5–5)
ALP SERPL-CCNC: 66 U/L (ref 45–120)
ALT SERPL W P-5'-P-CCNC: 13 U/L (ref 0–45)
ANION GAP SERPL CALCULATED.3IONS-SCNC: 8 MMOL/L (ref 5–18)
AST SERPL W P-5'-P-CCNC: 18 U/L (ref 0–40)
BASOPHILS # BLD AUTO: 0 10E3/UL (ref 0–0.2)
BASOPHILS NFR BLD AUTO: 1 %
BILIRUB SERPL-MCNC: 0.6 MG/DL (ref 0–1)
BUN SERPL-MCNC: 23 MG/DL (ref 8–22)
CALCIUM SERPL-MCNC: 9.4 MG/DL (ref 8.5–10.5)
CHLORIDE BLD-SCNC: 108 MMOL/L (ref 98–107)
CO2 SERPL-SCNC: 25 MMOL/L (ref 22–31)
CREAT SERPL-MCNC: 0.95 MG/DL (ref 0.6–1.1)
EOSINOPHIL # BLD AUTO: 0.4 10E3/UL (ref 0–0.7)
EOSINOPHIL NFR BLD AUTO: 5 %
ERYTHROCYTE [DISTWIDTH] IN BLOOD BY AUTOMATED COUNT: 12.6 % (ref 10–15)
ERYTHROCYTE [SEDIMENTATION RATE] IN BLOOD BY WESTERGREN METHOD: 8 MM/HR (ref 0–20)
GFR SERPL CREATININE-BSD FRML MDRD: 75 ML/MIN/1.73M2
GLUCOSE BLD-MCNC: 76 MG/DL (ref 70–125)
HCT VFR BLD AUTO: 40.6 % (ref 35–47)
HGB BLD-MCNC: 14 G/DL (ref 11.7–15.7)
IMM GRANULOCYTES # BLD: 0 10E3/UL
IMM GRANULOCYTES NFR BLD: 0 %
LYMPHOCYTES # BLD AUTO: 2.3 10E3/UL (ref 0.8–5.3)
LYMPHOCYTES NFR BLD AUTO: 29 %
MCH RBC QN AUTO: 32.4 PG (ref 26.5–33)
MCHC RBC AUTO-ENTMCNC: 34.5 G/DL (ref 31.5–36.5)
MCV RBC AUTO: 94 FL (ref 78–100)
MONOCYTES # BLD AUTO: 0.7 10E3/UL (ref 0–1.3)
MONOCYTES NFR BLD AUTO: 9 %
NEUTROPHILS # BLD AUTO: 4.4 10E3/UL (ref 1.6–8.3)
NEUTROPHILS NFR BLD AUTO: 56 %
NRBC # BLD AUTO: 0 10E3/UL
NRBC BLD AUTO-RTO: 0 /100
PLATELET # BLD AUTO: 336 10E3/UL (ref 150–450)
POTASSIUM BLD-SCNC: 3.9 MMOL/L (ref 3.5–5)
PROT SERPL-MCNC: 6.4 G/DL (ref 6–8)
RBC # BLD AUTO: 4.32 10E6/UL (ref 3.8–5.2)
SODIUM SERPL-SCNC: 141 MMOL/L (ref 136–145)
WBC # BLD AUTO: 8 10E3/UL (ref 4–11)

## 2023-08-16 PROCEDURE — 85004 AUTOMATED DIFF WBC COUNT: CPT

## 2023-08-16 PROCEDURE — 36415 COLL VENOUS BLD VENIPUNCTURE: CPT

## 2023-08-16 PROCEDURE — 85652 RBC SED RATE AUTOMATED: CPT

## 2023-08-16 PROCEDURE — G0463 HOSPITAL OUTPT CLINIC VISIT: HCPCS | Performed by: INTERNAL MEDICINE

## 2023-08-16 PROCEDURE — 99214 OFFICE O/P EST MOD 30 MIN: CPT | Performed by: INTERNAL MEDICINE

## 2023-08-16 PROCEDURE — 80053 COMPREHEN METABOLIC PANEL: CPT

## 2023-08-16 ASSESSMENT — ENCOUNTER SYMPTOMS
ABDOMINAL PAIN: 0
SORE THROAT: 0
BREAST MASS: 0
HEARTBURN: 0
DIARRHEA: 0
NAUSEA: 0
ARTHRALGIAS: 1
NERVOUS/ANXIOUS: 0
CHILLS: 0
PALPITATIONS: 0
HEMATURIA: 0
SHORTNESS OF BREATH: 0
PARESTHESIAS: 0
DIZZINESS: 0
FREQUENCY: 0
COUGH: 0
HEADACHES: 0
MYALGIAS: 0
WEAKNESS: 0
CONSTIPATION: 0
DYSURIA: 0
HEMATOCHEZIA: 0
JOINT SWELLING: 1
FEVER: 0
EYE PAIN: 0

## 2023-08-16 ASSESSMENT — PAIN SCALES - GENERAL: PAINLEVEL: NO PAIN (0)

## 2023-08-16 NOTE — LETTER
"    8/16/2023         RE: Amna Oneil  [address] [place] [address]        Dear Colleague,    Thank you for referring your patient, Amna Oneil, to the [facility]. Please see a copy of my visit note below.    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    Weight as of this encounter: 73.3 kg (161 lb 9.6 oz). Body surface area is 1.86 meters squared.  No Pain (0) Comment: Data Unavailable   No LMP recorded.  Allergies reviewed: Yes  Medications reviewed: Yes    Medications: Medication refills not needed today.  Pharmacy name entered into [employer]:    [facility] -- [place] - [place], [place] - [address]  [id number] - [place], [place] - [address] [facility] [address]  [address]    Clinical concerns: No concerns today.       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    ECOG Performance    0 - Independent     Pain  Pain Score: No Pain (0)    #.  History of classical Hodgkin lymphoma, stage II (favorable risk) IPSS-0 risk " factor, FFP 88%, overall survival 98%.   She is clinically well.  Exam is unremarkable.  I reviewed her labs and her CBC, CMP and ESR are entirely normal.  There is no signs and symptoms suggestive of lymphoma recurrence.    Recommended clinical exam, labs and CT scan in 6 months.  She is advised to call me sooner with any concerns.      #.  Eosinophilia, resolved.     #.  History of acute bilateral pulmonary emboli without evidence of saddle embolus or central emboli in 2/2022.   Likely provoked in the setting of recent COVID-19 infection, active chemotherapy for lymphoma in the setting of ongoing birth control pill use. She has stopped birth control pills.  She tolerated Xarelto well without side effects or intolerance. She completed 6 months of anticoagulation therapy in 8/2022.  No signs and symptoms suggestive of venous thromboembolism.    #.  She is up-to-date on age appropriate cancer screening, specifically mammogram.    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  12/23/2021-PET/CT scan showed hypermetabolic lymph nodes involving basilar neck/superior mediastinum, mediastinum, right pericardial space, bilateral axillae.   -second opinion pathology evaluation at [facility] and it confirmed classical Hodgkin lymphoma.  Echocardiogram showed adequate cardiac function.  Pulmonary function test was normal    Treatment to date  2/3/2022-5/23/2022-completed 4  "cycles of ABVD (bleomycin was omitted in cycle #3 and #4 due to pulmonary toxicity) treatment course was complicated by pulmonary emboli, PCP pneumonia.  She achieved complete radiographic response (no uptake by PET) after 2 cycles of ABVD.    History of Present Illness    Ms. Amna Oneil presented today accompanied by her .    She reported she is doing very well.  No illnesses since last visit.  She does not notice any palpable or swollen glands.  No fever.  No drenching sweats.    Review of systems  Apart from describing in HPI, the remainder of comprehensive ROS was negative.    Past History    Past Medical History:   Diagnosis Date     Bilateral pulmonary embolism (H) 3/16/2022     Hodgkin lymphoma (H)      Pulmonary emboli (H)        Past Surgical History:   Procedure Laterality Date     IR CHEST PORT PLACEMENT > 5 YRS OF AGE  1/21/2022     IR LYMPH NODE BIOPSY  12/13/2021     IR PORT REMOVAL RIGHT  3/8/2023         Physical Exam    /66 (Patient Position: Sitting)   Pulse 76   Temp 98.8  F (37.1  C) (Oral)   Resp 16   Ht 1.702 m (5' 7\")   Wt 73.3 kg (161 lb 9.6 oz)   SpO2 98%   BMI 25.31 kg/m      General: alert, awake, not in acute distress  HEENT: Head: Normal, normocephalic, atraumatic.  Eye: Normal external eye, conjunctiva, lids cornea, PATRICIA.  Pharynx: Normal buccal mucosa. Normal pharynx.  Neck / Thyroid: Supple, no masses, nodes, nodules or enlargement.  Lymphatics: No abnormally enlarged lymph nodes.  Chest: Normal chest wall and respirations. Clear to auscultation.  Heart: S1 S2 RRR.   Abdomen: abdomen is soft without significant tenderness, masses, organomegaly or guarding  Extremities: normal strength, tone, and muscle mass  Skin: normal. no rash or abnormalities  CNS: non focal.      Lab Results    Recent Results (from the past 168 hour(s))   Comprehensive metabolic panel   Result Value Ref Range    Sodium 141 136 - 145 mmol/L    Potassium 3.9 3.5 - 5.0 mmol/L    Chloride " 108 (H) 98 - 107 mmol/L    Carbon Dioxide (CO2) 25 22 - 31 mmol/L    Anion Gap 8 5 - 18 mmol/L    Urea Nitrogen 23 (H) 8 - 22 mg/dL    Creatinine 0.95 0.60 - 1.10 mg/dL    Calcium 9.4 8.5 - 10.5 mg/dL    Glucose 76 70 - 125 mg/dL    Alkaline Phosphatase 66 45 - 120 U/L    AST 18 0 - 40 U/L    ALT 13 0 - 45 U/L    Protein Total 6.4 6.0 - 8.0 g/dL    Albumin 3.9 3.5 - 5.0 g/dL    Bilirubin Total 0.6 0.0 - 1.0 mg/dL    GFR Estimate 75 >60 mL/min/1.73m2   Erythrocyte sedimentation rate auto   Result Value Ref Range    Erythrocyte Sedimentation Rate 8 0 - 20 mm/hr   CBC with platelets and differential   Result Value Ref Range    WBC Count 8.0 4.0 - 11.0 10e3/uL    RBC Count 4.32 3.80 - 5.20 10e6/uL    Hemoglobin 14.0 11.7 - 15.7 g/dL    Hematocrit 40.6 35.0 - 47.0 %    MCV 94 78 - 100 fL    MCH 32.4 26.5 - 33.0 pg    MCHC 34.5 31.5 - 36.5 g/dL    RDW 12.6 10.0 - 15.0 %    Platelet Count 336 150 - 450 10e3/uL    % Neutrophils 56 %    % Lymphocytes 29 %    % Monocytes 9 %    % Eosinophils 5 %    % Basophils 1 %    % Immature Granulocytes 0 %    NRBCs per 100 WBC 0 <1 /100    Absolute Neutrophils 4.4 1.6 - 8.3 10e3/uL    Absolute Lymphocytes 2.3 0.8 - 5.3 10e3/uL    Absolute Monocytes 0.7 0.0 - 1.3 10e3/uL    Absolute Eosinophils 0.4 0.0 - 0.7 10e3/uL    Absolute Basophils 0.0 0.0 - 0.2 10e3/uL    Absolute Immature Granulocytes 0.0 <=0.4 10e3/uL    Absolute NRBCs 0.0 10e3/uL       Imaging    No results found.    Signed by: Juan Lizarraga MD      Again, thank you for allowing me to participate in the care of your patient.        Sincerely,        Juan Lizarraga MD

## 2023-08-16 NOTE — PROGRESS NOTES
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    Weight as of this encounter: 73.3 kg (161 lb 9.6 oz). Body surface area is 1.86 meters squared.  No Pain (0) Comment: Data Unavailable   No LMP recorded.  Allergies reviewed: Yes  Medications reviewed: Yes    Medications: Medication refills not needed today.  Pharmacy name entered into [employer]:    [facility] -- [place] - [place], [place] - [address]  [id number] - [place], [place] - [address] [facility] [address]    Clinical concerns: No concerns today.       Leonardo Corona LPN            "

## 2023-08-20 PROBLEM — Z51.11 ENCOUNTER FOR ANTINEOPLASTIC CHEMOTHERAPY: Status: RESOLVED | Noted: 2022-01-26 | Resolved: 2023-08-20

## 2023-08-20 NOTE — PROGRESS NOTES
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    ECOG Performance    0 - Independent     Pain  Pain Score: No Pain (0)    #.  History of classical Hodgkin lymphoma, stage II (favorable risk) IPSS-0 risk factor, FFP 88%, overall survival 98%.   She is clinically well.  Exam is unremarkable.  I reviewed her labs and her CBC, CMP and ESR are entirely normal.  There is no signs and symptoms suggestive of lymphoma recurrence.    Recommended clinical exam, labs and CT scan in 6 months.  She is advised to call me sooner with any concerns.      #.  Eosinophilia, resolved.     #.  History of acute bilateral pulmonary emboli without evidence of saddle embolus or central emboli in 2/2022.   Likely provoked in the setting of recent COVID-19 infection, active chemotherapy for lymphoma in the setting of ongoing birth control pill use. She has stopped birth control pills.  She tolerated Xarelto well without side effects or intolerance. She completed 6 months of anticoagulation therapy in 8/2022.  No signs and symptoms suggestive of venous thromboembolism.    #.  She is up-to-date on age appropriate cancer screening, specifically mammogram.    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  12/23/2021-PET/CT scan showed hypermetabolic lymph nodes involving basilar neck/superior mediastinum, mediastinum, right pericardial space, bilateral axillae.   -second opinion pathology evaluation at [facility] and it confirmed classical Hodgkin lymphoma.  Echocardiogram showed adequate cardiac function.  Pulmonary function test was normal    Treatment to date  2/3/2022-5/23/2022-completed 4 cycles of ABVD (bleomycin was omitted in cycle #3 and #4 due to pulmonary toxicity) treatment course was complicated by pulmonary emboli, PCP pneumonia.  She achieved complete radiographic response (no uptake by PET) after 2 cycles of ABVD.    History of Present Illness    Ms. Amna Oneil presented today accompanied by her .    She reported she is doing very well.  No illnesses since last visit.  She does not notice any palpable or swollen glands.  No fever.  No drenching sweats.    Review of systems  Apart from describing in HPI, the remainder of comprehensive ROS was negative.    Past History    Past Medical History:   Diagnosis Date    Bilateral pulmonary embolism (H) 3/16/2022    Hodgkin lymphoma (H)     Pulmonary emboli (H)        Past Surgical History:   Procedure Laterality Date    IR CHEST PORT PLACEMENT > 5 YRS OF AGE  1/21/2022    IR LYMPH NODE BIOPSY  12/13/2021    IR PORT REMOVAL RIGHT  3/8/2023         Physical Exam    /66 (Patient Position: Sitting)   Pulse 76   Temp 98.8  F (37.1  C) (Oral)   Resp 16   Ht 1.702 m (5' 7\")   Wt 73.3 kg (161 lb 9.6 oz)   SpO2 98%   BMI 25.31 kg/m      General: alert, awake, not in acute distress  HEENT: Head: Normal, normocephalic, atraumatic.  Eye: Normal external eye, conjunctiva, lids cornea, PATRICIA.  Pharynx: Normal buccal mucosa. Normal pharynx.  Neck / Thyroid: " Supple, no masses, nodes, nodules or enlargement.  Lymphatics: No abnormally enlarged lymph nodes.  Chest: Normal chest wall and respirations. Clear to auscultation.  Heart: S1 S2 RRR.   Abdomen: abdomen is soft without significant tenderness, masses, organomegaly or guarding  Extremities: normal strength, tone, and muscle mass  Skin: normal. no rash or abnormalities  CNS: non focal.      Lab Results    Recent Results (from the past 168 hour(s))   Comprehensive metabolic panel   Result Value Ref Range    Sodium 141 136 - 145 mmol/L    Potassium 3.9 3.5 - 5.0 mmol/L    Chloride 108 (H) 98 - 107 mmol/L    Carbon Dioxide (CO2) 25 22 - 31 mmol/L    Anion Gap 8 5 - 18 mmol/L    Urea Nitrogen 23 (H) 8 - 22 mg/dL    Creatinine 0.95 0.60 - 1.10 mg/dL    Calcium 9.4 8.5 - 10.5 mg/dL    Glucose 76 70 - 125 mg/dL    Alkaline Phosphatase 66 45 - 120 U/L    AST 18 0 - 40 U/L    ALT 13 0 - 45 U/L    Protein Total 6.4 6.0 - 8.0 g/dL    Albumin 3.9 3.5 - 5.0 g/dL    Bilirubin Total 0.6 0.0 - 1.0 mg/dL    GFR Estimate 75 >60 mL/min/1.73m2   Erythrocyte sedimentation rate auto   Result Value Ref Range    Erythrocyte Sedimentation Rate 8 0 - 20 mm/hr   CBC with platelets and differential   Result Value Ref Range    WBC Count 8.0 4.0 - 11.0 10e3/uL    RBC Count 4.32 3.80 - 5.20 10e6/uL    Hemoglobin 14.0 11.7 - 15.7 g/dL    Hematocrit 40.6 35.0 - 47.0 %    MCV 94 78 - 100 fL    MCH 32.4 26.5 - 33.0 pg    MCHC 34.5 31.5 - 36.5 g/dL    RDW 12.6 10.0 - 15.0 %    Platelet Count 336 150 - 450 10e3/uL    % Neutrophils 56 %    % Lymphocytes 29 %    % Monocytes 9 %    % Eosinophils 5 %    % Basophils 1 %    % Immature Granulocytes 0 %    NRBCs per 100 WBC 0 <1 /100    Absolute Neutrophils 4.4 1.6 - 8.3 10e3/uL    Absolute Lymphocytes 2.3 0.8 - 5.3 10e3/uL    Absolute Monocytes 0.7 0.0 - 1.3 10e3/uL    Absolute Eosinophils 0.4 0.0 - 0.7 10e3/uL    Absolute Basophils 0.0 0.0 - 0.2 10e3/uL    Absolute Immature Granulocytes 0.0 <=0.4 10e3/uL     Absolute NRBCs 0.0 10e3/uL       Imaging    No results found.    Signed by: Juan Lizarraga MD

## 2023-08-23 ENCOUNTER — OFFICE VISIT (OUTPATIENT)
Dept: FAMILY MEDICINE | Facility: CLINIC | Age: 43
End: 2023-08-23
Payer: COMMERCIAL

## 2023-08-23 ENCOUNTER — ANCILLARY PROCEDURE (OUTPATIENT)
Dept: GENERAL RADIOLOGY | Facility: CLINIC | Age: 43
End: 2023-08-23
Attending: FAMILY MEDICINE
Payer: COMMERCIAL

## 2023-08-23 VITALS
OXYGEN SATURATION: 98 % | HEART RATE: 75 BPM | RESPIRATION RATE: 21 BRPM | WEIGHT: 162 LBS | DIASTOLIC BLOOD PRESSURE: 68 MMHG | BODY MASS INDEX: 25.43 KG/M2 | SYSTOLIC BLOOD PRESSURE: 101 MMHG | HEIGHT: 67 IN

## 2023-08-23 DIAGNOSIS — L70.0 ACNE VULGARIS: ICD-10-CM

## 2023-08-23 DIAGNOSIS — Z00.00 ROUTINE GENERAL MEDICAL EXAMINATION AT A HEALTH CARE FACILITY: Primary | ICD-10-CM

## 2023-08-23 DIAGNOSIS — M79.644 CHRONIC PAIN OF RIGHT THUMB: ICD-10-CM

## 2023-08-23 DIAGNOSIS — G89.29 CHRONIC PAIN OF RIGHT THUMB: ICD-10-CM

## 2023-08-23 DIAGNOSIS — F32.1 CURRENT MODERATE EPISODE OF MAJOR DEPRESSIVE DISORDER WITHOUT PRIOR EPISODE (H): ICD-10-CM

## 2023-08-23 PROBLEM — B96.89 BACTERIAL VAGINOSIS: Status: RESOLVED | Noted: 2022-12-02 | Resolved: 2023-08-23

## 2023-08-23 PROBLEM — N76.0 BACTERIAL VAGINOSIS: Status: RESOLVED | Noted: 2022-12-02 | Resolved: 2023-08-23

## 2023-08-23 PROCEDURE — 90677 PCV20 VACCINE IM: CPT | Performed by: FAMILY MEDICINE

## 2023-08-23 PROCEDURE — 99396 PREV VISIT EST AGE 40-64: CPT | Mod: 25 | Performed by: FAMILY MEDICINE

## 2023-08-23 PROCEDURE — 99213 OFFICE O/P EST LOW 20 MIN: CPT | Mod: 25 | Performed by: FAMILY MEDICINE

## 2023-08-23 PROCEDURE — 90471 IMMUNIZATION ADMIN: CPT | Performed by: FAMILY MEDICINE

## 2023-08-23 PROCEDURE — 73140 X-RAY EXAM OF FINGER(S): CPT | Mod: TC | Performed by: RADIOLOGY

## 2023-08-23 RX ORDER — SPIRONOLACTONE 100 MG/1
100 TABLET, FILM COATED ORAL DAILY
Start: 2023-08-23

## 2023-08-23 ASSESSMENT — ENCOUNTER SYMPTOMS
NERVOUS/ANXIOUS: 0
EYE PAIN: 0
FREQUENCY: 0
SHORTNESS OF BREATH: 0
CONSTIPATION: 0
WEAKNESS: 0
HEARTBURN: 0
COUGH: 0
HEADACHES: 0
PARESTHESIAS: 0
ARTHRALGIAS: 1
SORE THROAT: 0
HEMATURIA: 0
DIARRHEA: 0
DYSURIA: 0
JOINT SWELLING: 1
HEMATOCHEZIA: 0
FEVER: 0
PALPITATIONS: 0
DIZZINESS: 0
BREAST MASS: 0
CHILLS: 0
MYALGIAS: 0
ABDOMINAL PAIN: 0
NAUSEA: 0

## 2023-08-23 ASSESSMENT — PATIENT HEALTH QUESTIONNAIRE - PHQ9
SUM OF ALL RESPONSES TO PHQ QUESTIONS 1-9: 4
10. IF YOU CHECKED OFF ANY PROBLEMS, HOW DIFFICULT HAVE THESE PROBLEMS MADE IT FOR YOU TO DO YOUR WORK, TAKE CARE OF THINGS AT HOME, OR GET ALONG WITH OTHER PEOPLE: NOT DIFFICULT AT ALL
SUM OF ALL RESPONSES TO PHQ QUESTIONS 1-9: 4

## 2024-01-08 ENCOUNTER — PATIENT OUTREACH (OUTPATIENT)
Dept: CARE COORDINATION | Facility: CLINIC | Age: 44
End: 2024-01-08
Payer: COMMERCIAL

## 2024-01-22 ENCOUNTER — E-VISIT (OUTPATIENT)
Dept: FAMILY MEDICINE | Facility: CLINIC | Age: 44
End: 2024-01-22
Payer: COMMERCIAL

## 2024-01-22 DIAGNOSIS — C81.18 NODULAR SCLEROSIS HODGKIN LYMPHOMA OF LYMPH NODES OF MULTIPLE REGIONS (H): Primary | ICD-10-CM

## 2024-01-22 DIAGNOSIS — U07.1 CLINICAL DIAGNOSIS OF COVID-19: ICD-10-CM

## 2024-01-22 DIAGNOSIS — F32.1 CURRENT MODERATE EPISODE OF MAJOR DEPRESSIVE DISORDER WITHOUT PRIOR EPISODE (H): ICD-10-CM

## 2024-01-22 PROCEDURE — 99421 OL DIG E/M SVC 5-10 MIN: CPT | Performed by: FAMILY MEDICINE

## 2024-01-22 RX ORDER — SPIRONOLACTONE 50 MG/1
2 TABLET, FILM COATED ORAL
COMMUNITY
Start: 2023-11-06

## 2024-01-22 NOTE — PATIENT INSTRUCTIONS
"Dear Amna,      Based on your responses, you have COVID-19.     How do I self-isolate?  You isolate when you have symptoms of COVID or a test shows you have COVID, even if you don t have symptoms.   If you DO have symptoms:  Stay home and away from others  For at least 5 days after your symptoms started, AND   You are fever free for 24 hours (with no medicine that reduces fever), AND  Your other symptoms are better.  Wear a mask for 10 full days any time you are around others.  If you DON T have symptoms:  Stay at home and away from others for at least 5 days after your positive test.  Wear a mask for 10 full days any time you are around others.    How can I take care of myself?  Over the counter medications may help with your symptoms such as runny or stuffy nose, cough, chills, or fever.  Talk to your care team about your options.     Some people are at high risk of severe illness (for example, you have a weak immune system, you re 50 years or older, or you have certain medical problems). If your risk is high and your symptoms started in the last 5 days, we strongly recommend for you to get COVID treatment as soon as possible.There are safe and effective medicines available that can make you feel better faster, and prevent hospitalization and death.       To discuss COVID treatment you can:  Call your clinic OR [phone] ([phone]) and ask to speak to a nurse about a positive COVID test.  Send a [employer] message to your care team. In [employer], select \"Ask a Medical Question\" then \"Do I need an appointment\" and put \"COVID\" in the subject line. Please include a phone number to call you back in the message.             Get lots of rest. Drink extra fluids (unless a doctor has told you not to)  Take Tylenol (acetaminophen) or ibuprofen for fever or pain. If you have liver or kidney problems, ask your family doctor if it's okay to take Tylenol or ibuprofen  Take over the counter medications for your " symptoms, as directed by your doctor. You may also talk to your pharmacist.    If you have other health problems (like cancer, heart failure, an organ transplant or severe kidney disease): Call your specialty clinic if you don't feel better in the next 2 days.  Know when to call 911. Emergency warning signs include:  Trouble breathing or shortness of breath  Pain or pressure in the chest that doesn't go away  Feeling confused like you haven't felt before, or not being able to wake up  Bluish-colored lips or face    Where can I get more information?  [facility] - About COVID-19: [email or web address]   CDC - What to Do If You're Sick: https://www.cdc.gov/coronavirus/2019-ncov/if-you-are-sick/index.html   CDC -  Isolation https://www.cdc.gov/coronavirus/2019-ncov/your-health/isolation.html    January 22, 2024  RE:  Amna Oneil                                                                                                                  [address] [place] [address]      To whom it may concern:    I evaluated Amna Oneil on January 22, 2024. Amna Oneil should be excused from work/school.     They should let their workplace manager and staffing office know when their quarantine ends.    We can not give an exact date as it depends on the information below. They can calculate this on their own or work with their manager/staffing office to calculate this. (For example if they were exposed on 10/04, they would have to quarantine for 14 full days. That would be through 10/18. They could return on 10/19.)    Quarantine Guidelines:    If patient receives a positive COVID-19 test result, they should follow the guidance of those who are giving the results. Usually the return to work is 10 (or in some cases 20 days from symptom onset.) If they work at [facility], they must be cleared by Employee Occupational Health and Safety to return to work.      If patient receives a negative  COVID-19 test result and did not have a high risk exposure to someone with a known positive COVID-19 test, they can return to work once they're free of fever for 24 hours without fever-reducing medication and their symptoms are improving or resolved.    If patient receives a negative COVID-19 test and if they had a high risk exposure to someone who has tested positive for COVID-19 then they can return to work 14 days after their last contact with the positive individual    Note: If there is ongoing exposure to the covid positive person, this quarantine period may be longer than 14 days. (For example, if they are continually exposed to their child, who tested positive and cannot isolate from them, then the quarantine of 7-14 days can't start until their child is no longer contagious. This is typically 10 days from onset to the child's symptoms. So the total duration may be 17-24 days in this case.)     Sincerely,  Grace Hagan MD            Thank you for choosing us for your care. I have placed an order for a prescription so that you can start treatment. View your full visit summary for details by clicking on the link below. Your pharmacist will able to address any questions you may have about the medication.     If you're not feeling better within 5-7 days, please schedule an appointment.  You can schedule an appointment right here in [facility], or call [phone]  If the visit is for the same symptoms as your eVisit, we'll refund the cost of your eVisit if seen within seven days.    For informational purposes only. Not to replace the advice of your health care provider. Copyright   2022 [facility]. All rights reserved. Clinically reviewed by Sara Chavez, PharmD, BCACP. [place] [id number] - REV 06/23.  COVID-19 Outpatient Treatments  Your care team can help you find the best treatments for COVID-19. Talk to a health care provider or refer to the FDA medicine fact sheets below.  Paxlovid  (nirmatrelvir and ritonavir): https://www.paxlovid.com/resources  Molnupiravir (Lagevrio): https://www.fda.gov/media/715776/download  Important: We can only prescribe Paxlovid or Molnupiravir when it can be started within 5 days of first having symptoms.  Paxlovid (nimatrelvir and ritonavir)  How it works  Two medicines (nirmatrelvir and ritonavir) are taken together. They stop the virus from growing. Less amount of virus is easier for your body to fight.  Benefits  Lowers risk of a hospital stay or death from COVID-19.  How to take  Medicine comes in a daily container with both medicine tablets. Take by mouth twice daily (once in the morning, once at night) for 5 days.  The number of tablets to take varies by patient.  Don't chew or break capsules. Swallow whole.  When to take  Take it as soon as possible and within 5 days of your first symptoms.  Who can take it  Patients must be 12 years or older weigh at least 88 pounds. Paxlovid is the preferred treatment for pregnant patients.  Possible side effects  Can cause altered sense of taste, diarrhea (loose, watery stools), high blood pressure, muscle aches.  Medicine conflicts  Some medicines may conflict with Paxlovid and may cause serious side effects.  Tell your care team about all the medicines you take, including prescription and over-the-counter medicines, vitamins, and herbal supplements.  Your care team will review your medicines to make sure that you can safely take Paxlovid.  Cautions  Paxlovid is not advised for patients with severe kidney or liver disease. If you have kidney or liver problems, the dose may need to be changed.  If you're pregnant or breastfeeding, talk to your care team about your options.  If you take hormonal birth control (such as the Pill), then you or your partner should also use a non-hormonal form of birth control (such as a condom). Keep doing this for 1 menstrual cycle after your last dose of Paxlovid.  Molnupiravir  (lagevrio)  How it works  Stops the virus from growing. Less amount of virus is easier for your body to fight.  Benefits  Lowers risk of a hospital stay or death from COVID-19.  How to take  Take 4 capsules by mouth every 12 hours (4 in the morning and 4 at night) for 5 days. Don't chew or break capsules. Swallow whole.  When to take  Take as soon as possible and within 5 days of your first symptoms.  Who can take it  Patients must be 18 years or older.   Possible side effects  Diarrhea (loose, watery stools), nausea (feeling sick to your stomach), dizziness, headaches.  Medicine conflicts  Right now, there are no known conflicts with other drugs. But tell your care team about all medicines you take.  Cautions  This medicine is not advised for patients who are pregnant.  If you are someone who could become pregnant, use trusted birth control until 4 days after your last dose of molnupiravir.  If your partner could become pregnant, you should use trusted birth control until 3 months after your last dose of molnupiravir.

## 2024-02-05 ENCOUNTER — PATIENT OUTREACH (OUTPATIENT)
Dept: CARE COORDINATION | Facility: CLINIC | Age: 44
End: 2024-02-05
Payer: COMMERCIAL

## 2024-02-09 ENCOUNTER — HOSPITAL ENCOUNTER (OUTPATIENT)
Dept: MAMMOGRAPHY | Facility: CLINIC | Age: 44
Discharge: HOME | End: 2024-02-09
Attending: FAMILY MEDICINE | Admitting: FAMILY MEDICINE
Payer: COMMERCIAL

## 2024-02-09 DIAGNOSIS — Z12.31 VISIT FOR SCREENING MAMMOGRAM: ICD-10-CM

## 2024-02-09 PROCEDURE — 77063 BREAST TOMOSYNTHESIS BI: CPT

## 2024-02-19 ENCOUNTER — HOSPITAL ENCOUNTER (OUTPATIENT)
Dept: CT IMAGING | Facility: CLINIC | Age: 44
Discharge: HOME | End: 2024-02-19
Attending: INTERNAL MEDICINE
Payer: COMMERCIAL

## 2024-02-19 DIAGNOSIS — F32.1 CURRENT MODERATE EPISODE OF MAJOR DEPRESSIVE DISORDER WITHOUT PRIOR EPISODE (H): ICD-10-CM

## 2024-02-19 DIAGNOSIS — C81.18 NODULAR SCLEROSIS HODGKIN LYMPHOMA OF LYMPH NODES OF MULTIPLE REGIONS (H): ICD-10-CM

## 2024-02-19 PROCEDURE — 70491 CT SOFT TISSUE NECK W/DYE: CPT

## 2024-02-19 PROCEDURE — 71260 CT THORAX DX C+: CPT

## 2024-02-19 PROCEDURE — 250N000011 HC RX IP 250 OP 636: Performed by: INTERNAL MEDICINE

## 2024-02-19 RX ORDER — IOPAMIDOL 755 MG/ML
100 INJECTION, SOLUTION INTRAVASCULAR ONCE
Status: COMPLETED | OUTPATIENT
Start: 2024-02-19 | End: 2024-02-19

## 2024-02-19 RX ADMIN — IOPAMIDOL 100 ML: 755 INJECTION, SOLUTION INTRAVENOUS at 09:08

## 2024-02-21 RX ORDER — ESCITALOPRAM OXALATE 10 MG/1
10 TABLET ORAL DAILY
Qty: 90 TABLET | Refills: 1 | Status: SHIPPED | OUTPATIENT
Start: 2024-02-21 | End: 2024-03-06

## 2024-02-22 ENCOUNTER — LAB (OUTPATIENT)
Dept: INFUSION THERAPY | Facility: HOSPITAL | Age: 44
End: 2024-02-22
Attending: INTERNAL MEDICINE
Payer: COMMERCIAL

## 2024-02-22 ENCOUNTER — ONCOLOGY VISIT (OUTPATIENT)
Dept: ONCOLOGY | Facility: HOSPITAL | Age: 44
End: 2024-02-22
Attending: INTERNAL MEDICINE
Payer: COMMERCIAL

## 2024-02-22 VITALS
TEMPERATURE: 97.8 F | RESPIRATION RATE: 16 BRPM | BODY MASS INDEX: 26.9 KG/M2 | DIASTOLIC BLOOD PRESSURE: 60 MMHG | SYSTOLIC BLOOD PRESSURE: 110 MMHG | OXYGEN SATURATION: 100 % | HEART RATE: 72 BPM | WEIGHT: 171.4 LBS | HEIGHT: 67 IN

## 2024-02-22 DIAGNOSIS — C81.18 NODULAR SCLEROSIS HODGKIN LYMPHOMA OF LYMPH NODES OF MULTIPLE REGIONS (H): Primary | ICD-10-CM

## 2024-02-22 DIAGNOSIS — C81.18 NODULAR SCLEROSIS HODGKIN LYMPHOMA OF LYMPH NODES OF MULTIPLE REGIONS (H): ICD-10-CM

## 2024-02-22 LAB
ALBUMIN SERPL BCG-MCNC: 4.2 G/DL (ref 3.5–5.2)
ALP SERPL-CCNC: 60 U/L (ref 40–150)
ALT SERPL W P-5'-P-CCNC: 11 U/L (ref 0–50)
ANION GAP SERPL CALCULATED.3IONS-SCNC: 8 MMOL/L (ref 7–15)
AST SERPL W P-5'-P-CCNC: 21 U/L (ref 0–45)
BASOPHILS # BLD AUTO: 0 10E3/UL (ref 0–0.2)
BASOPHILS NFR BLD AUTO: 1 %
BILIRUB SERPL-MCNC: 0.5 MG/DL
BUN SERPL-MCNC: 19.3 MG/DL (ref 6–20)
CALCIUM SERPL-MCNC: 8.5 MG/DL (ref 8.6–10)
CHLORIDE SERPL-SCNC: 107 MMOL/L (ref 98–107)
CREAT SERPL-MCNC: 0.98 MG/DL (ref 0.51–0.95)
DEPRECATED HCO3 PLAS-SCNC: 25 MMOL/L (ref 22–29)
EGFRCR SERPLBLD CKD-EPI 2021: 73 ML/MIN/1.73M2
EOSINOPHIL # BLD AUTO: 0.4 10E3/UL (ref 0–0.7)
EOSINOPHIL NFR BLD AUTO: 6 %
ERYTHROCYTE [DISTWIDTH] IN BLOOD BY AUTOMATED COUNT: 12.9 % (ref 10–15)
ERYTHROCYTE [SEDIMENTATION RATE] IN BLOOD BY WESTERGREN METHOD: 9 MM/HR (ref 0–20)
GLUCOSE SERPL-MCNC: 66 MG/DL (ref 70–99)
HCT VFR BLD AUTO: 41.2 % (ref 35–47)
HGB BLD-MCNC: 13.7 G/DL (ref 11.7–15.7)
IMM GRANULOCYTES # BLD: 0 10E3/UL
IMM GRANULOCYTES NFR BLD: 0 %
LYMPHOCYTES # BLD AUTO: 2.2 10E3/UL (ref 0.8–5.3)
LYMPHOCYTES NFR BLD AUTO: 32 %
MCH RBC QN AUTO: 31.3 PG (ref 26.5–33)
MCHC RBC AUTO-ENTMCNC: 33.3 G/DL (ref 31.5–36.5)
MCV RBC AUTO: 94 FL (ref 78–100)
MONOCYTES # BLD AUTO: 0.7 10E3/UL (ref 0–1.3)
MONOCYTES NFR BLD AUTO: 11 %
NEUTROPHILS # BLD AUTO: 3.5 10E3/UL (ref 1.6–8.3)
NEUTROPHILS NFR BLD AUTO: 50 %
NRBC # BLD AUTO: 0 10E3/UL
NRBC BLD AUTO-RTO: 0 /100
PLATELET # BLD AUTO: 342 10E3/UL (ref 150–450)
POTASSIUM SERPL-SCNC: 4.3 MMOL/L (ref 3.4–5.3)
PROT SERPL-MCNC: 6.8 G/DL (ref 6.4–8.3)
RBC # BLD AUTO: 4.38 10E6/UL (ref 3.8–5.2)
SODIUM SERPL-SCNC: 140 MMOL/L (ref 135–145)
WBC # BLD AUTO: 6.9 10E3/UL (ref 4–11)

## 2024-02-22 PROCEDURE — 99213 OFFICE O/P EST LOW 20 MIN: CPT | Performed by: INTERNAL MEDICINE

## 2024-02-22 PROCEDURE — 80053 COMPREHEN METABOLIC PANEL: CPT

## 2024-02-22 PROCEDURE — 85652 RBC SED RATE AUTOMATED: CPT

## 2024-02-22 PROCEDURE — 85025 COMPLETE CBC W/AUTO DIFF WBC: CPT

## 2024-02-22 PROCEDURE — G2211 COMPLEX E/M VISIT ADD ON: HCPCS | Performed by: INTERNAL MEDICINE

## 2024-02-22 PROCEDURE — 99214 OFFICE O/P EST MOD 30 MIN: CPT | Performed by: INTERNAL MEDICINE

## 2024-02-22 PROCEDURE — 36415 COLL VENOUS BLD VENIPUNCTURE: CPT

## 2024-02-22 ASSESSMENT — PAIN SCALES - GENERAL: PAINLEVEL: NO PAIN (0)

## 2024-02-22 NOTE — PROGRESS NOTES
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    Weight as of this encounter: 77.7 kg (171 lb 6.4 oz). Body surface area is 1.92 meters squared.  No Pain (0) Comment: Data Unavailable   No LMP recorded.  Allergies reviewed: Yes  Medications reviewed: Yes    Medications: Medication refills not needed today.  Pharmacy name entered into [employer]:    [facility] -- [place], [place] - [address]  [id number] - [place], [place] - [address] AT [facility] [address]  [id number] - [place], [place] - [address]    Frailty Screening:   Is the patient here for a new oncology consult visit in cancer care? 2. No      Clinical concerns: No concerns today.       Leonardo Corona LPN            "

## 2024-02-22 NOTE — LETTER
"    2/22/2024         RE: Amna Oneil  [address] [place] [address]        Dear Colleague,    Thank you for referring your patient, Amna Oneil, to the [facility]. Please see a copy of my visit note below.    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    Weight as of this encounter: 77.7 kg (171 lb 6.4 oz). Body surface area is 1.92 meters squared.  No Pain (0) Comment: Data Unavailable   No LMP recorded.  Allergies reviewed: Yes  Medications reviewed: Yes    Medications: Medication refills not needed today.  Pharmacy name entered into [employer]:    [facility] -- [place] - [place], [place] - [address]  [id number] - [place], [place] - [address] [id number] - [place], [place] - [address]    Frailty Screening:   Is the patient here for a new oncology consult visit in cancer care? 2. No      Clinical concerns: No concerns today.       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      ECOG Performance    0 - Independent     Pain  Pain Score: No Pain (0)    #.  History of classical Hodgkin lymphoma, stage II (favorable risk)    She is clinically well.  Exam is unremarkable.  I reviewed her labs and her CBC, CMP and ESR are entirely normal.  I reviewed the CT scan of soft tissue neck/chest/abdomen/pelvis and there is no signs and symptoms suggestive of lymphoma recurrence.    She is now 2 year out from initial diagnosis and treatment.  Recommended clinical exam, labs in 6 months.  She is advised to call me sooner with any concerns.       Surveillance: H&P and lab every 3-6 month for 1-2 yr, then every 6-12 months year 3, then annually to complete 5 years (NCCN guidelines). Imaging as clinically indicated.     #.  History of acute bilateral pulmonary emboli without evidence of saddle embolus or central emboli in 2/2022.   Likely provoked in the setting of recent COVID-19 infection, active chemotherapy for lymphoma in the setting of ongoing birth control pill use. She has stopped birth control pills.  She tolerated Xarelto well without side effects or intolerance. She completed 6 months of anticoagulation therapy in 8/2022.  No signs and symptoms suggestive of venous thromboembolism.    #.  She is up-to-date on age appropriate cancer screening, specifically mammogram.    The longitudinal plan of care for the diagnosis(es)/condition(s) as documented were addressed during this visit. Due to the added complexity in care, I will continue to support Amna in the subsequent management and with ongoing continuity of care.    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  12/23/2021-PET/CT scan showed hypermetabolic lymph nodes involving basilar neck/superior mediastinum, mediastinum, right pericardial space, bilateral axillae.   -second opinion pathology evaluation at [facility] and it confirmed classical Hodgkin lymphoma.  Echocardiogram showed adequate cardiac function.  Pulmonary function test was normal   stage II (favorable risk)    IPSS-0 risk factor, FFP 88%, overall survival 98%.    Treatment to date  2/3/2022-5/23/2022-completed 4 cycles of ABVD (bleomycin was omitted in cycle #3 and #4 due to pulmonary toxicity) treatment course was complicated by pulmonary emboli, PCP pneumonia.  She achieved complete radiographic response (no uptake by PET) after 2 cycles of ABVD.    History of Present Illness    Ms. Amna Oneil presented today accompanied by her .    She reported she is doing very well.  No pain or concerns. No recurrence of swollen glands.  No fever.  No drenching sweats.    Review of systems  Apart from describing in HPI, the remainder of comprehensive ROS was negative.    Past History    Past Medical History:   Diagnosis Date     Bilateral pulmonary embolism (H) 03/16/2022     Depressive disorder 2007    No current symptoms     Hodgkin lymphoma (H)      Pulmonary emboli (H)        Past Surgical History:   Procedure Laterality Date     APPENDECTOMY  4/2008     BIOPSY  12/2021 & 1/2022    Lymphoma diagnosis- lymph node and bone marrow     COLONOSCOPY  11/2021    Clear     IR CHEST PORT PLACEMENT > 5 YRS OF AGE  01/21/2022     IR LYMPH NODE BIOPSY  12/13/2021     IR PORT REMOVAL RIGHT  03/08/2023         Physical Exam    /60 (Patient Position: Sitting)   Pulse 72   Temp 97.8  F (36.6  C) (Oral)   Resp 16   Ht 1.702 m (5' 7\")   Wt 77.7 kg (171 lb 6.4 oz)   " SpO2 100%   BMI 26.85 kg/m      General: alert, awake, not in acute distress  HEENT: Head: Normal, normocephalic, atraumatic.  Eye: Normal external eye, conjunctiva, lids cornea, PATRICIA.  Pharynx: Normal buccal mucosa. Normal pharynx.  Neck / Thyroid: Supple, no masses, nodes, nodules or enlargement.  Lymphatics: No abnormally enlarged lymph nodes.  Chest: Normal chest wall and respirations. Clear to auscultation.  Heart: S1 S2 RRR.   Abdomen: abdomen is soft without significant tenderness, masses, organomegaly or guarding  Extremities: normal strength, tone, and muscle mass  Skin: normal. no rash or abnormalities  CNS: non focal.    Lab Results    Recent Results (from the past 168 hour(s))   Comprehensive metabolic panel   Result Value Ref Range    Sodium 140 135 - 145 mmol/L    Potassium 4.3 3.4 - 5.3 mmol/L    Carbon Dioxide (CO2) 25 22 - 29 mmol/L    Anion Gap 8 7 - 15 mmol/L    Urea Nitrogen 19.3 6.0 - 20.0 mg/dL    Creatinine 0.98 (H) 0.51 - 0.95 mg/dL    GFR Estimate 73 >60 mL/min/1.73m2    Calcium 8.5 (L) 8.6 - 10.0 mg/dL    Chloride 107 98 - 107 mmol/L    Glucose 66 (L) 70 - 99 mg/dL    Alkaline Phosphatase 60 40 - 150 U/L    AST 21 0 - 45 U/L    ALT 11 0 - 50 U/L    Protein Total 6.8 6.4 - 8.3 g/dL    Albumin 4.2 3.5 - 5.2 g/dL    Bilirubin Total 0.5 <=1.2 mg/dL   Erythrocyte sedimentation rate auto   Result Value Ref Range    Erythrocyte Sedimentation Rate 9 0 - 20 mm/hr   CBC with platelets and differential   Result Value Ref Range    WBC Count 6.9 4.0 - 11.0 10e3/uL    RBC Count 4.38 3.80 - 5.20 10e6/uL    Hemoglobin 13.7 11.7 - 15.7 g/dL    Hematocrit 41.2 35.0 - 47.0 %    MCV 94 78 - 100 fL    MCH 31.3 26.5 - 33.0 pg    MCHC 33.3 31.5 - 36.5 g/dL    RDW 12.9 10.0 - 15.0 %    Platelet Count 342 150 - 450 10e3/uL    % Neutrophils 50 %    % Lymphocytes 32 %    % Monocytes 11 %    % Eosinophils 6 %    % Basophils 1 %    % Immature Granulocytes 0 %    NRBCs per 100 WBC 0 <1 /100    Absolute Neutrophils 3.5  1.6 - 8.3 10e3/uL    Absolute Lymphocytes 2.2 0.8 - 5.3 10e3/uL    Absolute Monocytes 0.7 0.0 - 1.3 10e3/uL    Absolute Eosinophils 0.4 0.0 - 0.7 10e3/uL    Absolute Basophils 0.0 0.0 - 0.2 10e3/uL    Absolute Immature Granulocytes 0.0 <=0.4 10e3/uL    Absolute NRBCs 0.0 10e3/uL       Imaging    CT Soft Tissue Neck w Contrast    Result Date: 2/19/2024  EXAM: CT SOFT TISSUE NECK W CONTRAST LOCATION: [facility] DATE: 2/19/2024 INDICATION: Lymphoma surveillance. COMPARISON: CT neck 2/13/2023 and 8/29/2022. CONTRAST: 100 mL Isovue-370. TECHNIQUE: Routine CT Soft Tissue Neck with IV contrast. Multiplanar reformats. Dose reduction techniques were used. FINDINGS: MUCOSAL SPACES/SOFT TISSUES: Normal mucosal spaces of the upper aerodigestive tract. No mucosal or submucosal nodularity, focal enhancement, mass or inflammation identified. Normal vocal cords and infraglottic trachea. Normal parapharyngeal space and subcutaneous soft tissues. Normal oral cavity,  spaces, and floor of mouth structures. Normal caliber of the epiglottis. Normal volumes of adenoidal lingual and palatine tonsillar tissue. Appropriate appearance to the airway and cervical prevertebral soft tissues. Hyoid bone and neck cartilage structures are normal. LYMPH NODES: No pathologic lymph nodes by size or morphology criteria. No new or enlarging adenopathy. Stable representative nonenlarged level II lymph nodes measure up to 8.5 mm. No cystic/necrotic adenopathy. SALIVARY GLANDS: Normal parotid and submandibular glands. THYROID: Normal. VESSELS: Vascular structures of the neck are patent. Partial effacement without occlusion of the left internal jugular vein just below the skull base level as before unchanged. VISUALIZED INTRACRANIAL/ORBITS/SINUSES: No intracranial mass, mass effect or pathologic intracranial enhancement. Orbits are satisfactory without pathologic orbital enhancement. Lacrimal spaces are within normal  limits Visualized paranasal sinuses and mastoid air cells are clear. OTHER: Cervical height/alignment is satisfactory. No lytic or destructive lesions. Scarring in the lung apices. No discrete pulmonary nodule, mass or pleural effusion. No mediastinal adenopathy.     IMPRESSION: 1.  No evidence for mass, adenopathy or pathologic enhancement. 2.  No specific evidence to suggest lymphoma/lymphoproliferative disorder within the neck or superior mediastinum. 3.  Patient's right-sided Port-A-Cath has been removed from prior CT neck 02/13/2023. Interval clearing of polypoid membrane thickening within the left maxillary sinus. 4.  Additional nonacute details and description given above stable from prior exam.    CT Chest/Abdomen/Pelvis w Contrast    Result Date: 2/19/2024  EXAM: CT CHEST/ABDOMEN/PELVIS W CONTRAST LOCATION: [facility] DATE: 2/19/2024 INDICATION: lymphoma surviellance COMPARISON: CT of the chest, abdomen, and pelvis 02/13/2023, 08/29/2022 TECHNIQUE: CT scan of the chest, abdomen, and pelvis was performed following injection of IV contrast. Multiplanar reformats were obtained. Dose reduction techniques were used. CONTRAST: 100ml Isovue 370 FINDINGS: LUNGS AND PLEURA: Stable 4 mm nodule in the anterior basal left lower lobe (series 5, image 245). No new or enlarging lung nodules. No airspace opacities. Minimal pleural parenchymal scarring in the apices and scarring along the superior lateral aspect of the right major fissure. Airways are normal. No pleural effusion. MEDIASTINUM: Cardiac chambers are normal in size. Normal variant anatomy of the pulmonary veins with right top pulmonary vein. No pericardial effusion. Normal caliber thoracic aorta with conventional arch anatomy. Age-appropriate thymus in the anterior mediastinum. No enlarged mediastinal or hilar lymph nodes. CORONARY ARTERY CALCIFICATION: None. HEPATOBILIARY: Normal. PANCREAS: Normal. SPLEEN: Normal in size. ADRENAL  GLANDS: Normal. KIDNEYS/BLADDER: Normal. BOWEL: Normal. Previous appendectomy. LYMPH NODES: Normal. VASCULATURE: Unremarkable. PELVIC ORGANS: Uterus is normal in size. There is a corpus luteal cyst in the left ovary and physiologic fluid layering into the posterior cul-de-sac. Right ovary is normal. MUSCULOSKELETAL: Tiny degenerative osteophytes at T11-T12. Bones are otherwise normal.     IMPRESSION: No findings to suggest active lymphomatous involvement of the chest, abdomen, or pelvis.    MA Screening Bilateral w/ August    Result Date: 2/12/2024  BILATERAL FULL FIELD DIGITAL SCREENING MAMMOGRAM WITH TOMOSYNTHESIS Performed on: 2/9/24 Compared to: 02/06/2023, 12/13/2021, and 12/09/2021 Technique:  This study was evaluated with the assistance of Computer-Aided Detection.  Breast Tomosynthesis was used in interpretation. Findings: The breasts are heterogeneously dense, which may obscure small masses.  There is no radiographic evidence of malignancy.     IMPRESSION: ACR BI-RADS Category 1: Negative BREAST CANCER SCREENING RECOMMENDATION: Routine yearly mammography beginning at age 40 or as discussed with your provider. The results and recommendations of this examination will be communicated to the patient. Renato Martinez      Signed by: Juan Lizarraga MD      Again, thank you for allowing me to participate in the care of your patient.        Sincerely,        Juan Lizarraga MD

## 2024-02-22 NOTE — PROGRESS NOTES
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      ECOG Performance    0 - Independent     Pain  Pain Score: No Pain (0)    #.  History of classical Hodgkin lymphoma, stage II (favorable risk)    She is clinically well.  Exam is unremarkable.  I reviewed her labs and her CBC, CMP and ESR are entirely normal.  I reviewed the CT scan of soft tissue neck/chest/abdomen/pelvis and there is no signs and symptoms suggestive of lymphoma recurrence.    She is now 2 year out from initial diagnosis and treatment.  Recommended clinical exam, labs in 6 months.  She is advised to call me sooner with any concerns.       Surveillance: H&P and lab every 3-6 month for 1-2 yr, then every 6-12 months year 3, then annually to complete 5 years (NCCN guidelines). Imaging as clinically indicated.     #.  History of acute bilateral pulmonary emboli without evidence of saddle embolus or central emboli in 2/2022.   Likely provoked in the setting of recent COVID-19 infection, active chemotherapy for lymphoma in the setting of ongoing birth control pill use. She has stopped birth control pills.  She tolerated Xarelto well without side effects or intolerance. She completed 6 months of anticoagulation therapy in 8/2022.  No signs and symptoms suggestive of venous thromboembolism.    #.  She is up-to-date on age appropriate cancer screening, specifically mammogram.    The longitudinal plan of care for the diagnosis(es)/condition(s) as documented were addressed during this visit. Due to the added complexity in care, I will continue to support Amna in the subsequent management and with ongoing  continuity of care.    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  12/23/2021-PET/CT scan showed hypermetabolic lymph nodes involving basilar neck/superior mediastinum, mediastinum, right pericardial space, bilateral axillae.   -second opinion pathology evaluation at [facility] and it confirmed classical Hodgkin lymphoma.  Echocardiogram showed adequate cardiac function.  Pulmonary function test was normal   stage II (favorable risk)    IPSS-0 risk factor, FFP 88%, overall survival 98%.    Treatment to date  2/3/2022-5/23/2022-completed 4 cycles of ABVD (bleomycin was omitted in cycle #3 and #4 due to pulmonary toxicity) treatment course was complicated by pulmonary emboli, PCP pneumonia.  She achieved complete radiographic response (no uptake by PET) after 2 cycles of ABVD.    History of Present Illness    Ms. Amna Oneil presented today accompanied by her .    She reported she is doing very well.  No pain or concerns. No recurrence of swollen glands.  No fever.  No drenching sweats.    Review of systems  Apart from describing in HPI, the remainder of comprehensive ROS was negative.    Past History    Past Medical History:   Diagnosis Date    Bilateral pulmonary embolism (H) 03/16/2022    Depressive disorder 2007    No current symptoms    Hodgkin lymphoma (H)     Pulmonary emboli (H)        Past Surgical History:   Procedure Laterality Date    APPENDECTOMY  4/2008    BIOPSY  12/2021 & 1/2022    Lymphoma diagnosis-  "lymph node and bone marrow    COLONOSCOPY  11/2021    Clear    IR CHEST PORT PLACEMENT > 5 YRS OF AGE  01/21/2022    IR LYMPH NODE BIOPSY  12/13/2021    IR PORT REMOVAL RIGHT  03/08/2023         Physical Exam    /60 (Patient Position: Sitting)   Pulse 72   Temp 97.8  F (36.6  C) (Oral)   Resp 16   Ht 1.702 m (5' 7\")   Wt 77.7 kg (171 lb 6.4 oz)   SpO2 100%   BMI 26.85 kg/m      General: alert, awake, not in acute distress  HEENT: Head: Normal, normocephalic, atraumatic.  Eye: Normal external eye, conjunctiva, lids cornea, PATRICIA.  Pharynx: Normal buccal mucosa. Normal pharynx.  Neck / Thyroid: Supple, no masses, nodes, nodules or enlargement.  Lymphatics: No abnormally enlarged lymph nodes.  Chest: Normal chest wall and respirations. Clear to auscultation.  Heart: S1 S2 RRR.   Abdomen: abdomen is soft without significant tenderness, masses, organomegaly or guarding  Extremities: normal strength, tone, and muscle mass  Skin: normal. no rash or abnormalities  CNS: non focal.    Lab Results    Recent Results (from the past 168 hour(s))   Comprehensive metabolic panel   Result Value Ref Range    Sodium 140 135 - 145 mmol/L    Potassium 4.3 3.4 - 5.3 mmol/L    Carbon Dioxide (CO2) 25 22 - 29 mmol/L    Anion Gap 8 7 - 15 mmol/L    Urea Nitrogen 19.3 6.0 - 20.0 mg/dL    Creatinine 0.98 (H) 0.51 - 0.95 mg/dL    GFR Estimate 73 >60 mL/min/1.73m2    Calcium 8.5 (L) 8.6 - 10.0 mg/dL    Chloride 107 98 - 107 mmol/L    Glucose 66 (L) 70 - 99 mg/dL    Alkaline Phosphatase 60 40 - 150 U/L    AST 21 0 - 45 U/L    ALT 11 0 - 50 U/L    Protein Total 6.8 6.4 - 8.3 g/dL    Albumin 4.2 3.5 - 5.2 g/dL    Bilirubin Total 0.5 <=1.2 mg/dL   Erythrocyte sedimentation rate auto   Result Value Ref Range    Erythrocyte Sedimentation Rate 9 0 - 20 mm/hr   CBC with platelets and differential   Result Value Ref Range    WBC Count 6.9 4.0 - 11.0 10e3/uL    RBC Count 4.38 3.80 - 5.20 10e6/uL    Hemoglobin 13.7 11.7 - 15.7 g/dL    " Hematocrit 41.2 35.0 - 47.0 %    MCV 94 78 - 100 fL    MCH 31.3 26.5 - 33.0 pg    MCHC 33.3 31.5 - 36.5 g/dL    RDW 12.9 10.0 - 15.0 %    Platelet Count 342 150 - 450 10e3/uL    % Neutrophils 50 %    % Lymphocytes 32 %    % Monocytes 11 %    % Eosinophils 6 %    % Basophils 1 %    % Immature Granulocytes 0 %    NRBCs per 100 WBC 0 <1 /100    Absolute Neutrophils 3.5 1.6 - 8.3 10e3/uL    Absolute Lymphocytes 2.2 0.8 - 5.3 10e3/uL    Absolute Monocytes 0.7 0.0 - 1.3 10e3/uL    Absolute Eosinophils 0.4 0.0 - 0.7 10e3/uL    Absolute Basophils 0.0 0.0 - 0.2 10e3/uL    Absolute Immature Granulocytes 0.0 <=0.4 10e3/uL    Absolute NRBCs 0.0 10e3/uL       Imaging    CT Soft Tissue Neck w Contrast    Result Date: 2/19/2024  EXAM: CT SOFT TISSUE NECK W CONTRAST LOCATION: [facility] DATE: 2/19/2024 INDICATION: Lymphoma surveillance. COMPARISON: CT neck 2/13/2023 and 8/29/2022. CONTRAST: 100 mL Isovue-370. TECHNIQUE: Routine CT Soft Tissue Neck with IV contrast. Multiplanar reformats. Dose reduction techniques were used. FINDINGS: MUCOSAL SPACES/SOFT TISSUES: Normal mucosal spaces of the upper aerodigestive tract. No mucosal or submucosal nodularity, focal enhancement, mass or inflammation identified. Normal vocal cords and infraglottic trachea. Normal parapharyngeal space and subcutaneous soft tissues. Normal oral cavity,  spaces, and floor of mouth structures. Normal caliber of the epiglottis. Normal volumes of adenoidal lingual and palatine tonsillar tissue. Appropriate appearance to the airway and cervical prevertebral soft tissues. Hyoid bone and neck cartilage structures are normal. LYMPH NODES: No pathologic lymph nodes by size or morphology criteria. No new or enlarging adenopathy. Stable representative nonenlarged level II lymph nodes measure up to 8.5 mm. No cystic/necrotic adenopathy. SALIVARY GLANDS: Normal parotid and submandibular glands. THYROID: Normal. VESSELS: Vascular  structures of the neck are patent. Partial effacement without occlusion of the left internal jugular vein just below the skull base level as before unchanged. VISUALIZED INTRACRANIAL/ORBITS/SINUSES: No intracranial mass, mass effect or pathologic intracranial enhancement. Orbits are satisfactory without pathologic orbital enhancement. Lacrimal spaces are within normal limits Visualized paranasal sinuses and mastoid air cells are clear. OTHER: Cervical height/alignment is satisfactory. No lytic or destructive lesions. Scarring in the lung apices. No discrete pulmonary nodule, mass or pleural effusion. No mediastinal adenopathy.     IMPRESSION: 1.  No evidence for mass, adenopathy or pathologic enhancement. 2.  No specific evidence to suggest lymphoma/lymphoproliferative disorder within the neck or superior mediastinum. 3.  Patient's right-sided Port-A-Cath has been removed from prior CT neck 02/13/2023. Interval clearing of polypoid membrane thickening within the left maxillary sinus. 4.  Additional nonacute details and description given above stable from prior exam.    CT Chest/Abdomen/Pelvis w Contrast    Result Date: 2/19/2024  EXAM: CT CHEST/ABDOMEN/PELVIS W CONTRAST LOCATION: [facility] DATE: 2/19/2024 INDICATION: lymphoma surviellance COMPARISON: CT of the chest, abdomen, and pelvis 02/13/2023, 08/29/2022 TECHNIQUE: CT scan of the chest, abdomen, and pelvis was performed following injection of IV contrast. Multiplanar reformats were obtained. Dose reduction techniques were used. CONTRAST: 100ml Isovue 370 FINDINGS: LUNGS AND PLEURA: Stable 4 mm nodule in the anterior basal left lower lobe (series 5, image 245). No new or enlarging lung nodules. No airspace opacities. Minimal pleural parenchymal scarring in the apices and scarring along the superior lateral aspect of the right major fissure. Airways are normal. No pleural effusion. MEDIASTINUM: Cardiac chambers are normal in size.  Normal variant anatomy of the pulmonary veins with right top pulmonary vein. No pericardial effusion. Normal caliber thoracic aorta with conventional arch anatomy. Age-appropriate thymus in the anterior mediastinum. No enlarged mediastinal or hilar lymph nodes. CORONARY ARTERY CALCIFICATION: None. HEPATOBILIARY: Normal. PANCREAS: Normal. SPLEEN: Normal in size. ADRENAL GLANDS: Normal. KIDNEYS/BLADDER: Normal. BOWEL: Normal. Previous appendectomy. LYMPH NODES: Normal. VASCULATURE: Unremarkable. PELVIC ORGANS: Uterus is normal in size. There is a corpus luteal cyst in the left ovary and physiologic fluid layering into the posterior cul-de-sac. Right ovary is normal. MUSCULOSKELETAL: Tiny degenerative osteophytes at T11-T12. Bones are otherwise normal.     IMPRESSION: No findings to suggest active lymphomatous involvement of the chest, abdomen, or pelvis.    MA Screening Bilateral w/ August    Result Date: 2/12/2024  BILATERAL FULL FIELD DIGITAL SCREENING MAMMOGRAM WITH TOMOSYNTHESIS Performed on: 2/9/24 Compared to: 02/06/2023, 12/13/2021, and 12/09/2021 Technique:  This study was evaluated with the assistance of Computer-Aided Detection.  Breast Tomosynthesis was used in interpretation. Findings: The breasts are heterogeneously dense, which may obscure small masses.  There is no radiographic evidence of malignancy.     IMPRESSION: ACR BI-RADS Category 1: Negative BREAST CANCER SCREENING RECOMMENDATION: Routine yearly mammography beginning at age 40 or as discussed with your provider. The results and recommendations of this examination will be communicated to the patient. Renato Martinez      Signed by: Juan Lizarraga MD

## 2024-03-01 DIAGNOSIS — F32.1 CURRENT MODERATE EPISODE OF MAJOR DEPRESSIVE DISORDER WITHOUT PRIOR EPISODE (H): ICD-10-CM

## 2024-03-01 RX ORDER — ESCITALOPRAM OXALATE 10 MG/1
10 TABLET ORAL DAILY
Qty: 90 TABLET | Refills: 0 | OUTPATIENT
Start: 2024-03-01

## 2024-03-04 NOTE — TELEPHONE ENCOUNTER
Pharmacy faxed a request for this refill as they never received the first one. Please fax a refill as their system is still down, thanks!

## 2024-03-05 ENCOUNTER — MYC REFILL (OUTPATIENT)
Dept: FAMILY MEDICINE | Facility: CLINIC | Age: 44
End: 2024-03-05
Payer: COMMERCIAL

## 2024-03-05 DIAGNOSIS — F32.1 CURRENT MODERATE EPISODE OF MAJOR DEPRESSIVE DISORDER WITHOUT PRIOR EPISODE (H): ICD-10-CM

## 2024-03-06 ENCOUNTER — E-VISIT (OUTPATIENT)
Dept: FAMILY MEDICINE | Facility: CLINIC | Age: 44
End: 2024-03-06
Payer: COMMERCIAL

## 2024-03-06 DIAGNOSIS — F32.1 CURRENT MODERATE EPISODE OF MAJOR DEPRESSIVE DISORDER WITHOUT PRIOR EPISODE (H): ICD-10-CM

## 2024-03-06 PROCEDURE — 99207 PR NON-BILLABLE SERV PER CHARTING: CPT | Performed by: FAMILY MEDICINE

## 2024-03-06 RX ORDER — ESCITALOPRAM OXALATE 10 MG/1
10 TABLET ORAL DAILY
Qty: 90 TABLET | Refills: 1 | OUTPATIENT
Start: 2024-03-06

## 2024-03-06 RX ORDER — ESCITALOPRAM OXALATE 10 MG/1
10 TABLET ORAL DAILY
Qty: 90 TABLET | Refills: 1 | Status: SHIPPED | OUTPATIENT
Start: 2024-03-06 | End: 2024-08-21

## 2024-03-06 NOTE — PATIENT INSTRUCTIONS
Thank you for choosing us for your care. I have placed an order for a prescription so that you can start treatment. View your full visit summary for details by clicking on the link below. Your pharmacist will able to address any questions you may have about the medication.     If you're not feeling better within 5-7 days, please schedule an appointment.  You can schedule an appointment right here in [facility], or call [phone]  If the visit is for the same symptoms as your eVisit, we'll refund the cost of your eVisit if seen within seven days.

## 2024-05-13 ENCOUNTER — E-VISIT (OUTPATIENT)
Dept: URGENT CARE | Facility: CLINIC | Age: 44
End: 2024-05-13
Payer: COMMERCIAL

## 2024-05-13 DIAGNOSIS — J30.2 SEASONAL ALLERGIC RHINITIS, UNSPECIFIED TRIGGER: Primary | ICD-10-CM

## 2024-05-13 DIAGNOSIS — J30.2 SEASONAL ALLERGIES: ICD-10-CM

## 2024-05-13 PROCEDURE — 99421 OL DIG E/M SVC 5-10 MIN: CPT | Performed by: PREVENTIVE MEDICINE

## 2024-05-13 RX ORDER — FLUTICASONE PROPIONATE 50 MCG
2 SPRAY, SUSPENSION (ML) NASAL DAILY
Qty: 9.9 ML | Refills: 1 | Status: SHIPPED | OUTPATIENT
Start: 2024-05-13

## 2024-05-13 NOTE — PATIENT INSTRUCTIONS
"Allergies: Care Instructions  Overview     Allergies occur when your body's defense system (immune system) overreacts to certain substances. The immune system treats a harmless substance as if it were a harmful germ or virus. Many things can make this happen. These include pollens, medicine, food, dust, animal dander, and mold.  Allergies can be mild or severe. Mild allergies can be managed with home treatment. But medicine may be needed to prevent problems.  Managing your allergies is an important part of staying healthy. Your doctor may suggest that you have allergy testing to help find out what is causing your allergies.  Severe allergies can cause reactions that affect your whole body (anaphylactic reactions). Your doctor may prescribe a shot of epinephrine to carry with you in case you have a severe reaction. Learn how to give yourself the shot and keep it with you at all times. Make sure it is not .  Follow-up care is a key part of your treatment and safety. Be sure to make and go to all appointments, and call your doctor if you are having problems. It's also a good idea to know your test results and keep a list of the medicines you take.  How can you care for yourself at home?  If you have been told by your doctor that dust or dust mites are causing your allergy, decrease the dust around your bed:  Wash sheets, pillowcases, and other bedding in hot water every week.  Use dust-proof covers for pillows, duvets, and mattresses. Avoid plastic covers because they tear easily and do not \"breathe.\" Wash as instructed on the label.  Do not use any blankets and pillows that you do not need.  Use blankets that you can wash in your washing machine.  Consider removing drapes and carpets, which attract and hold dust, from your bedroom.  If you are allergic to house dust and mites, do not use home humidifiers. Your doctor can suggest ways you can control dust and mites.  Look for signs of cockroaches. Cockroaches " cause allergic reactions. Use cockroach baits to get rid of them. Then, clean your home well. Cockroaches like areas where grocery bags, newspapers, empty bottles, or cardboard boxes are stored. Do not keep these inside your home, and keep trash and food containers sealed. Seal off any spots where cockroaches might enter your home.  If you are allergic to mold, get rid of furniture, rugs, and drapes that smell musty. Check for mold in the bathroom.  If you are allergic to outdoor pollen or mold spores, use air-conditioning. Change or clean all filters every month. Keep windows closed.  If you are allergic to pollen, stay inside when pollen counts are high. Use a vacuum  with a HEPA filter or a double-thickness filter at least two times each week.  Stay inside when air pollution is bad. Avoid paint fumes, perfumes, and other strong odors.  Avoid conditions that make your allergies worse. Stay away from smoke. Do not smoke or let anyone else smoke in your house. Do not use fireplaces or wood-burning stoves.  If you are allergic to your pets, change the air filter in your furnace every month. Use high-efficiency filters.  If you are allergic to pet dander, keep pets outside or out of your bedroom. Old carpet and cloth furniture can hold a lot of animal dander. You may need to replace them.  When should you call for help?   Give an epinephrine shot if:    You think you are having a severe allergic reaction.     You have symptoms in more than one body area, such as mild nausea and an itchy mouth.   After giving an epinephrine shot call 911, even if you feel better.  Call 911 if:    You have symptoms of a severe allergic reaction. These may include:  Sudden raised, red areas (hives) all over your body.  Swelling of the throat, mouth, lips, or tongue.  Trouble breathing.  Passing out (losing consciousness). Or you may feel very lightheaded or suddenly feel weak, confused, or restless.  Severe belly pain, nausea,  "vomiting, or diarrhea.     You have been given an epinephrine shot, even if you feel better.   Call your doctor now or seek immediate medical care if:    You have symptoms of an allergic reaction, such as:  A rash or hives (raised, red areas on the skin).  Itching.  Swelling.  Mild belly pain or nausea.   Watch closely for changes in your health, and be sure to contact your doctor if:    You do not get better as expected.   Where can you learn more?  Go to https://www.[employer].net/patiented  Enter W171 in the search box to learn more about \"Allergies: Care Instructions.\"  Current as of: September 25, 2023               Content Version: 14.0    5435-6681 [employer].   Care instructions adapted under license by your healthcare professional. If you have questions about a medical condition or this instruction, always ask your healthcare professional. [employer] disclaims any warranty or liability for your use of this information.      Thank you for choosing us for your care. I have placed an order for a prescription so that you can start treatment. View your full visit summary for details by clicking on the link below. Your pharmacist will able to address any questions you may have about the medication.     If you're not feeling better within 5-7 days, please schedule an appointment.  You can schedule an appointment right here in [facility], or call [phone]  If the visit is for the same symptoms as your eVisit, we'll refund the cost of your eVisit if seen within seven days.    "

## 2024-07-24 ENCOUNTER — PATIENT OUTREACH (OUTPATIENT)
Dept: CARE COORDINATION | Facility: CLINIC | Age: 44
End: 2024-07-24
Payer: COMMERCIAL

## 2024-08-15 ENCOUNTER — ONCOLOGY VISIT (OUTPATIENT)
Dept: ONCOLOGY | Facility: HOSPITAL | Age: 44
End: 2024-08-15
Attending: INTERNAL MEDICINE
Payer: COMMERCIAL

## 2024-08-15 ENCOUNTER — LAB (OUTPATIENT)
Dept: INFUSION THERAPY | Facility: HOSPITAL | Age: 44
End: 2024-08-15
Payer: COMMERCIAL

## 2024-08-15 VITALS
HEART RATE: 71 BPM | HEIGHT: 67 IN | WEIGHT: 178.1 LBS | BODY MASS INDEX: 27.95 KG/M2 | DIASTOLIC BLOOD PRESSURE: 74 MMHG | OXYGEN SATURATION: 99 % | SYSTOLIC BLOOD PRESSURE: 107 MMHG | RESPIRATION RATE: 16 BRPM

## 2024-08-15 DIAGNOSIS — C81.18 NODULAR SCLEROSIS HODGKIN LYMPHOMA OF LYMPH NODES OF MULTIPLE REGIONS (H): ICD-10-CM

## 2024-08-15 DIAGNOSIS — C81.18 NODULAR SCLEROSIS HODGKIN LYMPHOMA OF LYMPH NODES OF MULTIPLE REGIONS (H): Primary | ICD-10-CM

## 2024-08-15 LAB
ALBUMIN SERPL BCG-MCNC: 4.3 G/DL (ref 3.5–5.2)
ALP SERPL-CCNC: 63 U/L (ref 40–150)
ALT SERPL W P-5'-P-CCNC: 10 U/L (ref 0–50)
ANION GAP SERPL CALCULATED.3IONS-SCNC: 8 MMOL/L (ref 7–15)
AST SERPL W P-5'-P-CCNC: 22 U/L (ref 0–45)
BASOPHILS # BLD AUTO: 0.1 10E3/UL (ref 0–0.2)
BASOPHILS NFR BLD AUTO: 1 %
BILIRUB SERPL-MCNC: 0.4 MG/DL
BUN SERPL-MCNC: 23.4 MG/DL (ref 6–20)
CALCIUM SERPL-MCNC: 9.3 MG/DL (ref 8.8–10.4)
CHLORIDE SERPL-SCNC: 105 MMOL/L (ref 98–107)
CREAT SERPL-MCNC: 0.95 MG/DL (ref 0.51–0.95)
EGFRCR SERPLBLD CKD-EPI 2021: 75 ML/MIN/1.73M2
EOSINOPHIL # BLD AUTO: 0.5 10E3/UL (ref 0–0.7)
EOSINOPHIL NFR BLD AUTO: 6 %
ERYTHROCYTE [DISTWIDTH] IN BLOOD BY AUTOMATED COUNT: 12.5 % (ref 10–15)
ERYTHROCYTE [SEDIMENTATION RATE] IN BLOOD BY WESTERGREN METHOD: 7 MM/HR (ref 0–20)
GLUCOSE SERPL-MCNC: 114 MG/DL (ref 70–99)
HCO3 SERPL-SCNC: 24 MMOL/L (ref 22–29)
HCT VFR BLD AUTO: 38.7 % (ref 35–47)
HGB BLD-MCNC: 13.2 G/DL (ref 11.7–15.7)
IMM GRANULOCYTES # BLD: 0 10E3/UL
IMM GRANULOCYTES NFR BLD: 0 %
LYMPHOCYTES # BLD AUTO: 2.7 10E3/UL (ref 0.8–5.3)
LYMPHOCYTES NFR BLD AUTO: 33 %
MCH RBC QN AUTO: 31.8 PG (ref 26.5–33)
MCHC RBC AUTO-ENTMCNC: 34.1 G/DL (ref 31.5–36.5)
MCV RBC AUTO: 93 FL (ref 78–100)
MONOCYTES # BLD AUTO: 0.7 10E3/UL (ref 0–1.3)
MONOCYTES NFR BLD AUTO: 9 %
NEUTROPHILS # BLD AUTO: 4.3 10E3/UL (ref 1.6–8.3)
NEUTROPHILS NFR BLD AUTO: 52 %
NRBC # BLD AUTO: 0 10E3/UL
NRBC BLD AUTO-RTO: 0 /100
PLATELET # BLD AUTO: 351 10E3/UL (ref 150–450)
POTASSIUM SERPL-SCNC: 4.3 MMOL/L (ref 3.4–5.3)
PROT SERPL-MCNC: 6.4 G/DL (ref 6.4–8.3)
RBC # BLD AUTO: 4.15 10E6/UL (ref 3.8–5.2)
SODIUM SERPL-SCNC: 137 MMOL/L (ref 135–145)
WBC # BLD AUTO: 8.3 10E3/UL (ref 4–11)

## 2024-08-15 PROCEDURE — 99214 OFFICE O/P EST MOD 30 MIN: CPT

## 2024-08-15 PROCEDURE — 36415 COLL VENOUS BLD VENIPUNCTURE: CPT

## 2024-08-15 PROCEDURE — 85025 COMPLETE CBC W/AUTO DIFF WBC: CPT

## 2024-08-15 PROCEDURE — G2211 COMPLEX E/M VISIT ADD ON: HCPCS

## 2024-08-15 PROCEDURE — 99213 OFFICE O/P EST LOW 20 MIN: CPT

## 2024-08-15 PROCEDURE — 85652 RBC SED RATE AUTOMATED: CPT

## 2024-08-15 PROCEDURE — 82374 ASSAY BLOOD CARBON DIOXIDE: CPT

## 2024-08-15 ASSESSMENT — PAIN SCALES - GENERAL: PAINLEVEL: NO PAIN (0)

## 2024-08-15 NOTE — LETTER
8/15/2024      Amna Oneil  [address] [place] [address]      Dear Colleague,    Thank you for referring your patient, Amna Oneil, to the [facility] [place]. Please see a copy of my visit note below.    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  History of classical Hodgkin lymphoma, stage II (favorable risk)   Clinically stable, and her exam is unremarkable.We reviewed her labs today including CBC, CMP and ESR.  All labs are entirely normal.  She is now 2.5 years out from initial diagnosis and treatment.  Recommend to continue clinical exam and labs again in 6 months.  Surveillance: H&P and lab every 3-6 month for 1-2 yr, then every 6-12 months year 3, then annually to complete 5 years (NCCN guidelines). Imaging as clinically indicated.     #.  History of acute bilateral pulmonary emboli without evidence of saddle embolus or central emboli in 2/2022.  Likely provoked in the setting of recent COVID-19 infection, active chemotherapy for lymphoma in the setting of ongoing birth control pill use. She has stopped birth control pills.  She tolerated Xarelto well without side effects or intolerance. She completed 6 months of anticoagulation therapy in 8/2022.  No signs and symptoms suggestive of venous thromboembolism.    ______________________________________________________________________________    History of Present Illness/ Interval History    Ms. Amna Oneil  is a 44 year old female patient who returns in follow-up.  She has a history of classical Hodgkin's lymphoma and is 2.5 years out from diagnosis/treatment.  She  "reports that she is feeling well and is in good health.  She denies any night sweats, new lumps or bumps, or any new pain.  She has an adequate appetite and is moving her bowels and bladder appropriately.  She denies any chest pain or shortness of breath.      ECOG performance status   0- Fully active, without restriction      Pain  Pain Score: No Pain (0)    ROS  A 14 point review of systems was obtained.  Positive findings noted in the history.  The remainder of the review of system is otherwise negative.      Oncology History/Treatment  Diagnosis  12/13/2021-classical Hodgkin lymphoma by left axillary lymph node core needle biopsy.  12/23/2021-PET/CT scan showed hypermetabolic lymph nodes involving basilar neck/superior mediastinum, mediastinum, right pericardial space, bilateral axillae.   -second opinion pathology evaluation at [facility] and it confirmed classical Hodgkin lymphoma.  Echocardiogram showed adequate cardiac function.  Pulmonary function test was normal   stage II (favorable risk)    IPSS-0 risk factor, FFP 88%, overall survival 98%.    Treatment to date  2/3/2022-5/23/2022-completed 4 cycles of ABVD (bleomycin was omitted in cycle #3 and #4 due to pulmonary toxicity) treatment course was complicated by pulmonary emboli, PCP pneumonia.  She achieved complete radiographic response (no uptake by PET) after 2 cycles of ABVD.      Physical Exam    /74   Pulse 71   Resp 16   Ht 1.702 m (5' 7\")   Wt 80.8 kg (178 lb 1.6 oz)   SpO2 99%   BMI 27.89 kg/m      General: Well-appearing female in no acute distress. Cooperative in conversation.  Eyes: EOMI, PERRL. No scleral icterus.  ENT: Oral mucosa is moist without lesions or thrush. No ulcerations or mucositis noted.  Lymphatic: Neck is supple without cervical, axillary, or supraclavicular lymphadenopathy.   Cardiovascular: RRR No murmurs, gallops, or rubs. No peripheral edema.  Respiratory: CTA bilaterally. No wheezes or " crackles.  Gastrointestinal: BS +. Abdomen soft, non-tender. No palpable hepatosplenomegaly or masses.   Neurologic: Alert and oriented x3. Cranial nerves II through XII are grossly intact.  Skin: No rashes, petechiae, or bruising noted. Warm, dry, intact.      Lab Results    Recent Results (from the past 168 hour(s))   Comprehensive metabolic panel   Result Value Ref Range    Sodium 137 135 - 145 mmol/L    Potassium 4.3 3.4 - 5.3 mmol/L    Carbon Dioxide (CO2) 24 22 - 29 mmol/L    Anion Gap 8 7 - 15 mmol/L    Urea Nitrogen 23.4 (H) 6.0 - 20.0 mg/dL    Creatinine 0.95 0.51 - 0.95 mg/dL    GFR Estimate 75 >60 mL/min/1.73m2    Calcium 9.3 8.8 - 10.4 mg/dL    Chloride 105 98 - 107 mmol/L    Glucose 114 (H) 70 - 99 mg/dL    Alkaline Phosphatase 63 40 - 150 U/L    AST 22 0 - 45 U/L    ALT 10 0 - 50 U/L    Protein Total 6.4 6.4 - 8.3 g/dL    Albumin 4.3 3.5 - 5.2 g/dL    Bilirubin Total 0.4 <=1.2 mg/dL   Erythrocyte sedimentation rate auto   Result Value Ref Range    Erythrocyte Sedimentation Rate 7 0 - 20 mm/hr   CBC with platelets and differential   Result Value Ref Range    WBC Count 8.3 4.0 - 11.0 10e3/uL    RBC Count 4.15 3.80 - 5.20 10e6/uL    Hemoglobin 13.2 11.7 - 15.7 g/dL    Hematocrit 38.7 35.0 - 47.0 %    MCV 93 78 - 100 fL    MCH 31.8 26.5 - 33.0 pg    MCHC 34.1 31.5 - 36.5 g/dL    RDW 12.5 10.0 - 15.0 %    Platelet Count 351 150 - 450 10e3/uL    % Neutrophils 52 %    % Lymphocytes 33 %    % Monocytes 9 %    % Eosinophils 6 %    % Basophils 1 %    % Immature Granulocytes 0 %    NRBCs per 100 WBC 0 <1 /100    Absolute Neutrophils 4.3 1.6 - 8.3 10e3/uL    Absolute Lymphocytes 2.7 0.8 - 5.3 10e3/uL    Absolute Monocytes 0.7 0.0 - 1.3 10e3/uL    Absolute Eosinophils 0.5 0.0 - 0.7 10e3/uL    Absolute Basophils 0.1 0.0 - 0.2 10e3/uL    Absolute Immature Granulocytes 0.0 <=0.4 10e3/uL    Absolute NRBCs 0.0 10e3/uL     I reviewed the above labs today.  Imaging    No results found.      Billing  Total time 25 minutes,  "to include face to face visit, review of EMR, ordering, documentation and coordination of care on date of service   complexity modifier for longitudinal care.     Signed by: LORI Perez CNP    Chart documentation with Dragon Voice recognition Software. Although reviewed after completion, some words and grammatical errors may remain.    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    Weight as of this encounter: 80.8 kg (178 lb 1.6 oz). Body surface area is 1.95 meters squared.  No Pain (0) Comment: Data Unavailable   No LMP recorded.  Allergies reviewed: Yes  Medications reviewed: Yes    Medications: Medication refills not needed today.  Pharmacy name entered into [employer]: [id number] [address]    Frailty Screening:   Is the patient here for a new oncology consult visit in cancer care? 2. No      Clinical concerns:  6 month labs       Brenda Colin                Again, thank you for allowing me to participate in the care of your patient.        Sincerely,        LORI Perez CNP  "

## 2024-08-15 NOTE — PROGRESS NOTES
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    Weight as of this encounter: 80.8 kg (178 lb 1.6 oz). Body surface area is 1.95 meters squared.  No Pain (0) Comment: Data Unavailable   No LMP recorded.  Allergies reviewed: Yes  Medications reviewed: Yes    Medications: Medication refills not needed today.  Pharmacy name entered into [employer]: [id number] [place], [place] - [address]    Frailty Screening:   Is the patient here for a new oncology consult visit in cancer care? 2. No      Clinical concerns:  6 month labs       Brenda Colin              "

## 2024-08-16 ENCOUNTER — PATIENT OUTREACH (OUTPATIENT)
Dept: ONCOLOGY | Facility: HOSPITAL | Age: 44
End: 2024-08-16
Payer: COMMERCIAL

## 2024-08-16 NOTE — PROGRESS NOTES
"[facility]: Cancer Care                                                                                            Situation: Patient chart reviewed by care coordinator.    Background: History of classical Hodgkin lymphoma, stage II (favorable risk)     Assessment: Patient saw Yani Joseph CNP in follow-up on 8/15/24.    Plan/Recommendations: Recommendations for labs and clinical exam in 6 months.  Patient scheduled for labs, Dr. Lizarraga on 2/18/25.    Since patient not currently receiving active treatment, changing status to \"graduated\". Will follow patient in the future, if needed.    Signature:  Ping Gonzales RN    "

## 2024-08-20 DIAGNOSIS — F32.1 CURRENT MODERATE EPISODE OF MAJOR DEPRESSIVE DISORDER WITHOUT PRIOR EPISODE (H): ICD-10-CM

## 2024-08-21 RX ORDER — ESCITALOPRAM OXALATE 10 MG/1
10 TABLET ORAL DAILY
Qty: 90 TABLET | Refills: 1 | Status: SHIPPED | OUTPATIENT
Start: 2024-08-21

## 2024-09-14 ENCOUNTER — HOSPITAL ENCOUNTER (EMERGENCY)
Facility: CLINIC | Age: 44
Discharge: HOME | End: 2024-09-14
Attending: EMERGENCY MEDICINE | Admitting: EMERGENCY MEDICINE
Payer: COMMERCIAL

## 2024-09-14 ENCOUNTER — APPOINTMENT (OUTPATIENT)
Dept: CT IMAGING | Facility: CLINIC | Age: 44
End: 2024-09-14
Attending: EMERGENCY MEDICINE
Payer: COMMERCIAL

## 2024-09-14 VITALS
RESPIRATION RATE: 16 BRPM | HEART RATE: 96 BPM | BODY MASS INDEX: 26.78 KG/M2 | TEMPERATURE: 98.5 F | WEIGHT: 171 LBS | OXYGEN SATURATION: 96 % | DIASTOLIC BLOOD PRESSURE: 76 MMHG | SYSTOLIC BLOOD PRESSURE: 142 MMHG

## 2024-09-14 DIAGNOSIS — J18.9 PNEUMONIA OF BOTH LOWER LOBES DUE TO INFECTIOUS ORGANISM: ICD-10-CM

## 2024-09-14 LAB
ANION GAP SERPL CALCULATED.3IONS-SCNC: 10 MMOL/L (ref 7–15)
BASOPHILS # BLD AUTO: 0 10E3/UL (ref 0–0.2)
BASOPHILS NFR BLD AUTO: 0 %
BUN SERPL-MCNC: 13.7 MG/DL (ref 6–20)
CALCIUM SERPL-MCNC: 9.6 MG/DL (ref 8.8–10.4)
CHLORIDE SERPL-SCNC: 101 MMOL/L (ref 98–107)
CREAT SERPL-MCNC: 0.93 MG/DL (ref 0.51–0.95)
EGFRCR SERPLBLD CKD-EPI 2021: 77 ML/MIN/1.73M2
EOSINOPHIL # BLD AUTO: 0.2 10E3/UL (ref 0–0.7)
EOSINOPHIL NFR BLD AUTO: 2 %
ERYTHROCYTE [DISTWIDTH] IN BLOOD BY AUTOMATED COUNT: 12.5 % (ref 10–15)
FLUAV RNA SPEC QL NAA+PROBE: NEGATIVE
FLUBV RNA RESP QL NAA+PROBE: NEGATIVE
GLUCOSE SERPL-MCNC: 108 MG/DL (ref 70–99)
HCO3 SERPL-SCNC: 27 MMOL/L (ref 22–29)
HCT VFR BLD AUTO: 42 % (ref 35–47)
HGB BLD-MCNC: 14.3 G/DL (ref 11.7–15.7)
IMM GRANULOCYTES # BLD: 0 10E3/UL
IMM GRANULOCYTES NFR BLD: 0 %
LACTATE SERPL-SCNC: 1 MMOL/L (ref 0.7–2)
LYMPHOCYTES # BLD AUTO: 1.5 10E3/UL (ref 0.8–5.3)
LYMPHOCYTES NFR BLD AUTO: 14 %
MCH RBC QN AUTO: 31.5 PG (ref 26.5–33)
MCHC RBC AUTO-ENTMCNC: 34 G/DL (ref 31.5–36.5)
MCV RBC AUTO: 93 FL (ref 78–100)
MONOCYTES # BLD AUTO: 1.1 10E3/UL (ref 0–1.3)
MONOCYTES NFR BLD AUTO: 10 %
NEUTROPHILS # BLD AUTO: 8 10E3/UL (ref 1.6–8.3)
NEUTROPHILS NFR BLD AUTO: 74 %
NRBC # BLD AUTO: 0 10E3/UL
NRBC BLD AUTO-RTO: 0 /100
NT-PROBNP SERPL-MCNC: 160 PG/ML (ref 0–450)
PLATELET # BLD AUTO: 323 10E3/UL (ref 150–450)
POTASSIUM SERPL-SCNC: 4.2 MMOL/L (ref 3.4–5.3)
RADIOLOGIST FLAGS: NORMAL
RBC # BLD AUTO: 4.54 10E6/UL (ref 3.8–5.2)
RSV RNA SPEC NAA+PROBE: NEGATIVE
SARS-COV-2 RNA RESP QL NAA+PROBE: NEGATIVE
SODIUM SERPL-SCNC: 138 MMOL/L (ref 135–145)
WBC # BLD AUTO: 10.9 10E3/UL (ref 4–11)

## 2024-09-14 PROCEDURE — 258N000003 HC RX IP 258 OP 636: Performed by: EMERGENCY MEDICINE

## 2024-09-14 PROCEDURE — 87040 BLOOD CULTURE FOR BACTERIA: CPT | Performed by: EMERGENCY MEDICINE

## 2024-09-14 PROCEDURE — 85025 COMPLETE CBC W/AUTO DIFF WBC: CPT | Performed by: EMERGENCY MEDICINE

## 2024-09-14 PROCEDURE — 87637 SARSCOV2&INF A&B&RSV AMP PRB: CPT | Performed by: EMERGENCY MEDICINE

## 2024-09-14 PROCEDURE — 36415 COLL VENOUS BLD VENIPUNCTURE: CPT | Performed by: EMERGENCY MEDICINE

## 2024-09-14 PROCEDURE — 83605 ASSAY OF LACTIC ACID: CPT | Performed by: EMERGENCY MEDICINE

## 2024-09-14 PROCEDURE — 250N000013 HC RX MED GY IP 250 OP 250 PS 637: Performed by: EMERGENCY MEDICINE

## 2024-09-14 PROCEDURE — 250N000011 HC RX IP 250 OP 636: Performed by: EMERGENCY MEDICINE

## 2024-09-14 PROCEDURE — 71275 CT ANGIOGRAPHY CHEST: CPT

## 2024-09-14 PROCEDURE — 83880 ASSAY OF NATRIURETIC PEPTIDE: CPT | Performed by: EMERGENCY MEDICINE

## 2024-09-14 PROCEDURE — 99285 EMERGENCY DEPT VISIT HI MDM: CPT | Mod: 25

## 2024-09-14 PROCEDURE — 80048 BASIC METABOLIC PNL TOTAL CA: CPT | Performed by: EMERGENCY MEDICINE

## 2024-09-14 PROCEDURE — 96361 HYDRATE IV INFUSION ADD-ON: CPT

## 2024-09-14 PROCEDURE — 96365 THER/PROPH/DIAG IV INF INIT: CPT | Mod: 59

## 2024-09-14 PROCEDURE — 96375 TX/PRO/DX INJ NEW DRUG ADDON: CPT

## 2024-09-14 RX ORDER — IOPAMIDOL 755 MG/ML
100 INJECTION, SOLUTION INTRAVASCULAR ONCE
Status: COMPLETED | OUTPATIENT
Start: 2024-09-14 | End: 2024-09-14

## 2024-09-14 RX ORDER — ACETAMINOPHEN 325 MG/1
975 TABLET ORAL ONCE
Status: COMPLETED | OUTPATIENT
Start: 2024-09-14 | End: 2024-09-14

## 2024-09-14 RX ORDER — AZITHROMYCIN 500 MG/5ML
500 INJECTION, POWDER, LYOPHILIZED, FOR SOLUTION INTRAVENOUS ONCE
Status: COMPLETED | OUTPATIENT
Start: 2024-09-14 | End: 2024-09-14

## 2024-09-14 RX ORDER — CEFTRIAXONE 2 G/1
2 INJECTION, POWDER, FOR SOLUTION INTRAMUSCULAR; INTRAVENOUS ONCE
Status: COMPLETED | OUTPATIENT
Start: 2024-09-14 | End: 2024-09-14

## 2024-09-14 RX ORDER — AZITHROMYCIN 250 MG/1
TABLET, FILM COATED ORAL
Qty: 6 TABLET | Refills: 0 | Status: SHIPPED | OUTPATIENT
Start: 2024-09-14 | End: 2024-09-19

## 2024-09-14 RX ADMIN — CEFTRIAXONE SODIUM 2 G: 2 INJECTION, POWDER, FOR SOLUTION INTRAMUSCULAR; INTRAVENOUS at 21:54

## 2024-09-14 RX ADMIN — SODIUM CHLORIDE 1000 ML: 9 INJECTION, SOLUTION INTRAVENOUS at 18:18

## 2024-09-14 RX ADMIN — ACETAMINOPHEN 975 MG: 325 TABLET ORAL at 18:17

## 2024-09-14 RX ADMIN — IOPAMIDOL 75 ML: 755 INJECTION, SOLUTION INTRAVENOUS at 20:01

## 2024-09-14 RX ADMIN — AZITHROMYCIN MONOHYDRATE 500 MG: 500 INJECTION, POWDER, LYOPHILIZED, FOR SOLUTION INTRAVENOUS at 22:18

## 2024-09-14 ASSESSMENT — COLUMBIA-SUICIDE SEVERITY RATING SCALE - C-SSRS
2. HAVE YOU ACTUALLY HAD ANY THOUGHTS OF KILLING YOURSELF IN THE PAST MONTH?: NO
1. IN THE PAST MONTH, HAVE YOU WISHED YOU WERE DEAD OR WISHED YOU COULD GO TO SLEEP AND NOT WAKE UP?: NO
6. HAVE YOU EVER DONE ANYTHING, STARTED TO DO ANYTHING, OR PREPARED TO DO ANYTHING TO END YOUR LIFE?: NO

## 2024-09-14 ASSESSMENT — ACTIVITIES OF DAILY LIVING (ADL)
ADLS_ACUITY_SCORE: 35
ADLS_ACUITY_SCORE: 33
ADLS_ACUITY_SCORE: 35
ADLS_ACUITY_SCORE: 35

## 2024-09-14 NOTE — ED TRIAGE NOTES
History of Pes and cancer.  She has residual lung scarring from chemo meds.    Since Tuesday, fever, aches, chills, cough.   sent her here for exam   Triage Assessment (Adult)       Row Name 09/14/24 1363          Triage Assessment    Airway WDL WDL        Respiratory WDL    Respiratory WDL cough        Skin Circulation/Temperature WDL    Skin Circulation/Temperature WDL WDL        Cardiac WDL    Cardiac WDL WDL        Peripheral/Neurovascular WDL    Peripheral Neurovascular WDL WDL        Cognitive/Neuro/Behavioral WDL    Cognitive/Neuro/Behavioral WDL WDL

## 2024-09-14 NOTE — ED PROVIDER NOTES
EMERGENCY DEPARTMENT ENCOUNTER      NAME: Amna Oneil  AGE: 44 year old female  YOB: 1980  MRN: [id number]  EVALUATION DATE & TIME: 9/14/2024  5:26 PM    PCP: Grace Hagan    ED PROVIDER: Eunice Knott MD      Chief Complaint   Patient presents with    Fever    Cough    Fatigue         FINAL IMPRESSION:  1. Pneumonia of both lower lobes due to infectious organism          ED COURSE & MEDICAL DECISION MAKING:    Pertinent Labs & Imaging studies reviewed. (See chart for details)  44 year old female presents to the Emergency Department for evaluation of cough, fever, pleuritic chest pain that has been ongoing for the last 5 days.  She does have a history of Hodgkin's lymphoma, last treatment was 2 years ago.  Also history of PE.  CTA of the chest was obtained, this was negative for PE, this did show bilateral lower lobe pneumonia.  Patient was treated with a dose of IV antibiotics and will be sent home with oral antibiotics.  I did recommend the patient follow-up with her oncologist given patient's history and finding of bilateral hilar lymphadenopathy, likely reactive given acute infection.  Patient otherwise with stable vital signs, normal lactic acid.    10:00 PM I updated the patient on CT findings, IV antibiotics, we will give the first dose in the emergency department, subsequently discharge home with oral antibiotics.  She is comfortable with this plan.    At the conclusion of the encounter I discussed the results of all of the tests and the disposition. The questions were answered. The patient or family acknowledged understanding and was agreeable with the care plan.       Medical Decision Making  Obtained supplemental history:Supplemental history obtained?: No  Reviewed external records: External records reviewed?: No  Care impacted by chronic illness:Cancer/Chemotherapy  Care significantly affected by social determinants of health:Access to Medical Care  Did you consider but not  order tests?: Work up considered but not performed and documented in chart, if applicable  Did you interpret images independently?: Independent interpretation of ECG and images noted in documentation, when applicable.  Consultation discussion with other provider:Did you involve another provider (consultant, , pharmacy, etc.)?: No  Admission considered but given workup and patient being stable, the patient will be treated as an outpatient with recommendations to follow-up with primary care provider and oncology.      MEDICATIONS GIVEN IN THE EMERGENCY:  Medications   cefTRIAXone (ROCEPHIN) 2 g vial to attach to  ml bag for ADULTS or NS 50 ml bag for PEDS (2 g Intravenous $New Bag 9/14/24 2154)   azithromycin (ZITHROMAX) 500 mg in  mL intermittent infusion (has no administration in time range)   acetaminophen (TYLENOL) tablet 975 mg (975 mg Oral $Given 9/14/24 1817)   sodium chloride 0.9% BOLUS 1,000 mL (0 mLs Intravenous Stopped 9/14/24 2030)   iopamidol (ISOVUE-370) solution 100 mL (75 mLs Intravenous $Given 9/14/24 2001)       NEW PRESCRIPTIONS STARTED AT TODAY'S ER VISIT  New Prescriptions    AMOXICILLIN-CLAVULANATE (AUGMENTIN) 875-125 MG TABLET    Take 1 tablet by mouth 2 times daily for 7 days.    AZITHROMYCIN (ZITHROMAX) 250 MG TABLET    Take 2 tablets (500 mg) by mouth daily for 1 day, THEN 1 tablet (250 mg) daily for 4 days.          =================================================================    HPI    Patient information was obtained from: Patient    Use of : N/A         Amnaisidra Oneil is a 44 year old female with a pertinent history of Hodgkin's Lymphoma and pulmonary emboli, last treatment was 2 years ago (June 2022) who presents to this ED for evaluation of cough, fever, pleuritic chest pain.  Patient's symptoms started 5 days ago.  She has had a cough productive of tan, creamy mucus.  Temperature has been 101  F.  She has been alternating acetaminophen and ibuprofen and  sometimes her temp still spikes to 100.4  F to 100.6  F.  She states that with ambulation, on her pulse oximeter her sats were dropped to mid 80s.  Her daughter was sick with similar symptoms a couple weeks ago.  Her symptoms have resolved.  Her daughter tested negative for COVID.  The patient took a home test which was negative for COVID.  She has had no associated nausea, vomiting, diarrhea.  She has had a normal appetite, able to keep down fluids.  No urinary symptoms.  She is not currently anticoagulated.      PAST MEDICAL HISTORY:  Past Medical History:   Diagnosis Date    Bilateral pulmonary embolism (H) 03/16/2022    Depressive disorder 2007    No current symptoms    Hodgkin lymphoma (H)     Pulmonary emboli (H)        PAST SURGICAL HISTORY:  Past Surgical History:   Procedure Laterality Date    APPENDECTOMY  4/2008    BIOPSY  12/2021 & 1/2022    Lymphoma diagnosis- lymph node and bone marrow    COLONOSCOPY  11/2021    Clear    IR CHEST PORT PLACEMENT > 5 YRS OF AGE  01/21/2022    IR PORT REMOVAL RIGHT  03/08/2023           CURRENT MEDICATIONS:    amoxicillin-clavulanate (AUGMENTIN) 875-125 MG tablet  azithromycin (ZITHROMAX) 250 MG tablet  escitalopram (LEXAPRO) 10 MG tablet  fluticasone (FLONASE) 50 MCG/ACT nasal spray  Iron-vit C-vit B12-folic acid (IRON 100 PLUS) 100-250-0.025-1 MG TABS  loratadine (CLARITIN) 10 MG tablet  spironolactone (ALDACTONE) 100 MG tablet  spironolactone (ALDACTONE) 50 MG tablet  tretinoin (RETIN-A) 0.025 % external cream  vitamin D3 (CHOLECALCIFEROL) 50 mcg (2000 units) tablet        ALLERGIES:  Allergies   Allergen Reactions    Avocado GI Disturbance    Bleomycin Swelling     Lung inflammation    Kiwi GI Disturbance, Itching and Nausea and Vomiting       FAMILY HISTORY:  Family History   Problem Relation Age of Onset    Depression Mother     Anxiety Disorder Mother     Hypertension Father     No Known Problems Brother     Breast Cancer Maternal Grandmother     Diabetes Maternal  Grandfather     Other Cancer Paternal Grandfather         Bladder    Substance Abuse Paternal Grandfather        SOCIAL HISTORY:   Social History     Socioeconomic History    Marital status:      Spouse name: Christian    Number of children: 1   Occupational History    Occupation: Research Manager at [employer]   Tobacco Use    Smoking status: Never    Smokeless tobacco: Never   Substance and Sexual Activity    Alcohol use: Not Currently    Drug use: Not Currently     Types: Marijuana     Comment: edibles    Sexual activity: Yes     Partners: Male     Birth control/protection: Pull-out method, None   Other Topics Concern    Parent/sibling w/ CABG, MI or angioplasty before 65F 55M? No   Social History Narrative    Lives with Christian and daughter, 2 half sons in their 20s      Social Determinants of Health     Interpersonal Safety: Not At Risk (12/30/2021)    Humiliation, Afraid, Rape, and Kick questionnaire     Fear of Current or Ex-Partner: No     Emotionally Abused: No     Physically Abused: No     Sexually Abused: No       VITALS:  BP (!) 142/76   Pulse 96   Temp 98.5  F (36.9  C) (Oral)   Resp 16   Wt 77.6 kg (171 lb)   LMP 08/31/2024   SpO2 96%   BMI 26.78 kg/m      PHYSICAL EXAM    Constitutional: Well developed, Well nourished, NAD  HENT: Normocephalic, Atraumatic, Bilateral external ears normal, Oropharynx normal, mucous membranes moist, Nose normal.   Neck- Normal range of motion, No tenderness, Supple, No stridor.  Eyes: PERRL, EOMI, Conjunctiva normal, No discharge.   Respiratory: Normal breath sounds, No respiratory distress  Cardiovascular: Normal heart rate, Regular rhythm  GI: Bowel sounds normal, Soft, No tenderness,   Musculoskeletal: No edema. Good range of motion in all major joints. No tenderness to palpation or major deformities noted.   Integument: Warm, Dry, No erythema, No rash  Neurologic: Alert & oriented x 3, Normal motor function, Normal sensory function, No focal deficits noted. Normal  gait.   Psychiatric: Affect normal, Judgment normal, Mood normal.      LAB:  All pertinent labs reviewed and interpreted.  Results for orders placed or performed during the hospital encounter of 09/14/24   CT Chest Pulmonary Embolism w Contrast   Result Value Ref Range    Radiologist flags Chest CT in 3-6 months.     Impression    IMPRESSION:  1.  No acute pulmonary embolism.  2.  Bilateral lower lobe aspiration pneumonia/bronchopneumonia.  3.  Tiny bilateral pleural effusions.  4.  Mild bilateral hilar lymphadenopathy, likely reactive. A follow-up chest CT is recommended in 3-6 months.      [Recommend Follow Up: Chest CT in 3-6 months.]    This report will be copied to the [facility] to ensure a provider acknowledges the finding.        Basic metabolic panel   Result Value Ref Range    Sodium 138 135 - 145 mmol/L    Potassium 4.2 3.4 - 5.3 mmol/L    Chloride 101 98 - 107 mmol/L    Carbon Dioxide (CO2) 27 22 - 29 mmol/L    Anion Gap 10 7 - 15 mmol/L    Urea Nitrogen 13.7 6.0 - 20.0 mg/dL    Creatinine 0.93 0.51 - 0.95 mg/dL    GFR Estimate 77 >60 mL/min/1.73m2    Calcium 9.6 8.8 - 10.4 mg/dL    Glucose 108 (H) 70 - 99 mg/dL   Lactic acid whole blood   Result Value Ref Range    Lactic Acid 1.0 0.7 - 2.0 mmol/L   Symptomatic Influenza A/B, RSV, & SARS-CoV2 PCR (COVID-19) Nasopharyngeal    Specimen: Nasopharyngeal; Swab   Result Value Ref Range    Influenza A PCR Negative Negative    Influenza B PCR Negative Negative    RSV PCR Negative Negative    SARS CoV2 PCR Negative Negative   N terminal pro BNP outpatient   Result Value Ref Range    N Terminal Pro BNP Outpatient 160 0 - 450 pg/mL   CBC with platelets and differential   Result Value Ref Range    WBC Count 10.9 4.0 - 11.0 10e3/uL    RBC Count 4.54 3.80 - 5.20 10e6/uL    Hemoglobin 14.3 11.7 - 15.7 g/dL    Hematocrit 42.0 35.0 - 47.0 %    MCV 93 78 - 100 fL    MCH 31.5 26.5 - 33.0 pg    MCHC 34.0 31.5 - 36.5 g/dL    RDW 12.5 10.0 - 15.0 %    Platelet  Count 323 150 - 450 10e3/uL    % Neutrophils 74 %    % Lymphocytes 14 %    % Monocytes 10 %    % Eosinophils 2 %    % Basophils 0 %    % Immature Granulocytes 0 %    NRBCs per 100 WBC 0 <1 /100    Absolute Neutrophils 8.0 1.6 - 8.3 10e3/uL    Absolute Lymphocytes 1.5 0.8 - 5.3 10e3/uL    Absolute Monocytes 1.1 0.0 - 1.3 10e3/uL    Absolute Eosinophils 0.2 0.0 - 0.7 10e3/uL    Absolute Basophils 0.0 0.0 - 0.2 10e3/uL    Absolute Immature Granulocytes 0.0 <=0.4 10e3/uL    Absolute NRBCs 0.0 10e3/uL       RADIOLOGY:  Reviewed all pertinent imaging. Please see official radiology report.  CT Chest Pulmonary Embolism w Contrast   Final Result   IMPRESSION:   1.  No acute pulmonary embolism.   2.  Bilateral lower lobe aspiration pneumonia/bronchopneumonia.   3.  Tiny bilateral pleural effusions.   4.  Mild bilateral hilar lymphadenopathy, likely reactive. A follow-up chest CT is recommended in 3-6 months.         [Recommend Follow Up: Chest CT in 3-6 months.]      This report will be copied to the [facility] to ensure a provider acknowledges the finding.                 Eunice Knott MD  Emergency Medicine  [facility] EMERGENCY ROOM  Critical access hospital[address] [facility] [address] [phone]       Eunice Knott MD  09/14/24 7888

## 2024-09-15 NOTE — DISCHARGE INSTRUCTIONS
IMPRESSION:  1.  No acute pulmonary embolism.  2.  Bilateral lower lobe aspiration pneumonia/bronchopneumonia.  3.  Tiny bilateral pleural effusions.  4.  Mild bilateral hilar lymphadenopathy, likely reactive. A follow-up chest CT is recommended in 3-6 months.

## 2024-09-19 LAB — BACTERIA BLD CULT: NO GROWTH

## 2024-09-24 ENCOUNTER — NURSE TRIAGE (OUTPATIENT)
Dept: FAMILY MEDICINE | Facility: CLINIC | Age: 44
End: 2024-09-24

## 2024-09-24 ENCOUNTER — OFFICE VISIT (OUTPATIENT)
Dept: FAMILY MEDICINE | Facility: CLINIC | Age: 44
End: 2024-09-24
Payer: COMMERCIAL

## 2024-09-24 VITALS
HEART RATE: 93 BPM | HEIGHT: 67 IN | SYSTOLIC BLOOD PRESSURE: 108 MMHG | WEIGHT: 174 LBS | TEMPERATURE: 97.5 F | RESPIRATION RATE: 16 BRPM | BODY MASS INDEX: 27.31 KG/M2 | DIASTOLIC BLOOD PRESSURE: 71 MMHG

## 2024-09-24 DIAGNOSIS — Z88.1 ALLERGIC REACTION TO AMOXICILLIN/CLAVULANIC ACID: Primary | ICD-10-CM

## 2024-09-24 DIAGNOSIS — J18.9 PNEUMONIA OF BOTH LOWER LOBES DUE TO INFECTIOUS ORGANISM: ICD-10-CM

## 2024-09-24 PROCEDURE — 99213 OFFICE O/P EST LOW 20 MIN: CPT | Performed by: FAMILY MEDICINE

## 2024-09-24 RX ORDER — PREDNISONE 10 MG/1
TABLET ORAL
Qty: 14 TABLET | Refills: 0 | Status: SHIPPED | OUTPATIENT
Start: 2024-09-24 | End: 2024-09-30

## 2024-09-24 ASSESSMENT — PATIENT HEALTH QUESTIONNAIRE - PHQ9
SUM OF ALL RESPONSES TO PHQ QUESTIONS 1-9: 4
10. IF YOU CHECKED OFF ANY PROBLEMS, HOW DIFFICULT HAVE THESE PROBLEMS MADE IT FOR YOU TO DO YOUR WORK, TAKE CARE OF THINGS AT HOME, OR GET ALONG WITH OTHER PEOPLE: SOMEWHAT DIFFICULT
SUM OF ALL RESPONSES TO PHQ QUESTIONS 1-9: 4

## 2024-09-24 NOTE — PROGRESS NOTES
"  {PROVIDER CHARTING PREFERENCE:236011}    Stephy Woo is a 44 year old, presenting for the following health issues:  Rash (Pt stated rash started yesterday just ended amoxicillin for a week and then the rash came )      9/24/2024     2:13 PM   Additional Questions   Roomed by Sheldon     Had bacterial pneumonia - 2 weeks ago   Had Amoxicillin -   Finished 2 days ago  Now developed a rash -     Seen ER 9/14 -   Missed work 9/11, 12, 13  ER 14  Gradually working back to full days since then working back   4-4, then 6-2 , getting back to full day  Missed today for appointment       History of Present Illness       Reason for visit:  Developed rash after taking prescribed antibiotics  Symptom onset:  1-3 days ago  Symptoms include:  Itchy rash all over body  Symptom intensity:  Moderate  Symptom progression:  Worsening  Had these symptoms before:  No   She is taking medications regularly.       {MA/LPN/RN Pre-Provider Visit Orders- hCG/UA/Strep (Optional):299920}  {SUPERLIST (Optional):920691}  {additonal problems for provider to add (Optional):327418}    {ROS Picklists (Optional):504265}      Objective    /71 (BP Location: Left arm, Patient Position: Sitting, Cuff Size: Adult Regular)   Pulse 93   Temp 97.5  F (36.4  C) (Temporal)   Resp 16   Ht 1.702 m (5' 7.01\")   Wt 78.9 kg (174 lb)   LMP 09/23/2024 (Approximate)   BMI 27.25 kg/m    Body mass index is 27.25 kg/m .  Physical Exam   {Exam List (Optional):802192}    {Diagnostic Test Results (Optional):664901}        Signed Electronically by: SUSANA MUSTAFA MD  {Email feedback regarding this note to [email or web address]   :[id number]}Prior to immunization administration, verified patients identity using patient s name and date of birth. Please see Immunization Activity for additional information.     Screening Questionnaire for Adult Immunization    Are you sick today?   Yes   Do you have allergies to " medications, food, a vaccine component or latex?   Yes   Have you ever had a serious reaction after receiving a vaccination?   No   Do you have a long-term health problem with heart, lung, kidney, or metabolic disease (e.g., diabetes), asthma, a blood disorder, no spleen, complement component deficiency, a cochlear implant, or a spinal fluid leak?  Are you on long-term aspirin therapy?   No   Do you have cancer, leukemia, HIV/AIDS, or any other immune system problem?   No   Do you have a parent, brother, or sister with an immune system problem?   No   In the past 3 months, have you taken medications that affect  your immune system, such as prednisone, other steroids, or anticancer drugs; drugs for the treatment of rheumatoid arthritis, Crohn s disease, or psoriasis; or have you had radiation treatments?   No   Have you had a seizure, or a brain or other nervous system problem?   No   During the past year, have you received a transfusion of blood or blood    products, or been given immune (gamma) globulin or antiviral drug?   No   For women: Are you pregnant or is there a chance you could become       pregnant during the next month?   No   Have you received any vaccinations in the past 4 weeks?   No     Immunization questionnaire was positive for at least one answer.  Notified Dr Xavier.      Patient instructed to remain in clinic for 15 minutes afterwards, and to report any adverse reactions.     Screening performed by Sheldon Bernal on 9/24/2024 at 2:16 PM.      Psychiatric:         Mood and Affect: Mood normal.         Behavior: Behavior normal.            No results found for any visits on 09/24/24.        Signed Electronically by: SUSANA MUSTAFA MD  Prior to immunization administration, verified patients identity using patient s name and date of birth. Please see Immunization Activity for additional information.     Screening Questionnaire for Adult Immunization    Are you sick today?   Yes   Do you have allergies to medications, food, a vaccine component or latex?   Yes   Have you ever had a serious reaction after receiving a vaccination?   No   Do you have a long-term health problem with heart, lung, kidney, or metabolic disease (e.g., diabetes), asthma, a blood disorder, no spleen, complement component deficiency, a cochlear implant, or a spinal fluid leak?  Are you on long-term aspirin therapy?   No   Do you have cancer, leukemia, HIV/AIDS, or any other immune system problem?   No   Do you have a parent, brother, or sister with an immune system problem?   No   In the past 3 months, have you taken medications that affect  your immune system, such as prednisone, other steroids, or anticancer drugs; drugs for the treatment of rheumatoid arthritis, Crohn s disease, or psoriasis; or have you had radiation treatments?   No   Have you had a seizure, or a brain or other nervous system problem?   No   During the past year, have you received a transfusion of blood or blood    products, or been given immune (gamma) globulin or antiviral drug?   No   For women: Are you pregnant or is there a chance you could become       pregnant during the next month?   No   Have you received any vaccinations in the past 4 weeks?   No     Immunization questionnaire was positive for at least one answer.  Notified Dr Xavier.      Patient instructed to remain in clinic for 15 minutes afterwards, and to report any adverse reactions.     Screening performed by Sheldon Bernal on  9/24/2024 at 2:16 PM.

## 2024-09-24 NOTE — TELEPHONE ENCOUNTER
In the meantime, I have also developed hives / rash, presumably from the amoxicillin. (Photo attached.) The rash is on my chest, arms, legs, and trunk. My daughter had a similar reaction to penicillin meds when she was a baby. I am done with the course of antibiotics, but wanted to make sure my care team was aware.     Patient calling to follow-up on [employer] message sent this morning.    Patient reports generalized rash x1 day.  Small, pink lesions covering neck, trunk, limbs, feet.  Moderately itchy.  Denies SOB, angioedema, sore/scratchy throat, body aches, blister-like lesions.  Completed course of amoxicillin 9/15 to 9/22 for pneumonia.  Patient's daughter has reacted to penicillin with full body rash in past.     Advised patient:  You should be seen today per disposition--scheduled 9/24 at 2:20pm with Dr. Xavier.  Reviewed symptoms of anaphylaxis--go to ED immediately if these symptoms occur.    Yani Underwood RN  [facility]    Additional Information   Negative: Difficulty breathing or wheezing   Negative: Hoarseness or cough that started soon after 1st dose of drug   Negative: Swollen tongue that started soon after first dose of drug   Negative: Fever and purple or blood-colored spots or dots   Negative: Too weak or sick to stand   Negative: Sounds like a life-threatening emergency to the triager   Negative: Rash is only on 1 part of the body (localized)   Negative: Taking new non-prescription (OTC) antihistamine, decongestant, ear drops, eye drops, or other OTC cough/cold medicine   Negative: Taking new prescription antihistamine, allergy medicine, asthma medicine, eye drops, ear drops or nose drops   Negative: Rash started more than 3 days after stopping new prescription medicine   Negative: Swollen tongue   Negative: Widespread hives and onset < 2 hours of exposure to 1st dose of drug   Negative: Patient sounds very sick or weak to the triager   Negative: Fever   Negative: Purple  "or blood-colored spots or dots (no fever and sounds well to triager)   Negative: Face or lip swelling   Negative: Joint pain or swelling   Negative: Bloody crusts on lips or in mouth   Negative: Large or small blisters on skin (i.e., fluid filled bubbles or sacs)   Negative: Pregnant   Negative: Rash beginning within 4 hours of a new prescription medication   Hives or itching    Answer Assessment - Initial Assessment Questions  1. APPEARANCE of RASH: \"Describe the rash.\" (e.g., spots, blisters, raised areas, skin peeling, scaly)      \"Little red bumps/marks\" covering trunk, arms, legs, feet, neck, and head.  2. SIZE: \"How big are the spots?\" (e.g., tip of pen, eraser, coin; inches, centimeters)      \"Pretty small\"--kofi-sized  3. LOCATION: \"Where is the rash located?\"      *No Answer*  4. COLOR: \"What color is the rash?\" (Note: It is difficult to assess rash color in people with darker-colored skin. When this situation occurs, simply ask the caller to describe what they see.)      Pink lesions  5. ONSET: \"When did the rash begin?\"      9/23. Started amoxicillin 9/15.  6. FEVER: \"Do you have a fever?\" If so, ask: \"What is your temperature, how was it measured, and when did it start?\"      No  7. ITCHING: \"Does the rash itch?\" If so, ask: \"How bad is the itch?\" (Scale 1-10; or mild, moderate, severe)      Moderately itchy  8. CAUSE: \"What do you think is causing the rash?\"      Amoxicillin--started 9/15/24. Patient's daughter had similar reaction.  9. NEW MEDICATION: \"What new medication are you taking?\" (e.g., name of antibiotic) \"When did you start taking this medication?\".      *No Answer*  10. OTHER SYMPTOMS: \"Do you have any other symptoms?\" (e.g., sore throat, fever, joint pain)        Denies SOB, angioedema, joint pain.  11. PREGNANCY: \"Is there any chance you are pregnant?\" \"When was your last menstrual period?\"        *No Answer*    Protocols used: Rash - Widespread While On Drugs-A-OH    "

## 2024-10-05 ENCOUNTER — HEALTH MAINTENANCE LETTER (OUTPATIENT)
Age: 44
End: 2024-10-05

## 2024-10-14 ASSESSMENT — ENCOUNTER SYMPTOMS
ARTHRALGIAS: 0
DYSURIA: 0
EYE PAIN: 0
SEIZURES: 0
SORE THROAT: 0
CHILLS: 0
SHORTNESS OF BREATH: 0
COLOR CHANGE: 0
PALPITATIONS: 0
FEVER: 0
ABDOMINAL PAIN: 0
COUGH: 1
HEMATURIA: 0
BACK PAIN: 0
VOMITING: 0

## 2024-12-02 SDOH — HEALTH STABILITY: PHYSICAL HEALTH: ON AVERAGE, HOW MANY MINUTES DO YOU ENGAGE IN EXERCISE AT THIS LEVEL?: 60 MIN

## 2024-12-02 SDOH — HEALTH STABILITY: PHYSICAL HEALTH: ON AVERAGE, HOW MANY DAYS PER WEEK DO YOU ENGAGE IN MODERATE TO STRENUOUS EXERCISE (LIKE A BRISK WALK)?: 7 DAYS

## 2024-12-02 ASSESSMENT — SOCIAL DETERMINANTS OF HEALTH (SDOH): HOW OFTEN DO YOU GET TOGETHER WITH FRIENDS OR RELATIVES?: ONCE A WEEK

## 2024-12-04 ENCOUNTER — OFFICE VISIT (OUTPATIENT)
Dept: FAMILY MEDICINE | Facility: CLINIC | Age: 44
End: 2024-12-04
Attending: FAMILY MEDICINE
Payer: COMMERCIAL

## 2024-12-04 VITALS
HEART RATE: 68 BPM | TEMPERATURE: 97.3 F | BODY MASS INDEX: 27.12 KG/M2 | HEIGHT: 67 IN | SYSTOLIC BLOOD PRESSURE: 91 MMHG | OXYGEN SATURATION: 100 % | RESPIRATION RATE: 18 BRPM | DIASTOLIC BLOOD PRESSURE: 63 MMHG

## 2024-12-04 DIAGNOSIS — R42 VERTIGO: ICD-10-CM

## 2024-12-04 DIAGNOSIS — Z12.31 ENCOUNTER FOR SCREENING MAMMOGRAM FOR MALIGNANT NEOPLASM OF BREAST: Primary | ICD-10-CM

## 2024-12-04 DIAGNOSIS — Z78.9 VEGETARIAN DIET: ICD-10-CM

## 2024-12-04 DIAGNOSIS — F32.1 CURRENT MODERATE EPISODE OF MAJOR DEPRESSIVE DISORDER WITHOUT PRIOR EPISODE (H): ICD-10-CM

## 2024-12-04 DIAGNOSIS — Z00.00 ROUTINE GENERAL MEDICAL EXAMINATION AT A HEALTH CARE FACILITY: ICD-10-CM

## 2024-12-04 DIAGNOSIS — Z88.0 PENICILLIN ALLERGY: ICD-10-CM

## 2024-12-04 DIAGNOSIS — Z71.89 ADVANCED DIRECTIVES, COUNSELING/DISCUSSION: ICD-10-CM

## 2024-12-04 PROBLEM — Z85.71: Status: ACTIVE | Noted: 2022-01-26

## 2024-12-04 PROBLEM — Z30.09 GENERAL COUNSELING FOR PRESCRIPTION OF ORAL CONTRACEPTIVES: Status: RESOLVED | Noted: 2022-03-16 | Resolved: 2024-12-04

## 2024-12-04 PROCEDURE — 90471 IMMUNIZATION ADMIN: CPT | Performed by: FAMILY MEDICINE

## 2024-12-04 PROCEDURE — 90472 IMMUNIZATION ADMIN EACH ADD: CPT | Performed by: FAMILY MEDICINE

## 2024-12-04 PROCEDURE — 99396 PREV VISIT EST AGE 40-64: CPT | Mod: 25 | Performed by: FAMILY MEDICINE

## 2024-12-04 PROCEDURE — 90480 ADMN SARSCOV2 VAC 1/ONLY CMP: CPT | Performed by: FAMILY MEDICINE

## 2024-12-04 PROCEDURE — 90715 TDAP VACCINE 7 YRS/> IM: CPT | Performed by: FAMILY MEDICINE

## 2024-12-04 PROCEDURE — 90673 RIV3 VACCINE NO PRESERV IM: CPT | Performed by: FAMILY MEDICINE

## 2024-12-04 PROCEDURE — 91320 SARSCV2 VAC 30MCG TRS-SUC IM: CPT | Performed by: FAMILY MEDICINE

## 2024-12-04 ASSESSMENT — PATIENT HEALTH QUESTIONNAIRE - PHQ9
10. IF YOU CHECKED OFF ANY PROBLEMS, HOW DIFFICULT HAVE THESE PROBLEMS MADE IT FOR YOU TO DO YOUR WORK, TAKE CARE OF THINGS AT HOME, OR GET ALONG WITH OTHER PEOPLE: NOT DIFFICULT AT ALL
SUM OF ALL RESPONSES TO PHQ QUESTIONS 1-9: 2
SUM OF ALL RESPONSES TO PHQ QUESTIONS 1-9: 2

## 2024-12-04 NOTE — PROGRESS NOTES
Preventive Care Visit  [facility]  Grace Hagan MD, Family Medicine  Dec 4, 2024      Assessment & Plan     Routine general medical examination at a health care facility  - COVID-19 12+ (PFIZER)  - Lipid panel reflex to direct LDL Fasting  - TDAP 10-64Y (ADACEL,BOOSTRIX)  - INFLUENZA VACCINE IM 18-64 YRS (FLUBLOK)  - PRIMARY CARE FOLLOW-UP SCHEDULING    Current moderate episode of major depressive disorder without prior episode (H)  Doing well on escitalopram 10mg daily.  Okay to decrease to 5mg when ready and if does well on that could titrate down to 2.5mg (1/2 of 5mg tabs with new rx if needed).      Encounter for screening mammogram for malignant neoplasm of breast  Due this winter  - MA Screen Bilateral w/August    Penicillin allergy  Recommend consultation with allergist to review if this is true allergy and discuss desensitization.    - Adult Allergy/Asthma  Referral    Vertigo  Reviewed that this is most likely BPV based on symptoms.  No hearing loss per pt.  Reviewed general management of vertigo with home exercise, hydration, etc.  Vestibular rehab next as needed.     Advanced directives, counseling/discussion  Would trust her  and dad as alternate as needed.      Vegetarian diet  On b12 and iron daily   No anemia on routine labs in past year.      H/o hodgkin lymphoma- has regular follow-up with oncology. CT for pneumonia recently did show some enlarged LN.  Did send message to Dr Lawrence about this to review and determine if pt needs follow-up ct or not.  Also pt has had PE in past felt to be related to chemo and covid- today pt notes that her brother has had 2 spontaneous blood clots- on in lungs and one in neck.  Notified her heme/onc doctor about this to see if pt needs a workup for hypercoag?        Patient has been advised of split billing requirements and indicates understanding: Yes        BMI  Estimated body mass index is 27.12 kg/m  as calculated from the  "following:    Height as of this encounter: 1.706 m (5' 7.17\").    Weight as of 9/24/24: 78.9 kg (174 lb).   Weight management plan: Discussed healthy diet and exercise guidelines    Counseling  Appropriate preventive services were addressed with this patient via screening, questionnaire, or discussion as appropriate for fall prevention, nutrition, physical activity, Tobacco-use cessation, social engagement, weight loss and cognition.  Checklist reviewing preventive services available has been given to the patient.  Reviewed patient's diet, addressing concerns and/or questions.       Stephy Woo is a 44 year old, presenting for the following:  Physical (/) and Dizzy spell        12/4/2024     7:43 AM   Additional Questions   Roomed by Estephania BRANHAM   Accompanied by self         12/4/2024   Declines Weight   Did patient decline having their weight taken? Yes             HPI  ED visit 9/2024 for pneumonia treated with augmentin- developed rash at the end of therapy.  Follow-up with Dr Xavier about rash determined likely allergy to this med.  Treated with prednisone and no further tx for pneumonia.  Reviewed records for both these encounters.      H/o Hodgkin lymphoma- most recent oncology visit 8/2024 .  Labs all normal.  2.5years from dx and treatment.  Follow-up in 6 months.  H/o PE 2022 related to chemo and covid.  6 months anticoag completed no further tx needed.      Mental health- on escitalopram 10mg daily.   Doing well but not     Acne- on spironolacone daily 2 of the 50mg     Thumb pain- worse with knitting.      Vaccines- willing to do all vaccines recommended today     Dizzy spells- comes on suddenly will have about 15 sec of spinning room and then it passes. Can be 1-2 times daily.  Nothing really triggers them.  Maybe worse with too much caffeine. Not reliably with position changes.  Dad gets same episodes but getting worse with age- full workup negative.  Hearing is totally normal.      Gets faint with " vaccines- recovered within 10minutes today     Health Care Directive  Patient does not have a Health Care Directive: Discussed advance care planning with patient; information given to patient to review.      12/2/2024   General Health   How would you rate your overall physical health? Excellent   Feel stress (tense, anxious, or unable to sleep) Only a little      (!) STRESS CONCERN      12/2/2024   Nutrition   Three or more servings of calcium each day? Yes   Diet: Vegetarian/vegan   How many servings of fruit and vegetables per day? 4 or more   How many sweetened beverages each day? 0-1            12/2/2024   Exercise   Days per week of moderate/strenous exercise 7 days   Average minutes spent exercising at this level 60 min            12/2/2024   Social Factors   Frequency of gathering with friends or relatives Once a week   Worry food won't last until get money to buy more No   Food not last or not have enough money for food? No   Do you have housing? (Housing is defined as stable permanent housing and does not include staying ouside in a car, in a tent, in an abandoned building, in an overnight shelter, or couch-surfing.) Yes   Are you worried about losing your housing? No   Lack of transportation? No   Unable to get utilities (heat,electricity)? No            12/2/2024   Dental   Dentist two times every year? Yes            12/2/2024   TB Screening   Were you born outside of the [place]? No          Today's PHQ-9 Score:       12/4/2024     7:34 AM   PHQ-9 SCORE   PHQ-9 Total Score MyChart 2 (Minimal depression)   PHQ-9 Total Score 2        Patient-reported         12/2/2024   Substance Use   Alcohol more than 3/day or more than 7/wk No   Do you use any other substances recreationally? No        Social History     Tobacco Use    Smoking status: Never    Smokeless tobacco: Never   Vaping Use    Vaping status: Never Used   Substance Use Topics    Alcohol use: Not Currently    Drug use: Not Currently     Types:  "Marijuana     Comment: edibles           2/6/2023   LAST FHS-7 RESULTS   1st degree relative breast or ovarian cancer No   Any relative bilateral breast cancer No   Any male have breast cancer No   Any ONE woman have BOTH breast AND ovarian cancer No   Any woman with breast cancer before 50yrs No   2 or more relatives with breast AND/OR ovarian cancer No   2 or more relatives with breast AND/OR bowel cancer No           Mammogram Screening - Annual screen due to greater than 20% lifetime risk as estimated by Breast Cancer Risk Calculator  Mammo 2/2024 wnl         12/2/2024   STI Screening   New sexual partner(s) since last STI/HIV test? No        History of abnormal Pap smear: No - age 30- 64 PAP with HPV every 5 years recommended        Latest Ref Rng & Units 12/2/2022    11:20 AM   PAP / HPV   PAP  Negative for Intraepithelial Lesion or Malignancy (NILM)    HPV 16 DNA Negative Negative    HPV 18 DNA Negative Negative    Other HR HPV Negative Negative      ASCVD Risk   The ASCVD Risk score (Monroe AZEVEDO, et al., 2019) failed to calculate for the following reasons:    Cannot find a previous HDL lab    Cannot find a previous total cholesterol lab        12/2/2024   Contraception/Family Planning   Questions about contraception or family planning No           Reviewed and updated as needed this visit by Provider   Tobacco  Allergies  Meds  Problems  Med Hx  Surg Hx  Fam Hx  Soc   Hx Sexual Activity                 Objective    Exam  BP 91/63 (BP Location: Right arm, Patient Position: Sitting, Cuff Size: Adult Large)   Pulse 68   Temp 97.3  F (36.3  C) (Oral)   Resp 18   Ht 1.706 m (5' 7.17\")   LMP 12/01/2024 (Approximate)   SpO2 100%   BMI 27.12 kg/m     Estimated body mass index is 27.12 kg/m  as calculated from the following:    Height as of this encounter: 1.706 m (5' 7.17\").    Weight as of 9/24/24: 78.9 kg (174 lb).    Physical Exam  Complete 10 point ROS completed today as part of the exam " and patient denies any symptoms as reviewed in HPI     Wt Readings from Last 3 Encounters:   09/24/24 78.9 kg (174 lb)   09/14/24 77.6 kg (171 lb)   08/15/24 80.8 kg (178 lb 1.6 oz)       Patient's last menstrual period was 12/01/2024 (approximate).    All normal as below except abnormalities include: all normal   General is a  44 year old sitting comfortably in no apparent distress   HEENT:  TM are clear bilaterally.  Eye exams within normal   Neck: Supple without lymphadenopathy or thyromegally  CV: Regular rate and rhythm S1S2 without rubs, murmurs or gallops,   Lungs: Clear to auscultation bilaterally  Abd:  +BS, soft NT/ND,  No masses or organomegally,   Extremities: Warm, No Edema, 2+ Pedal and radial pulses bilaterally  Skin: No lesions or rashes noted  Neuro: Able to ambulate around the exam room with equal movement, strength and normal coordination of the upper and lower extremeties symmetrically  Pelvic:deferred- asymptomatic and low risk.     History summarized from1-2:as above  Old Records-1: Outside allergies, meds, problems and immunizations were reconciled as needed from [facility]  Radiology tests reviewed-1: mammo 2024 and ct scan 2024   Lab tests reviewed-1: 2024  Medicine tests reviewed-1: colonoscopy 2021- repeat in 10 years.      Follow-up Visit   Expected date:  Dec 04, 2025 (Approximate)      Follow Up Appointment Details:     Follow-up with whom?: PCP    Follow-Up for what?: Adult Preventive    How?: In Person                 Grace Hagan MD          Signed Electronically by: Grace Hagan MD      Prior to immunization administration, verified patients identity using patient s name and date of birth. Please see Immunization Activity for additional information.     Screening Questionnaire for Adult Immunization    Are you sick today?   No   Do you have allergies to medications, food, a vaccine component or latex?   Yes   Have you ever had a serious reaction after receiving a vaccination?    No   Do you have a long-term health problem with heart, lung, kidney, or metabolic disease (e.g., diabetes), asthma, a blood disorder, no spleen, complement component deficiency, a cochlear implant, or a spinal fluid leak?  Are you on long-term aspirin therapy?   No   Do you have cancer, leukemia, HIV/AIDS, or any other immune system problem?   No   Do you have a parent, brother, or sister with an immune system problem?   No   In the past 3 months, have you taken medications that affect  your immune system, such as prednisone, other steroids, or anticancer drugs; drugs for the treatment of rheumatoid arthritis, Crohn s disease, or psoriasis; or have you had radiation treatments?   Yes   Have you had a seizure, or a brain or other nervous system problem?   No   During the past year, have you received a transfusion of blood or blood    products, or been given immune (gamma) globulin or antiviral drug?   No   For women: Are you pregnant or is there a chance you could become       pregnant during the next month?   No   Have you received any vaccinations in the past 4 weeks?   No     Immunization questionnaire was positive for at least one answer.  Notified provider.      Patient instructed to remain in clinic for 15 minutes afterwards, and to report any adverse reactions.     Screening performed by Estephania Eaton MA on 12/4/2024 at 7:47 AM.        Answers submitted by the patient for this visit:  Patient Health Questionnaire (Submitted on 12/4/2024)  If you checked off any problems, how difficult have these problems made it for you to do your work, take care of things at home, or get along with other people?: Not difficult at all  PHQ9 TOTAL SCORE: 2

## 2024-12-10 ENCOUNTER — PATIENT OUTREACH (OUTPATIENT)
Dept: CARE COORDINATION | Facility: CLINIC | Age: 44
End: 2024-12-10
Payer: COMMERCIAL

## 2025-02-05 ENCOUNTER — MYC MEDICAL ADVICE (OUTPATIENT)
Dept: ONCOLOGY | Facility: HOSPITAL | Age: 45
End: 2025-02-05
Payer: COMMERCIAL

## 2025-02-05 DIAGNOSIS — R91.8 PULMONARY INFILTRATE: Primary | ICD-10-CM

## 2025-02-11 ENCOUNTER — HOSPITAL ENCOUNTER (OUTPATIENT)
Dept: MAMMOGRAPHY | Facility: CLINIC | Age: 45
Discharge: HOME | End: 2025-02-11
Attending: FAMILY MEDICINE
Payer: COMMERCIAL

## 2025-02-11 DIAGNOSIS — Z12.31 ENCOUNTER FOR SCREENING MAMMOGRAM FOR MALIGNANT NEOPLASM OF BREAST: ICD-10-CM

## 2025-02-11 PROCEDURE — 77063 BREAST TOMOSYNTHESIS BI: CPT

## 2025-02-11 PROCEDURE — 77067 SCR MAMMO BI INCL CAD: CPT

## 2025-02-14 ENCOUNTER — HOSPITAL ENCOUNTER (OUTPATIENT)
Dept: CT IMAGING | Facility: CLINIC | Age: 45
Discharge: HOME | End: 2025-02-14
Attending: INTERNAL MEDICINE | Admitting: INTERNAL MEDICINE
Payer: COMMERCIAL

## 2025-02-14 DIAGNOSIS — R91.8 PULMONARY INFILTRATE: ICD-10-CM

## 2025-02-14 PROCEDURE — 71250 CT THORAX DX C-: CPT

## 2025-02-17 DIAGNOSIS — F32.1 CURRENT MODERATE EPISODE OF MAJOR DEPRESSIVE DISORDER WITHOUT PRIOR EPISODE (H): ICD-10-CM

## 2025-02-17 RX ORDER — ESCITALOPRAM OXALATE 10 MG/1
10 TABLET ORAL DAILY
Qty: 90 TABLET | Refills: 0 | Status: SHIPPED | OUTPATIENT
Start: 2025-02-17

## 2025-02-18 ENCOUNTER — LAB (OUTPATIENT)
Dept: INFUSION THERAPY | Facility: HOSPITAL | Age: 45
End: 2025-02-18
Attending: INTERNAL MEDICINE
Payer: COMMERCIAL

## 2025-02-18 ENCOUNTER — ONCOLOGY VISIT (OUTPATIENT)
Dept: ONCOLOGY | Facility: HOSPITAL | Age: 45
End: 2025-02-18
Attending: INTERNAL MEDICINE
Payer: COMMERCIAL

## 2025-02-18 VITALS
RESPIRATION RATE: 18 BRPM | OXYGEN SATURATION: 96 % | SYSTOLIC BLOOD PRESSURE: 110 MMHG | HEART RATE: 71 BPM | WEIGHT: 179.1 LBS | BODY MASS INDEX: 28.11 KG/M2 | HEIGHT: 67 IN | TEMPERATURE: 97 F | DIASTOLIC BLOOD PRESSURE: 72 MMHG

## 2025-02-18 DIAGNOSIS — Z85.71: Primary | ICD-10-CM

## 2025-02-18 DIAGNOSIS — C81.18 NODULAR SCLEROSIS HODGKIN LYMPHOMA OF LYMPH NODES OF MULTIPLE REGIONS (H): ICD-10-CM

## 2025-02-18 LAB
ALBUMIN SERPL BCG-MCNC: 4.4 G/DL (ref 3.5–5.2)
ALP SERPL-CCNC: 56 U/L (ref 40–150)
ALT SERPL W P-5'-P-CCNC: 7 U/L (ref 0–50)
ANION GAP SERPL CALCULATED.3IONS-SCNC: 10 MMOL/L (ref 7–15)
AST SERPL W P-5'-P-CCNC: 21 U/L (ref 0–45)
BASOPHILS # BLD AUTO: 0 10E3/UL (ref 0–0.2)
BASOPHILS NFR BLD AUTO: 0 %
BILIRUB SERPL-MCNC: 0.6 MG/DL
BUN SERPL-MCNC: 23.3 MG/DL (ref 6–20)
CALCIUM SERPL-MCNC: 9.6 MG/DL (ref 8.8–10.4)
CHLORIDE SERPL-SCNC: 105 MMOL/L (ref 98–107)
CREAT SERPL-MCNC: 1.03 MG/DL (ref 0.51–0.95)
EGFRCR SERPLBLD CKD-EPI 2021: 68 ML/MIN/1.73M2
EOSINOPHIL # BLD AUTO: 0.3 10E3/UL (ref 0–0.7)
EOSINOPHIL NFR BLD AUTO: 4 %
ERYTHROCYTE [DISTWIDTH] IN BLOOD BY AUTOMATED COUNT: 12.5 % (ref 10–15)
ERYTHROCYTE [SEDIMENTATION RATE] IN BLOOD BY WESTERGREN METHOD: 8 MM/HR (ref 0–20)
GLUCOSE SERPL-MCNC: 89 MG/DL (ref 70–99)
HCO3 SERPL-SCNC: 24 MMOL/L (ref 22–29)
HCT VFR BLD AUTO: 41 % (ref 35–47)
HGB BLD-MCNC: 13.7 G/DL (ref 11.7–15.7)
IMM GRANULOCYTES # BLD: 0 10E3/UL
IMM GRANULOCYTES NFR BLD: 0 %
LYMPHOCYTES # BLD AUTO: 2.3 10E3/UL (ref 0.8–5.3)
LYMPHOCYTES NFR BLD AUTO: 29 %
MCH RBC QN AUTO: 31.1 PG (ref 26.5–33)
MCHC RBC AUTO-ENTMCNC: 33.4 G/DL (ref 31.5–36.5)
MCV RBC AUTO: 93 FL (ref 78–100)
MONOCYTES # BLD AUTO: 0.7 10E3/UL (ref 0–1.3)
MONOCYTES NFR BLD AUTO: 9 %
NEUTROPHILS # BLD AUTO: 4.4 10E3/UL (ref 1.6–8.3)
NEUTROPHILS NFR BLD AUTO: 58 %
NRBC # BLD AUTO: 0 10E3/UL
NRBC BLD AUTO-RTO: 0 /100
PLATELET # BLD AUTO: 331 10E3/UL (ref 150–450)
POTASSIUM SERPL-SCNC: 4.4 MMOL/L (ref 3.4–5.3)
PROT SERPL-MCNC: 6.9 G/DL (ref 6.4–8.3)
RBC # BLD AUTO: 4.4 10E6/UL (ref 3.8–5.2)
SODIUM SERPL-SCNC: 139 MMOL/L (ref 135–145)
WBC # BLD AUTO: 7.7 10E3/UL (ref 4–11)

## 2025-02-18 PROCEDURE — 85025 COMPLETE CBC W/AUTO DIFF WBC: CPT

## 2025-02-18 PROCEDURE — 36415 COLL VENOUS BLD VENIPUNCTURE: CPT

## 2025-02-18 PROCEDURE — 99214 OFFICE O/P EST MOD 30 MIN: CPT | Performed by: INTERNAL MEDICINE

## 2025-02-18 PROCEDURE — G2211 COMPLEX E/M VISIT ADD ON: HCPCS | Performed by: INTERNAL MEDICINE

## 2025-02-18 PROCEDURE — 85652 RBC SED RATE AUTOMATED: CPT

## 2025-02-18 PROCEDURE — 99215 OFFICE O/P EST HI 40 MIN: CPT | Performed by: INTERNAL MEDICINE

## 2025-02-18 PROCEDURE — 80053 COMPREHEN METABOLIC PANEL: CPT

## 2025-02-18 ASSESSMENT — PAIN SCALES - GENERAL: PAINLEVEL_OUTOF10: NO PAIN (0)

## 2025-02-18 NOTE — LETTER
"2/18/2025      Amna Oneil  [address]  [place] [address]      Dear Colleague,    Thank you for referring your patient, Amna Oneil, to the [facility] CANCER [facility]. Please see a copy of my visit note below.    Oncology Rooming Note    February 18, 2025 8:18 AM   Amna Oneil is a 44 year old female who presents for:    Chief Complaint   Patient presents with     Oncology Clinic Visit     History of nodular sclerosis Hodgkin disease     Initial Vitals: /72 (BP Location: Left arm, Patient Position: Sitting, Cuff Size: Adult Regular)   Pulse 71   Temp 97  F (36.1  C) (Tympanic)   Resp 18   Ht 1.702 m (5' 7.01\")   Wt 81.2 kg (179 lb 1.6 oz)   SpO2 96%   BMI 28.04 kg/m   Estimated body mass index is 28.04 kg/m  as calculated from the following:    Height as of this encounter: 1.702 m (5' 7.01\").    Weight as of this encounter: 81.2 kg (179 lb 1.6 oz). Body surface area is 1.96 meters squared.  No Pain (0) Comment: Data Unavailable   No LMP recorded.  Allergies reviewed: Yes  Medications reviewed: Yes    Medications: Medication refills not needed today.  Pharmacy name entered into [employer]: [id number] - [place], [place] - [address]    Frailty Screening:   Is the patient here for a new oncology consult visit in cancer care? 2. No    PHQ9:  Did this patient require a PHQ9?: No      Clinical concerns: review labs 6 mo followup      Yani Arcos MA              [facility] Hematology and Oncology Progress Note    Patient: Amna Oneil  MRN: [id number]  Date of Service: Feb 18, 2025     Reason for Visit    Chief Complaint   Patient presents with     Oncology Clinic Visit     History of nodular sclerosis Hodgkin disease       Assessment and Plan     Cancer Staging   No matching staging information was found for the patient.      ECOG Performance    0 - Independent     Pain  Pain Score: No Pain (0)    #.  History of classical Hodgkin lymphoma, stage II " (favorable risk)   #.  History of acute bilateral pulmonary emboli without evidence of saddle embolus or central emboli in 2/2022.   Likely provoked in the setting of recent COVID-19 infection, active chemotherapy for lymphoma in the setting of ongoing birth control pill use. She has stopped birth control pills.  She tolerated Xarelto well without side effects or intolerance. She completed 6 months of anticoagulation therapy in 8/2022.    Assessment & Plan  Hodgkin Lymphoma  Stable on exam.  Reviewed his CT scan of chest images and report independently.  CT scan shows pleural thickening, likely due to prior chemotherapy. No signs of infection.  No signs of lymphoma recurrence.  Recent blood work shows a slight increase in BUN and creatinine levels, which she attributes to creatine use. Electrolytes, liver function, and blood counts are normal.  -Continue current management with clinical surveillance.  -Plan for follow-up in 6 months with blood work and clinical exam.    Vertigo  New onset of dizzy spells, diagnosed by primary care physician. No impact on daily activities or breathing.  -Continue management as directed by primary care physician.    Renal Function  Slight elevation in BUN and creatinine, possibly due to creatine use.  -Ensure adequate water intake.    Personal and Family History of Blood Clots  Brother has history of blood clots, patient had a previous episode during chemotherapy. No genetic testing done.  -Be mindful of potential increased risk for blood clots in high-risk situations (e.g., surgery, illness).  -Consider genetic testing if brother tests positive or if patient becomes curious about genetic risk.    Mammogram  Recent mammogram results are clear.  -Continue routine screenings as recommended.  Surveillance: H&P and lab every 3-6 month for 1-2 yr, then every 6-12 months year 3, then annually to complete 5 years (NCCN guidelines). Imaging as clinically indicated.    Encounter  Diagnoses:    Problem List Items Addressed This Visit          Oncology Diagnoses    History of nodular sclerosis Hodgkin disease - Primary    Relevant Orders    CBC with Platelets & Differential    Comprehensive metabolic panel    Erythrocyte sedimentation rate auto              CC: Physician No Ref-Primary   ______________________________________________________________________________  Diagnosis  12/13/2021-classical Hodgkin lymphoma by left axillary lymph node core needle biopsy.  12/23/2021-PET/CT scan showed hypermetabolic lymph nodes involving basilar neck/superior mediastinum, mediastinum, right pericardial space, bilateral axillae.   -second opinion pathology evaluation at [facility] and it confirmed classical Hodgkin lymphoma.  Echocardiogram showed adequate cardiac function.  Pulmonary function test was normal   stage II (favorable risk)    IPSS-0 risk factor, FFP 88%, overall survival 98%.    Treatment to date  2/3/2022-5/23/2022-completed 4 cycles of ABVD (bleomycin was omitted in cycle #3 and #4 due to pulmonary toxicity) treatment course was complicated by pulmonary emboli, PCP pneumonia.  She achieved complete radiographic response (no uptake by PET) after 2 cycles of ABVD.    History of Present Illness    Ms. Amna Oneil presented today accompanied by her .    History of Present Illness  Amna Oneil is a 44 year old female with a history of lymphoma who presents for a follow-up visit.    Recent CT scan shows pleural thickening, which she attributes to prior chemotherapy. Infection-like findings on the CT have resolved. A stable lung nodule is observed on imaging, with no changes noted and no symptoms or concerns at this time.    She has a personal history of blood clots during chemotherapy, possibly related to COVID, chemotherapy, or birth control pills. She has been on birth control for twenty years without prior issues. Her brother has a history of blood clots, is a  "smoker, and has had clots in his leg and neck, requiring blood thinners. No mention of other family members with blood clotting issues.    Breathing is good with no cough or irritation. Shortness of breath occurs only during intense workouts, which she considers normal and not limiting her daily activities.    She experiences dizzy spells diagnosed as vertigo by her primary care physician, with symptoms that come and go suddenly.        Review of systems  Apart from describing in HPI, the remainder of comprehensive ROS was negative.    Past History    Past Medical History:   Diagnosis Date     Bilateral pulmonary embolism (H) 03/16/2022     Depressive disorder 2007    No current symptoms     Hodgkin lymphoma (H)      Pulmonary emboli (H)        Past Surgical History:   Procedure Laterality Date     APPENDECTOMY  4/2008     BIOPSY  12/2021 & 1/2022    Lymphoma diagnosis- lymph node and bone marrow     COLONOSCOPY  11/2021    Clear     IR CHEST PORT PLACEMENT > 5 YRS OF AGE  01/21/2022     IR PORT REMOVAL RIGHT  03/08/2023         Physical Exam    /72 (BP Location: Left arm, Patient Position: Sitting, Cuff Size: Adult Regular)   Pulse 71   Temp 97  F (36.1  C) (Tympanic)   Resp 18   Ht 1.702 m (5' 7.01\")   Wt 81.2 kg (179 lb 1.6 oz)   SpO2 96%   BMI 28.04 kg/m      General: alert, awake, not in acute distress  HEENT: Head: Normal, normocephalic, atraumatic.  Eye: Normal external eye, conjunctiva, lids cornea, PATRICIA.  Pharynx: Normal buccal mucosa. Normal pharynx.  Neck / Thyroid: Supple, no masses, nodes, nodules or enlargement.  Lymphatics: No abnormally enlarged lymph nodes.  Chest: Normal chest wall and respirations. Clear to auscultation.  Heart: S1 S2 RRR.   Abdomen: abdomen is soft without significant tenderness, masses, organomegaly or guarding  Extremities: normal strength, tone, and muscle mass  Skin: normal. no rash or abnormalities  CNS: non focal.    Lab Results    Recent Results (from the past " week)   Comprehensive metabolic panel   Result Value Ref Range    Sodium 139 135 - 145 mmol/L    Potassium 4.4 3.4 - 5.3 mmol/L    Carbon Dioxide (CO2) 24 22 - 29 mmol/L    Anion Gap 10 7 - 15 mmol/L    Urea Nitrogen 23.3 (H) 6.0 - 20.0 mg/dL    Creatinine 1.03 (H) 0.51 - 0.95 mg/dL    GFR Estimate 68 >60 mL/min/1.73m2    Calcium 9.6 8.8 - 10.4 mg/dL    Chloride 105 98 - 107 mmol/L    Glucose 89 70 - 99 mg/dL    Alkaline Phosphatase 56 40 - 150 U/L    AST 21 0 - 45 U/L    ALT 7 0 - 50 U/L    Protein Total 6.9 6.4 - 8.3 g/dL    Albumin 4.4 3.5 - 5.2 g/dL    Bilirubin Total 0.6 <=1.2 mg/dL   ESR: Erythrocyte sedimentation rate   Result Value Ref Range    Erythrocyte Sedimentation Rate 8 0 - 20 mm/hr   CBC with platelets and differential   Result Value Ref Range    WBC Count 7.7 4.0 - 11.0 10e3/uL    RBC Count 4.40 3.80 - 5.20 10e6/uL    Hemoglobin 13.7 11.7 - 15.7 g/dL    Hematocrit 41.0 35.0 - 47.0 %    MCV 93 78 - 100 fL    MCH 31.1 26.5 - 33.0 pg    MCHC 33.4 31.5 - 36.5 g/dL    RDW 12.5 10.0 - 15.0 %    Platelet Count 331 150 - 450 10e3/uL    % Neutrophils 58 %    % Lymphocytes 29 %    % Monocytes 9 %    % Eosinophils 4 %    % Basophils 0 %    % Immature Granulocytes 0 %    NRBCs per 100 WBC 0 <1 /100    Absolute Neutrophils 4.4 1.6 - 8.3 10e3/uL    Absolute Lymphocytes 2.3 0.8 - 5.3 10e3/uL    Absolute Monocytes 0.7 0.0 - 1.3 10e3/uL    Absolute Eosinophils 0.3 0.0 - 0.7 10e3/uL    Absolute Basophils 0.0 0.0 - 0.2 10e3/uL    Absolute Immature Granulocytes 0.0 <=0.4 10e3/uL    Absolute NRBCs 0.0 10e3/uL       Imaging    CT Chest w/o Contrast    Result Date: 2/14/2025  EXAM: CT CHEST W/O CONTRAST LOCATION: [facility] DATE: 2/14/2025 INDICATION: Lymphoma. Reassess pulmonary infiltrates/pneumonia. COMPARISON: CTA chest 9/14/2024 CT CAP 2/19/2024 and 2/13/2023 TECHNIQUE: CT chest without IV contrast. Multiplanar reformats were obtained. Dose reduction techniques were used. CONTRAST: None.  FINDINGS: LUNGS AND PLEURA: Resolution of the previously noted bilateral lower lobe pneumonia. No new pulmonary disease. Minimal biapical pleural thickening, subpleural scarring laterally adjacent to the major fissure and 4 mm nodule in the left lower lobe are stable. MEDIASTINUM/AXILLAE: Thyroid is normal. Normal residual thymic tissue. No adenopathy. CORONARY ARTERY CALCIFICATION: None. UPPER ABDOMEN: Unremarkable. MUSCULOSKELETAL: No concerning bone lesions.     IMPRESSION: 1.  Resolution of the previously noted bilateral lower lobe pneumonia. 2.  No evidence of recurrent lymphoma. 3.  Other noncritical findings as noted above.     MA Screen Bilateral w/August    Result Date: 2/11/2025  BILATERAL FULL FIELD DIGITAL SCREENING MAMMOGRAM WITH TOMOSYNTHESIS Performed on: 2/11/25 Compared to: 02/09/2024, 02/06/2023, 12/13/2021, and 12/09/2021 Technique:  This study was evaluated with the assistance of Computer-Aided Detection.  Breast Tomosynthesis was used in interpretation. Findings: The breasts are heterogeneously dense, which may obscure small masses.  There is no radiographic evidence of malignancy.     IMPRESSION: ACR BI-RADS Category 1: Negative BREAST CANCER SCREENING RECOMMENDATION: Routine yearly mammography beginning at age 40 or as discussed with your provider. The results and recommendations of this examination will be communicated to the patient. Lucy Rivera, DO        The longitudinal plan of care for the diagnosis(es)/condition(s) as documented were addressed during this visit. Due to the added complexity in care, I will continue to support Amna in the subsequent management and with ongoing continuity of care.     40 minutes spent by me on the date of the encounter doing chart review, history and exam, documentation and further activities as noted above.    Consent was obtained from the patient to use an AI documentation tool in the creation of this note.  Signed by: Juan Lizarraga MD      Again,  thank you for allowing me to participate in the care of your patient.        Sincerely,        Juan Lizarraga MD    Electronically signed

## 2025-02-18 NOTE — PROGRESS NOTES
"Oncology Rooming Note    February 18, 2025 8:18 AM   Amna Oneil is a 44 year old female who presents for:    Chief Complaint   Patient presents with    Oncology Clinic Visit     History of nodular sclerosis Hodgkin disease     Initial Vitals: /72 (BP Location: Left arm, Patient Position: Sitting, Cuff Size: Adult Regular)   Pulse 71   Temp 97  F (36.1  C) (Tympanic)   Resp 18   Ht 1.702 m (5' 7.01\")   Wt 81.2 kg (179 lb 1.6 oz)   SpO2 96%   BMI 28.04 kg/m   Estimated body mass index is 28.04 kg/m  as calculated from the following:    Height as of this encounter: 1.702 m (5' 7.01\").    Weight as of this encounter: 81.2 kg (179 lb 1.6 oz). Body surface area is 1.96 meters squared.  No Pain (0) Comment: Data Unavailable   No LMP recorded.  Allergies reviewed: Yes  Medications reviewed: Yes    Medications: Medication refills not needed today.  Pharmacy name entered into [facility]: [id number] [place], [place] - [address]    Frailty Screening:   Is the patient here for a new oncology consult visit in cancer care? 2. No    PHQ9:  Did this patient require a PHQ9?: No      Clinical concerns: review labs 6 mo followup      Yani Arcos MA            "

## 2025-02-18 NOTE — PROGRESS NOTES
[facility] Hematology and Oncology Progress Note    Patient: Amna Oneil  MRN: [id number]  Date of Service: Feb 18, 2025     Reason for Visit    Chief Complaint   Patient presents with    Oncology Clinic Visit     History of nodular sclerosis Hodgkin disease       Assessment and Plan     Cancer Staging   No matching staging information was found for the patient.      ECOG Performance    0 - Independent     Pain  Pain Score: No Pain (0)    #.  History of classical Hodgkin lymphoma, stage II (favorable risk)   #.  History of acute bilateral pulmonary emboli without evidence of saddle embolus or central emboli in 2/2022.   Likely provoked in the setting of recent COVID-19 infection, active chemotherapy for lymphoma in the setting of ongoing birth control pill use. She has stopped birth control pills.  She tolerated Xarelto well without side effects or intolerance. She completed 6 months of anticoagulation therapy in 8/2022.    Assessment & Plan  Hodgkin Lymphoma  Stable on exam.  Reviewed his CT scan of chest images and report independently.  CT scan shows pleural thickening, likely due to prior chemotherapy. No signs of infection.  No signs of lymphoma recurrence.  Recent blood work shows a slight increase in BUN and creatinine levels, which she attributes to creatine use. Electrolytes, liver function, and blood counts are normal.  -Continue current management with clinical surveillance.  -Plan for follow-up in 6 months with blood work and clinical exam.    Vertigo  New onset of dizzy spells, diagnosed by primary care physician. No impact on daily activities or breathing.  -Continue management as directed by primary care physician.    Renal Function  Slight elevation in BUN and creatinine, possibly due to creatine use.  -Ensure adequate water intake.    Personal and Family History of Blood Clots  Brother has history of blood clots, patient had a previous episode during chemotherapy. No genetic testing  done.  -Be mindful of potential increased risk for blood clots in high-risk situations (e.g., surgery, illness).  -Consider genetic testing if brother tests positive or if patient becomes curious about genetic risk.    Mammogram  Recent mammogram results are clear.  -Continue routine screenings as recommended.  Surveillance: H&P and lab every 3-6 month for 1-2 yr, then every 6-12 months year 3, then annually to complete 5 years (NCCN guidelines). Imaging as clinically indicated.    Encounter Diagnoses:    Problem List Items Addressed This Visit          Oncology Diagnoses    History of nodular sclerosis Hodgkin disease - Primary    Relevant Orders    CBC with Platelets & Differential    Comprehensive metabolic panel    Erythrocyte sedimentation rate auto              CC: Physician No Ref-Primary   ______________________________________________________________________________  Diagnosis  12/13/2021-classical Hodgkin lymphoma by left axillary lymph node core needle biopsy.  12/23/2021-PET/CT scan showed hypermetabolic lymph nodes involving basilar neck/superior mediastinum, mediastinum, right pericardial space, bilateral axillae.   -second opinion pathology evaluation at [facility] and it confirmed classical Hodgkin lymphoma.  Echocardiogram showed adequate cardiac function.  Pulmonary function test was normal   stage II (favorable risk)    IPSS-0 risk factor, FFP 88%, overall survival 98%.    Treatment to date  2/3/2022-5/23/2022-completed 4 cycles of ABVD (bleomycin was omitted in cycle #3 and #4 due to pulmonary toxicity) treatment course was complicated by pulmonary emboli, PCP pneumonia.  She achieved complete radiographic response (no uptake by PET) after 2 cycles of ABVD.    History of Present Illness    Ms. Amna Oneil presented today accompanied by her .    History of Present Illness  Amna Oneil is a 44 year old female with a history of lymphoma who presents for a follow-up  "visit.    Recent CT scan shows pleural thickening, which she attributes to prior chemotherapy. Infection-like findings on the CT have resolved. A stable lung nodule is observed on imaging, with no changes noted and no symptoms or concerns at this time.    She has a personal history of blood clots during chemotherapy, possibly related to COVID, chemotherapy, or birth control pills. She has been on birth control for twenty years without prior issues. Her brother has a history of blood clots, is a smoker, and has had clots in his leg and neck, requiring blood thinners. No mention of other family members with blood clotting issues.    Breathing is good with no cough or irritation. Shortness of breath occurs only during intense workouts, which she considers normal and not limiting her daily activities.    She experiences dizzy spells diagnosed as vertigo by her primary care physician, with symptoms that come and go suddenly.        Review of systems  Apart from describing in HPI, the remainder of comprehensive ROS was negative.    Past History    Past Medical History:   Diagnosis Date    Bilateral pulmonary embolism (H) 03/16/2022    Depressive disorder 2007    No current symptoms    Hodgkin lymphoma (H)     Pulmonary emboli (H)        Past Surgical History:   Procedure Laterality Date    APPENDECTOMY  4/2008    BIOPSY  12/2021 & 1/2022    Lymphoma diagnosis- lymph node and bone marrow    COLONOSCOPY  11/2021    Clear    IR CHEST PORT PLACEMENT > 5 YRS OF AGE  01/21/2022    IR PORT REMOVAL RIGHT  03/08/2023         Physical Exam    /72 (BP Location: Left arm, Patient Position: Sitting, Cuff Size: Adult Regular)   Pulse 71   Temp 97  F (36.1  C) (Tympanic)   Resp 18   Ht 1.702 m (5' 7.01\")   Wt 81.2 kg (179 lb 1.6 oz)   SpO2 96%   BMI 28.04 kg/m      General: alert, awake, not in acute distress  HEENT: Head: Normal, normocephalic, atraumatic.  Eye: Normal external eye, conjunctiva, lids cornea, " PATRICIA.  Pharynx: Normal buccal mucosa. Normal pharynx.  Neck / Thyroid: Supple, no masses, nodes, nodules or enlargement.  Lymphatics: No abnormally enlarged lymph nodes.  Chest: Normal chest wall and respirations. Clear to auscultation.  Heart: S1 S2 RRR.   Abdomen: abdomen is soft without significant tenderness, masses, organomegaly or guarding  Extremities: normal strength, tone, and muscle mass  Skin: normal. no rash or abnormalities  CNS: non focal.    Lab Results    Recent Results (from the past week)   Comprehensive metabolic panel   Result Value Ref Range    Sodium 139 135 - 145 mmol/L    Potassium 4.4 3.4 - 5.3 mmol/L    Carbon Dioxide (CO2) 24 22 - 29 mmol/L    Anion Gap 10 7 - 15 mmol/L    Urea Nitrogen 23.3 (H) 6.0 - 20.0 mg/dL    Creatinine 1.03 (H) 0.51 - 0.95 mg/dL    GFR Estimate 68 >60 mL/min/1.73m2    Calcium 9.6 8.8 - 10.4 mg/dL    Chloride 105 98 - 107 mmol/L    Glucose 89 70 - 99 mg/dL    Alkaline Phosphatase 56 40 - 150 U/L    AST 21 0 - 45 U/L    ALT 7 0 - 50 U/L    Protein Total 6.9 6.4 - 8.3 g/dL    Albumin 4.4 3.5 - 5.2 g/dL    Bilirubin Total 0.6 <=1.2 mg/dL   ESR: Erythrocyte sedimentation rate   Result Value Ref Range    Erythrocyte Sedimentation Rate 8 0 - 20 mm/hr   CBC with platelets and differential   Result Value Ref Range    WBC Count 7.7 4.0 - 11.0 10e3/uL    RBC Count 4.40 3.80 - 5.20 10e6/uL    Hemoglobin 13.7 11.7 - 15.7 g/dL    Hematocrit 41.0 35.0 - 47.0 %    MCV 93 78 - 100 fL    MCH 31.1 26.5 - 33.0 pg    MCHC 33.4 31.5 - 36.5 g/dL    RDW 12.5 10.0 - 15.0 %    Platelet Count 331 150 - 450 10e3/uL    % Neutrophils 58 %    % Lymphocytes 29 %    % Monocytes 9 %    % Eosinophils 4 %    % Basophils 0 %    % Immature Granulocytes 0 %    NRBCs per 100 WBC 0 <1 /100    Absolute Neutrophils 4.4 1.6 - 8.3 10e3/uL    Absolute Lymphocytes 2.3 0.8 - 5.3 10e3/uL    Absolute Monocytes 0.7 0.0 - 1.3 10e3/uL    Absolute Eosinophils 0.3 0.0 - 0.7 10e3/uL    Absolute Basophils 0.0 0.0 - 0.2  10e3/uL    Absolute Immature Granulocytes 0.0 <=0.4 10e3/uL    Absolute NRBCs 0.0 10e3/uL       Imaging    CT Chest w/o Contrast    Result Date: 2/14/2025  EXAM: CT CHEST W/O CONTRAST LOCATION: [facility] DATE: 2/14/2025 INDICATION: Lymphoma. Reassess pulmonary infiltrates/pneumonia. COMPARISON: CTA chest 9/14/2024 CT CAP 2/19/2024 and 2/13/2023 TECHNIQUE: CT chest without IV contrast. Multiplanar reformats were obtained. Dose reduction techniques were used. CONTRAST: None. FINDINGS: LUNGS AND PLEURA: Resolution of the previously noted bilateral lower lobe pneumonia. No new pulmonary disease. Minimal biapical pleural thickening, subpleural scarring laterally adjacent to the major fissure and 4 mm nodule in the left lower lobe are stable. MEDIASTINUM/AXILLAE: Thyroid is normal. Normal residual thymic tissue. No adenopathy. CORONARY ARTERY CALCIFICATION: None. UPPER ABDOMEN: Unremarkable. MUSCULOSKELETAL: No concerning bone lesions.     IMPRESSION: 1.  Resolution of the previously noted bilateral lower lobe pneumonia. 2.  No evidence of recurrent lymphoma. 3.  Other noncritical findings as noted above.     MA Screen Bilateral w/August    Result Date: 2/11/2025  BILATERAL FULL FIELD DIGITAL SCREENING MAMMOGRAM WITH TOMOSYNTHESIS Performed on: 2/11/25 Compared to: 02/09/2024, 02/06/2023, 12/13/2021, and 12/09/2021 Technique:  This study was evaluated with the assistance of Computer-Aided Detection.  Breast Tomosynthesis was used in interpretation. Findings: The breasts are heterogeneously dense, which may obscure small masses.  There is no radiographic evidence of malignancy.     IMPRESSION: ACR BI-RADS Category 1: Negative BREAST CANCER SCREENING RECOMMENDATION: Routine yearly mammography beginning at age 40 or as discussed with your provider. The results and recommendations of this examination will be communicated to the patient. Lucy Rivera, DO        The longitudinal plan of care for the  diagnosis(es)/condition(s) as documented were addressed during this visit. Due to the added complexity in care, I will continue to support Amna in the subsequent management and with ongoing continuity of care.     40 minutes spent by me on the date of the encounter doing chart review, history and exam, documentation and further activities as noted above.    Consent was obtained from the patient to use an AI documentation tool in the creation of this note.  Signed by: Juan Lizarraga MD

## 2025-05-20 ENCOUNTER — MYC REFILL (OUTPATIENT)
Dept: FAMILY MEDICINE | Facility: CLINIC | Age: 45
End: 2025-05-20
Payer: COMMERCIAL

## 2025-05-20 DIAGNOSIS — F32.1 CURRENT MODERATE EPISODE OF MAJOR DEPRESSIVE DISORDER WITHOUT PRIOR EPISODE (H): ICD-10-CM

## 2025-05-20 RX ORDER — ESCITALOPRAM OXALATE 10 MG/1
10 TABLET ORAL DAILY
Qty: 90 TABLET | Refills: 0 | Status: SHIPPED | OUTPATIENT
Start: 2025-05-20

## 2025-05-20 RX ORDER — ESCITALOPRAM OXALATE 10 MG/1
10 TABLET ORAL DAILY
Qty: 90 TABLET | Refills: 0 | OUTPATIENT
Start: 2025-05-20

## 2025-06-16 ENCOUNTER — PATIENT OUTREACH (OUTPATIENT)
Dept: CARE COORDINATION | Facility: CLINIC | Age: 45
End: 2025-06-16
Payer: COMMERCIAL

## 2025-08-18 ENCOUNTER — ONCOLOGY VISIT (OUTPATIENT)
Dept: ONCOLOGY | Facility: HOSPITAL | Age: 45
End: 2025-08-18
Attending: INTERNAL MEDICINE
Payer: COMMERCIAL

## 2025-08-18 ENCOUNTER — LAB (OUTPATIENT)
Dept: INFUSION THERAPY | Facility: HOSPITAL | Age: 45
End: 2025-08-18
Attending: INTERNAL MEDICINE
Payer: COMMERCIAL

## 2025-08-18 VITALS
DIASTOLIC BLOOD PRESSURE: 75 MMHG | BODY MASS INDEX: 27.72 KG/M2 | SYSTOLIC BLOOD PRESSURE: 104 MMHG | OXYGEN SATURATION: 100 % | WEIGHT: 177 LBS | TEMPERATURE: 98.4 F | HEART RATE: 71 BPM | RESPIRATION RATE: 16 BRPM

## 2025-08-18 DIAGNOSIS — F32.1 CURRENT MODERATE EPISODE OF MAJOR DEPRESSIVE DISORDER WITHOUT PRIOR EPISODE (H): ICD-10-CM

## 2025-08-18 DIAGNOSIS — Z85.71: Primary | ICD-10-CM

## 2025-08-18 DIAGNOSIS — C81.18 NODULAR SCLEROSIS HODGKIN LYMPHOMA OF LYMPH NODES OF MULTIPLE REGIONS (H): ICD-10-CM

## 2025-08-18 LAB
ALBUMIN SERPL BCG-MCNC: 4.6 G/DL (ref 3.5–5.2)
ALP SERPL-CCNC: 58 U/L (ref 40–150)
ALT SERPL W P-5'-P-CCNC: 8 U/L (ref 0–50)
ANION GAP SERPL CALCULATED.3IONS-SCNC: 10 MMOL/L (ref 7–15)
AST SERPL W P-5'-P-CCNC: 18 U/L (ref 0–45)
BASOPHILS # BLD AUTO: 0.04 10E3/UL (ref 0–0.2)
BASOPHILS NFR BLD AUTO: 0.5 %
BILIRUB SERPL-MCNC: 0.3 MG/DL
BUN SERPL-MCNC: 18.2 MG/DL (ref 6–20)
CALCIUM SERPL-MCNC: 9.8 MG/DL (ref 8.8–10.4)
CHLORIDE SERPL-SCNC: 104 MMOL/L (ref 98–107)
CREAT SERPL-MCNC: 1.07 MG/DL (ref 0.51–0.95)
EGFRCR SERPLBLD CKD-EPI 2021: 65 ML/MIN/1.73M2
EOSINOPHIL # BLD AUTO: 0.22 10E3/UL (ref 0–0.7)
EOSINOPHIL NFR BLD AUTO: 2.9 %
ERYTHROCYTE [DISTWIDTH] IN BLOOD BY AUTOMATED COUNT: 12.5 % (ref 10–15)
ERYTHROCYTE [SEDIMENTATION RATE] IN BLOOD BY WESTERGREN METHOD: 11 MM/HR (ref 0–20)
GLUCOSE SERPL-MCNC: 82 MG/DL (ref 70–99)
HCO3 SERPL-SCNC: 26 MMOL/L (ref 22–29)
HCT VFR BLD AUTO: 39.4 % (ref 35–47)
HGB BLD-MCNC: 13.2 G/DL (ref 11.7–15.7)
IMM GRANULOCYTES # BLD: <0.03 10E3/UL
IMM GRANULOCYTES NFR BLD: 0.1 %
LYMPHOCYTES # BLD AUTO: 2.32 10E3/UL (ref 0.8–5.3)
LYMPHOCYTES NFR BLD AUTO: 30.3 %
MCH RBC QN AUTO: 31.5 PG (ref 26.5–33)
MCHC RBC AUTO-ENTMCNC: 33.5 G/DL (ref 31.5–36.5)
MCV RBC AUTO: 94 FL (ref 78–100)
MONOCYTES # BLD AUTO: 0.67 10E3/UL (ref 0–1.3)
MONOCYTES NFR BLD AUTO: 8.7 %
NEUTROPHILS # BLD AUTO: 4.4 10E3/UL (ref 1.6–8.3)
NEUTROPHILS NFR BLD AUTO: 57.5 %
NRBC # BLD AUTO: <0.03 10E3/UL
NRBC BLD AUTO-RTO: 0 /100
PLATELET # BLD AUTO: 336 10E3/UL (ref 150–450)
POTASSIUM SERPL-SCNC: 4.6 MMOL/L (ref 3.4–5.3)
PROT SERPL-MCNC: 6.9 G/DL (ref 6.4–8.3)
RBC # BLD AUTO: 4.19 10E6/UL (ref 3.8–5.2)
SODIUM SERPL-SCNC: 140 MMOL/L (ref 135–145)
WBC # BLD AUTO: 7.66 10E3/UL (ref 4–11)

## 2025-08-18 PROCEDURE — 83001 ASSAY OF GONADOTROPIN (FSH): CPT

## 2025-08-18 PROCEDURE — 99213 OFFICE O/P EST LOW 20 MIN: CPT

## 2025-08-18 PROCEDURE — 84443 ASSAY THYROID STIM HORMONE: CPT

## 2025-08-18 PROCEDURE — 36415 COLL VENOUS BLD VENIPUNCTURE: CPT

## 2025-08-18 PROCEDURE — 85004 AUTOMATED DIFF WBC COUNT: CPT

## 2025-08-18 PROCEDURE — 85652 RBC SED RATE AUTOMATED: CPT

## 2025-08-18 PROCEDURE — G2211 COMPLEX E/M VISIT ADD ON: HCPCS

## 2025-08-18 PROCEDURE — 82040 ASSAY OF SERUM ALBUMIN: CPT

## 2025-08-18 PROCEDURE — 99214 OFFICE O/P EST MOD 30 MIN: CPT

## 2025-08-18 RX ORDER — HYDROCORTISONE 25 MG/G
OINTMENT TOPICAL
COMMUNITY
Start: 2025-08-06

## 2025-08-18 ASSESSMENT — PAIN SCALES - GENERAL: PAINLEVEL_OUTOF10: NO PAIN (0)

## 2025-08-20 ENCOUNTER — E-VISIT (OUTPATIENT)
Dept: FAMILY MEDICINE | Facility: CLINIC | Age: 45
End: 2025-08-20
Payer: COMMERCIAL

## 2025-08-20 DIAGNOSIS — F32.1 CURRENT MODERATE EPISODE OF MAJOR DEPRESSIVE DISORDER WITHOUT PRIOR EPISODE (H): Primary | ICD-10-CM

## 2025-08-20 ASSESSMENT — ANXIETY QUESTIONNAIRES
8. IF YOU CHECKED OFF ANY PROBLEMS, HOW DIFFICULT HAVE THESE MADE IT FOR YOU TO DO YOUR WORK, TAKE CARE OF THINGS AT HOME, OR GET ALONG WITH OTHER PEOPLE?: VERY DIFFICULT
7. FEELING AFRAID AS IF SOMETHING AWFUL MIGHT HAPPEN: NOT AT ALL
1. FEELING NERVOUS, ANXIOUS, OR ON EDGE: SEVERAL DAYS
4. TROUBLE RELAXING: SEVERAL DAYS
6. BECOMING EASILY ANNOYED OR IRRITABLE: NEARLY EVERY DAY
IF YOU CHECKED OFF ANY PROBLEMS ON THIS QUESTIONNAIRE, HOW DIFFICULT HAVE THESE PROBLEMS MADE IT FOR YOU TO DO YOUR WORK, TAKE CARE OF THINGS AT HOME, OR GET ALONG WITH OTHER PEOPLE: VERY DIFFICULT
2. NOT BEING ABLE TO STOP OR CONTROL WORRYING: SEVERAL DAYS
GAD7 TOTAL SCORE: 8
GAD7 TOTAL SCORE: 8
3. WORRYING TOO MUCH ABOUT DIFFERENT THINGS: MORE THAN HALF THE DAYS
GAD7 TOTAL SCORE: 8
7. FEELING AFRAID AS IF SOMETHING AWFUL MIGHT HAPPEN: NOT AT ALL
5. BEING SO RESTLESS THAT IT IS HARD TO SIT STILL: NOT AT ALL

## 2025-08-20 ASSESSMENT — PATIENT HEALTH QUESTIONNAIRE - PHQ9
10. IF YOU CHECKED OFF ANY PROBLEMS, HOW DIFFICULT HAVE THESE PROBLEMS MADE IT FOR YOU TO DO YOUR WORK, TAKE CARE OF THINGS AT HOME, OR GET ALONG WITH OTHER PEOPLE: SOMEWHAT DIFFICULT
SUM OF ALL RESPONSES TO PHQ QUESTIONS 1-9: 3
SUM OF ALL RESPONSES TO PHQ QUESTIONS 1-9: 3

## 2025-08-21 PROBLEM — G89.29 CHRONIC PAIN OF RIGHT THUMB: Status: RESOLVED | Noted: 2023-08-23 | Resolved: 2025-08-21

## 2025-08-21 PROBLEM — M79.644 CHRONIC PAIN OF RIGHT THUMB: Status: RESOLVED | Noted: 2023-08-23 | Resolved: 2025-08-21

## 2025-08-22 LAB
FSH SERPL IRP2-ACNC: 5 MIU/ML
TSH SERPL DL<=0.005 MIU/L-ACNC: 2.3 UIU/ML (ref 0.3–4.2)

## (undated) RX ORDER — HEPARIN SODIUM (PORCINE) LOCK FLUSH IV SOLN 100 UNIT/ML 100 UNIT/ML
SOLUTION INTRAVENOUS
Status: DISPENSED
Start: 2023-02-13

## (undated) RX ORDER — HEPARIN SODIUM (PORCINE) LOCK FLUSH IV SOLN 100 UNIT/ML 100 UNIT/ML
SOLUTION INTRAVENOUS
Status: DISPENSED
Start: 2022-05-16

## (undated) RX ORDER — FENTANYL CITRATE 50 UG/ML
INJECTION, SOLUTION INTRAMUSCULAR; INTRAVENOUS
Status: DISPENSED
Start: 2023-03-08

## (undated) RX ORDER — LIDOCAINE HYDROCHLORIDE 10 MG/ML
INJECTION, SOLUTION EPIDURAL; INFILTRATION; INTRACAUDAL; PERINEURAL
Status: DISPENSED
Start: 2022-01-21

## (undated) RX ORDER — FENTANYL CITRATE 50 UG/ML
INJECTION, SOLUTION INTRAMUSCULAR; INTRAVENOUS
Status: DISPENSED
Start: 2022-01-21

## (undated) RX ORDER — LIDOCAINE HYDROCHLORIDE 10 MG/ML
INJECTION, SOLUTION INFILTRATION; PERINEURAL
Status: DISPENSED
Start: 2023-03-08

## (undated) RX ORDER — HEPARIN SODIUM (PORCINE) LOCK FLUSH IV SOLN 100 UNIT/ML 100 UNIT/ML
SOLUTION INTRAVENOUS
Status: DISPENSED
Start: 2022-08-29

## (undated) RX ORDER — LIDOCAINE HYDROCHLORIDE AND EPINEPHRINE 10; 10 MG/ML; UG/ML
INJECTION, SOLUTION INFILTRATION; PERINEURAL
Status: DISPENSED
Start: 2022-01-21

## (undated) RX ORDER — HEPARIN SODIUM 200 [USP'U]/100ML
INJECTION, SOLUTION INTRAVENOUS
Status: DISPENSED
Start: 2022-01-21

## (undated) RX ORDER — HEPARIN SODIUM (PORCINE) LOCK FLUSH IV SOLN 100 UNIT/ML 100 UNIT/ML
SOLUTION INTRAVENOUS
Status: DISPENSED
Start: 2022-01-21